# Patient Record
Sex: FEMALE | Race: ASIAN | NOT HISPANIC OR LATINO | Employment: UNEMPLOYED | URBAN - METROPOLITAN AREA
[De-identification: names, ages, dates, MRNs, and addresses within clinical notes are randomized per-mention and may not be internally consistent; named-entity substitution may affect disease eponyms.]

---

## 2017-01-11 ENCOUNTER — COMMUNICATION - HEALTHEAST (OUTPATIENT)
Dept: NURSING | Facility: CLINIC | Age: 57
End: 2017-01-11

## 2017-02-07 ENCOUNTER — OFFICE VISIT - HEALTHEAST (OUTPATIENT)
Dept: FAMILY MEDICINE | Facility: CLINIC | Age: 57
End: 2017-02-07

## 2017-02-07 DIAGNOSIS — R52 PAIN: ICD-10-CM

## 2017-02-07 DIAGNOSIS — E11.9 TYPE 2 DIABETES MELLITUS WITHOUT COMPLICATION, WITH LONG-TERM CURRENT USE OF INSULIN (H): ICD-10-CM

## 2017-02-07 DIAGNOSIS — Z79.4 TYPE 2 DIABETES MELLITUS WITHOUT COMPLICATION, WITH LONG-TERM CURRENT USE OF INSULIN (H): ICD-10-CM

## 2017-02-07 DIAGNOSIS — E55.9 VITAMIN D DEFICIENCY: ICD-10-CM

## 2017-02-07 LAB — HBA1C MFR BLD: 10.8 % (ref 3.5–6)

## 2017-02-07 ASSESSMENT — MIFFLIN-ST. JEOR: SCORE: 1190.05

## 2017-02-21 ENCOUNTER — OFFICE VISIT - HEALTHEAST (OUTPATIENT)
Dept: FAMILY MEDICINE | Facility: CLINIC | Age: 57
End: 2017-02-21

## 2017-02-21 DIAGNOSIS — R35.0 URINARY FREQUENCY: ICD-10-CM

## 2017-02-21 DIAGNOSIS — M54.50 ACUTE RIGHT-SIDED LOW BACK PAIN WITHOUT SCIATICA: ICD-10-CM

## 2017-02-21 DIAGNOSIS — R50.9 FEVER: ICD-10-CM

## 2017-02-21 DIAGNOSIS — J02.0 STREP PHARYNGITIS: ICD-10-CM

## 2017-02-21 DIAGNOSIS — J02.9 SORE THROAT: ICD-10-CM

## 2017-02-21 ASSESSMENT — MIFFLIN-ST. JEOR: SCORE: 1187.78

## 2017-02-27 ENCOUNTER — RECORDS - HEALTHEAST (OUTPATIENT)
Dept: ADMINISTRATIVE | Facility: OTHER | Age: 57
End: 2017-02-27

## 2017-03-02 ENCOUNTER — OFFICE VISIT - HEALTHEAST (OUTPATIENT)
Dept: NURSING | Facility: CLINIC | Age: 57
End: 2017-03-02

## 2017-03-02 DIAGNOSIS — E55.9 VITAMIN D DEFICIENCY: ICD-10-CM

## 2017-03-02 DIAGNOSIS — Z79.4 TYPE 2 DIABETES MELLITUS WITHOUT COMPLICATION, WITH LONG-TERM CURRENT USE OF INSULIN (H): ICD-10-CM

## 2017-03-02 DIAGNOSIS — E78.49 OTHER HYPERLIPIDEMIA: ICD-10-CM

## 2017-03-02 DIAGNOSIS — E11.9 TYPE 2 DIABETES MELLITUS WITHOUT COMPLICATION, WITH LONG-TERM CURRENT USE OF INSULIN (H): ICD-10-CM

## 2017-03-02 DIAGNOSIS — Z79.899 POLYPHARMACY: ICD-10-CM

## 2017-03-02 DIAGNOSIS — F41.1 ANXIETY STATE: ICD-10-CM

## 2017-03-02 DIAGNOSIS — F33.1 MAJOR DEPRESSIVE DISORDER, RECURRENT EPISODE, MODERATE (H): ICD-10-CM

## 2017-03-08 ENCOUNTER — COMMUNICATION - HEALTHEAST (OUTPATIENT)
Dept: NURSING | Facility: CLINIC | Age: 57
End: 2017-03-08

## 2017-03-16 ENCOUNTER — AMBULATORY - HEALTHEAST (OUTPATIENT)
Dept: FAMILY MEDICINE | Facility: CLINIC | Age: 57
End: 2017-03-16

## 2017-03-16 ENCOUNTER — OFFICE VISIT - HEALTHEAST (OUTPATIENT)
Dept: NURSING | Facility: CLINIC | Age: 57
End: 2017-03-16

## 2017-03-16 DIAGNOSIS — J02.0 STREP THROAT: ICD-10-CM

## 2017-03-16 DIAGNOSIS — J02.0 STREP PHARYNGITIS: ICD-10-CM

## 2017-03-16 DIAGNOSIS — I10 ESSENTIAL HYPERTENSION WITH GOAL BLOOD PRESSURE LESS THAN 140/90: ICD-10-CM

## 2017-03-16 DIAGNOSIS — E11.9 TYPE 2 DIABETES MELLITUS WITHOUT COMPLICATION, WITH LONG-TERM CURRENT USE OF INSULIN (H): ICD-10-CM

## 2017-03-16 DIAGNOSIS — Z79.4 TYPE 2 DIABETES MELLITUS WITHOUT COMPLICATION, WITH LONG-TERM CURRENT USE OF INSULIN (H): ICD-10-CM

## 2017-04-19 ENCOUNTER — AMBULATORY - HEALTHEAST (OUTPATIENT)
Dept: EDUCATION SERVICES | Facility: CLINIC | Age: 57
End: 2017-04-19

## 2017-04-20 ENCOUNTER — COMMUNICATION - HEALTHEAST (OUTPATIENT)
Dept: NURSING | Facility: CLINIC | Age: 57
End: 2017-04-20

## 2017-04-22 ENCOUNTER — COMMUNICATION - HEALTHEAST (OUTPATIENT)
Dept: FAMILY MEDICINE | Facility: CLINIC | Age: 57
End: 2017-04-22

## 2017-04-22 DIAGNOSIS — I10 UNSPECIFIED ESSENTIAL HYPERTENSION: ICD-10-CM

## 2017-04-22 DIAGNOSIS — E11.9 TYPE 2 DIABETES MELLITUS (H): ICD-10-CM

## 2017-04-22 DIAGNOSIS — E78.5 HYPERLIPIDEMIA: ICD-10-CM

## 2017-05-19 ENCOUNTER — OFFICE VISIT - HEALTHEAST (OUTPATIENT)
Dept: FAMILY MEDICINE | Facility: CLINIC | Age: 57
End: 2017-05-19

## 2017-05-19 DIAGNOSIS — I10 ESSENTIAL HYPERTENSION WITH GOAL BLOOD PRESSURE LESS THAN 140/90: ICD-10-CM

## 2017-05-19 DIAGNOSIS — E11.9 TYPE 2 DIABETES MELLITUS WITHOUT COMPLICATION, WITH LONG-TERM CURRENT USE OF INSULIN (H): ICD-10-CM

## 2017-05-19 DIAGNOSIS — Z79.4 TYPE 2 DIABETES MELLITUS WITHOUT COMPLICATION, WITH LONG-TERM CURRENT USE OF INSULIN (H): ICD-10-CM

## 2017-05-19 DIAGNOSIS — F33.1 MAJOR DEPRESSIVE DISORDER, RECURRENT EPISODE, MODERATE (H): ICD-10-CM

## 2017-05-19 LAB — HBA1C MFR BLD: 8.7 % (ref 3.5–6)

## 2017-05-19 ASSESSMENT — MIFFLIN-ST. JEOR: SCORE: 1202.52

## 2017-05-19 NOTE — ASSESSMENT & PLAN NOTE
Unable to take ACE Inhibitor due to angioedema from lisinopril  Excellent control on current regimen. No changes. Recheck in 1 year.

## 2017-06-01 ENCOUNTER — COMMUNICATION - HEALTHEAST (OUTPATIENT)
Dept: NURSING | Facility: CLINIC | Age: 57
End: 2017-06-01

## 2017-07-05 ENCOUNTER — AMBULATORY - HEALTHEAST (OUTPATIENT)
Dept: EDUCATION SERVICES | Facility: CLINIC | Age: 57
End: 2017-07-05

## 2017-07-13 ENCOUNTER — COMMUNICATION - HEALTHEAST (OUTPATIENT)
Dept: NURSING | Facility: CLINIC | Age: 57
End: 2017-07-13

## 2017-08-29 ENCOUNTER — COMMUNICATION - HEALTHEAST (OUTPATIENT)
Dept: NURSING | Facility: CLINIC | Age: 57
End: 2017-08-29

## 2017-09-02 ENCOUNTER — COMMUNICATION - HEALTHEAST (OUTPATIENT)
Dept: FAMILY MEDICINE | Facility: CLINIC | Age: 57
End: 2017-09-02

## 2017-09-02 DIAGNOSIS — K31.9 DISEASE OF STOMACH AND DUODENUM: ICD-10-CM

## 2017-09-02 DIAGNOSIS — F32.A DEPRESSION: ICD-10-CM

## 2017-09-20 ENCOUNTER — AMBULATORY - HEALTHEAST (OUTPATIENT)
Dept: EDUCATION SERVICES | Facility: CLINIC | Age: 57
End: 2017-09-20

## 2017-09-20 DIAGNOSIS — F32.A DEPRESSION: ICD-10-CM

## 2017-09-20 DIAGNOSIS — I10 UNSPECIFIED ESSENTIAL HYPERTENSION: ICD-10-CM

## 2017-09-20 LAB — HBA1C MFR BLD: 10.1 % (ref 3.5–6)

## 2017-10-03 ENCOUNTER — COMMUNICATION - HEALTHEAST (OUTPATIENT)
Dept: NURSING | Facility: CLINIC | Age: 57
End: 2017-10-03

## 2017-10-10 ENCOUNTER — OFFICE VISIT - HEALTHEAST (OUTPATIENT)
Dept: FAMILY MEDICINE | Facility: CLINIC | Age: 57
End: 2017-10-10

## 2017-10-10 DIAGNOSIS — E11.9 TYPE 2 DIABETES MELLITUS WITHOUT COMPLICATION, WITH LONG-TERM CURRENT USE OF INSULIN (H): ICD-10-CM

## 2017-10-10 DIAGNOSIS — E78.5 HYPERLIPIDEMIA: ICD-10-CM

## 2017-10-10 DIAGNOSIS — Z23 NEED FOR VACCINATION: ICD-10-CM

## 2017-10-10 DIAGNOSIS — E55.9 VITAMIN D DEFICIENCY: ICD-10-CM

## 2017-10-10 DIAGNOSIS — Z79.4 TYPE 2 DIABETES MELLITUS WITHOUT COMPLICATION, WITH LONG-TERM CURRENT USE OF INSULIN (H): ICD-10-CM

## 2017-10-10 DIAGNOSIS — E78.49 OTHER HYPERLIPIDEMIA: ICD-10-CM

## 2017-10-10 DIAGNOSIS — I10 ESSENTIAL HYPERTENSION: ICD-10-CM

## 2017-10-10 ASSESSMENT — MIFFLIN-ST. JEOR: SCORE: 1182.67

## 2017-10-10 NOTE — ASSESSMENT & PLAN NOTE
She is unable to take an ACE inhibitor due to angioedema from lisinopril.  She had been on propranolol, but has not taken it in the last week and her blood pressure is very good.  Will have her remain off of it for now, with recheck at upcoming appointment with our pharmacist.

## 2017-10-10 NOTE — ASSESSMENT & PLAN NOTE
Diabetes isuncontrolled.  I think that she is probably getting hypoglycemic and then overeating, resulting in many uncontrolled high blood sugars.  She does not want to give herself more injections, so splitting her long-acting and short-acting insulin is not practical and she has declined doing this in the past as well.  I think she might benefit from trying Trulicity to reduce her appetite and potentially decrease her insulin needs, thus keeping her blood sugars more even overall.  Will have her meet with our pharmacist in this regard.

## 2017-10-16 ENCOUNTER — OFFICE VISIT - HEALTHEAST (OUTPATIENT)
Dept: NURSING | Facility: CLINIC | Age: 57
End: 2017-10-16

## 2017-10-16 DIAGNOSIS — E78.5 DYSLIPIDEMIA: ICD-10-CM

## 2017-11-02 ENCOUNTER — OFFICE VISIT - HEALTHEAST (OUTPATIENT)
Dept: NURSING | Facility: CLINIC | Age: 57
End: 2017-11-02

## 2017-11-02 DIAGNOSIS — E11.9 TYPE 2 DIABETES MELLITUS WITHOUT COMPLICATION, WITH LONG-TERM CURRENT USE OF INSULIN (H): ICD-10-CM

## 2017-11-02 DIAGNOSIS — E78.5 DYSLIPIDEMIA: ICD-10-CM

## 2017-11-02 DIAGNOSIS — E66.09 CLASS 1 OBESITY DUE TO EXCESS CALORIES WITH SERIOUS COMORBIDITY AND BODY MASS INDEX (BMI) OF 31.0 TO 31.9 IN ADULT: ICD-10-CM

## 2017-11-02 DIAGNOSIS — I10 ESSENTIAL HYPERTENSION WITH GOAL BLOOD PRESSURE LESS THAN 140/90: ICD-10-CM

## 2017-11-02 DIAGNOSIS — Z79.4 TYPE 2 DIABETES MELLITUS WITHOUT COMPLICATION, WITH LONG-TERM CURRENT USE OF INSULIN (H): ICD-10-CM

## 2017-11-02 DIAGNOSIS — E66.811 CLASS 1 OBESITY DUE TO EXCESS CALORIES WITH SERIOUS COMORBIDITY AND BODY MASS INDEX (BMI) OF 31.0 TO 31.9 IN ADULT: ICD-10-CM

## 2017-11-02 DIAGNOSIS — M25.562 ACUTE PAIN OF LEFT KNEE: ICD-10-CM

## 2017-11-02 LAB
CHOLEST SERPL-MCNC: 150 MG/DL
FASTING STATUS PATIENT QL REPORTED: NO
HDLC SERPL-MCNC: 48 MG/DL
LDLC SERPL CALC-MCNC: 87 MG/DL
TRIGL SERPL-MCNC: 75 MG/DL

## 2017-11-15 ENCOUNTER — COMMUNICATION - HEALTHEAST (OUTPATIENT)
Dept: FAMILY MEDICINE | Facility: CLINIC | Age: 57
End: 2017-11-15

## 2017-12-20 ENCOUNTER — COMMUNICATION - HEALTHEAST (OUTPATIENT)
Dept: NURSING | Facility: CLINIC | Age: 57
End: 2017-12-20

## 2018-01-10 ENCOUNTER — OFFICE VISIT - HEALTHEAST (OUTPATIENT)
Dept: FAMILY MEDICINE | Facility: CLINIC | Age: 58
End: 2018-01-10

## 2018-01-10 DIAGNOSIS — I10 ESSENTIAL HYPERTENSION: ICD-10-CM

## 2018-01-10 DIAGNOSIS — Z79.4 TYPE 2 DIABETES MELLITUS WITHOUT COMPLICATION, WITH LONG-TERM CURRENT USE OF INSULIN (H): ICD-10-CM

## 2018-01-10 DIAGNOSIS — M25.562 LEFT KNEE PAIN: ICD-10-CM

## 2018-01-10 DIAGNOSIS — F33.1 MAJOR DEPRESSIVE DISORDER, RECURRENT EPISODE, MODERATE (H): ICD-10-CM

## 2018-01-10 DIAGNOSIS — E11.9 TYPE 2 DIABETES MELLITUS (H): ICD-10-CM

## 2018-01-10 DIAGNOSIS — E11.9 TYPE 2 DIABETES MELLITUS WITHOUT COMPLICATION, WITH LONG-TERM CURRENT USE OF INSULIN (H): ICD-10-CM

## 2018-01-10 DIAGNOSIS — E55.9 VITAMIN D DEFICIENCY: ICD-10-CM

## 2018-01-10 DIAGNOSIS — R10.9 RIGHT FLANK PAIN: ICD-10-CM

## 2018-01-10 LAB
ALBUMIN SERPL-MCNC: 3.6 G/DL (ref 3.5–5)
ALBUMIN UR-MCNC: NEGATIVE MG/DL
ALP SERPL-CCNC: 88 U/L (ref 45–120)
ALT SERPL W P-5'-P-CCNC: 31 U/L (ref 0–45)
ANION GAP SERPL CALCULATED.3IONS-SCNC: 13 MMOL/L (ref 5–18)
APPEARANCE UR: CLEAR
AST SERPL W P-5'-P-CCNC: 25 U/L (ref 0–40)
BILIRUB SERPL-MCNC: 0.7 MG/DL (ref 0–1)
BILIRUB UR QL STRIP: NEGATIVE
BUN SERPL-MCNC: 15 MG/DL (ref 8–22)
CALCIUM SERPL-MCNC: 9 MG/DL (ref 8.5–10.5)
CHLORIDE BLD-SCNC: 104 MMOL/L (ref 98–107)
CO2 SERPL-SCNC: 22 MMOL/L (ref 22–31)
COLOR UR AUTO: YELLOW
CREAT SERPL-MCNC: 0.83 MG/DL (ref 0.6–1.1)
CREAT UR-MCNC: 104.7 MG/DL
GFR SERPL CREATININE-BSD FRML MDRD: >60 ML/MIN/1.73M2
GLUCOSE BLD-MCNC: 204 MG/DL (ref 70–125)
GLUCOSE UR STRIP-MCNC: NEGATIVE MG/DL
HBA1C MFR BLD: 9.8 % (ref 3.5–6)
HGB UR QL STRIP: NEGATIVE
KETONES UR STRIP-MCNC: NEGATIVE MG/DL
LEUKOCYTE ESTERASE UR QL STRIP: NEGATIVE
MICROALBUMIN UR-MCNC: 0.88 MG/DL (ref 0–1.99)
MICROALBUMIN/CREAT UR: 8.4 MG/G
NITRATE UR QL: NEGATIVE
PH UR STRIP: 6 [PH] (ref 5–8)
POTASSIUM BLD-SCNC: 4.2 MMOL/L (ref 3.5–5)
PROT SERPL-MCNC: 7.4 G/DL (ref 6–8)
SODIUM SERPL-SCNC: 139 MMOL/L (ref 136–145)
SP GR UR STRIP: 1.01 (ref 1–1.03)
URATE SERPL-MCNC: 3.6 MG/DL (ref 2–7.5)
UROBILINOGEN UR STRIP-ACNC: NORMAL

## 2018-01-10 ASSESSMENT — MIFFLIN-ST. JEOR: SCORE: 1175.87

## 2018-01-10 NOTE — ASSESSMENT & PLAN NOTE
Poorly controlled on current regimen.  Will have her see our pharmacist for adjustements (discussed with PharmD Derrell Thorpe).

## 2018-01-10 NOTE — ASSESSMENT & PLAN NOTE
Borderline control on no meds.  Unable to take ACEInhibitor due to angioedema from lisinopril  Was on propranolol in the past, but we had stopped it because it hadn't seemed necessary (she had come to clinic 10/10/17 after not taking it for a week and blood pressure was good).  Continue to watch carefully.

## 2018-01-10 NOTE — ASSESSMENT & PLAN NOTE
I suspect she may have a ligamentous or meniscal injury given her relatively negative x-ray with history of swelling.  We discussed next steps, whichmight include an MRI, the patient declined at this time.

## 2018-01-11 LAB — 25(OH)D3 SERPL-MCNC: 42.9 NG/ML (ref 30–80)

## 2018-01-17 ENCOUNTER — COMMUNICATION - HEALTHEAST (OUTPATIENT)
Dept: FAMILY MEDICINE | Facility: CLINIC | Age: 58
End: 2018-01-17

## 2018-01-22 ENCOUNTER — OFFICE VISIT - HEALTHEAST (OUTPATIENT)
Dept: NURSING | Facility: CLINIC | Age: 58
End: 2018-01-22

## 2018-01-22 DIAGNOSIS — M25.569 ARTHRALGIA OF KNEE, UNSPECIFIED LATERALITY: ICD-10-CM

## 2018-02-01 ENCOUNTER — COMMUNICATION - HEALTHEAST (OUTPATIENT)
Dept: NURSING | Facility: CLINIC | Age: 58
End: 2018-02-01

## 2018-02-07 ENCOUNTER — AMBULATORY - HEALTHEAST (OUTPATIENT)
Dept: EDUCATION SERVICES | Facility: CLINIC | Age: 58
End: 2018-02-07

## 2018-02-18 ENCOUNTER — COMMUNICATION - HEALTHEAST (OUTPATIENT)
Dept: FAMILY MEDICINE | Facility: CLINIC | Age: 58
End: 2018-02-18

## 2018-02-18 DIAGNOSIS — R52 PAIN: ICD-10-CM

## 2018-02-18 DIAGNOSIS — E78.5 HYPERLIPIDEMIA: ICD-10-CM

## 2018-02-22 ENCOUNTER — OFFICE VISIT - HEALTHEAST (OUTPATIENT)
Dept: NURSING | Facility: CLINIC | Age: 58
End: 2018-02-22

## 2018-02-22 DIAGNOSIS — Z79.4 TYPE 2 DIABETES MELLITUS WITHOUT COMPLICATION, WITH LONG-TERM CURRENT USE OF INSULIN (H): ICD-10-CM

## 2018-02-22 DIAGNOSIS — E11.9 TYPE 2 DIABETES MELLITUS WITHOUT COMPLICATION, WITH LONG-TERM CURRENT USE OF INSULIN (H): ICD-10-CM

## 2018-03-12 ENCOUNTER — RECORDS - HEALTHEAST (OUTPATIENT)
Dept: ADMINISTRATIVE | Facility: OTHER | Age: 58
End: 2018-03-12

## 2018-03-14 ENCOUNTER — COMMUNICATION - HEALTHEAST (OUTPATIENT)
Dept: NURSING | Facility: CLINIC | Age: 58
End: 2018-03-14

## 2018-03-19 ENCOUNTER — AMBULATORY - HEALTHEAST (OUTPATIENT)
Dept: NURSING | Facility: CLINIC | Age: 58
End: 2018-03-19

## 2018-03-21 ENCOUNTER — OFFICE VISIT - HEALTHEAST (OUTPATIENT)
Dept: NURSING | Facility: CLINIC | Age: 58
End: 2018-03-21

## 2018-03-21 DIAGNOSIS — Z79.4 TYPE 2 DIABETES MELLITUS WITHOUT COMPLICATION, WITH LONG-TERM CURRENT USE OF INSULIN (H): ICD-10-CM

## 2018-03-21 DIAGNOSIS — I10 ESSENTIAL HYPERTENSION WITH GOAL BLOOD PRESSURE LESS THAN 140/90: ICD-10-CM

## 2018-03-21 DIAGNOSIS — E11.9 TYPE 2 DIABETES MELLITUS WITHOUT COMPLICATION, WITH LONG-TERM CURRENT USE OF INSULIN (H): ICD-10-CM

## 2018-03-21 DIAGNOSIS — G89.29 OTHER CHRONIC PAIN: ICD-10-CM

## 2018-04-11 ENCOUNTER — RECORDS - HEALTHEAST (OUTPATIENT)
Dept: ADMINISTRATIVE | Facility: OTHER | Age: 58
End: 2018-04-11

## 2018-04-13 ENCOUNTER — OFFICE VISIT - HEALTHEAST (OUTPATIENT)
Dept: FAMILY MEDICINE | Facility: CLINIC | Age: 58
End: 2018-04-13

## 2018-04-13 DIAGNOSIS — E78.49 OTHER HYPERLIPIDEMIA: ICD-10-CM

## 2018-04-13 DIAGNOSIS — Z00.00 ROUTINE GENERAL MEDICAL EXAMINATION AT A HEALTH CARE FACILITY: ICD-10-CM

## 2018-04-13 DIAGNOSIS — F33.1 MAJOR DEPRESSIVE DISORDER, RECURRENT EPISODE, MODERATE (H): ICD-10-CM

## 2018-04-13 DIAGNOSIS — R74.02 NONSPECIFIC ELEVATION OF LEVELS OF TRANSAMINASE OR LACTIC ACID DEHYDROGENASE (LDH): ICD-10-CM

## 2018-04-13 DIAGNOSIS — I10 ESSENTIAL HYPERTENSION: ICD-10-CM

## 2018-04-13 DIAGNOSIS — E11.9 TYPE 2 DIABETES MELLITUS WITHOUT COMPLICATION, WITH LONG-TERM CURRENT USE OF INSULIN (H): ICD-10-CM

## 2018-04-13 DIAGNOSIS — Z79.4 TYPE 2 DIABETES MELLITUS WITHOUT COMPLICATION, WITH LONG-TERM CURRENT USE OF INSULIN (H): ICD-10-CM

## 2018-04-13 DIAGNOSIS — R74.01 NONSPECIFIC ELEVATION OF LEVELS OF TRANSAMINASE OR LACTIC ACID DEHYDROGENASE (LDH): ICD-10-CM

## 2018-04-13 LAB
ALBUMIN SERPL-MCNC: 3.7 G/DL (ref 3.5–5)
ALP SERPL-CCNC: 89 U/L (ref 45–120)
ALT SERPL W P-5'-P-CCNC: 35 U/L (ref 0–45)
ANION GAP SERPL CALCULATED.3IONS-SCNC: 11 MMOL/L (ref 5–18)
AST SERPL W P-5'-P-CCNC: 25 U/L (ref 0–40)
BILIRUB DIRECT SERPL-MCNC: 0.2 MG/DL
BILIRUB SERPL-MCNC: 0.7 MG/DL (ref 0–1)
BUN SERPL-MCNC: 14 MG/DL (ref 8–22)
CALCIUM SERPL-MCNC: 9.4 MG/DL (ref 8.5–10.5)
CHLORIDE BLD-SCNC: 105 MMOL/L (ref 98–107)
CO2 SERPL-SCNC: 24 MMOL/L (ref 22–31)
CREAT SERPL-MCNC: 0.78 MG/DL (ref 0.6–1.1)
GFR SERPL CREATININE-BSD FRML MDRD: >60 ML/MIN/1.73M2
GLUCOSE BLD-MCNC: 200 MG/DL (ref 70–125)
HBA1C MFR BLD: 8.8 % (ref 3.5–6)
POTASSIUM BLD-SCNC: 4 MMOL/L (ref 3.5–5)
PROT SERPL-MCNC: 7.4 G/DL (ref 6–8)
SODIUM SERPL-SCNC: 140 MMOL/L (ref 136–145)

## 2018-04-13 ASSESSMENT — MIFFLIN-ST. JEOR: SCORE: 1197.42

## 2018-04-13 NOTE — ASSESSMENT & PLAN NOTE
Reports eye exam last month at a different clinic; will bring info to next appt.    Patient Instructions   Insulin:  27 Units every morning  35 Units every evening for normal meals, increase to 40 Units if bigger meal or having dessert.

## 2018-04-25 ENCOUNTER — COMMUNICATION - HEALTHEAST (OUTPATIENT)
Dept: NURSING | Facility: CLINIC | Age: 58
End: 2018-04-25

## 2018-05-09 ENCOUNTER — RECORDS - HEALTHEAST (OUTPATIENT)
Dept: MAMMOGRAPHY | Facility: CLINIC | Age: 58
End: 2018-05-09

## 2018-05-09 ENCOUNTER — OFFICE VISIT - HEALTHEAST (OUTPATIENT)
Dept: FAMILY MEDICINE | Facility: CLINIC | Age: 58
End: 2018-05-09

## 2018-05-09 DIAGNOSIS — Z12.31 ENCOUNTER FOR SCREENING MAMMOGRAM FOR MALIGNANT NEOPLASM OF BREAST: ICD-10-CM

## 2018-05-09 DIAGNOSIS — Z12.4 SCREENING FOR CERVICAL CANCER: ICD-10-CM

## 2018-05-09 DIAGNOSIS — R30.0 DYSURIA: ICD-10-CM

## 2018-05-09 DIAGNOSIS — M77.12 LATERAL EPICONDYLITIS, LEFT ELBOW: ICD-10-CM

## 2018-05-09 DIAGNOSIS — M79.622 LEFT AXILLARY PAIN: ICD-10-CM

## 2018-05-09 LAB
ALBUMIN UR-MCNC: NEGATIVE MG/DL
APPEARANCE UR: CLEAR
BACTERIA #/AREA URNS HPF: ABNORMAL HPF
BILIRUB UR QL STRIP: NEGATIVE
COLOR UR AUTO: YELLOW
GLUCOSE UR STRIP-MCNC: ABNORMAL MG/DL
HGB UR QL STRIP: NEGATIVE
KETONES UR STRIP-MCNC: NEGATIVE MG/DL
LEUKOCYTE ESTERASE UR QL STRIP: ABNORMAL
NITRATE UR QL: NEGATIVE
PH UR STRIP: 6 [PH] (ref 5–8)
RBC #/AREA URNS AUTO: ABNORMAL HPF
SP GR UR STRIP: <=1.005 (ref 1–1.03)
SQUAMOUS #/AREA URNS AUTO: ABNORMAL LPF
UROBILINOGEN UR STRIP-ACNC: ABNORMAL
WBC #/AREA URNS AUTO: ABNORMAL HPF

## 2018-05-09 ASSESSMENT — MIFFLIN-ST. JEOR: SCORE: 1189.48

## 2018-05-10 ENCOUNTER — COMMUNICATION - HEALTHEAST (OUTPATIENT)
Dept: FAMILY MEDICINE | Facility: CLINIC | Age: 58
End: 2018-05-10

## 2018-05-10 LAB
BACTERIA SPEC CULT: NO GROWTH
HPV SOURCE: NORMAL
HUMAN PAPILLOMA VIRUS 16 DNA: NEGATIVE
HUMAN PAPILLOMA VIRUS 18 DNA: NEGATIVE
HUMAN PAPILLOMA VIRUS FINAL DIAGNOSIS: NORMAL
HUMAN PAPILLOMA VIRUS OTHER HR: NEGATIVE
SPECIMEN DESCRIPTION: NORMAL

## 2018-05-16 ENCOUNTER — COMMUNICATION - HEALTHEAST (OUTPATIENT)
Dept: FAMILY MEDICINE | Facility: CLINIC | Age: 58
End: 2018-05-16

## 2018-05-29 ENCOUNTER — COMMUNICATION - HEALTHEAST (OUTPATIENT)
Dept: NURSING | Facility: CLINIC | Age: 58
End: 2018-05-29

## 2018-06-04 ENCOUNTER — COMMUNICATION - HEALTHEAST (OUTPATIENT)
Dept: NURSING | Facility: CLINIC | Age: 58
End: 2018-06-04

## 2018-06-05 ENCOUNTER — OFFICE VISIT - HEALTHEAST (OUTPATIENT)
Dept: FAMILY MEDICINE | Facility: CLINIC | Age: 58
End: 2018-06-05

## 2018-06-05 DIAGNOSIS — M54.50 ACUTE RIGHT-SIDED LOW BACK PAIN WITHOUT SCIATICA: ICD-10-CM

## 2018-06-05 DIAGNOSIS — R30.0 BURNING WITH URINATION: ICD-10-CM

## 2018-06-05 DIAGNOSIS — N30.00 ACUTE CYSTITIS WITHOUT HEMATURIA: ICD-10-CM

## 2018-06-05 LAB
ALBUMIN UR-MCNC: NEGATIVE MG/DL
APPEARANCE UR: ABNORMAL
BACTERIA #/AREA URNS HPF: ABNORMAL HPF
BILIRUB UR QL STRIP: NEGATIVE
COLOR UR AUTO: YELLOW
GLUCOSE UR STRIP-MCNC: ABNORMAL MG/DL
HGB UR QL STRIP: ABNORMAL
KETONES UR STRIP-MCNC: NEGATIVE MG/DL
LEUKOCYTE ESTERASE UR QL STRIP: ABNORMAL
MUCOUS THREADS #/AREA URNS LPF: ABNORMAL LPF
NITRATE UR QL: NEGATIVE
PH UR STRIP: 7 [PH] (ref 5–8)
RBC #/AREA URNS AUTO: ABNORMAL HPF
SP GR UR STRIP: 1.02 (ref 1–1.03)
SQUAMOUS #/AREA URNS AUTO: ABNORMAL LPF
TRANS CELLS #/AREA URNS HPF: ABNORMAL LPF
UROBILINOGEN UR STRIP-ACNC: ABNORMAL
WBC #/AREA URNS AUTO: ABNORMAL HPF
WBC CLUMPS #/AREA URNS HPF: PRESENT /[HPF]

## 2018-06-05 ASSESSMENT — MIFFLIN-ST. JEOR: SCORE: 1179.27

## 2018-06-06 ENCOUNTER — OFFICE VISIT - HEALTHEAST (OUTPATIENT)
Dept: PHARMACY | Facility: CLINIC | Age: 58
End: 2018-06-06

## 2018-06-06 DIAGNOSIS — R12 HEARTBURN: ICD-10-CM

## 2018-06-06 DIAGNOSIS — M25.562 CHRONIC PAIN OF LEFT KNEE: ICD-10-CM

## 2018-06-06 DIAGNOSIS — E11.9 TYPE 2 DIABETES MELLITUS WITHOUT COMPLICATION, WITH LONG-TERM CURRENT USE OF INSULIN (H): ICD-10-CM

## 2018-06-06 DIAGNOSIS — G89.29 CHRONIC PAIN OF LEFT KNEE: ICD-10-CM

## 2018-06-06 DIAGNOSIS — Z79.4 TYPE 2 DIABETES MELLITUS WITHOUT COMPLICATION, WITH LONG-TERM CURRENT USE OF INSULIN (H): ICD-10-CM

## 2018-06-06 DIAGNOSIS — E78.49 OTHER HYPERLIPIDEMIA: ICD-10-CM

## 2018-06-07 LAB — BACTERIA SPEC CULT: ABNORMAL

## 2018-06-27 ENCOUNTER — OFFICE VISIT - HEALTHEAST (OUTPATIENT)
Dept: FAMILY MEDICINE | Facility: CLINIC | Age: 58
End: 2018-06-27

## 2018-06-27 DIAGNOSIS — Z23 IMMUNIZATION DUE: ICD-10-CM

## 2018-06-27 DIAGNOSIS — R30.0 DYSURIA: ICD-10-CM

## 2018-06-27 DIAGNOSIS — M62.838 MUSCLE SPASM: ICD-10-CM

## 2018-06-27 LAB
ALBUMIN UR-MCNC: NEGATIVE MG/DL
APPEARANCE UR: CLEAR
BACTERIA #/AREA URNS HPF: ABNORMAL HPF
BILIRUB UR QL STRIP: NEGATIVE
COLOR UR AUTO: YELLOW
GLUCOSE UR STRIP-MCNC: ABNORMAL MG/DL
HGB UR QL STRIP: NEGATIVE
KETONES UR STRIP-MCNC: NEGATIVE MG/DL
LEUKOCYTE ESTERASE UR QL STRIP: ABNORMAL
NITRATE UR QL: NEGATIVE
PH UR STRIP: 6.5 [PH] (ref 5–8)
RBC #/AREA URNS AUTO: ABNORMAL HPF
SP GR UR STRIP: 1.01 (ref 1–1.03)
SQUAMOUS #/AREA URNS AUTO: ABNORMAL LPF
UROBILINOGEN UR STRIP-ACNC: ABNORMAL
WBC #/AREA URNS AUTO: ABNORMAL HPF

## 2018-06-27 ASSESSMENT — MIFFLIN-ST. JEOR: SCORE: 1172.47

## 2018-06-30 LAB
BACTERIA SPEC CULT: ABNORMAL
BACTERIA SPEC CULT: ABNORMAL

## 2018-07-02 ENCOUNTER — AMBULATORY - HEALTHEAST (OUTPATIENT)
Dept: FAMILY MEDICINE | Facility: CLINIC | Age: 58
End: 2018-07-02

## 2018-07-11 ENCOUNTER — OFFICE VISIT - HEALTHEAST (OUTPATIENT)
Dept: FAMILY MEDICINE | Facility: CLINIC | Age: 58
End: 2018-07-11

## 2018-07-11 ENCOUNTER — COMMUNICATION - HEALTHEAST (OUTPATIENT)
Dept: FAMILY MEDICINE | Facility: CLINIC | Age: 58
End: 2018-07-11

## 2018-07-11 DIAGNOSIS — K31.9 DISEASE OF STOMACH AND DUODENUM: ICD-10-CM

## 2018-07-11 DIAGNOSIS — N39.0 RECURRENT UTI (URINARY TRACT INFECTION): ICD-10-CM

## 2018-07-11 DIAGNOSIS — M54.50 LUMBAGO: ICD-10-CM

## 2018-07-11 ASSESSMENT — MIFFLIN-ST. JEOR: SCORE: 1174.17

## 2018-07-13 ENCOUNTER — HOSPITAL ENCOUNTER (OUTPATIENT)
Dept: CT IMAGING | Facility: HOSPITAL | Age: 58
Discharge: HOME | End: 2018-07-13
Attending: FAMILY MEDICINE

## 2018-07-13 DIAGNOSIS — N39.0 RECURRENT UTI (URINARY TRACT INFECTION): ICD-10-CM

## 2018-07-17 ENCOUNTER — COMMUNICATION - HEALTHEAST (OUTPATIENT)
Dept: NURSING | Facility: CLINIC | Age: 58
End: 2018-07-17

## 2018-07-25 ENCOUNTER — AMBULATORY - HEALTHEAST (OUTPATIENT)
Dept: LAB | Facility: CLINIC | Age: 58
End: 2018-07-25

## 2018-07-25 DIAGNOSIS — N39.0 RECURRENT UTI (URINARY TRACT INFECTION): ICD-10-CM

## 2018-07-25 LAB
ALBUMIN UR-MCNC: NEGATIVE MG/DL
APPEARANCE UR: CLEAR
BACTERIA #/AREA URNS HPF: ABNORMAL HPF
BILIRUB UR QL STRIP: NEGATIVE
COLOR UR AUTO: YELLOW
GLUCOSE UR STRIP-MCNC: NEGATIVE MG/DL
HGB UR QL STRIP: NEGATIVE
KETONES UR STRIP-MCNC: NEGATIVE MG/DL
LEUKOCYTE ESTERASE UR QL STRIP: ABNORMAL
NITRATE UR QL: NEGATIVE
PH UR STRIP: 6.5 [PH] (ref 5–8)
RBC #/AREA URNS AUTO: ABNORMAL HPF
SP GR UR STRIP: 1.01 (ref 1–1.03)
SQUAMOUS #/AREA URNS AUTO: ABNORMAL LPF
UROBILINOGEN UR STRIP-ACNC: ABNORMAL
WBC #/AREA URNS AUTO: ABNORMAL HPF

## 2018-07-26 LAB — BACTERIA SPEC CULT: NO GROWTH

## 2018-07-27 ENCOUNTER — AMBULATORY - HEALTHEAST (OUTPATIENT)
Dept: FAMILY MEDICINE | Facility: CLINIC | Age: 58
End: 2018-07-27

## 2018-07-27 DIAGNOSIS — R30.0 DYSURIA: ICD-10-CM

## 2018-08-13 ENCOUNTER — AMBULATORY - HEALTHEAST (OUTPATIENT)
Dept: LAB | Facility: CLINIC | Age: 58
End: 2018-08-13

## 2018-08-13 ENCOUNTER — COMMUNICATION - HEALTHEAST (OUTPATIENT)
Dept: ADMINISTRATIVE | Facility: CLINIC | Age: 58
End: 2018-08-13

## 2018-08-13 ENCOUNTER — COMMUNICATION - HEALTHEAST (OUTPATIENT)
Dept: PHARMACY | Facility: CLINIC | Age: 58
End: 2018-08-13

## 2018-08-13 ENCOUNTER — OFFICE VISIT - HEALTHEAST (OUTPATIENT)
Dept: PHARMACY | Facility: CLINIC | Age: 58
End: 2018-08-13

## 2018-08-13 DIAGNOSIS — M25.562 CHRONIC PAIN OF LEFT KNEE: ICD-10-CM

## 2018-08-13 DIAGNOSIS — E11.42 DIABETIC POLYNEUROPATHY ASSOCIATED WITH TYPE 2 DIABETES MELLITUS (H): ICD-10-CM

## 2018-08-13 DIAGNOSIS — E11.9 TYPE 2 DIABETES MELLITUS WITHOUT COMPLICATION, WITH LONG-TERM CURRENT USE OF INSULIN (H): ICD-10-CM

## 2018-08-13 DIAGNOSIS — Z79.4 TYPE 2 DIABETES MELLITUS WITHOUT COMPLICATION, WITH LONG-TERM CURRENT USE OF INSULIN (H): ICD-10-CM

## 2018-08-13 DIAGNOSIS — G89.29 CHRONIC PAIN OF LEFT KNEE: ICD-10-CM

## 2018-08-13 DIAGNOSIS — R30.0 DYSURIA: ICD-10-CM

## 2018-08-13 DIAGNOSIS — E78.49 OTHER HYPERLIPIDEMIA: ICD-10-CM

## 2018-08-13 LAB
ALBUMIN UR-MCNC: NEGATIVE MG/DL
APPEARANCE UR: CLEAR
BILIRUB UR QL STRIP: NEGATIVE
COLOR UR AUTO: YELLOW
GLUCOSE UR STRIP-MCNC: NEGATIVE MG/DL
HBA1C MFR BLD: 8.5 % (ref 3.5–6)
HGB UR QL STRIP: NEGATIVE
KETONES UR STRIP-MCNC: NEGATIVE MG/DL
LEUKOCYTE ESTERASE UR QL STRIP: NEGATIVE
NITRATE UR QL: NEGATIVE
PH UR STRIP: 6 [PH] (ref 5–8)
SP GR UR STRIP: 1.01 (ref 1–1.03)
UROBILINOGEN UR STRIP-ACNC: NORMAL

## 2018-08-28 ENCOUNTER — COMMUNICATION - HEALTHEAST (OUTPATIENT)
Dept: NURSING | Facility: CLINIC | Age: 58
End: 2018-08-28

## 2018-08-31 ENCOUNTER — RECORDS - HEALTHEAST (OUTPATIENT)
Dept: ADMINISTRATIVE | Facility: OTHER | Age: 58
End: 2018-08-31

## 2018-09-19 ENCOUNTER — OFFICE VISIT - HEALTHEAST (OUTPATIENT)
Dept: PHARMACY | Facility: CLINIC | Age: 58
End: 2018-09-19

## 2018-09-19 DIAGNOSIS — F32.A DEPRESSION, UNSPECIFIED DEPRESSION TYPE: ICD-10-CM

## 2018-09-19 DIAGNOSIS — E78.5 HYPERLIPIDEMIA, UNSPECIFIED HYPERLIPIDEMIA TYPE: ICD-10-CM

## 2018-09-19 DIAGNOSIS — E11.649 UNCONTROLLED TYPE 2 DIABETES MELLITUS WITH HYPOGLYCEMIA WITHOUT COMA, WITH LONG-TERM CURRENT USE OF INSULIN (H): ICD-10-CM

## 2018-09-19 DIAGNOSIS — Z79.4 UNCONTROLLED TYPE 2 DIABETES MELLITUS WITH HYPOGLYCEMIA WITHOUT COMA, WITH LONG-TERM CURRENT USE OF INSULIN (H): ICD-10-CM

## 2018-09-19 DIAGNOSIS — E55.9 VITAMIN D DEFICIENCY: ICD-10-CM

## 2018-09-19 DIAGNOSIS — E11.42 DIABETIC PERIPHERAL NEUROPATHY (H): ICD-10-CM

## 2018-10-04 ENCOUNTER — COMMUNICATION - HEALTHEAST (OUTPATIENT)
Dept: NURSING | Facility: CLINIC | Age: 58
End: 2018-10-04

## 2018-10-10 ENCOUNTER — AMBULATORY - HEALTHEAST (OUTPATIENT)
Dept: EDUCATION SERVICES | Facility: CLINIC | Age: 58
End: 2018-10-10

## 2018-10-10 DIAGNOSIS — Z79.4 TYPE 2 DIABETES MELLITUS WITHOUT COMPLICATION, WITH LONG-TERM CURRENT USE OF INSULIN (H): ICD-10-CM

## 2018-10-10 DIAGNOSIS — E11.9 TYPE 2 DIABETES MELLITUS WITHOUT COMPLICATION, WITH LONG-TERM CURRENT USE OF INSULIN (H): ICD-10-CM

## 2018-10-11 ENCOUNTER — AMBULATORY - HEALTHEAST (OUTPATIENT)
Dept: FAMILY MEDICINE | Facility: CLINIC | Age: 58
End: 2018-10-11

## 2018-10-11 DIAGNOSIS — Z79.4 TYPE 2 DIABETES MELLITUS WITHOUT COMPLICATION, WITH LONG-TERM CURRENT USE OF INSULIN (H): ICD-10-CM

## 2018-10-11 DIAGNOSIS — E11.9 TYPE 2 DIABETES MELLITUS WITHOUT COMPLICATION, WITH LONG-TERM CURRENT USE OF INSULIN (H): ICD-10-CM

## 2018-11-03 ENCOUNTER — RECORDS - HEALTHEAST (OUTPATIENT)
Dept: ADMINISTRATIVE | Facility: OTHER | Age: 58
End: 2018-11-03

## 2018-11-13 ENCOUNTER — COMMUNICATION - HEALTHEAST (OUTPATIENT)
Dept: NURSING | Facility: CLINIC | Age: 58
End: 2018-11-13

## 2018-11-14 ENCOUNTER — OFFICE VISIT - HEALTHEAST (OUTPATIENT)
Dept: EDUCATION SERVICES | Facility: CLINIC | Age: 58
End: 2018-11-14

## 2018-11-14 DIAGNOSIS — E11.9 TYPE 2 DIABETES MELLITUS WITHOUT COMPLICATION, WITH LONG-TERM CURRENT USE OF INSULIN (H): ICD-10-CM

## 2018-11-14 DIAGNOSIS — Z79.4 TYPE 2 DIABETES MELLITUS WITHOUT COMPLICATION, WITH LONG-TERM CURRENT USE OF INSULIN (H): ICD-10-CM

## 2018-11-14 LAB
CHOLEST SERPL-MCNC: 253 MG/DL
FASTING STATUS PATIENT QL REPORTED: NO
HBA1C MFR BLD: 9.4 % (ref 3.5–6)
HDLC SERPL-MCNC: 59 MG/DL
LDLC SERPL CALC-MCNC: 165 MG/DL
TRIGL SERPL-MCNC: 143 MG/DL

## 2018-11-15 ENCOUNTER — COMMUNICATION - HEALTHEAST (OUTPATIENT)
Dept: FAMILY MEDICINE | Facility: CLINIC | Age: 58
End: 2018-11-15

## 2019-01-02 ENCOUNTER — OFFICE VISIT - HEALTHEAST (OUTPATIENT)
Dept: EDUCATION SERVICES | Facility: CLINIC | Age: 59
End: 2019-01-02

## 2019-01-02 DIAGNOSIS — E11.9 TYPE 2 DIABETES MELLITUS (H): ICD-10-CM

## 2019-01-04 ENCOUNTER — OFFICE VISIT - HEALTHEAST (OUTPATIENT)
Dept: FAMILY MEDICINE | Facility: CLINIC | Age: 59
End: 2019-01-04

## 2019-01-04 DIAGNOSIS — R30.0 DYSURIA: ICD-10-CM

## 2019-01-04 LAB
ALBUMIN UR-MCNC: NEGATIVE MG/DL
APPEARANCE UR: CLEAR
BILIRUB UR QL STRIP: NEGATIVE
COLOR UR AUTO: YELLOW
GLUCOSE UR STRIP-MCNC: ABNORMAL MG/DL
HGB UR QL STRIP: NEGATIVE
KETONES UR STRIP-MCNC: NEGATIVE MG/DL
LEUKOCYTE ESTERASE UR QL STRIP: NEGATIVE
NITRATE UR QL: NEGATIVE
PH UR STRIP: 6 [PH] (ref 5–8)
SP GR UR STRIP: <=1.005 (ref 1–1.03)
UROBILINOGEN UR STRIP-ACNC: ABNORMAL

## 2019-01-04 ASSESSMENT — MIFFLIN-ST. JEOR: SCORE: 1190.05

## 2019-01-07 ENCOUNTER — COMMUNICATION - HEALTHEAST (OUTPATIENT)
Dept: NURSING | Facility: CLINIC | Age: 59
End: 2019-01-07

## 2019-01-10 ENCOUNTER — COMMUNICATION - HEALTHEAST (OUTPATIENT)
Dept: NURSING | Facility: CLINIC | Age: 59
End: 2019-01-10

## 2019-01-23 ENCOUNTER — COMMUNICATION - HEALTHEAST (OUTPATIENT)
Dept: FAMILY MEDICINE | Facility: CLINIC | Age: 59
End: 2019-01-23

## 2019-01-23 DIAGNOSIS — R52 PAIN: ICD-10-CM

## 2019-01-23 DIAGNOSIS — E11.9 TYPE 2 DIABETES MELLITUS (H): ICD-10-CM

## 2019-02-14 ENCOUNTER — COMMUNICATION - HEALTHEAST (OUTPATIENT)
Dept: NURSING | Facility: CLINIC | Age: 59
End: 2019-02-14

## 2019-02-15 ENCOUNTER — OFFICE VISIT - HEALTHEAST (OUTPATIENT)
Dept: FAMILY MEDICINE | Facility: CLINIC | Age: 59
End: 2019-02-15

## 2019-02-15 DIAGNOSIS — M77.12 LATERAL EPICONDYLITIS, LEFT ELBOW: ICD-10-CM

## 2019-02-15 DIAGNOSIS — I10 ESSENTIAL HYPERTENSION: ICD-10-CM

## 2019-02-15 DIAGNOSIS — Z79.4 TYPE 2 DIABETES MELLITUS WITHOUT COMPLICATION, WITH LONG-TERM CURRENT USE OF INSULIN (H): ICD-10-CM

## 2019-02-15 DIAGNOSIS — E11.9 TYPE 2 DIABETES MELLITUS WITHOUT COMPLICATION, WITH LONG-TERM CURRENT USE OF INSULIN (H): ICD-10-CM

## 2019-02-15 LAB
CREAT UR-MCNC: 47.1 MG/DL
HBA1C MFR BLD: 9.4 % (ref 3.5–6)
MICROALBUMIN UR-MCNC: 0.8 MG/DL (ref 0–1.99)
MICROALBUMIN/CREAT UR: 17 MG/G

## 2019-02-15 ASSESSMENT — MIFFLIN-ST. JEOR: SCORE: 1170.77

## 2019-02-15 NOTE — ASSESSMENT & PLAN NOTE
BP up a bit today.  Was on antihypertensive for awhile, but off recently.  Unable to take ACE Inhibitor due to angioedema from lisinopril.  Will start low-dose amlodipine.

## 2019-02-15 NOTE — ASSESSMENT & PLAN NOTE
Remains uncontrolled with A1C of 9.4 today.  Will have her see MTM pharmacist for insulin regimen adjustment.

## 2019-02-26 ENCOUNTER — OFFICE VISIT - HEALTHEAST (OUTPATIENT)
Dept: PHARMACY | Facility: CLINIC | Age: 59
End: 2019-02-26

## 2019-02-26 ENCOUNTER — COMMUNICATION - HEALTHEAST (OUTPATIENT)
Dept: ADMINISTRATIVE | Facility: CLINIC | Age: 59
End: 2019-02-26

## 2019-03-05 ENCOUNTER — COMMUNICATION - HEALTHEAST (OUTPATIENT)
Dept: PHARMACY | Facility: CLINIC | Age: 59
End: 2019-03-05

## 2019-03-19 ENCOUNTER — AMBULATORY - HEALTHEAST (OUTPATIENT)
Dept: CARE COORDINATION | Facility: CLINIC | Age: 59
End: 2019-03-19

## 2019-03-21 ENCOUNTER — COMMUNICATION - HEALTHEAST (OUTPATIENT)
Dept: CARE COORDINATION | Facility: CLINIC | Age: 59
End: 2019-03-21

## 2019-03-28 ENCOUNTER — COMMUNICATION - HEALTHEAST (OUTPATIENT)
Dept: NURSING | Facility: CLINIC | Age: 59
End: 2019-03-28

## 2019-04-03 ENCOUNTER — COMMUNICATION - HEALTHEAST (OUTPATIENT)
Dept: CARE COORDINATION | Facility: CLINIC | Age: 59
End: 2019-04-03

## 2019-04-09 ENCOUNTER — OFFICE VISIT - HEALTHEAST (OUTPATIENT)
Dept: FAMILY MEDICINE | Facility: CLINIC | Age: 59
End: 2019-04-09

## 2019-04-09 ENCOUNTER — AMBULATORY - HEALTHEAST (OUTPATIENT)
Dept: NURSING | Facility: CLINIC | Age: 59
End: 2019-04-09

## 2019-04-09 DIAGNOSIS — N39.3 FEMALE STRESS INCONTINENCE: ICD-10-CM

## 2019-04-09 DIAGNOSIS — M54.41 CHRONIC RIGHT-SIDED LOW BACK PAIN WITH RIGHT-SIDED SCIATICA: ICD-10-CM

## 2019-04-09 DIAGNOSIS — G89.29 CHRONIC RIGHT-SIDED LOW BACK PAIN WITH RIGHT-SIDED SCIATICA: ICD-10-CM

## 2019-04-09 DIAGNOSIS — R30.0 DYSURIA: ICD-10-CM

## 2019-04-09 LAB
ALBUMIN UR-MCNC: NEGATIVE MG/DL
ALBUMIN UR-MCNC: NEGATIVE MG/DL
APPEARANCE UR: CLEAR
APPEARANCE UR: CLEAR
BACTERIA #/AREA URNS HPF: ABNORMAL HPF
BACTERIA #/AREA URNS HPF: ABNORMAL HPF
BILIRUB UR QL STRIP: NEGATIVE
BILIRUB UR QL STRIP: NEGATIVE
COLOR UR AUTO: YELLOW
COLOR UR AUTO: YELLOW
GLUCOSE UR STRIP-MCNC: NEGATIVE MG/DL
GLUCOSE UR STRIP-MCNC: NEGATIVE MG/DL
HGB UR QL STRIP: NEGATIVE
HGB UR QL STRIP: NEGATIVE
KETONES UR STRIP-MCNC: NEGATIVE MG/DL
KETONES UR STRIP-MCNC: NEGATIVE MG/DL
LEUKOCYTE ESTERASE UR QL STRIP: ABNORMAL
LEUKOCYTE ESTERASE UR QL STRIP: ABNORMAL
NITRATE UR QL: NEGATIVE
NITRATE UR QL: NEGATIVE
PH UR STRIP: 6 [PH] (ref 5–8)
PH UR STRIP: 6 [PH] (ref 5–8)
RBC #/AREA URNS AUTO: ABNORMAL HPF
RBC #/AREA URNS AUTO: ABNORMAL HPF
SP GR UR STRIP: <=1.005 (ref 1–1.03)
SP GR UR STRIP: <=1.005 (ref 1–1.03)
SQUAMOUS #/AREA URNS AUTO: ABNORMAL LPF
SQUAMOUS #/AREA URNS AUTO: ABNORMAL LPF
UROBILINOGEN UR STRIP-ACNC: ABNORMAL
UROBILINOGEN UR STRIP-ACNC: ABNORMAL
WBC #/AREA URNS AUTO: ABNORMAL HPF
WBC #/AREA URNS AUTO: ABNORMAL HPF

## 2019-04-09 ASSESSMENT — MIFFLIN-ST. JEOR: SCORE: 1179.84

## 2019-04-10 LAB — BACTERIA SPEC CULT: NO GROWTH

## 2019-05-07 ENCOUNTER — COMMUNICATION - HEALTHEAST (OUTPATIENT)
Dept: FAMILY MEDICINE | Facility: CLINIC | Age: 59
End: 2019-05-07

## 2019-05-07 DIAGNOSIS — R52 PAIN: ICD-10-CM

## 2019-05-15 ENCOUNTER — COMMUNICATION - HEALTHEAST (OUTPATIENT)
Dept: NURSING | Facility: CLINIC | Age: 59
End: 2019-05-15

## 2019-05-17 ENCOUNTER — OFFICE VISIT - HEALTHEAST (OUTPATIENT)
Dept: FAMILY MEDICINE | Facility: CLINIC | Age: 59
End: 2019-05-17

## 2019-05-17 ENCOUNTER — OFFICE VISIT - HEALTHEAST (OUTPATIENT)
Dept: PHARMACY | Facility: CLINIC | Age: 59
End: 2019-05-17

## 2019-05-17 DIAGNOSIS — F32.A DEPRESSION, UNSPECIFIED DEPRESSION TYPE: ICD-10-CM

## 2019-05-17 DIAGNOSIS — R30.0 DYSURIA: ICD-10-CM

## 2019-05-17 DIAGNOSIS — E11.649 UNCONTROLLED TYPE 2 DIABETES MELLITUS WITH HYPOGLYCEMIA WITHOUT COMA, WITH LONG-TERM CURRENT USE OF INSULIN (H): ICD-10-CM

## 2019-05-17 DIAGNOSIS — M25.562 CHRONIC PAIN OF LEFT KNEE: ICD-10-CM

## 2019-05-17 DIAGNOSIS — Z79.4 TYPE 2 DIABETES MELLITUS WITHOUT COMPLICATION, WITH LONG-TERM CURRENT USE OF INSULIN (H): ICD-10-CM

## 2019-05-17 DIAGNOSIS — N30.00 ACUTE CYSTITIS WITHOUT HEMATURIA: ICD-10-CM

## 2019-05-17 DIAGNOSIS — G89.29 CHRONIC PAIN OF LEFT KNEE: ICD-10-CM

## 2019-05-17 DIAGNOSIS — E11.9 TYPE 2 DIABETES MELLITUS WITHOUT COMPLICATION, WITH LONG-TERM CURRENT USE OF INSULIN (H): ICD-10-CM

## 2019-05-17 DIAGNOSIS — R12 HEARTBURN: ICD-10-CM

## 2019-05-17 DIAGNOSIS — E55.9 VITAMIN D DEFICIENCY: ICD-10-CM

## 2019-05-17 DIAGNOSIS — E78.5 HYPERLIPIDEMIA, UNSPECIFIED HYPERLIPIDEMIA TYPE: ICD-10-CM

## 2019-05-17 DIAGNOSIS — N39.3 URINARY, INCONTINENCE, STRESS FEMALE: ICD-10-CM

## 2019-05-17 DIAGNOSIS — I10 ESSENTIAL HYPERTENSION: ICD-10-CM

## 2019-05-17 DIAGNOSIS — N39.0 RECURRENT UTI: ICD-10-CM

## 2019-05-17 DIAGNOSIS — Z71.84 COUNSELING ABOUT TRAVEL: ICD-10-CM

## 2019-05-17 DIAGNOSIS — Z79.4 UNCONTROLLED TYPE 2 DIABETES MELLITUS WITH HYPOGLYCEMIA WITHOUT COMA, WITH LONG-TERM CURRENT USE OF INSULIN (H): ICD-10-CM

## 2019-05-17 DIAGNOSIS — K31.9 DISEASE OF STOMACH AND DUODENUM: ICD-10-CM

## 2019-05-17 DIAGNOSIS — E11.9 TYPE 2 DIABETES MELLITUS (H): ICD-10-CM

## 2019-05-17 LAB
ALBUMIN SERPL-MCNC: 3.8 G/DL (ref 3.5–5)
ALBUMIN UR-MCNC: NEGATIVE MG/DL
ALP SERPL-CCNC: 68 U/L (ref 45–120)
ALT SERPL W P-5'-P-CCNC: 23 U/L (ref 0–45)
ANION GAP SERPL CALCULATED.3IONS-SCNC: 12 MMOL/L (ref 5–18)
APPEARANCE UR: CLEAR
AST SERPL W P-5'-P-CCNC: 24 U/L (ref 0–40)
BILIRUB SERPL-MCNC: 0.5 MG/DL (ref 0–1)
BILIRUB UR QL STRIP: NEGATIVE
BUN SERPL-MCNC: 12 MG/DL (ref 8–22)
CALCIUM SERPL-MCNC: 9.9 MG/DL (ref 8.5–10.5)
CHLORIDE BLD-SCNC: 104 MMOL/L (ref 98–107)
CO2 SERPL-SCNC: 27 MMOL/L (ref 22–31)
COLOR UR AUTO: YELLOW
CREAT SERPL-MCNC: 0.86 MG/DL (ref 0.6–1.1)
GFR SERPL CREATININE-BSD FRML MDRD: >60 ML/MIN/1.73M2
GLUCOSE BLD-MCNC: 108 MG/DL (ref 70–125)
GLUCOSE UR STRIP-MCNC: ABNORMAL MG/DL
HBA1C MFR BLD: 9 % (ref 3.5–6)
HGB UR QL STRIP: NEGATIVE
KETONES UR STRIP-MCNC: NEGATIVE MG/DL
LEUKOCYTE ESTERASE UR QL STRIP: NEGATIVE
NITRATE UR QL: NEGATIVE
PH UR STRIP: 5.5 [PH] (ref 5–8)
POTASSIUM BLD-SCNC: 4.5 MMOL/L (ref 3.5–5)
PROT SERPL-MCNC: 7.2 G/DL (ref 6–8)
SODIUM SERPL-SCNC: 143 MMOL/L (ref 136–145)
SP GR UR STRIP: 1.01 (ref 1–1.03)
UROBILINOGEN UR STRIP-ACNC: ABNORMAL

## 2019-05-17 RX ORDER — LANCETS 30 GAUGE
EACH MISCELLANEOUS
Qty: 100 EACH | Refills: 11 | Status: SHIPPED | OUTPATIENT
Start: 2019-05-17 | End: 2023-09-12

## 2019-05-17 ASSESSMENT — MIFFLIN-ST. JEOR: SCORE: 1178.71

## 2019-05-17 NOTE — ASSESSMENT & PLAN NOTE
Unable to take ACE Inhibitor due to angioedema from lisinopril    BP borderline today, but had run out of amlodopine; will refill.

## 2019-05-17 NOTE — ASSESSMENT & PLAN NOTE
Poorly controlled.  Most glucometer readings low, but clearly blood sugar must be high frequently for A1C 9.     Has refused morethan 2 insulin shots per day in the past, which is why she's on 50/50 insulin instead of basal plus mealtime.     Will have her meet with MTM for potential adjustments.

## 2019-05-19 ENCOUNTER — RECORDS - HEALTHEAST (OUTPATIENT)
Dept: FAMILY MEDICINE | Facility: CLINIC | Age: 59
End: 2019-05-19

## 2019-05-19 ENCOUNTER — COMMUNICATION - HEALTHEAST (OUTPATIENT)
Dept: FAMILY MEDICINE | Facility: CLINIC | Age: 59
End: 2019-05-19

## 2019-05-19 LAB — BACTERIA SPEC CULT: ABNORMAL

## 2019-05-29 ENCOUNTER — RECORDS - HEALTHEAST (OUTPATIENT)
Dept: HEALTH INFORMATION MANAGEMENT | Facility: CLINIC | Age: 59
End: 2019-05-29

## 2019-05-31 ENCOUNTER — OFFICE VISIT - HEALTHEAST (OUTPATIENT)
Dept: PHARMACY | Facility: CLINIC | Age: 59
End: 2019-05-31

## 2019-05-31 ENCOUNTER — AMBULATORY - HEALTHEAST (OUTPATIENT)
Dept: FAMILY MEDICINE | Facility: CLINIC | Age: 59
End: 2019-05-31

## 2019-05-31 DIAGNOSIS — I10 ESSENTIAL HYPERTENSION: ICD-10-CM

## 2019-05-31 DIAGNOSIS — E78.5 HYPERLIPIDEMIA, UNSPECIFIED HYPERLIPIDEMIA TYPE: ICD-10-CM

## 2019-05-31 DIAGNOSIS — N30.00 ACUTE CYSTITIS WITHOUT HEMATURIA: ICD-10-CM

## 2019-05-31 DIAGNOSIS — G89.29 OTHER CHRONIC PAIN: ICD-10-CM

## 2019-05-31 DIAGNOSIS — E11.9 TYPE 2 DIABETES MELLITUS WITHOUT COMPLICATION, WITH LONG-TERM CURRENT USE OF INSULIN (H): ICD-10-CM

## 2019-05-31 DIAGNOSIS — Z79.4 TYPE 2 DIABETES MELLITUS WITHOUT COMPLICATION, WITH LONG-TERM CURRENT USE OF INSULIN (H): ICD-10-CM

## 2019-05-31 DIAGNOSIS — R12 HEARTBURN: ICD-10-CM

## 2019-05-31 LAB
ALBUMIN UR-MCNC: NEGATIVE MG/DL
APPEARANCE UR: CLEAR
BILIRUB UR QL STRIP: NEGATIVE
COLOR UR AUTO: YELLOW
GLUCOSE UR STRIP-MCNC: NEGATIVE MG/DL
HGB UR QL STRIP: NEGATIVE
KETONES UR STRIP-MCNC: NEGATIVE MG/DL
LEUKOCYTE ESTERASE UR QL STRIP: NEGATIVE
NITRATE UR QL: NEGATIVE
PH UR STRIP: 6 [PH] (ref 5–8)
SP GR UR STRIP: 1.01 (ref 1–1.03)
UROBILINOGEN UR STRIP-ACNC: NORMAL

## 2019-06-01 LAB — BACTERIA SPEC CULT: NO GROWTH

## 2019-06-03 ENCOUNTER — AMBULATORY - HEALTHEAST (OUTPATIENT)
Dept: NURSING | Facility: CLINIC | Age: 59
End: 2019-06-03

## 2019-06-05 ENCOUNTER — COMMUNICATION - HEALTHEAST (OUTPATIENT)
Dept: FAMILY MEDICINE | Facility: CLINIC | Age: 59
End: 2019-06-05

## 2019-07-02 ENCOUNTER — COMMUNICATION - HEALTHEAST (OUTPATIENT)
Dept: NURSING | Facility: CLINIC | Age: 59
End: 2019-07-02

## 2019-07-09 ENCOUNTER — OFFICE VISIT - HEALTHEAST (OUTPATIENT)
Dept: PHARMACY | Facility: CLINIC | Age: 59
End: 2019-07-09

## 2019-07-09 ENCOUNTER — OFFICE VISIT - HEALTHEAST (OUTPATIENT)
Dept: FAMILY MEDICINE | Facility: CLINIC | Age: 59
End: 2019-07-09

## 2019-07-09 DIAGNOSIS — E78.5 HYPERLIPIDEMIA, UNSPECIFIED HYPERLIPIDEMIA TYPE: ICD-10-CM

## 2019-07-09 DIAGNOSIS — F33.1 MAJOR DEPRESSIVE DISORDER, RECURRENT EPISODE, MODERATE (H): ICD-10-CM

## 2019-07-09 DIAGNOSIS — I10 ESSENTIAL HYPERTENSION: ICD-10-CM

## 2019-07-09 DIAGNOSIS — E11.9 TYPE 2 DIABETES MELLITUS WITHOUT COMPLICATION, WITH LONG-TERM CURRENT USE OF INSULIN (H): ICD-10-CM

## 2019-07-09 DIAGNOSIS — Z79.4 TYPE 2 DIABETES MELLITUS WITHOUT COMPLICATION, WITH LONG-TERM CURRENT USE OF INSULIN (H): ICD-10-CM

## 2019-07-09 DIAGNOSIS — R35.0 FREQUENT URINATION: ICD-10-CM

## 2019-07-09 DIAGNOSIS — R30.0 DYSURIA: ICD-10-CM

## 2019-07-09 DIAGNOSIS — R12 HEARTBURN: ICD-10-CM

## 2019-07-09 LAB
ALBUMIN UR-MCNC: NEGATIVE MG/DL
APPEARANCE UR: CLEAR
BACTERIA #/AREA URNS HPF: ABNORMAL HPF
BILIRUB UR QL STRIP: NEGATIVE
COLOR UR AUTO: YELLOW
GLUCOSE UR STRIP-MCNC: NEGATIVE MG/DL
HGB UR QL STRIP: ABNORMAL
KETONES UR STRIP-MCNC: NEGATIVE MG/DL
LEUKOCYTE ESTERASE UR QL STRIP: ABNORMAL
NITRATE UR QL: NEGATIVE
PH UR STRIP: 5.5 [PH] (ref 5–8)
RBC #/AREA URNS AUTO: ABNORMAL HPF
SP GR UR STRIP: 1.02 (ref 1–1.03)
SQUAMOUS #/AREA URNS AUTO: ABNORMAL LPF
UROBILINOGEN UR STRIP-ACNC: ABNORMAL
WBC #/AREA URNS AUTO: ABNORMAL HPF

## 2019-07-09 ASSESSMENT — MIFFLIN-ST. JEOR: SCORE: 1177.4

## 2019-07-10 ENCOUNTER — COMMUNICATION - HEALTHEAST (OUTPATIENT)
Dept: NURSING | Facility: CLINIC | Age: 59
End: 2019-07-10

## 2019-07-10 LAB — BACTERIA SPEC CULT: NORMAL

## 2019-08-02 ENCOUNTER — OFFICE VISIT - HEALTHEAST (OUTPATIENT)
Dept: FAMILY MEDICINE | Facility: CLINIC | Age: 59
End: 2019-08-02

## 2019-08-02 DIAGNOSIS — R52 PAIN: ICD-10-CM

## 2019-08-02 DIAGNOSIS — M54.16 RIGHT LUMBAR RADICULITIS: ICD-10-CM

## 2019-08-02 DIAGNOSIS — E11.9 TYPE 2 DIABETES MELLITUS WITHOUT COMPLICATION, WITH LONG-TERM CURRENT USE OF INSULIN (H): ICD-10-CM

## 2019-08-02 DIAGNOSIS — Z79.4 TYPE 2 DIABETES MELLITUS WITHOUT COMPLICATION, WITH LONG-TERM CURRENT USE OF INSULIN (H): ICD-10-CM

## 2019-08-02 DIAGNOSIS — Z11.4 SCREENING FOR HIV (HUMAN IMMUNODEFICIENCY VIRUS): ICD-10-CM

## 2019-08-02 LAB
HBA1C MFR BLD: 8.4 % (ref 3.5–6)
HIV 1+2 AB+HIV1 P24 AG SERPL QL IA: NEGATIVE

## 2019-08-02 ASSESSMENT — MIFFLIN-ST. JEOR: SCORE: 1172.17

## 2019-08-29 ENCOUNTER — COMMUNICATION - HEALTHEAST (OUTPATIENT)
Dept: NURSING | Facility: CLINIC | Age: 59
End: 2019-08-29

## 2019-08-29 ENCOUNTER — OFFICE VISIT - HEALTHEAST (OUTPATIENT)
Dept: PHARMACY | Facility: CLINIC | Age: 59
End: 2019-08-29

## 2019-08-29 DIAGNOSIS — I10 ESSENTIAL HYPERTENSION: ICD-10-CM

## 2019-08-29 DIAGNOSIS — Z79.4 TYPE 2 DIABETES MELLITUS WITHOUT COMPLICATION, WITH LONG-TERM CURRENT USE OF INSULIN (H): ICD-10-CM

## 2019-08-29 DIAGNOSIS — F32.A DEPRESSION, UNSPECIFIED DEPRESSION TYPE: ICD-10-CM

## 2019-08-29 DIAGNOSIS — G89.29 OTHER CHRONIC PAIN: ICD-10-CM

## 2019-08-29 DIAGNOSIS — E78.5 HYPERLIPIDEMIA, UNSPECIFIED HYPERLIPIDEMIA TYPE: ICD-10-CM

## 2019-08-29 DIAGNOSIS — E11.9 TYPE 2 DIABETES MELLITUS WITHOUT COMPLICATION, WITH LONG-TERM CURRENT USE OF INSULIN (H): ICD-10-CM

## 2019-08-29 DIAGNOSIS — R12 HEARTBURN: ICD-10-CM

## 2019-08-30 ENCOUNTER — AMBULATORY - HEALTHEAST (OUTPATIENT)
Dept: NURSING | Facility: CLINIC | Age: 59
End: 2019-08-30

## 2019-09-27 ENCOUNTER — OFFICE VISIT - HEALTHEAST (OUTPATIENT)
Dept: PHARMACY | Facility: CLINIC | Age: 59
End: 2019-09-27

## 2019-09-27 DIAGNOSIS — E78.5 HYPERLIPIDEMIA, UNSPECIFIED HYPERLIPIDEMIA TYPE: ICD-10-CM

## 2019-09-27 DIAGNOSIS — E11.9 TYPE 2 DIABETES MELLITUS WITHOUT COMPLICATION, WITH LONG-TERM CURRENT USE OF INSULIN (H): ICD-10-CM

## 2019-09-27 DIAGNOSIS — Z79.4 TYPE 2 DIABETES MELLITUS WITHOUT COMPLICATION, WITH LONG-TERM CURRENT USE OF INSULIN (H): ICD-10-CM

## 2019-09-27 DIAGNOSIS — I10 ESSENTIAL HYPERTENSION: ICD-10-CM

## 2019-10-01 ENCOUNTER — COMMUNICATION - HEALTHEAST (OUTPATIENT)
Dept: NURSING | Facility: CLINIC | Age: 59
End: 2019-10-01

## 2019-10-02 ENCOUNTER — COMMUNICATION - HEALTHEAST (OUTPATIENT)
Dept: NURSING | Facility: CLINIC | Age: 59
End: 2019-10-02

## 2019-10-04 ENCOUNTER — OFFICE VISIT - HEALTHEAST (OUTPATIENT)
Dept: PHARMACY | Facility: CLINIC | Age: 59
End: 2019-10-04

## 2019-10-04 DIAGNOSIS — E11.9 TYPE 2 DIABETES MELLITUS WITHOUT COMPLICATION, WITH LONG-TERM CURRENT USE OF INSULIN (H): ICD-10-CM

## 2019-10-04 DIAGNOSIS — Z79.4 TYPE 2 DIABETES MELLITUS WITHOUT COMPLICATION, WITH LONG-TERM CURRENT USE OF INSULIN (H): ICD-10-CM

## 2019-11-01 ENCOUNTER — AMBULATORY - HEALTHEAST (OUTPATIENT)
Dept: LAB | Facility: CLINIC | Age: 59
End: 2019-11-01

## 2019-11-01 ENCOUNTER — OFFICE VISIT - HEALTHEAST (OUTPATIENT)
Dept: PHARMACY | Facility: CLINIC | Age: 59
End: 2019-11-01

## 2019-11-01 DIAGNOSIS — Z79.4 TYPE 2 DIABETES MELLITUS WITHOUT COMPLICATION, WITH LONG-TERM CURRENT USE OF INSULIN (H): ICD-10-CM

## 2019-11-01 DIAGNOSIS — E11.9 TYPE 2 DIABETES MELLITUS WITHOUT COMPLICATION, WITH LONG-TERM CURRENT USE OF INSULIN (H): ICD-10-CM

## 2019-11-01 DIAGNOSIS — E78.5 HYPERLIPIDEMIA, UNSPECIFIED HYPERLIPIDEMIA TYPE: ICD-10-CM

## 2019-11-01 LAB — HBA1C MFR BLD: 8.2 % (ref 3.5–6)

## 2019-11-05 ENCOUNTER — COMMUNICATION - HEALTHEAST (OUTPATIENT)
Dept: NURSING | Facility: CLINIC | Age: 59
End: 2019-11-05

## 2019-11-08 ENCOUNTER — OFFICE VISIT - HEALTHEAST (OUTPATIENT)
Dept: FAMILY MEDICINE | Facility: CLINIC | Age: 59
End: 2019-11-08

## 2019-11-08 DIAGNOSIS — R50.9 FEVER: ICD-10-CM

## 2019-11-08 DIAGNOSIS — R10.9 ABDOMINAL PAIN: ICD-10-CM

## 2019-11-08 DIAGNOSIS — R10.31 RLQ ABDOMINAL PAIN: ICD-10-CM

## 2019-11-08 DIAGNOSIS — E11.9 TYPE 2 DIABETES MELLITUS WITHOUT COMPLICATION, WITH LONG-TERM CURRENT USE OF INSULIN (H): ICD-10-CM

## 2019-11-08 DIAGNOSIS — M54.16 RIGHT LUMBAR RADICULITIS: ICD-10-CM

## 2019-11-08 DIAGNOSIS — Z23 IMMUNIZATION DUE: ICD-10-CM

## 2019-11-08 DIAGNOSIS — I10 ESSENTIAL HYPERTENSION: ICD-10-CM

## 2019-11-08 DIAGNOSIS — Z79.4 TYPE 2 DIABETES MELLITUS WITHOUT COMPLICATION, WITH LONG-TERM CURRENT USE OF INSULIN (H): ICD-10-CM

## 2019-11-08 LAB
ALBUMIN UR-MCNC: NEGATIVE MG/DL
APPEARANCE UR: CLEAR
BILIRUB UR QL STRIP: NEGATIVE
COLOR UR AUTO: YELLOW
FLUAV AG SPEC QL IA: NORMAL
FLUBV AG SPEC QL IA: NORMAL
GLUCOSE UR STRIP-MCNC: NEGATIVE MG/DL
HGB UR QL STRIP: NEGATIVE
KETONES UR STRIP-MCNC: NEGATIVE MG/DL
LEUKOCYTE ESTERASE UR QL STRIP: NEGATIVE
NITRATE UR QL: NEGATIVE
PH UR STRIP: 7 [PH] (ref 5–8)
SP GR UR STRIP: <=1.005 (ref 1–1.03)
UROBILINOGEN UR STRIP-ACNC: NORMAL

## 2019-11-08 ASSESSMENT — MIFFLIN-ST. JEOR: SCORE: 1168.77

## 2019-11-08 NOTE — ASSESSMENT & PLAN NOTE
Uncontrolled, with A1C last week still elevated, but improved from spring.  Hemoglobin A1c   Date Value Ref Range Status   11/01/2019 8.2 (H) 3.5 - 6.0 % Final   08/02/2019 8.4 (H) 3.5 - 6.0 % Final   05/17/2019 9.0 (H) 3.5 - 6.0 % Final   02/15/2019 9.4 (H) 3.5 - 6.0 % Final         Just had meds changed last week (increased metformin) and doesn't want to change insulin.  Will need more time to see effects.

## 2019-11-08 NOTE — ASSESSMENT & PLAN NOTE
Elevated today and last MTM visit. Borderline prior to that.    Unable to take ACE Inhibitor due to angioedema from lisinopril.    Will increase amlodipine from 2.5 to 5mg today, then can have BP checked at next MTM visit 12/6/19.

## 2019-11-08 NOTE — ASSESSMENT & PLAN NOTE
MRI of the lumbar spine on 07/26/2013 revealed a right paracentral diskprotrusion and annular tear at L4-5 traversing the right L5 nerve root and resulting in moderate spinal canal and mild bilateral neural foraminal  narrowing. There is also a left (asymptomatic side) midline foraminal disk herniation L5-S1.    Had epidural steroid injection 9/13/13.    Now having some return of pain, but it's not enough that she would consider injection at this time.  Will keep in mind for the future.

## 2019-11-11 ENCOUNTER — COMMUNICATION - HEALTHEAST (OUTPATIENT)
Dept: NURSING | Facility: CLINIC | Age: 59
End: 2019-11-11

## 2019-11-26 ENCOUNTER — AMBULATORY - HEALTHEAST (OUTPATIENT)
Dept: FAMILY MEDICINE | Facility: CLINIC | Age: 59
End: 2019-11-26

## 2019-11-26 DIAGNOSIS — Z79.4 TYPE 2 DIABETES MELLITUS WITHOUT COMPLICATION, WITH LONG-TERM CURRENT USE OF INSULIN (H): ICD-10-CM

## 2019-11-26 DIAGNOSIS — E11.9 TYPE 2 DIABETES MELLITUS WITHOUT COMPLICATION, WITH LONG-TERM CURRENT USE OF INSULIN (H): ICD-10-CM

## 2019-12-06 ENCOUNTER — OFFICE VISIT - HEALTHEAST (OUTPATIENT)
Dept: FAMILY MEDICINE | Facility: CLINIC | Age: 59
End: 2019-12-06

## 2019-12-06 ENCOUNTER — OFFICE VISIT - HEALTHEAST (OUTPATIENT)
Dept: PHARMACY | Facility: CLINIC | Age: 59
End: 2019-12-06

## 2019-12-06 DIAGNOSIS — K31.9 DISEASE OF STOMACH AND DUODENUM: ICD-10-CM

## 2019-12-06 DIAGNOSIS — Z79.4 TYPE 2 DIABETES MELLITUS WITHOUT COMPLICATION, WITH LONG-TERM CURRENT USE OF INSULIN (H): ICD-10-CM

## 2019-12-06 DIAGNOSIS — F32.A DEPRESSION, UNSPECIFIED DEPRESSION TYPE: ICD-10-CM

## 2019-12-06 DIAGNOSIS — R30.0 DYSURIA: ICD-10-CM

## 2019-12-06 DIAGNOSIS — E78.49 OTHER HYPERLIPIDEMIA: ICD-10-CM

## 2019-12-06 DIAGNOSIS — N30.00 ACUTE CYSTITIS WITHOUT HEMATURIA: ICD-10-CM

## 2019-12-06 DIAGNOSIS — E11.9 TYPE 2 DIABETES MELLITUS WITHOUT COMPLICATION, WITH LONG-TERM CURRENT USE OF INSULIN (H): ICD-10-CM

## 2019-12-06 DIAGNOSIS — I10 ESSENTIAL HYPERTENSION: ICD-10-CM

## 2019-12-06 ASSESSMENT — MIFFLIN-ST. JEOR: SCORE: 1168.77

## 2019-12-08 LAB — BACTERIA SPEC CULT: NORMAL

## 2019-12-27 ENCOUNTER — OFFICE VISIT - HEALTHEAST (OUTPATIENT)
Dept: PHARMACY | Facility: CLINIC | Age: 59
End: 2019-12-27

## 2019-12-27 DIAGNOSIS — E78.49 OTHER HYPERLIPIDEMIA: ICD-10-CM

## 2019-12-27 DIAGNOSIS — E11.9 TYPE 2 DIABETES MELLITUS WITHOUT COMPLICATION, WITH LONG-TERM CURRENT USE OF INSULIN (H): ICD-10-CM

## 2019-12-27 DIAGNOSIS — K31.9 DISEASE OF STOMACH AND DUODENUM: ICD-10-CM

## 2019-12-27 DIAGNOSIS — Z79.4 TYPE 2 DIABETES MELLITUS WITHOUT COMPLICATION, WITH LONG-TERM CURRENT USE OF INSULIN (H): ICD-10-CM

## 2019-12-27 DIAGNOSIS — I10 ESSENTIAL HYPERTENSION: ICD-10-CM

## 2020-01-03 ENCOUNTER — COMMUNICATION - HEALTHEAST (OUTPATIENT)
Dept: NURSING | Facility: CLINIC | Age: 60
End: 2020-01-03

## 2020-01-17 ENCOUNTER — OFFICE VISIT - HEALTHEAST (OUTPATIENT)
Dept: FAMILY MEDICINE | Facility: CLINIC | Age: 60
End: 2020-01-17

## 2020-01-17 DIAGNOSIS — N10 PYELONEPHRITIS, ACUTE: ICD-10-CM

## 2020-01-17 DIAGNOSIS — R30.0 DYSURIA: ICD-10-CM

## 2020-01-17 LAB
ALBUMIN UR-MCNC: NEGATIVE MG/DL
APPEARANCE UR: ABNORMAL
BACTERIA #/AREA URNS HPF: ABNORMAL HPF
BILIRUB UR QL STRIP: NEGATIVE
COLOR UR AUTO: YELLOW
GLUCOSE UR STRIP-MCNC: ABNORMAL MG/DL
HGB UR QL STRIP: ABNORMAL
KETONES UR STRIP-MCNC: NEGATIVE MG/DL
LEUKOCYTE ESTERASE UR QL STRIP: ABNORMAL
NITRATE UR QL: NEGATIVE
PH UR STRIP: 6 [PH] (ref 5–8)
RBC #/AREA URNS AUTO: ABNORMAL HPF
SP GR UR STRIP: 1.01 (ref 1–1.03)
SQUAMOUS #/AREA URNS AUTO: ABNORMAL LPF
TRANS CELLS #/AREA URNS HPF: ABNORMAL LPF
UROBILINOGEN UR STRIP-ACNC: ABNORMAL
WBC #/AREA URNS AUTO: ABNORMAL HPF

## 2020-01-17 ASSESSMENT — MIFFLIN-ST. JEOR: SCORE: 1163.1

## 2020-01-18 LAB — BACTERIA SPEC CULT: NORMAL

## 2020-01-29 ENCOUNTER — COMMUNICATION - HEALTHEAST (OUTPATIENT)
Dept: NURSING | Facility: CLINIC | Age: 60
End: 2020-01-29

## 2020-01-31 ENCOUNTER — AMBULATORY - HEALTHEAST (OUTPATIENT)
Dept: LAB | Facility: CLINIC | Age: 60
End: 2020-01-31

## 2020-01-31 ENCOUNTER — OFFICE VISIT - HEALTHEAST (OUTPATIENT)
Dept: PHARMACY | Facility: CLINIC | Age: 60
End: 2020-01-31

## 2020-01-31 DIAGNOSIS — I10 ESSENTIAL HYPERTENSION: ICD-10-CM

## 2020-01-31 DIAGNOSIS — K31.9 DISEASE OF STOMACH AND DUODENUM: ICD-10-CM

## 2020-01-31 DIAGNOSIS — E11.9 TYPE 2 DIABETES MELLITUS WITHOUT COMPLICATION, WITH LONG-TERM CURRENT USE OF INSULIN (H): ICD-10-CM

## 2020-01-31 DIAGNOSIS — E78.49 OTHER HYPERLIPIDEMIA: ICD-10-CM

## 2020-01-31 DIAGNOSIS — Z79.4 TYPE 2 DIABETES MELLITUS WITHOUT COMPLICATION, WITH LONG-TERM CURRENT USE OF INSULIN (H): ICD-10-CM

## 2020-01-31 DIAGNOSIS — R39.9 UTI SYMPTOMS: ICD-10-CM

## 2020-01-31 LAB
CHOLEST SERPL-MCNC: 220 MG/DL
FASTING STATUS PATIENT QL REPORTED: YES
HBA1C MFR BLD: 9.4 % (ref 3.5–6)
HDLC SERPL-MCNC: 47 MG/DL
LDLC SERPL CALC-MCNC: 153 MG/DL
TRIGL SERPL-MCNC: 99 MG/DL

## 2020-02-15 ENCOUNTER — COMMUNICATION - HEALTHEAST (OUTPATIENT)
Dept: FAMILY MEDICINE | Facility: CLINIC | Age: 60
End: 2020-02-15

## 2020-02-15 DIAGNOSIS — E78.5 HYPERLIPIDEMIA, UNSPECIFIED HYPERLIPIDEMIA TYPE: ICD-10-CM

## 2020-02-25 ENCOUNTER — RECORDS - HEALTHEAST (OUTPATIENT)
Dept: GENERAL RADIOLOGY | Facility: CLINIC | Age: 60
End: 2020-02-25

## 2020-02-25 ENCOUNTER — OFFICE VISIT - HEALTHEAST (OUTPATIENT)
Dept: FAMILY MEDICINE | Facility: CLINIC | Age: 60
End: 2020-02-25

## 2020-02-25 DIAGNOSIS — I10 ESSENTIAL HYPERTENSION: ICD-10-CM

## 2020-02-25 DIAGNOSIS — E78.5 HYPERLIPIDEMIA, UNSPECIFIED HYPERLIPIDEMIA TYPE: ICD-10-CM

## 2020-02-25 DIAGNOSIS — R07.81 PLEURODYNIA: ICD-10-CM

## 2020-02-25 DIAGNOSIS — K31.9 DISEASE OF STOMACH AND DUODENUM: ICD-10-CM

## 2020-02-25 DIAGNOSIS — Z12.31 VISIT FOR SCREENING MAMMOGRAM: ICD-10-CM

## 2020-02-25 DIAGNOSIS — R30.0 DYSURIA: ICD-10-CM

## 2020-02-25 DIAGNOSIS — Z79.4 TYPE 2 DIABETES MELLITUS WITH HYPERGLYCEMIA, WITH LONG-TERM CURRENT USE OF INSULIN (H): ICD-10-CM

## 2020-02-25 DIAGNOSIS — R07.81 RIB PAIN ON RIGHT SIDE: ICD-10-CM

## 2020-02-25 DIAGNOSIS — E11.65 TYPE 2 DIABETES MELLITUS WITH HYPERGLYCEMIA, WITH LONG-TERM CURRENT USE OF INSULIN (H): ICD-10-CM

## 2020-02-25 LAB
ALBUMIN UR-MCNC: NEGATIVE MG/DL
APPEARANCE UR: CLEAR
BILIRUB UR QL STRIP: NEGATIVE
COLOR UR AUTO: YELLOW
CREAT UR-MCNC: 51.5 MG/DL
GLUCOSE UR STRIP-MCNC: ABNORMAL MG/DL
HGB UR QL STRIP: NEGATIVE
KETONES UR STRIP-MCNC: NEGATIVE MG/DL
LEUKOCYTE ESTERASE UR QL STRIP: NEGATIVE
MICROALBUMIN UR-MCNC: 2.55 MG/DL (ref 0–1.99)
MICROALBUMIN/CREAT UR: 49.5 MG/G
NITRATE UR QL: NEGATIVE
PH UR STRIP: 5.5 [PH] (ref 5–8)
SP GR UR STRIP: 1.01 (ref 1–1.03)
UROBILINOGEN UR STRIP-ACNC: ABNORMAL

## 2020-02-25 ASSESSMENT — PATIENT HEALTH QUESTIONNAIRE - PHQ9: SUM OF ALL RESPONSES TO PHQ QUESTIONS 1-9: 3

## 2020-02-25 ASSESSMENT — MIFFLIN-ST. JEOR: SCORE: 1159.7

## 2020-02-25 NOTE — ASSESSMENT & PLAN NOTE
Hx reactive gastropathy by EGD 2012.  Had been taking ranitidine most recently, but it's no longer available.  Will try famotidine.

## 2020-02-25 NOTE — ASSESSMENT & PLAN NOTE
Inadequate control on current regimen. Would prefer to have her less than 130 systolic given risk factors.    BP Readings from Last 3 Encounters:   02/25/20 150/80   01/31/20 134/82   01/17/20 134/80      Unable to take ACE Inhibitor due to angioedema from lisinopril    Will increase amlodipine from 5 to 10mg daily.

## 2020-02-26 LAB — BACTERIA SPEC CULT: NO GROWTH

## 2020-03-03 ENCOUNTER — COMMUNICATION - HEALTHEAST (OUTPATIENT)
Dept: NURSING | Facility: CLINIC | Age: 60
End: 2020-03-03

## 2020-03-10 ENCOUNTER — COMMUNICATION - HEALTHEAST (OUTPATIENT)
Dept: NURSING | Facility: CLINIC | Age: 60
End: 2020-03-10

## 2020-03-10 ENCOUNTER — COMMUNICATION - HEALTHEAST (OUTPATIENT)
Dept: ADMINISTRATIVE | Facility: CLINIC | Age: 60
End: 2020-03-10

## 2020-03-11 ENCOUNTER — COMMUNICATION - HEALTHEAST (OUTPATIENT)
Dept: CARE COORDINATION | Facility: CLINIC | Age: 60
End: 2020-03-11

## 2020-03-16 ENCOUNTER — COMMUNICATION - HEALTHEAST (OUTPATIENT)
Dept: NURSING | Facility: CLINIC | Age: 60
End: 2020-03-16

## 2020-03-18 ENCOUNTER — AMBULATORY - HEALTHEAST (OUTPATIENT)
Dept: NURSING | Facility: CLINIC | Age: 60
End: 2020-03-18

## 2020-04-16 ENCOUNTER — COMMUNICATION - HEALTHEAST (OUTPATIENT)
Dept: CARE COORDINATION | Facility: CLINIC | Age: 60
End: 2020-04-16

## 2020-04-16 ENCOUNTER — COMMUNICATION - HEALTHEAST (OUTPATIENT)
Dept: NURSING | Facility: CLINIC | Age: 60
End: 2020-04-16

## 2020-04-21 ENCOUNTER — COMMUNICATION - HEALTHEAST (OUTPATIENT)
Dept: NURSING | Facility: CLINIC | Age: 60
End: 2020-04-21

## 2020-05-07 ENCOUNTER — COMMUNICATION - HEALTHEAST (OUTPATIENT)
Dept: CARE COORDINATION | Facility: CLINIC | Age: 60
End: 2020-05-07

## 2020-05-21 ENCOUNTER — COMMUNICATION - HEALTHEAST (OUTPATIENT)
Dept: CARE COORDINATION | Facility: CLINIC | Age: 60
End: 2020-05-21

## 2020-05-21 ENCOUNTER — COMMUNICATION - HEALTHEAST (OUTPATIENT)
Dept: NURSING | Facility: CLINIC | Age: 60
End: 2020-05-21

## 2020-06-05 ENCOUNTER — COMMUNICATION - HEALTHEAST (OUTPATIENT)
Dept: CARE COORDINATION | Facility: CLINIC | Age: 60
End: 2020-06-05

## 2020-06-16 ENCOUNTER — COMMUNICATION - HEALTHEAST (OUTPATIENT)
Dept: CARE COORDINATION | Facility: CLINIC | Age: 60
End: 2020-06-16

## 2020-06-16 ENCOUNTER — AMBULATORY - HEALTHEAST (OUTPATIENT)
Dept: FAMILY MEDICINE | Facility: CLINIC | Age: 60
End: 2020-06-16

## 2020-06-16 DIAGNOSIS — F32.A DEPRESSION, UNSPECIFIED DEPRESSION TYPE: ICD-10-CM

## 2020-06-16 DIAGNOSIS — K31.9 DISEASE OF STOMACH AND DUODENUM: ICD-10-CM

## 2020-06-16 DIAGNOSIS — E11.65 TYPE 2 DIABETES MELLITUS WITH HYPERGLYCEMIA, WITH LONG-TERM CURRENT USE OF INSULIN (H): ICD-10-CM

## 2020-06-16 DIAGNOSIS — R12 HEARTBURN: ICD-10-CM

## 2020-06-16 DIAGNOSIS — E78.5 HYPERLIPIDEMIA, UNSPECIFIED HYPERLIPIDEMIA TYPE: ICD-10-CM

## 2020-06-16 DIAGNOSIS — R52 PAIN: ICD-10-CM

## 2020-06-16 DIAGNOSIS — Z79.4 TYPE 2 DIABETES MELLITUS WITHOUT COMPLICATION, WITH LONG-TERM CURRENT USE OF INSULIN (H): ICD-10-CM

## 2020-06-16 DIAGNOSIS — Z79.4 UNCONTROLLED TYPE 2 DIABETES MELLITUS WITH HYPOGLYCEMIA WITHOUT COMA, WITH LONG-TERM CURRENT USE OF INSULIN (H): ICD-10-CM

## 2020-06-16 DIAGNOSIS — E11.9 TYPE 2 DIABETES MELLITUS (H): ICD-10-CM

## 2020-06-16 DIAGNOSIS — E11.65 TYPE 2 DIABETES MELLITUS WITH HYPERGLYCEMIA (H): ICD-10-CM

## 2020-06-16 DIAGNOSIS — E11.649 UNCONTROLLED TYPE 2 DIABETES MELLITUS WITH HYPOGLYCEMIA WITHOUT COMA, WITH LONG-TERM CURRENT USE OF INSULIN (H): ICD-10-CM

## 2020-06-16 DIAGNOSIS — Z79.4 TYPE 2 DIABETES MELLITUS WITH HYPERGLYCEMIA, WITH LONG-TERM CURRENT USE OF INSULIN (H): ICD-10-CM

## 2020-06-16 DIAGNOSIS — E11.9 TYPE 2 DIABETES MELLITUS WITHOUT COMPLICATION, WITH LONG-TERM CURRENT USE OF INSULIN (H): ICD-10-CM

## 2020-06-16 DIAGNOSIS — E55.9 VITAMIN D DEFICIENCY: ICD-10-CM

## 2020-06-16 DIAGNOSIS — I10 ESSENTIAL HYPERTENSION: ICD-10-CM

## 2020-06-16 RX ORDER — CALCIUM CARBONATE 500 MG/1
1 TABLET, CHEWABLE ORAL DAILY
Qty: 100 TABLET | Refills: 1 | Status: SHIPPED | OUTPATIENT
Start: 2020-06-16 | End: 2021-08-20

## 2020-06-16 RX ORDER — GLUCOSAMINE HCL/CHONDROITIN SU 500-400 MG
1 CAPSULE ORAL 4 TIMES DAILY
Qty: 100 STRIP | Refills: 11 | Status: SHIPPED | OUTPATIENT
Start: 2020-06-16 | End: 2023-09-12

## 2020-06-16 RX ORDER — GLUCOSAMINE HCL/CHONDROITIN SU 500-400 MG
CAPSULE ORAL
Qty: 300 STRIP | Refills: 4 | Status: SHIPPED | OUTPATIENT
Start: 2020-06-16 | End: 2023-09-12

## 2020-07-02 ENCOUNTER — COMMUNICATION - HEALTHEAST (OUTPATIENT)
Dept: NURSING | Facility: CLINIC | Age: 60
End: 2020-07-02

## 2020-07-09 ENCOUNTER — COMMUNICATION - HEALTHEAST (OUTPATIENT)
Dept: NURSING | Facility: CLINIC | Age: 60
End: 2020-07-09

## 2020-07-16 ENCOUNTER — COMMUNICATION - HEALTHEAST (OUTPATIENT)
Dept: NURSING | Facility: CLINIC | Age: 60
End: 2020-07-16

## 2020-07-30 ENCOUNTER — RECORDS - HEALTHEAST (OUTPATIENT)
Dept: ADMINISTRATIVE | Facility: OTHER | Age: 60
End: 2020-07-30

## 2020-08-05 ENCOUNTER — OFFICE VISIT - HEALTHEAST (OUTPATIENT)
Dept: FAMILY MEDICINE | Facility: CLINIC | Age: 60
End: 2020-08-05

## 2020-08-05 ENCOUNTER — RECORDS - HEALTHEAST (OUTPATIENT)
Dept: GENERAL RADIOLOGY | Facility: CLINIC | Age: 60
End: 2020-08-05

## 2020-08-05 DIAGNOSIS — Z79.4 TYPE 2 DIABETES MELLITUS WITH HYPERGLYCEMIA, WITH LONG-TERM CURRENT USE OF INSULIN (H): ICD-10-CM

## 2020-08-05 DIAGNOSIS — I10 ESSENTIAL HYPERTENSION: ICD-10-CM

## 2020-08-05 DIAGNOSIS — M79.671 PAIN OF RIGHT HEEL: ICD-10-CM

## 2020-08-05 DIAGNOSIS — M79.671 PAIN IN RIGHT FOOT: ICD-10-CM

## 2020-08-05 DIAGNOSIS — E11.65 TYPE 2 DIABETES MELLITUS WITH HYPERGLYCEMIA, WITH LONG-TERM CURRENT USE OF INSULIN (H): ICD-10-CM

## 2020-08-05 DIAGNOSIS — F33.1 MAJOR DEPRESSIVE DISORDER, RECURRENT EPISODE, MODERATE (H): ICD-10-CM

## 2020-08-05 DIAGNOSIS — R35.0 URINARY FREQUENCY: ICD-10-CM

## 2020-08-05 LAB
ALBUMIN UR-MCNC: ABNORMAL MG/DL
ANION GAP SERPL CALCULATED.3IONS-SCNC: 11 MMOL/L (ref 5–18)
APPEARANCE UR: CLEAR
BACTERIA #/AREA URNS HPF: ABNORMAL HPF
BILIRUB UR QL STRIP: NEGATIVE
BUN SERPL-MCNC: 15 MG/DL (ref 8–22)
CALCIUM SERPL-MCNC: 8.8 MG/DL (ref 8.5–10.5)
CHLORIDE BLD-SCNC: 99 MMOL/L (ref 98–107)
CO2 SERPL-SCNC: 26 MMOL/L (ref 22–31)
COLOR UR AUTO: YELLOW
CREAT SERPL-MCNC: 1.29 MG/DL (ref 0.6–1.1)
GFR SERPL CREATININE-BSD FRML MDRD: 42 ML/MIN/1.73M2
GLUCOSE BLD-MCNC: 370 MG/DL (ref 70–125)
GLUCOSE UR STRIP-MCNC: ABNORMAL MG/DL
HBA1C MFR BLD: 8 %
HGB UR QL STRIP: NEGATIVE
HYALINE CASTS #/AREA URNS LPF: ABNORMAL LPF
KETONES UR STRIP-MCNC: ABNORMAL MG/DL
LEUKOCYTE ESTERASE UR QL STRIP: NEGATIVE
MUCOUS THREADS #/AREA URNS LPF: ABNORMAL LPF
NITRATE UR QL: NEGATIVE
PH UR STRIP: 5.5 [PH] (ref 5–8)
POTASSIUM BLD-SCNC: 4.5 MMOL/L (ref 3.5–5)
RBC #/AREA URNS AUTO: ABNORMAL HPF
SODIUM SERPL-SCNC: 136 MMOL/L (ref 136–145)
SP GR UR STRIP: 1.02 (ref 1–1.03)
SQUAMOUS #/AREA URNS AUTO: ABNORMAL LPF
UROBILINOGEN UR STRIP-ACNC: ABNORMAL
WBC #/AREA URNS AUTO: ABNORMAL HPF

## 2020-08-05 ASSESSMENT — PATIENT HEALTH QUESTIONNAIRE - PHQ9: SUM OF ALL RESPONSES TO PHQ QUESTIONS 1-9: 8

## 2020-08-05 ASSESSMENT — MIFFLIN-ST. JEOR: SCORE: 1117.74

## 2020-08-07 LAB
BACTERIA SPEC CULT: ABNORMAL
BACTERIA SPEC CULT: ABNORMAL

## 2020-08-17 ENCOUNTER — COMMUNICATION - HEALTHEAST (OUTPATIENT)
Dept: FAMILY MEDICINE | Facility: CLINIC | Age: 60
End: 2020-08-17

## 2020-08-17 DIAGNOSIS — N30.00 ACUTE CYSTITIS WITHOUT HEMATURIA: ICD-10-CM

## 2020-08-25 ENCOUNTER — AMBULATORY - HEALTHEAST (OUTPATIENT)
Dept: PHARMACY | Facility: CLINIC | Age: 60
End: 2020-08-25

## 2020-08-25 DIAGNOSIS — K31.9 DISEASE OF STOMACH AND DUODENUM: ICD-10-CM

## 2020-08-25 DIAGNOSIS — I10 ESSENTIAL HYPERTENSION: ICD-10-CM

## 2020-08-25 DIAGNOSIS — Z79.4 TYPE 2 DIABETES MELLITUS WITHOUT COMPLICATION, WITH LONG-TERM CURRENT USE OF INSULIN (H): ICD-10-CM

## 2020-08-25 DIAGNOSIS — R39.9 UTI SYMPTOMS: ICD-10-CM

## 2020-08-25 DIAGNOSIS — E11.9 TYPE 2 DIABETES MELLITUS WITHOUT COMPLICATION, WITH LONG-TERM CURRENT USE OF INSULIN (H): ICD-10-CM

## 2020-08-25 PROCEDURE — 99607 MTMS BY PHARM ADDL 15 MIN: CPT | Performed by: PHARMACIST

## 2020-08-25 PROCEDURE — 99606 MTMS BY PHARM EST 15 MIN: CPT | Performed by: PHARMACIST

## 2020-08-25 RX ORDER — METFORMIN HCL 500 MG
1000 TABLET, EXTENDED RELEASE 24 HR ORAL
Status: SHIPPED | COMMUNITY
Start: 2020-08-25 | End: 2021-08-20

## 2020-08-26 ENCOUNTER — COMMUNICATION - HEALTHEAST (OUTPATIENT)
Dept: NURSING | Facility: CLINIC | Age: 60
End: 2020-08-26

## 2020-08-26 ENCOUNTER — COMMUNICATION - HEALTHEAST (OUTPATIENT)
Dept: CARE COORDINATION | Facility: CLINIC | Age: 60
End: 2020-08-26

## 2020-09-25 ENCOUNTER — AMBULATORY - HEALTHEAST (OUTPATIENT)
Dept: PHARMACY | Facility: CLINIC | Age: 60
End: 2020-09-25

## 2020-11-19 ENCOUNTER — AMBULATORY - HEALTHEAST (OUTPATIENT)
Dept: PHARMACY | Facility: CLINIC | Age: 60
End: 2020-11-19

## 2020-11-19 DIAGNOSIS — K31.9 DISEASE OF STOMACH AND DUODENUM: ICD-10-CM

## 2020-11-19 DIAGNOSIS — Z79.4 TYPE 2 DIABETES MELLITUS WITHOUT COMPLICATION, WITH LONG-TERM CURRENT USE OF INSULIN (H): ICD-10-CM

## 2020-11-19 DIAGNOSIS — I10 ESSENTIAL HYPERTENSION: ICD-10-CM

## 2020-11-19 DIAGNOSIS — E11.9 TYPE 2 DIABETES MELLITUS WITHOUT COMPLICATION, WITH LONG-TERM CURRENT USE OF INSULIN (H): ICD-10-CM

## 2020-11-19 DIAGNOSIS — E78.49 OTHER HYPERLIPIDEMIA: ICD-10-CM

## 2020-11-19 PROCEDURE — 99607 MTMS BY PHARM ADDL 15 MIN: CPT | Performed by: PHARMACIST

## 2020-11-19 PROCEDURE — 99606 MTMS BY PHARM EST 15 MIN: CPT | Performed by: PHARMACIST

## 2020-11-20 ENCOUNTER — AMBULATORY - HEALTHEAST (OUTPATIENT)
Dept: LAB | Facility: CLINIC | Age: 60
End: 2020-11-20

## 2020-11-20 ENCOUNTER — AMBULATORY - HEALTHEAST (OUTPATIENT)
Dept: NURSING | Facility: CLINIC | Age: 60
End: 2020-11-20

## 2020-11-20 DIAGNOSIS — E11.9 TYPE 2 DIABETES MELLITUS WITHOUT COMPLICATION, WITH LONG-TERM CURRENT USE OF INSULIN (H): ICD-10-CM

## 2020-11-20 DIAGNOSIS — E78.49 OTHER HYPERLIPIDEMIA: ICD-10-CM

## 2020-11-20 DIAGNOSIS — Z23 NEED FOR IMMUNIZATION AGAINST INFLUENZA: ICD-10-CM

## 2020-11-20 DIAGNOSIS — Z79.4 TYPE 2 DIABETES MELLITUS WITHOUT COMPLICATION, WITH LONG-TERM CURRENT USE OF INSULIN (H): ICD-10-CM

## 2020-11-20 LAB
ANION GAP SERPL CALCULATED.3IONS-SCNC: 7 MMOL/L (ref 5–18)
BUN SERPL-MCNC: 11 MG/DL (ref 8–22)
CALCIUM SERPL-MCNC: 9.8 MG/DL (ref 8.5–10.5)
CHLORIDE BLD-SCNC: 103 MMOL/L (ref 98–107)
CHOLEST SERPL-MCNC: 192 MG/DL
CO2 SERPL-SCNC: 31 MMOL/L (ref 22–31)
CREAT SERPL-MCNC: 0.81 MG/DL (ref 0.6–1.1)
CREAT UR-MCNC: 73.7 MG/DL
FASTING STATUS PATIENT QL REPORTED: YES
GFR SERPL CREATININE-BSD FRML MDRD: >60 ML/MIN/1.73M2
GLUCOSE BLD-MCNC: 180 MG/DL (ref 70–125)
HBA1C MFR BLD: 9.5 %
HDLC SERPL-MCNC: 62 MG/DL
LDLC SERPL CALC-MCNC: 120 MG/DL
MICROALBUMIN UR-MCNC: 2.54 MG/DL (ref 0–1.99)
MICROALBUMIN/CREAT UR: 34.5 MG/G
POTASSIUM BLD-SCNC: 4.8 MMOL/L (ref 3.5–5)
SODIUM SERPL-SCNC: 141 MMOL/L (ref 136–145)
TRIGL SERPL-MCNC: 52 MG/DL

## 2020-11-24 ENCOUNTER — AMBULATORY - HEALTHEAST (OUTPATIENT)
Dept: PHARMACY | Facility: CLINIC | Age: 60
End: 2020-11-24

## 2020-11-24 DIAGNOSIS — E78.49 OTHER HYPERLIPIDEMIA: ICD-10-CM

## 2020-11-24 DIAGNOSIS — E11.9 TYPE 2 DIABETES MELLITUS WITHOUT COMPLICATION, WITH LONG-TERM CURRENT USE OF INSULIN (H): ICD-10-CM

## 2020-11-24 DIAGNOSIS — K31.9 DISEASE OF STOMACH AND DUODENUM: ICD-10-CM

## 2020-11-24 DIAGNOSIS — Z79.4 TYPE 2 DIABETES MELLITUS WITHOUT COMPLICATION, WITH LONG-TERM CURRENT USE OF INSULIN (H): ICD-10-CM

## 2020-11-24 DIAGNOSIS — I10 ESSENTIAL HYPERTENSION: ICD-10-CM

## 2020-11-24 PROCEDURE — 99606 MTMS BY PHARM EST 15 MIN: CPT | Performed by: PHARMACIST

## 2020-11-24 PROCEDURE — 99607 MTMS BY PHARM ADDL 15 MIN: CPT | Performed by: PHARMACIST

## 2020-12-09 ENCOUNTER — AMBULATORY - HEALTHEAST (OUTPATIENT)
Dept: PHARMACY | Facility: CLINIC | Age: 60
End: 2020-12-09

## 2021-01-13 ENCOUNTER — COMMUNICATION - HEALTHEAST (OUTPATIENT)
Dept: FAMILY MEDICINE | Facility: CLINIC | Age: 61
End: 2021-01-13

## 2021-01-13 DIAGNOSIS — R52 PAIN: ICD-10-CM

## 2021-01-13 RX ORDER — ACETAMINOPHEN 500 MG
TABLET ORAL
Qty: 100 TABLET | Refills: 5 | Status: SHIPPED | OUTPATIENT
Start: 2021-01-13 | End: 2021-11-15

## 2021-01-15 ENCOUNTER — OFFICE VISIT - HEALTHEAST (OUTPATIENT)
Dept: FAMILY MEDICINE | Facility: CLINIC | Age: 61
End: 2021-01-15

## 2021-01-15 DIAGNOSIS — H60.541 DERMATITIS OF RIGHT EAR CANAL: ICD-10-CM

## 2021-01-15 DIAGNOSIS — Z00.00 ROUTINE GENERAL MEDICAL EXAMINATION AT A HEALTH CARE FACILITY: ICD-10-CM

## 2021-01-15 DIAGNOSIS — E11.65 TYPE 2 DIABETES MELLITUS WITH HYPERGLYCEMIA, WITH LONG-TERM CURRENT USE OF INSULIN (H): ICD-10-CM

## 2021-01-15 DIAGNOSIS — I10 ESSENTIAL HYPERTENSION: ICD-10-CM

## 2021-01-15 DIAGNOSIS — R80.9 PROTEINURIA DUE TO TYPE 2 DIABETES MELLITUS (H): ICD-10-CM

## 2021-01-15 DIAGNOSIS — Z79.4 TYPE 2 DIABETES MELLITUS WITH HYPERGLYCEMIA, WITH LONG-TERM CURRENT USE OF INSULIN (H): ICD-10-CM

## 2021-01-15 DIAGNOSIS — E55.9 VITAMIN D DEFICIENCY: ICD-10-CM

## 2021-01-15 DIAGNOSIS — E11.29 PROTEINURIA DUE TO TYPE 2 DIABETES MELLITUS (H): ICD-10-CM

## 2021-01-15 ASSESSMENT — MIFFLIN-ST. JEOR: SCORE: 1123.41

## 2021-01-15 ASSESSMENT — PATIENT HEALTH QUESTIONNAIRE - PHQ9: SUM OF ALL RESPONSES TO PHQ QUESTIONS 1-9: 8

## 2021-02-26 ENCOUNTER — OFFICE VISIT - HEALTHEAST (OUTPATIENT)
Dept: FAMILY MEDICINE | Facility: CLINIC | Age: 61
End: 2021-02-26

## 2021-02-26 ENCOUNTER — RECORDS - HEALTHEAST (OUTPATIENT)
Dept: MAMMOGRAPHY | Facility: CLINIC | Age: 61
End: 2021-02-26

## 2021-02-26 DIAGNOSIS — Z12.31 ENCOUNTER FOR SCREENING MAMMOGRAM FOR MALIGNANT NEOPLASM OF BREAST: ICD-10-CM

## 2021-02-26 DIAGNOSIS — Z79.4 TYPE 2 DIABETES MELLITUS WITH HYPERGLYCEMIA, WITH LONG-TERM CURRENT USE OF INSULIN (H): ICD-10-CM

## 2021-02-26 DIAGNOSIS — I10 ESSENTIAL HYPERTENSION: ICD-10-CM

## 2021-02-26 DIAGNOSIS — Z12.31 VISIT FOR SCREENING MAMMOGRAM: ICD-10-CM

## 2021-02-26 DIAGNOSIS — E11.65 TYPE 2 DIABETES MELLITUS WITH HYPERGLYCEMIA, WITH LONG-TERM CURRENT USE OF INSULIN (H): ICD-10-CM

## 2021-02-26 LAB
ANION GAP SERPL CALCULATED.3IONS-SCNC: 11 MMOL/L (ref 5–18)
BUN SERPL-MCNC: 20 MG/DL (ref 8–22)
CALCIUM SERPL-MCNC: 9 MG/DL (ref 8.5–10.5)
CHLORIDE BLD-SCNC: 102 MMOL/L (ref 98–107)
CO2 SERPL-SCNC: 24 MMOL/L (ref 22–31)
CREAT SERPL-MCNC: 1.17 MG/DL (ref 0.6–1.1)
GFR SERPL CREATININE-BSD FRML MDRD: 47 ML/MIN/1.73M2
GLUCOSE BLD-MCNC: 373 MG/DL (ref 70–125)
HBA1C MFR BLD: 10 %
POTASSIUM BLD-SCNC: 4.7 MMOL/L (ref 3.5–5)
SODIUM SERPL-SCNC: 137 MMOL/L (ref 136–145)

## 2021-02-26 ASSESSMENT — MIFFLIN-ST. JEOR: SCORE: 1118.87

## 2021-03-15 ENCOUNTER — COMMUNICATION - HEALTHEAST (OUTPATIENT)
Dept: FAMILY MEDICINE | Facility: CLINIC | Age: 61
End: 2021-03-15

## 2021-04-02 ENCOUNTER — OFFICE VISIT - HEALTHEAST (OUTPATIENT)
Dept: PHARMACY | Facility: CLINIC | Age: 61
End: 2021-04-02

## 2021-04-02 ENCOUNTER — COMMUNICATION - HEALTHEAST (OUTPATIENT)
Dept: FAMILY MEDICINE | Facility: CLINIC | Age: 61
End: 2021-04-02

## 2021-04-02 DIAGNOSIS — Z79.4 TYPE 2 DIABETES MELLITUS WITHOUT COMPLICATION, WITH LONG-TERM CURRENT USE OF INSULIN (H): ICD-10-CM

## 2021-04-02 DIAGNOSIS — Z23 HIGH PRIORITY FOR 2019-NCOV VACCINE: ICD-10-CM

## 2021-04-02 DIAGNOSIS — F33.1 MAJOR DEPRESSIVE DISORDER, RECURRENT EPISODE, MODERATE (H): ICD-10-CM

## 2021-04-02 DIAGNOSIS — E78.49 OTHER HYPERLIPIDEMIA: ICD-10-CM

## 2021-04-02 DIAGNOSIS — E55.9 VITAMIN D DEFICIENCY: ICD-10-CM

## 2021-04-02 DIAGNOSIS — E11.9 TYPE 2 DIABETES MELLITUS WITHOUT COMPLICATION, WITH LONG-TERM CURRENT USE OF INSULIN (H): ICD-10-CM

## 2021-04-02 DIAGNOSIS — I10 ESSENTIAL HYPERTENSION: ICD-10-CM

## 2021-04-02 DIAGNOSIS — K31.9 DISEASE OF STOMACH AND DUODENUM: ICD-10-CM

## 2021-04-02 PROCEDURE — 99605 MTMS BY PHARM NP 15 MIN: CPT | Performed by: PHARMACIST

## 2021-04-02 PROCEDURE — 99607 MTMS BY PHARM ADDL 15 MIN: CPT | Performed by: PHARMACIST

## 2021-04-10 ENCOUNTER — AMBULATORY - HEALTHEAST (OUTPATIENT)
Dept: NURSING | Facility: CLINIC | Age: 61
End: 2021-04-10

## 2021-05-04 ENCOUNTER — AMBULATORY - HEALTHEAST (OUTPATIENT)
Dept: PHARMACY | Facility: CLINIC | Age: 61
End: 2021-05-04

## 2021-05-04 DIAGNOSIS — E55.9 VITAMIN D DEFICIENCY: ICD-10-CM

## 2021-05-04 DIAGNOSIS — I10 ESSENTIAL HYPERTENSION: ICD-10-CM

## 2021-05-04 DIAGNOSIS — E78.49 OTHER HYPERLIPIDEMIA: ICD-10-CM

## 2021-05-04 DIAGNOSIS — Z79.4 TYPE 2 DIABETES MELLITUS WITHOUT COMPLICATION, WITH LONG-TERM CURRENT USE OF INSULIN (H): ICD-10-CM

## 2021-05-04 DIAGNOSIS — E11.9 TYPE 2 DIABETES MELLITUS WITHOUT COMPLICATION, WITH LONG-TERM CURRENT USE OF INSULIN (H): ICD-10-CM

## 2021-05-04 DIAGNOSIS — F33.1 MAJOR DEPRESSIVE DISORDER, RECURRENT EPISODE, MODERATE (H): ICD-10-CM

## 2021-05-04 DIAGNOSIS — K31.9 DISEASE OF STOMACH AND DUODENUM: ICD-10-CM

## 2021-05-04 PROCEDURE — 99606 MTMS BY PHARM EST 15 MIN: CPT | Performed by: PHARMACIST

## 2021-05-04 PROCEDURE — 99607 MTMS BY PHARM ADDL 15 MIN: CPT | Performed by: PHARMACIST

## 2021-05-26 VITALS
SYSTOLIC BLOOD PRESSURE: 140 MMHG | HEART RATE: 72 BPM | DIASTOLIC BLOOD PRESSURE: 80 MMHG | TEMPERATURE: 97.4 F | OXYGEN SATURATION: 96 %

## 2021-05-27 ASSESSMENT — PATIENT HEALTH QUESTIONNAIRE - PHQ9
SUM OF ALL RESPONSES TO PHQ QUESTIONS 1-9: 8
SUM OF ALL RESPONSES TO PHQ QUESTIONS 1-9: 3
SUM OF ALL RESPONSES TO PHQ QUESTIONS 1-9: 8

## 2021-05-27 NOTE — PROGRESS NOTES
Programs Applying For: Renewal Tracking   County:  Case #:  [facility] Worker:   Deja #:   PMI #:   Tracking: Feb 2020 for March 2020  Date Applied:     Outreach:   4/3/2019: [facility] checked MNITS, patient has been renewed with Medical Assistance.[facility] called patient to let her know her insurance is still active. [facility] will track patient's health insurance while in [facility]  This subscriber has eligibility for MA: Medical Assistance.  Elig Type DX: Disabled  Eligibility Begin Date: 03/01/2016  Eligibility End Date: --/--/----  This subscriber is eligible for the following service types: Medical Care , Chiropractic , Dental Care , Hospital , Hospital - Inpatient , Hospital - Outpatient , Emergency Services , Pharmacy , Professional (Physician) Visit - Office , Vision (Optometry) , Mental Health , Urgent Care     CLOSED ENCOUNTER:  3/21/2019: [facility] called patient to discuss referral, G educated patient that [facility] takes 30-45 days to process documents. [facility] will make outreach call to patient and [facility] in 2 weeks.     Health Insurance Information:         Referral:   Hi,     I would like to refer this patient to assist with renewal for health insurance. Patient did submit her renewal form and the [facility] sent her a letter that pt have to submit back statement which she did send her bank statement about two weeks ago. Patient called requested for help to follow up with [facility], please contact patient at your convenience and assist with renewal.     Thanks,   -Greg

## 2021-05-27 NOTE — PROGRESS NOTES
Patient is 58 yr old female unaccompanied came to see [facility] RN today for yearly RN re-assessment.     Patient has pertinent medical dx of carotid artery stenosis, major depression, Insomnia, DM II, and chronic pain.    Chart reviewed and noted that patient needs initial appt with MTM. Will assist patient to schedule to see MTM for DM. Schedule to be seen by MTM on 5/17/19.     States she was scheduled to see diabetic educator at [facility] on 4/1/19 but she had to cancel. States she prefers to see a diabetic educator here instead of other clinics. Will assist patient to schedule appt with diabetic educator once new diabetic educator started here.     MedRec and med reviewed completed today.   1) Tylenol - as needed  2) Amlodipine 2.5mg daily - not taking  3) Atorvastatin 80mg - HS   4) Vit D 50,000IU weekly -   5) Escitalopram 10mg daily  6) Humalog 50-50 - 25 units AM and 35 units in the evening   7) Pantoprazole 40mg daily - AM  8) Phenazopyridine 100mg -- not taking - doesn't want it refill  9) Bactrim started today for possible UTI      Lives with spouse, 21 yr old daughter, 30 yr old son with his wife and 3 grand kids.     Spouse works full time.     Denies financial issue or food insecurity.     States she would like to work on lowering her A1c <8    RN Recommendations and Referrals  Dental exam: CG to assist as needed.  Eye exam: CG to assist as needed.    Action Plan    RN Will  Schedule MEV (Medication Evaluation Visit)  Will add the patient to [facility] RN tracking list  Be available to the patient as nursing needs arise    Care Guide Will  n/a    Goals    Clinic Care Coordination RN Assessed Needs  Patient Centered Assessment Method-No data recorded  Level 2:  A score of 25-36 indicates that the patient has a moderate initial need for RN or SW intervention at the discretion of the .  The RN will add this patient to her panel and follow closely in partnership with the care guide until stable.  She  will reach out to the care guide for support in care coordination needs and graduate the patient to standard care guide outreach when appropriate.      PCAM (Patient Centered Assessment Method)          Emergency Plan  Emergency Plan Recommendation:    When to Use the Emergency Department (ED)  An emergency means you could die if you don t get care quickly. Or you could be hurt permanently (disabled). Read below to know when to use -- and when not to use -- an emergency department (also called ED).    Dangers to your life  Here are examples of emergencies. These need immediate care:  A hard time breathing  Severe chest pain  Choking  Severe bleeding  Suddenly not able to move or speak  Blacking out (fainting)`  Poisoning    Dangers of permanent injuries  Here are other emergencies. These also need immediate care:  Deep cuts or severe burns  Broken bones, or sudden severe pain and swelling in a joint    When it s an emergency  If you have an emergency, follow these steps:    1. Go to the nearest emergency department  If you can, go to the hospital ED closest to you right away.  If you cannot get there right away, or if it is not safe to take yourself, call 911 or your police emergency number.  2. Call your primary care doctor  Tell your doctor about the emergency. Call within 24 hours of going to the ED.  If you cannot call, have someone call for you.  Go to your doctor (not the ED) for any follow-up care.    When it s not an emergency  If a problem is not an emergency, follow these steps:    1. Call your primary care doctor  If you don t know the name of your doctor, call your health plan.  If you cannot call, have someone call for you.  2. Follow instructions  Your doctor will tell you what you should do.  You may be told to see your doctor right away. You may be told to go to the ED. Or you may be told to go to an urgent care center.  Follow your doctor s advice.  Long-Term Complications of Diabetes can cause  health problems over time. These are called complications. They are more likely to happen if your blood sugar is often too high. Over time, high blood sugar can damage blood vessels in your body. It is important to keep your blood sugar in your target range. This can help prevent or delay complications from diabetes.  Possible complications  Complications of diabetes include:    Eye problems, including damage to the blood vessels in the eyes (retinopathy), pressure in the eye (glaucoma), and clouding of the eye s lens (a cataract). Eye problems can eventually lead to irreversible blindness.     Tooth and gum problems (periodontal disease), causing loss of teeth and bone    Blood vessel (vascular) disease leading to circulation problems, heart attack or stroke, or a need for amputation of a limb     Problems with sexual function leading to erectile dysfunction in men and sexual discomfort in women     Kidney disease (nephropathy) can eventually lead to kidney failure, which may require dialysis or kidney transplant     Nerve problems (neuropathy), causing pain or loss of feeling in your feet and other parts of your body, potentially leading to an amputation of a limb     High blood pressure (hypertension), putting strain on your heart and blood vessels    Serious infections, possibly leading to loss of toes, feet, or limbs  How to avoid complications  The serious consequences of these complications may be avoidable for most people with diabetes by managing your blood glucose, blood pressure, and cholesterol levels. This can help you feel better and stay healthy. You can manage diabetes by tracking your blood sugar. You can also eat healthy and exercise to avoid gaining weight. And you should take medicine if directed by your healthcare provider.  Call your health care provider if:    You vomit or have diarrhea for more than 6 hours.    Your blood glucose level is higher than usual or over 250 mg/dL after you have  taken extra insulin (if recommended in your sick-day plan).    You take oral medicine for diabetes, and your blood sugar is higher than usual or over 250 mg/dL, before a meal and stays that high for more than 24 hours.    Your blood glucose is lower than usual or less than 70 mg/dL    You have moderate to large amounts of ketones in your blood or urine.    You aren t better after 2 days.

## 2021-05-27 NOTE — TELEPHONE ENCOUNTER
[facility] web site is not updated yet however I was able to arrange transportation for patient's upcoming with diabetes educator at [facility] on 04/01/2019 at 11AM with Jazmin. [facility] ID# is [id number].

## 2021-05-27 NOTE — TELEPHONE ENCOUNTER
Insurance is now active, will call pt to try and schedule appt with Heather Chavez for Medication Management Initial Appt.

## 2021-05-27 NOTE — TELEPHONE ENCOUNTER
Francy ALBRECHT, says insurance is active, but is through [employer], per [employer], insurance is not active.

## 2021-05-27 NOTE — PROGRESS NOTES
"ASSESMENT AND PLAN:  1. Chronic right-sided low back pain with right-sided sciatica  -   Seeing Spine clinic and getting injections.  Reports helping somewhat. Denies new or worsening sxs.  -    Continue f/u with Spine clinic.     2. Dysuria  -  Intermittent dysuria x several months.  Pt with hx of recurrent UTI.  -  Advised Pt to drink more fluids/water.   -  Denies vaginal discharge/itching. Declines  exam.   -  UTC: POSITIVE; Pt notified,  Rx sent.    Orders:   - Urinalysis-UC if Indicated   - sulfamethoxazole-trimethoprim (BACTRIM DS) 800-160 mg per tablet; Take 1 tablet by mouth 2 (two) times a day for 3 days.  Dispense: 6 tablet; Refill: 0   - Urinalysis-UC if Indicated   - Culture, Urine    3. Female stress incontinence  -  Reports leaking with cough, getting up, or any pressure/stress.  -  Pt reports using about 2pads/da.   Orders:   -  Written script given to Pt for Incontinence pads/diapers. #100 R3. Use pad/diaper PRN.    Pt is present with professional ,  Mich Huitron.   Reviewed Medical/Social history and Medications.  See new changes above.   Discussed indications for emergent medical attention and routine F/u.  Patient/Parent/Guardian engaged in decision making process and verbalized understanding of the options discussed and agreed with the final treatment plan.     SUBJECTIVE:  Ramona Wilder is a 58 y.o. female who presents  for evaluation of her Dysuria.     Pt reports intermittent dysuria x several months.  Pain is \"burning\" and mild.  Mostly occurs during and after urinating. Also reports having mild vaginal itching after urinating. Denies vaginal discharge/ and declines  exam.   Has hx of recurrent UTI; reports dysuria feels slightly better today.  Denies fever/chills, n/v, abdominal pain, diarrhea/constipation, hematochezia, hematuria.     Pt also complains of chronic lower back pain R>L with radiation to right leg. She is seeing Spine clinic and getting injections done.  She reports feeling " somewhat better and denies new or worsening pain or sxs.        ROS:  Comprehensive Review of Systems Negative except stated in HPI.     No past medical history on file.  Patient Active Problem List   Diagnosis     Carotid Artery Stenosis     Moderate Recurrent Major Depression     Anxiety     Insomnia     Hypertension     Vitamin D deficiency     Gastropathy     Type 2 diabetes mellitus without complication, with long-term current use of insulin (H)     Hyperlipidemia     Headache     Chronic Pain     Back Strain     Dermatitis     Metabolic Tests Nonspecific Elevation Of Transaminase Levels     Pain in finger of right hand     Left knee pain     Current Outpatient Medications   Medication Sig Dispense Refill     acetaminophen (TYLENOL) 500 MG tablet TAKE 1 TABLET BY MOUTH EVERY 6 HOURS AS NEEDED FOR PAIN 100 tablet 2     amLODIPine (NORVASC) 2.5 MG tablet Take 1 tablet (2.5 mg total) by mouth daily. 30 tablet 11     atorvastatin (LIPITOR) 80 MG tablet Take 1 tablet (80 mg total) by mouth at bedtime. 30 tablet 11     blood glucose test (GLUCOSE BLOOD) strips Use to check blood sugar three times daily.  One touch verio test strips. 300 strip 3     ergocalciferol (ERGOCALCIFEROL) 50,000 unit capsule Take 1 capsule (50,000 Units total) by mouth once a week. 12 capsule 3     escitalopram oxalate (LEXAPRO) 10 MG tablet Take 1 tablet (10 mg total) by mouth daily. 90 tablet 3     insulin lispro protamin-lispro 100 unit/mL (50-50) Susp Inject 28 units in the morning and 35 units in the evenings with food. 1/2 dose if not eating full meal. (Patient taking differently: Inject 30 units in the morning and 37 units in the evenings with food. 1/2 dose if not eating full meal.      ) 15 mL 11     lancets (ONETOUCH DELICA LANCETS) 33 gauge Misc Use to check blood sugar 3 per day. 100 each 11     ONETOUCH VERIO strips TEST 4 TIMES DAILY. 100 strip 11     pantoprazole (PROTONIX) 40 MG tablet Take 1 tablet (40 mg total) by mouth  "daily. 90 tablet 3     phenazopyridine (PYRIDIUM) 100 MG tablet Take 1 tablet (100 mg total) by mouth 3 (three) times a day as needed for pain. 20 tablet 0     TRUEPLUS PEN NEEDLE 32 gauge x 5/32\" Ndle USE TO INJECT INSULIN FOUR TIMES DAILY OR AS DIRECTED 400 each 1     sulfamethoxazole-trimethoprim (BACTRIM DS) 800-160 mg per tablet Take 1 tablet by mouth 2 (two) times a day for 3 days. 6 tablet 0     No current facility-administered medications for this visit.      Social History     Tobacco Use   Smoking Status Never Smoker   Smokeless Tobacco Current User     Types: Chew   Tobacco Comment     smokes outside, pt chews betel nut     OBJECTIVE: /66   Pulse 64   Temp 97.9  F (36.6  C) (Oral)   Resp 20   Ht 4' 11\" (1.499 m)   Wt 155 lb 4 oz (70.4 kg)   LMP 05/09/2014   BMI 31.36 kg/m     No results found for this or any previous visit (from the past 24 hour(s)).    PHYSICAL:  General Alert, awake, not in acute distress.   CV Normal S1 & S2. No murmurs.   RESP Non-labored, RRR, CTAB. No wheezes or crackles.    BACK No CVA tenderness.    ABDOMEN Soft,non-tender. No rigidity, guarding.  Normal bowel sounds.     Decline.       Britton Britt PA-C         "

## 2021-05-28 NOTE — TELEPHONE ENCOUNTER
RN cannot approve Refill Request    RN can NOT refill this medication med is not covered by policy/route to provider. Last office visit: 2/15/2019 Coreen Kendall MD Last Physical: 4/13/2018 Last MTM visit: Visit date not found Last visit same specialty: 4/9/2019 Britton Britt PA-C.  Next visit within 3 mo: Visit date not found  Next physical within 3 mo: Visit date not found      Cirilo Masters, [facility] [place] Connection Triage/Med Refill 5/7/2019    Requested Prescriptions   Pending Prescriptions Disp Refills     acetaminophen (TYLENOL) 500 MG tablet [Pharmacy Med Name: ACETAMINOPHEN 500 MG TABLET] 100 tablet 0     Sig: TAKE 1 TABLET BY MOUTH EVERY 6 HOURS AS NEEDED FOR PAIN       There is no refill protocol information for this order

## 2021-05-28 NOTE — PROGRESS NOTES
Reminded patient of upcoming appointment with PCP, pharmacist and [facility] RN in the clinic.          Next outreach: 07/02/2019.

## 2021-05-28 NOTE — TELEPHONE ENCOUNTER
Patient's urine culture came back positive over the weekend.  I tried calling her on Sunday via Telelanguage, but there was no answer.  I left a message that she has a bladder infection and I sent a prescription to her pharmacy.    Please call patient on Monday and make sure that she got the message that her urine culture grew out bacteria and she needs to start an antibiotic.  I sent a prescription on Sunday to [facility] (amoxicillin-clavulanate two times a day x10 days).  When she was here on Friday and I had sent a different prescription for her to take with her to [facility] (nitrofurantoin).  She should not take that prescription yet, but save it to bring with her in case she needs it while she's in [facility].

## 2021-05-28 NOTE — TELEPHONE ENCOUNTER
Both pt and adult child's number were called.  Number rang but stopped.  Will call again.  Thanks.

## 2021-05-28 NOTE — PROGRESS NOTES
MTM Initial Encounter  Assessment & Plan                                                     1. Type 2 diabetes mellitus without complication, with long-term current use of insulin (H)  Needs improvement. A1c remains consistently above goal of less than 7%. Patient currently only taking one time fasting BG - which recently reports very controlled BG and sometimes hypoglycemia, though this is not reflected in the recent A1c.  Patient is resistant to check BG more than once daily due to pain of finger pokes, would be helpful to use CGM to obtain more information. Patient agreeable to have prescriptions sent today, will provide education on system at follow up visit. Patient not currently taking metformin, kidney function is stable. After intitiating CGM and hypoglycemia resolves, will consider additional metformin therapy. Informed patient that urinary pain and frequency will likely resolve with controlled blood sugars.   - flash glucose scanning reader (FREESTYLE AILYN 14 DAY READER) Misc; Use 1 Units As Directed every 8 (eight) hours.  Dispense: 1 each; Refill: 0  - flash glucose sensor (FREESTYLE AILYN 14 DAY SENSOR) Kit; Use 1 Units As Directed every 14 (fourteen) days.  Dispense: 6 kit; Refill: 3    Future consideration: Metformin therapy    2. Hyperlipidemia, unspecified hyperlipidemia type  Stable. Patient currently taking and tolerating high intensity, atorvastatin 80 mg daily. Recommend continued use.     3. Hypertension  Needs improvement. Patient has not been taking amlodipine for the last couple months due to miscommunication. Per discussion with PCP, new prescriptions sent today for patient to restart.   - Patient to taking amlodipine daily    4. Heartburn  Needs improvement. Patient taking pantoprazole 40 mg daily after dinner. Pantoprazole most effective when dosed on an empty stomach prior to a meal. Recommended switching medication dosing to 30 minutes prior to morning meal, patient verbalizes  understanding.   - Patient to take pantoprazole 30 minutes prior to a meal on an empty stomach daily    5. Depression  Stable. Currently well managed taking escitalopram daily.     6. Vitamin D deficiency  Stable. Taking ergocalciferol 50,000 units weekly. Last vitamin D within normal limits. Could consider maintenance dosing in the future.     7. Chronic pain of left knee  Stable. Taking acetaminophen as needed for pain.     Follow Up  Return in about 2 weeks (around 2019) for Medication Review.    Subjective & Objective                                                     Ramona Wilder is a 59 y.o. female coming in for an initial visit for Medication Therapy Management. She was referred to me from Coreen Kendall MD.  Patient presents with .     Chief Complaint: DM MTM visit. Urinary frequency.    Medication Adherence/Access: Self management and help from kids. She takes medications from the vials, does not have a pillbox. Takes medications twice daily. Patient brings with medications today.     Diabetes:  Pt currently taking insulin Humalog Mix 50/50 insulin 25 AM and 30-35 units (35 units if above 200). PCP verified that there is no urinary infection but she has issues with frequent urination and burning. Urinating about 15 times daily. Burning even when sitting still and not urinating. Dosing insulin 1 minute prior to eating.  patient is not currently experiencing side effects.   SMB-2 times daily    Ranges (based on glucometer readings) :   Date AM fasting Date AM fasting    54 5/10 69   5/16 85 5/9 169   5/15 98 5/8 88   5/14 83 5/7 46   5/13 77 5/6 89   5/12 59 5/5 105   5/11 210 5/4 61   5/1 - 292 AM  Patient is experiencing hypoglycemia. see BG above.  feeling low twice weekly. Brings quick acting sugar with her all the time just in case.   Recent symptoms of high blood sugar? Frequent urination  Eye exam: up to date   Foot exam: up to date   ACEi/ARB:  None - Allergy to ACE  Aspirin:  Not taking due to unknown   Diet/Exercise: She has changed from white rice to brown rice and cut down on rice. She is aware that if she eats sweet foods her BG elevates. Eats 2 meals daily, 9 am and 6 pm - dinner time is bigger than morning. Denies snacking throughout the day - sometimes 1/2 banana, tad, or orange for snack. Apple and cranberry juice only when sugars are low.    Lab Results   Component Value Date    HGBA1C 9.0 (H) 05/17/2019    HGBA1C 9.4 (H) 02/15/2019    HGBA1C 9.4 (H) 11/14/2018     Lab Results   Component Value Date    MICROALBUR 0.80 02/15/2019    LDLCALC 165 (H) 11/14/2018    CREATININE 0.86 05/17/2019     Hyperlipidemia: Atorvastatin 80 mg daily. Denies myalgias.   Lab Results   Component Value Date    CHOL 253 (H) 11/14/2018    CHOL 150 11/02/2017    CHOL 146 07/15/2016     Lab Results   Component Value Date    HDL 59 11/14/2018    HDL 48 (L) 11/02/2017    HDL 41 (L) 07/15/2016     Lab Results   Component Value Date    LDLCALC 165 (H) 11/14/2018    LDLCALC 87 11/02/2017    LDLCALC 85 07/15/2016     Lab Results   Component Value Date    TRIG 143 11/14/2018    TRIG 75 11/02/2017    TRIG 98 07/15/2016     Hypertension: Not taking the Amlodipine 2.5 mg daily. Has not been taking amlodipine for about 2 months, was not aware there would be refills. Denied edema while taking amlodipine.   BP Readings from Last 3 Encounters:   05/17/19 130/82   05/17/19 130/82   04/09/19 132/66     GERD: Pantoprazole 40 mg daily at bedtime after eating dinner. Symptoms are not completely gone, this is just helping. Admits that spicy foods cause her to have heartburn. She is trying to avoid spicy foods, if she does eat them she tries to only eat a little. Patient states that she has been taking this medication after food because she thinks it'll cause more pain if she takes it on an empty stomach.     Depression: Escitalopram 20 mg daily.   PHQ-9 Total Score: 8 (1/4/2019  9:00 AM)    Vitamin D deficiency:  Ergocalciferol 50,000 units weekly on Thursdays.   Vitamin D, Total (25-Hydroxy)   Date Value Ref Range Status   01/10/2018 42.9 30.0 - 80.0 ng/mL Final     Arthritis: Acetaminophen 1,000 mg 1-2 times daily as needed.     PMH: reviewed in EPIC   Allergies/ADRs: reviewed in EPIC   Alcohol: reviewed in EPIC   Tobacco:   Social History     Tobacco Use   Smoking Status Never Smoker   Smokeless Tobacco Current User     Types: Chew   Tobacco Comment     smokes outside, pt chews betel nut     Today's Vitals:   Vitals:    05/17/19 1618   BP: 130/82   Pulse: 72   Weight: 155 lb (70.3 kg)     ----------------  The patient was given a summary of these recommendations as an after visit summary    I spent 60 minutes with this patient today.   All changes were made via collaborative practice agreement with Coreen Kendall MD. A copy of the visit note was provided to the patient's provider.     Heather Chavez, PharmD  Medication Therapy Management Pharmacist  [facility]       Current Outpatient Medications   Medication Sig Dispense Refill     acetaminophen (TYLENOL) 500 MG tablet TAKE 1 TABLET BY MOUTH EVERY 6 HOURS AS NEEDED FOR PAIN 100 tablet 5     amLODIPine (NORVASC) 2.5 MG tablet Take 1 tablet (2.5 mg total) by mouth daily. 90 tablet 4     atorvastatin (LIPITOR) 80 MG tablet Take 1 tablet (80 mg total) by mouth at bedtime. 30 tablet 11     atovaquone-proguanil (MALARONE) 250-100 mg Tab Take 1 tablet daily, starting 2 days before leaving [place] and continuing until 7 days after returning to [place]. 40 tablet 0     azithromycin (ZITHROMAX) 500 MG tablet Take 2 tablets for severe travel diarrhea (more than 4 unformed stools daily, fever, or blood, pus, or mucus in the stool) 2 tablet 0     blood glucose test (GLUCOSE BLOOD) strips Use to check blood sugar three times daily.  One touch verio test strips. 300 strip 3     ergocalciferol (ERGOCALCIFEROL) 50,000 unit capsule Take 1 capsule (50,000 Units  "total) by mouth once a week. 12 capsule 3     escitalopram oxalate (LEXAPRO) 10 MG tablet Take 1 tablet (10 mg total) by mouth daily. 90 tablet 3     flash glucose scanning reader (FREESTYLE AILYN 14 DAY READER) Misc Use 1 Units As Directed every 8 (eight) hours. 1 each 0     flash glucose sensor (FREESTYLE AILYN 14 DAY SENSOR) Kit Use 1 Units As Directed every 14 (fourteen) days. 6 kit 3     insulin lispro protamin-lispro 100 unit/mL (50-50) Susp Inject 28 units in the morning and 35 units in the evenings with food. 1/2 dose if not eating full meal. (Patient taking differently: Inject 30 units in the morning and 37 units in the evenings with food. 1/2 dose if not eating full meal.      ) 15 mL 11     lancets (ONETOUCH DELICA LANCETS) 33 gauge Misc Use to check blood sugar 3 per day. 100 each 11     nitrofurantoin, macrocrystal-monohydrate, (MACROBID) 100 MG capsule Take 1 capsule (100 mg total) by mouth 2 (two) times a day for 5 days. For bladder infection. 10 capsule 0     ONETOUCH VERIO strips TEST 4 TIMES DAILY. 100 strip 11     pantoprazole (PROTONIX) 40 MG tablet Take 1 tablet (40 mg total) by mouth daily. 90 tablet 3     pen needle, diabetic (TRUEPLUS PEN NEEDLE) 32 gauge x 5/32\" Ndle USE TO INJECT INSULIN FOUR TIMES DAILY OR AS DIRECTED 400 each 1     phenazopyridine (PYRIDIUM) 100 MG tablet Take 1 tablet (100 mg total) by mouth 3 (three) times a day as needed for pain. 20 tablet 0     No current facility-administered medications for this visit.              "

## 2021-05-28 NOTE — PROGRESS NOTES
"ASSESSMENT/PLAN:    Problem List Items Addressed This Visit        ENT/CARD/PULM/ENDO Problems    Hypertension     Unable to take ACE Inhibitor due to angioedema from lisinopril    BP borderline today, but had run out of amlodopine; will refill.         Relevant Medications    amLODIPine (NORVASC) 2.5 MG tablet    Type 2 diabetes mellitus without complication, with long-term current use of insulin (H) - Primary     Poorly controlled.  Most glucometer readings low, but clearly blood sugar must be high frequently for A1C 9.     Has refused more than 2 insulin shots per day in the past, which is why she's on 50/50 insulin instead of basal plus mealtime.     Will have her meet with MTM for potential adjustments.         Relevant Medications    pen needle, diabetic (TRUEPLUS PEN NEEDLE) 32 gauge x 5/32\" Ndle    Other Relevant Orders    Glycosylated Hemoglobin A1c (Completed)    Comprehensive Metabolic Panel (Completed)       Other    Vitamin D deficiency    Relevant Medications    ergocalciferol (ERGOCALCIFEROL) 50,000 unit capsule    Gastropathy    Relevant Medications    pantoprazole (PROTONIX) 40 MG tablet    Urinary, incontinence, stress female    Relevant Orders    Incontinence supplies      Other Visit Diagnoses     Counseling about travel        Relevant Medications    atovaquone-proguanil (MALARONE) 250-100 mg Tab    azithromycin (ZITHROMAX) 500 MG tablet    Other Relevant Orders    TYPHOID, INACTIVE (Completed)    Dysuria        Recurrent dysuria and frequency, maybe be due to glucosuria.  Usually doesn't have infection with this, but checking culture today.    Relevant Medications    phenazopyridine (PYRIDIUM) 100 MG tablet    Other Relevant Orders    Urinalysis-UC if Indicated (Completed)    Type 2 diabetes mellitus (H)        Relevant Medications    pen needle, diabetic (TRUEPLUS PEN NEEDLE) 32 gauge x 5/32\" Ndle    Other Relevant Orders    Glycosylated Hemoglobin A1c (Completed)    Comprehensive Metabolic " Panel (Completed)    Uncontrolled type 2 diabetes mellitus with hypoglycemia without coma, with long-term current use of insulin        Relevant Medications    lancets (ONETOUCH DELICA LANCETS) 33 gauge Misc    Depression, unspecified depression type        Relevant Medications    escitalopram oxalate (LEXAPRO) 10 MG tablet    Recurrent UTI        Did give rx for antibiotics to take with on her trip in case of UTI.    Relevant Medications    atovaquone-proguanil (MALARONE) 250-100 mg Tab    azithromycin (ZITHROMAX) 500 MG tablet    nitrofurantoin, macrocrystal-monohydrate, (MACROBID) 100 MG capsule    Other Relevant Orders    Culture, Urine          Return in about 3 months (around 8/17/2019) for Diabetes follow-up.     SUBJECTIVE:  Ramona Wilder is a 59 y.o. female here for diabetes follow-up, dysuria, and travel prepeartion.    Travel: Going to [facility] 6/5-7/5/19 for her son's wedding.  [place] [facility].    Diabetes:  Taking insulin 25 morning, evening 30-35.  Glucometer shows blood sugars .  Patient notes blood sugars too high when she eats, but low if she doesn't.    Has burning in hands and feet, long time.    Depression:  Out of Lexapro for awhile.  Would like to rest it.  Helps her sleep better, cry less.    Urinary incontinence:  Requests adult diapers for urinary incontinence, worse when she has dysuria and frequency. Worse with cough or sneeze. Would like to run for exercise, but can't because of leaking urine.  Has hx of anal sphincter injury years ago with childbirth, with repair in 2011.      Patient Active Problem List   Diagnosis     Carotid Artery Stenosis     Moderate Recurrent Major Depression     Anxiety     Insomnia     Hypertension     Vitamin D deficiency     Gastropathy     Type 2 diabetes mellitus without complication, with long-term current use of insulin (H)     Hyperlipidemia     Headache     Chronic Pain     Back Strain     Dermatitis      Metabolic Tests Nonspecific Elevation Of Transaminase Levels     Pain in finger of right hand     Left knee pain     Urinary, incontinence, stress female     Current Outpatient Medications on File Prior to Visit   Medication Sig Dispense Refill     acetaminophen (TYLENOL) 500 MG tablet TAKE 1 TABLET BY MOUTH EVERY 6 HOURS AS NEEDED FOR PAIN 100 tablet 5     atorvastatin (LIPITOR) 80 MG tablet Take 1 tablet (80 mg total) by mouth at bedtime. 30 tablet 11     blood glucose test (GLUCOSE BLOOD) strips Use to check blood sugar three times daily.  One touch verio test strips. 300 strip 3     insulin lispro protamin-lispro 100 unit/mL (50-50) Susp Inject 28 units in the morning and 35 units in the evenings with food. 1/2 dose if not eating full meal. (Patient taking differently: Inject 30 units in the morning and 37 units in the evenings with food. 1/2 dose if not eating full meal.      ) 15 mL 11     ONETOUCH VERIO strips TEST 4 TIMES DAILY. 100 strip 11     No current facility-administered medications on file prior to visit.         Social History     Tobacco Use   Smoking Status Never Smoker   Smokeless Tobacco Current User     Types: Chew   Tobacco Comment     smokes outside, pt chews betel nut       DIABETIC MEASURES:  Hemoglobin A1c   Date Value Ref Range Status   05/17/2019 9.0 (H) 3.5 - 6.0 % Final   02/15/2019 9.4 (H) 3.5 - 6.0 % Final   11/14/2018 9.4 (H) 3.5 - 6.0 % Final        Lipids:   Lab Results   Component Value Date    HDL 59 11/14/2018    HDL 48 (L) 11/02/2017    HDL 41 (L) 07/15/2016    Lab Results   Component Value Date    LDLCALC 165 (H) 11/14/2018    LDLCALC 87 11/02/2017    LDLCALC 85 07/15/2016    Lab Results   Component Value Date    TRIG 143 11/14/2018    TRIG 75 11/02/2017    TRIG 98 07/15/2016        Microalbumin  Lab Results   Component Value Date    MICROALBUR 0.80 02/15/2019        Last eye exam: Nov 2018      Recent blood pressures:   BP Readings from Last 3 Encounters:  "  05/17/19 130/82   05/17/19 130/82   04/09/19 132/66        Recent weight:   Wt Readings from Last 3 Encounters:   05/17/19 155 lb (70.3 kg)   05/17/19 155 lb (70.3 kg)   04/09/19 155 lb 4 oz (70.4 kg)         OBJECTIVE:  :  /82   Pulse 72   Temp 97.5  F (36.4  C) (Oral)   Resp 20   Ht 4' 11\" (1.499 m)   Wt 155 lb (70.3 kg)   LMP 05/09/2014   BMI 31.31 kg/m      Gen:  A&A, NAD  Neck:  supple, no sig LAD or thyromegally  CV:  HRRR, no M/R/G  Resp:  CTAB  Abd:  Soft, mild suprapubic tenderness.  Extremities: No edema.  No sores or significant callus.  Monofilament testing shows intact sensation.     Lab results:    Results for orders placed or performed in visit on 05/17/19   Glycosylated Hemoglobin A1c   Result Value Ref Range    Hemoglobin A1c 9.0 (H) 3.5 - 6.0 %   Comprehensive Metabolic Panel   Result Value Ref Range    Sodium 143 136 - 145 mmol/L    Potassium 4.5 3.5 - 5.0 mmol/L    Chloride 104 98 - 107 mmol/L    CO2 27 22 - 31 mmol/L    Anion Gap, Calculation 12 5 - 18 mmol/L    Glucose 108 70 - 125 mg/dL    BUN 12 8 - 22 mg/dL    Creatinine 0.86 0.60 - 1.10 mg/dL    GFR MDRD Af Amer >60 >60 mL/min/1.73m2    GFR MDRD Non Af Amer >60 >60 mL/min/1.73m2    Bilirubin, Total 0.5 0.0 - 1.0 mg/dL    Calcium 9.9 8.5 - 10.5 mg/dL    Protein, Total 7.2 6.0 - 8.0 g/dL    Albumin 3.8 3.5 - 5.0 g/dL    Alkaline Phosphatase 68 45 - 120 U/L    AST 24 0 - 40 U/L    ALT 23 0 - 45 U/L   Urinalysis-UC if Indicated   Result Value Ref Range    Color, UA Yellow Colorless, Yellow, Straw, Light Yellow    Clarity, UA Clear Clear    Glucose,  mg/dL (!) Negative    Bilirubin, UA Negative Negative    Ketones, UA Negative Negative    Specific Gravity, UA 1.010 1.005 - 1.030    Blood, UA Negative Negative    pH, UA 5.5 5.0 - 8.0    Protein, UA Negative Negative mg/dL    Urobilinogen, UA 0.2 E.U./dL 0.2 E.U./dL, 1.0 E.U./dL    Nitrite, UA Negative Negative    Leukocytes, UA Negative Negative             "

## 2021-05-29 NOTE — PROGRESS NOTES
Clinic Care Coordination Medication Education FOLLOW UP Visit    Patient presents for:  Medication education, Compliance monitoring and Medication reconciliation    Language: Dee    Communication: Literate in other languages    Accompanied by: unaccompanied    Patient Living Situation:  Dependent with family    Primary Care Provider:  Coreen Kendall MD    Barriers:  Language, Cultural, Poor insight into disease process and Non-compliance of medications    Medication List (see cited below): Patient presents with all ordered medications    Current Outpatient Medications   Medication Sig     acetaminophen (TYLENOL) 500 MG tablet TAKE 1 TABLET BY MOUTH EVERY 6 HOURS AS NEEDED FOR PAIN     amLODIPine (NORVASC) 2.5 MG tablet Take 1 tablet (2.5 mg total) by mouth daily.     amoxicillin-clavulanate (AUGMENTIN) 875-125 mg per tablet Take 1 tablet by mouth 2 (two) times a day for 10 days.     atorvastatin (LIPITOR) 80 MG tablet Take 1 tablet (80 mg total) by mouth at bedtime.     atovaquone-proguanil (MALARONE) 250-100 mg Tab Take 1 tablet daily, starting 2 days before leaving [place] and continuing until 7 days after returning to [place].     azithromycin (ZITHROMAX) 500 MG tablet Take 2 tablets for severe travel diarrhea (more than 4 unformed stools daily, fever, or blood, pus, or mucus in the stool)     blood glucose test (GLUCOSE BLOOD) strips Use to check blood sugar three times daily.  One touch verio test strips.     ergocalciferol (ERGOCALCIFEROL) 50,000 unit capsule Take 1 capsule (50,000 Units total) by mouth once a week.     escitalopram oxalate (LEXAPRO) 10 MG tablet Take 1 tablet (10 mg total) by mouth daily.     flash glucose scanning reader (FREESTYLE AILYN 14 DAY READER) Misc Use 1 Units As Directed every 8 (eight) hours.     flash glucose sensor (FREESTYLE AILYN 14 DAY SENSOR) Kit Use 1 Units As Directed every 14 (fourteen) days.     insulin lispro protamin-lispro 100 unit/mL (50-50) Susp Inject 20 units in  "the morning and 30 units in the evenings with food. 1/2 dose if not eating full meal.     lancets (ONETOUCH DELICA LANCETS) 33 gauge Misc Use to check blood sugar 3 per day.     ONETOUCH VERIO strips TEST 4 TIMES DAILY.     pantoprazole (PROTONIX) 40 MG tablet Take 1 tablet (40 mg total) by mouth daily.     pen needle, diabetic (TRUEPLUS PEN NEEDLE) 32 gauge x 5/32\" Ndle USE TO INJECT INSULIN FOUR TIMES DAILY OR AS DIRECTED     phenazopyridine (PYRIDIUM) 100 MG tablet Take 1 tablet (100 mg total) by mouth 3 (three) times a day as needed for pain.       Equipment:     Pill Box was       Compliance: 80%    Future Appointments   Date Time Provider Department Center   6/3/2019 10:00 AM N CCC RN [facility]   7/9/2019  8:00 AM Heather Chavez PharmD N [facility]   8/30/2019 11:00 AM RLN CCC RN RLN [facility]       Action Plan  RN Will:  F/u appt with CCC RN on 8/30/19     Care Guide Will:  Care Guide Delegation    Nursing Notes:     Patient takes her medications on her own. Able to verbalize how to take her meds correctly but sometime she tends to forget to take it.    Would like to work on lowering A1c <8. Sees MTM regularly. Last saw MTM on 5/17/19 and next f/u appt on 6/8/19.     Main concern frequent urination and burning sensation with urination. States it goes on about 2 yrs now. Had many UA/UC done. Last UA/UC done on 5/31/19 with no growth but PCP started patient on ABX.     Patient will be out the country as of 6/5/19 and be back on 7/5/19. Scheduled patient to see PCP on 8/2/19 f/u on frequent urination. Patient states symptoms would be better for a few days after ABX but then will return soon after.         1) Lexapram 10mg daily    2) Amlodipine 2.5mg daily    3) Augmentin 875-125mg  Two times a day x 10 days.    4) Vit D 50, 000 IU weekly    5) Tylenol - as needed    6) Atorvastatin 80mg HS    7) Pantoprazole 40mg   "

## 2021-05-29 NOTE — TELEPHONE ENCOUNTER
CMT left message x 3. Please review message thread below and advise the patient as indicated. Please schedule if necessary or indicated in message thread.

## 2021-05-29 NOTE — TELEPHONE ENCOUNTER
CMT left message for Ramona Wilder at her home phone. Attempted to call son's phone but his phone is not currently active. DOMINGO will call again.

## 2021-05-29 NOTE — TELEPHONE ENCOUNTER
Patient notified per MD note below. The patient verbalizes understanding of provider/CSS instructions for follow-up and continued care per provider message.

## 2021-05-29 NOTE — TELEPHONE ENCOUNTER
CMT attempted to reach the patient x 2. CMT unable to leave voicemail, unable to reach the patient. Please review message thread below and advise the patient as indicated. Please schedule if necessary or indicated in message thread.

## 2021-05-29 NOTE — TELEPHONE ENCOUNTER
Called and did not leave any messages, please relay PCP's message below when call back. Thanks.       ----- Message from Coreen Kendall MD sent at 6/3/2019  2:04 PM CDT -----  Please call pt: Her repeat urine culture on from Friday ended up coming back negative; she does NOT have a bladder infection. She should stop the new antibiotic that I prescribed on Friday.

## 2021-05-29 NOTE — PROGRESS NOTES
MTM Follow Up Encounter  Assessment & Plan                                                     1. Acute cystitis without hematuria  Needs improvement.  Despite 5-day course of Macrobid antibiotic, patient still endorsing symptoms.  Per discussion with PCP, patient to complete additional urinalysis today and prescription will be sent for Augmentin.  Verified with pharmacy that Augmentin is available and ready for pickup.  - Patient to  and take 5-day course of Augmentin  - Urinalysis-UC if Indicated  - Culture, Urine    2. Type 2 diabetes mellitus without complication, with long-term current use of insulin (H)  Needs improvement.  Patient's current insulin dosing continues to cause multiple hypoglycemic events, though A1c remains consistently above goal of <7%.  Likely hypoglycemic events likely due to patient diet adjustments recently.  Congratulated diet adjustments and recommended a 15% decrease in insulin today.  Patient has still not received the CGM prescribed at previous visit, will check on this today.  Additionally, patient would benefit from metformin therapy- will initiate when patient returns from [facility] and blood sugars stabilized.  - insulin lispro protamin-lispro 100 unit/mL (50-50) Susp; Inject 20 units in the morning and 30 units in the evenings with food. 1/2 dose if not eating full meal.  Dispense: 15 mL; Refill: 11    3. Hyperlipidemia  Stable. Patient currently taking and tolerating high intensity, atorvastatin 80 mg daily.  Recommend continued use.     4. Hypertension  Needs further assessment.  Patient recently restarted amlodipine 2.5 mg daily.  Blood pressure today elevated to goal of less than 140/90, will recheck blood pressure at follow-up visit.    5. Heartburn  Stable.  Patient currently taking pantoprazole daily and working well.  Patient also avoiding trigger foods.    6. Chronic Pain  Needs further assessment.  Patient continues to express pain in her feet and lower back,  despite acetaminophen use.  Will discuss in further detail at follow-up visit.    Follow Up  Return in about 6 weeks (around 2019) for Medication Review.    Subjective & Objective                                                       Ramona Wilder is a 59 y.o. female coming in for a follow up visit for Medication Therapy Management. She was referred to me from Coreen Kendall, JSatient presents with .     Chief Complaint: DM MTM visit. Continued urinary frequency.    Medication Adherence/Access: Self management and help from kids. She takes medications from the vials, does not have a pillbox. Takes medications twice daily. Patient brings with medications today.     Vacation Override: Patient requesting all medications be filled prior to her 1 month vacation starting .      Acute cystitis without hematuria: Patient is still suffering from symptoms of urinary infection; urinary frequency and painful urination. Completed macrobid prescription as prescribed, and infection has not subsided.  Patient states that she has also completed her phenazopyridine prescription.    Diabetes:  Pt currently taking insulin Humalog Mix 50/50 insulin 25 AM and 30-35 units (35 units if above 200). Patient presents with paperwork today stating that starting 19, her Humalog 50/50 kwikpen will not be covered. Covered alternatives include humalog 50/50 mix vials, Novolog 70/30 mix (unsure if pens or vials per paperwork).  Dosing insulin 1 minute prior to eating.  patient is not currently experiencing side effects.   SMB-2 times daily    Ranges (based on glucometer readings) :   Date FBG/2 hours PP Dinner/2 hours PP     30  Ate rice last night (less than normal) 35 units last night    75 78/     119 114     113 103     130      63 178     80     Patient is experiencing hypoglycemia, especially last night. see BG above.  Brings quick acting sugar with her all the time just in case.    Recent symptoms of high blood sugar? Frequent urination  Aspirin: Not taking due to allergy  Diet/Exercise: She has changed from white rice to brown rice and cut down on rice. She is aware that if she eats sweet foods her BG elevates. Patient states that she recently cut fruits out of her diet 1-2 weeks ago to help decrease her blood sugars.    Eats 2 meals daily, 9 am and 6 pm - dinner time is bigger than morning. Denies snacking throughout the day - sometimes 1/2 banana, tad, or orange for snack. Apple and cranberry juice only when sugars are low.    Lab Results   Component Value Date    HGBA1C 9.0 (H) 05/17/2019    HGBA1C 9.4 (H) 02/15/2019    HGBA1C 9.4 (H) 11/14/2018     Lab Results   Component Value Date    MICROALBUR 0.80 02/15/2019    LDLCALC 165 (H) 11/14/2018    CREATININE 0.86 05/17/2019     Hyperlipidemia: Atorvastatin 80 mg daily. Denies myalgias.   Lab Results   Component Value Date    LDLCALC 165 (H) 11/14/2018     Hypertension: Taking the Amlodipine 2.5 mg daily, recently restarted. Denies light headedness or dizziness or ankle swelling.   BP Readings from Last 3 Encounters:   05/31/19 144/82   05/17/19 130/82   05/17/19 130/82     GERD: Pantoprazole 40 mg daily before eating. Her stomach is better than before, still avoiding spicy foods. Admits that spicy foods cause her to have heartburn. She is trying to avoid spicy foods, if she does eat them she tries to only eat a little.     Arthritis: Acetaminophen 1,000 mg 1-2 times daily as needed. Continues to take,  States that her pain is still problematic especially in her feet in the early mornings and in her lower back.     PMH: reviewed in EPIC   Allergies/ADRs: reviewed in EPIC   Alcohol: reviewed in EPIC   Tobacco:   Social History     Tobacco Use   Smoking Status Never Smoker   Smokeless Tobacco Current User     Types: Chew   Tobacco Comment     smokes outside, pt chews betel nut     Today's Vitals:   Vitals:    05/31/19 1539 05/31/19  1540   BP: 130/78 144/82   Pulse: 76    SpO2: 97%    Weight: 158 lb 8 oz (71.9 kg)      ----------------    The patient was given a summary of these recommendations as an after visit summary    I spent 60 minutes with this patient today;   All changes were made via collaborative practice agreement with Coreen Kendall MD. A copy of the visit note was provided to the patient's provider.     Heather Chavez, PharmD  Medication Therapy Management Pharmacist  [facility]       Current Outpatient Medications   Medication Sig Dispense Refill     acetaminophen (TYLENOL) 500 MG tablet TAKE 1 TABLET BY MOUTH EVERY 6 HOURS AS NEEDED FOR PAIN 100 tablet 5     amLODIPine (NORVASC) 2.5 MG tablet Take 1 tablet (2.5 mg total) by mouth daily. 90 tablet 4     amoxicillin-clavulanate (AUGMENTIN) 875-125 mg per tablet Take 1 tablet by mouth 2 (two) times a day for 10 days. 20 tablet 0     atorvastatin (LIPITOR) 80 MG tablet Take 1 tablet (80 mg total) by mouth at bedtime. 30 tablet 11     atovaquone-proguanil (MALARONE) 250-100 mg Tab Take 1 tablet daily, starting 2 days before leaving [place] and continuing until 7 days after returning to [place]. 40 tablet 0     azithromycin (ZITHROMAX) 500 MG tablet Take 2 tablets for severe travel diarrhea (more than 4 unformed stools daily, fever, or blood, pus, or mucus in the stool) 2 tablet 0     blood glucose test (GLUCOSE BLOOD) strips Use to check blood sugar three times daily.  One touch verio test strips. 300 strip 3     ergocalciferol (ERGOCALCIFEROL) 50,000 unit capsule Take 1 capsule (50,000 Units total) by mouth once a week. 12 capsule 3     escitalopram oxalate (LEXAPRO) 10 MG tablet Take 1 tablet (10 mg total) by mouth daily. 90 tablet 3     flash glucose scanning reader (FREESTYLE AILYN 14 DAY READER) Misc Use 1 Units As Directed every 8 (eight) hours. 1 each 0     flash glucose sensor (FREESTYLE AILYN 14 DAY SENSOR) Kit Use 1 Units As Directed every 14 (fourteen)  "days. 6 kit 3     insulin lispro protamin-lispro 100 unit/mL (50-50) Susp Inject 20 units in the morning and 30 units in the evenings with food. 1/2 dose if not eating full meal. 15 mL 11     lancets (ONETOUCH DELICA LANCETS) 33 gauge Misc Use to check blood sugar 3 per day. 100 each 11     ONETOUCH VERIO strips TEST 4 TIMES DAILY. 100 strip 11     pantoprazole (PROTONIX) 40 MG tablet Take 1 tablet (40 mg total) by mouth daily. 90 tablet 3     pen needle, diabetic (TRUEPLUS PEN NEEDLE) 32 gauge x 5/32\" Ndle USE TO INJECT INSULIN FOUR TIMES DAILY OR AS DIRECTED 400 each 1     phenazopyridine (PYRIDIUM) 100 MG tablet Take 1 tablet (100 mg total) by mouth 3 (three) times a day as needed for pain. 20 tablet 0     No current facility-administered medications for this visit.                            "

## 2021-05-30 VITALS — BODY MASS INDEX: 31.75 KG/M2 | HEIGHT: 59 IN | WEIGHT: 157.5 LBS

## 2021-05-30 VITALS — BODY MASS INDEX: 31.65 KG/M2 | HEIGHT: 59 IN | WEIGHT: 157 LBS

## 2021-05-30 VITALS — WEIGHT: 162 LBS | HEIGHT: 59 IN | BODY MASS INDEX: 32.66 KG/M2

## 2021-05-30 VITALS — WEIGHT: 160 LBS | BODY MASS INDEX: 32.32 KG/M2

## 2021-05-30 NOTE — PATIENT INSTRUCTIONS - HE
START Novolog 70/30 insulin   STOP Humulin 50/50 insulin    START ranitidine 150 mg two times a day as needed  STOP pantoprazole 40 mg daily    START rosuvastatin 20 mg daily  STOP atorvastatin 80 mg daily

## 2021-05-30 NOTE — PROGRESS NOTES
"HPI - 60 yo female (who is a patient of Dr. Kendall and who is new to me) here with dysuira.     Dysuria - She reports persistent burning with urination and recurrent UTIs. She reports a constant burning and frequent urination.   No fever but feels warm      Per chart review:   Seen 6/3/19 for UTI but her Ucx was negative and there is a phone message to stop the antibiotics.   DM that is poorly controlled with last A1c was 9.0 on 5/17/19 on insulin 70/30  CR wnl 5/17/19 and microalbumin wnl 2/15/19      H/o PTSD/MDD  PHQ9= 1 today  On lexapro      Current Outpatient Medications   Medication Sig     acetaminophen (TYLENOL) 500 MG tablet TAKE 1 TABLET BY MOUTH EVERY 6 HOURS AS NEEDED FOR PAIN     amLODIPine (NORVASC) 2.5 MG tablet Take 1 tablet (2.5 mg total) by mouth daily.     atovaquone-proguanil (MALARONE) 250-100 mg Tab Take 1 tablet daily, starting 2 days before leaving [place] and continuing until 7 days after returning to [place].     blood glucose test (GLUCOSE BLOOD) strips Use to check blood sugar three times daily.  One touch verio test strips.     ergocalciferol (ERGOCALCIFEROL) 50,000 unit capsule Take 1 capsule (50,000 Units total) by mouth once a week.     escitalopram oxalate (LEXAPRO) 10 MG tablet Take 1 tablet (10 mg total) by mouth daily.     flash glucose scanning reader (FREESTYLE AILYN 14 DAY READER) Misc Use 1 Units As Directed every 8 (eight) hours.     flash glucose sensor (FREESTYLE AILYN 14 DAY SENSOR) Kit Use 1 Units As Directed every 14 (fourteen) days.     insulin aspart protamine-insulin aspart (NOVOLOG MIX 70-30FLEXPEN U-100) 100 unit/mL (70-30) pen Inject 20 units under the skin before morning mean and 30 units under the skin before evening meal     lancets (ONETOUCH DELICA LANCETS) 33 gauge Misc Use to check blood sugar 3 per day.     ONETOUCH VERIO strips TEST 4 TIMES DAILY.     pen needle, diabetic (TRUEPLUS PEN NEEDLE) 32 gauge x 5/32\" Ndle USE TO INJECT INSULIN FOUR TIMES DAILY OR AS " "DIRECTED     phenazopyridine (PYRIDIUM) 100 MG tablet Take 1 tablet (100 mg total) by mouth 3 (three) times a day as needed for pain.     ranitidine (ZANTAC) 150 MG tablet Take 1 tablet (150 mg total) by mouth 2 (two) times a day as needed for heartburn.     rosuvastatin (CRESTOR) 20 MG tablet Take 1 tablet (20 mg total) by mouth at bedtime.     Vitals:    07/09/19 0946   BP: 130/84   Pulse: 70   Resp: 12   Temp: 97.5  F (36.4  C)   TempSrc: Oral   SpO2: 98%   Weight: 155 lb 14.4 oz (70.7 kg)   Height: 4' 10.66\" (1.49 m)     PHYSICAL EXAM   General Appearance: Awake and alert, in no acute distress  HEENT: neck is supple  CV: regular rate  Resp: No respiratory distress. Breathing comfortably  Musculoskeletal: moving limbs comfortably with not deficits or deformities  Skin: no rashes noted    Recent Results (from the past 1008 hour(s))   Urinalysis-UC if Indicated    Collection Time: 05/31/19  3:54 PM   Result Value Ref Range    Color, UA Yellow Colorless, Yellow, Straw, Light Yellow    Clarity, UA Clear Clear    Glucose, UA Negative Negative    Bilirubin, UA Negative Negative    Ketones, UA Negative Negative    Specific Gravity, UA 1.010 1.005 - 1.030    Blood, UA Negative Negative    pH, UA 6.0 5.0 - 8.0    Protein, UA Negative Negative mg/dL    Urobilinogen, UA 0.2 E.U./dL 0.2 E.U./dL, 1.0 E.U./dL    Nitrite, UA Negative Negative    Leukocytes, UA Negative Negative   Culture, Urine    Collection Time: 05/31/19  4:01 PM   Result Value Ref Range    Culture No Growth    Urinalysis-UC if Indicated    Collection Time: 07/09/19 10:00 AM   Result Value Ref Range    Color, UA Yellow Colorless, Yellow, Straw, Light Yellow    Clarity, UA Clear Clear    Glucose, UA Negative Negative    Bilirubin, UA Negative Negative    Ketones, UA Negative Negative    Specific Gravity, UA 1.020 1.005 - 1.030    Blood, UA Trace (!) Negative    pH, UA 5.5 5.0 - 8.0    Protein, UA Negative Negative mg/dL    Urobilinogen, UA 0.2 E.U./dL 0.2 " E.U./dL, 1.0 E.U./dL    Nitrite, UA Negative Negative    Leukocytes, UA Trace (!) Negative       A/P  Dysuria   UA is borderline   Ucx pending  Refill pyridium for sx management while awaiting Ucx    Frequent urination -   Discussed that poorly controlled DM with elevated blood sugars will cause an increase in thirst and urination  Pt concerns about kidney function and chart reviewed with patient and CR, microalbumin and abdo/pelvic CT have all been normal.     DM T2 - poorly controlled  On insulin 70/30  She met with MTM today for med teaching    Spent 25 min face to face with patient with more the 100% spent in counseling, reviewing chart with patient and coordination of care and discussing problems listed above.

## 2021-05-30 NOTE — PROGRESS NOTES
CC CHW reminded patient of upcoming appointments and transportation arranged. [facility] RN will update medical goal and patient will follow up with [facility] RN 8 weeks as scheduled.    CC CHW will outreach again on 08/29/2019.

## 2021-05-30 NOTE — PATIENT INSTRUCTIONS - HE
Dysuria /burning with urination  UA is borderline   Ucx pending  Refill pyridium for sx management while awaiting Ucx    Frequent urination -   Discussed that poorly controlled DM with elevated blood sugars will cause an increase in thirst and urination  Pt concerns about kidney function and chart reviewed with patient and CR, microalbumin and abdo/pelvic CT have all been normal.     DM T2 - poorly controlled  On insulin 70/30  She met with MTM today for med teaching

## 2021-05-31 ENCOUNTER — RECORDS - HEALTHEAST (OUTPATIENT)
Dept: ADMINISTRATIVE | Facility: CLINIC | Age: 61
End: 2021-05-31

## 2021-05-31 VITALS — BODY MASS INDEX: 31.47 KG/M2 | WEIGHT: 154.5 LBS

## 2021-05-31 VITALS — BODY MASS INDEX: 32.87 KG/M2 | WEIGHT: 160 LBS

## 2021-05-31 VITALS — HEIGHT: 59 IN | BODY MASS INDEX: 31.6 KG/M2 | WEIGHT: 156.75 LBS

## 2021-05-31 VITALS — BODY MASS INDEX: 32.05 KG/M2 | WEIGHT: 156 LBS

## 2021-05-31 VITALS — WEIGHT: 155.25 LBS | BODY MASS INDEX: 31.3 KG/M2 | HEIGHT: 59 IN

## 2021-05-31 NOTE — PROGRESS NOTES
CHW met with patient on today follow up appointment with MTM. Patient to follow up with [facility] RN for medication education tomorrow, 08/30/2019.        Next outreach: 10/02/2019.

## 2021-05-31 NOTE — PROGRESS NOTES
ASSESSMENT/PLAN:    1. Type 2 diabetes mellitus without complication, with long-term current use of insulin (H)  Patient's A1c has decreased a bit since switching from 50/50 to 70/30 insulin.  Will continue on current regimen, follow-up with pharmacist in 1 month as already scheduled.  - blood glucose test (ONETOUCH VERIO) strips; Use 1 each As Directed 4 (four) times a day. Dispense brand per patient's insurance at pharmacy discretion.  Dispense: 100 strip; Refill: 11  - Glycosylated Hemoglobin A1c    2. Right lumbar radiculitis  I think the patient's pain in her right leg is explained by radiculopathy.  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  She had an epidural steroid injection on 9/13/2013 which gave her good relief.  However, it was a difficult experience and she does not wish to injection at this time.  Therefore, I did not elect to update imaging today to reevaluate.  She did not tolerate gabapentin in the past (stomach upset) and does not wish to pursue physical therapy at this time.  She will keep these options in mind for the future.  For now, okay to take Tylenol as needed and return promptly with any worsening symptoms.    3. Screening for HIV (human immunodeficiency virus)  - HIV Antigen/Antibody Screening [place]    4. Pain  - acetaminophen (TYLENOL) 500 MG tablet; Take 1 tablet (500 mg total) by mouth every 6 (six) hours as needed for pain.  Dispense: 100 tablet; Refill: 5     Return in about 3 months (around 11/2/2019) for Diabetes follow-up.     SUBJECTIVE:  Ramona Wiledr is a 59 y.o. female here for follow-up of diabetes.    Of note, she traveled to [facility] for her son's wedding 6/5/2019-7/5/2019.  The trip was good.  She did have some bilateral lateral knee pain right more than left while she was there.  It sounds like she took her medicines while she was there.    She  has had knee pain for a long time and it just worsens sometimes.  She notes that she get a shot in her back one time in the past and that helped.  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  She had an epidural steroid injection.  Patient reports to me now that the injection helped a lot but it made her blood pressure go really high and she had to stay there a long time.  The procedure note does indicate some blurry vision that resolved spontaneously.  But the pain she is getting now seems like sometimes it starts in her low back her right buttock and then radiates down the lateral part of her leg to her knee and sometimes her heel.  Sound like she gets some muscle spasms at times.  Takes Tylenol which may or may not help.      Diabetes: Had to swtich insulin from 50/50 to 70/30 due to insurance reasons, and since that time blood sugars seem a bit better.  Reports no hypoglycemia.  Review of her glucometer shows recent readings between 69 and 223.      ROS:  Remainder review of systems is negative unless previously stated    The following portions of the patient's history were reviewed and updated as appropriate: allergies, current medications and problem list  Current Outpatient Medications on File Prior to Visit   Medication Sig     amLODIPine (NORVASC) 2.5 MG tablet Take 1 tablet (2.5 mg total) by mouth daily.     blood glucose test (GLUCOSE BLOOD) strips Use to check blood sugar three times daily.  One touch verio test strips.     ergocalciferol (ERGOCALCIFEROL) 50,000 unit capsule Take 1 capsule (50,000 Units total) by mouth once a week.     escitalopram oxalate (LEXAPRO) 10 MG tablet Take 1 tablet (10 mg total) by mouth daily.     flash glucose scanning reader (Vigix AILYN 14 DAY READER) Misc Use 1 Units As Directed every 8 (eight) hours.      "flash glucose sensor (FREESTYLE AILYN 14 DAY SENSOR) Kit Use 1 Units As Directed every 14 (fourteen) days.     insulin aspart protamine-insulin aspart (NOVOLOG MIX 70-30FLEXPEN U-100) 100 unit/mL (70-30) pen Inject 20 units under the skin before morning mean and 30 units under the skin before evening meal     lancets (ONETOUCH DELICA LANCETS) 33 gauge Misc Use to check blood sugar 3 per day.     pen needle, diabetic (TRUEPLUS PEN NEEDLE) 32 gauge x 5/32\" Ndle USE TO INJECT INSULIN FOUR TIMES DAILY OR AS DIRECTED     ranitidine (ZANTAC) 150 MG tablet Take 1 tablet (150 mg total) by mouth 2 (two) times a day as needed for heartburn.     rosuvastatin (CRESTOR) 20 MG tablet Take 1 tablet (20 mg total) by mouth at bedtime.     [DISCONTINUED] acetaminophen (TYLENOL) 500 MG tablet TAKE 1 TABLET BY MOUTH EVERY 6 HOURS AS NEEDED FOR PAIN     [DISCONTINUED] ONETOUCH VERIO strips TEST 4 TIMES DAILY.     phenazopyridine (PYRIDIUM) 100 MG tablet Take 1 tablet (100 mg total) by mouth 3 (three) times a day as needed for pain.     No current facility-administered medications on file prior to visit.         Social History     Tobacco Use   Smoking Status Never Smoker   Smokeless Tobacco Current User     Types: Chew   Tobacco Comment     smokes outside, pt chews betel nut       OBJECTIVE:  :  /74   Pulse 81   Temp 97.9  F (36.6  C) (Oral)   Resp 24   Ht 4' 10.66\" (1.49 m)   Wt 154 lb 12 oz (70.2 kg)   LMP 05/09/2014   SpO2 97%   BMI 31.62 kg/m      Gen:  A&A, NAD  CV:  HRRR, no M/R/G  Resp:  CTAB  Ext:  W&D, no edema.  No joint laxity in the knees.  No pain to palpation over the joint lines.  Skin: Without rash or jaundice      Lab results:    Office Visit on 08/02/2019   Component Date Value Ref Range Status     Hemoglobin A1c 08/02/2019 8.4* 3.5 - 6.0 % Final            "

## 2021-05-31 NOTE — PROGRESS NOTES
MTM Follow Up Encounter  Assessment & Plan                                                     1. Type 2 diabetes mellitus without complication, with long-term current use of insulin (H)  Improving.  A1c reduced from previous, though not yet at goal of less than 7%.  Per discussion with PCP after last visit, appropriate to retrial metformin therapy, discussed with patient today who refuses.  Patient states that she does not want to take any more medications than what she is currently taking.  Alternatively, patient states that she really wants to work on her diet and exercise to help control blood sugars before changing medications.  Pharmacist agreed to current plan, recommend continuing current medications.  - Patient to continue current medications and work on diet and exercise prior to next visit for blood sugar management  - Per [facility], [employer] has been approved and ready for delivery since June, they have not been able to reach the patient, tried calling both phone numbers on profile today, also unable to reach - will help with set up at next visit.     Future consideration: If blood sugars not managed at next visit, recommend initiating metformin; help with [employer] delivery and education    2. Hyperlipidemia  Stable.  Patient currently taking and tolerating rosuvastatin 20 mg daily, recommend continuation.  Last lipid Cascade checked in November of last year, recommend annual fasting reassessment in November.    Future assessment: Annual fasting lipid cascade in November 2019    3. Hypertension  Stable.  Blood pressure goal of less than 140/90.  Recommend continuation of current amlodipine therapy.  Patient would benefit from increased medication adherence, pharmacist provided pillbox today.  - Pharmacist provided once daily pillbox and instructions for use for increased medication adherence    4. Heartburn  Needs improvement.  Despite current use of ranitidine 150 mg twice daily, patient  still experiencing symptoms, about 1-2 times per week-mostly related to certain triggers such as spicy foods.  Patient agreeable to try Tums on as-needed basis.    - calcium, as carbonate, (TUMS) 200 mg calcium (500 mg) chewable tablet; Chew 1 tablet (200 mg total) daily.  Dispense: 100 tablet; Refill: 1    5. Chronic Pain  Stable.  Recommend continued follow-up with PCP for assessment.    6. Depression,  Stable.  Recommend continued use of current therapy.    Follow Up  Return in about 29 days (around 2019) for Medication Review.    Subjective & Objective                                                       Ramona Wilder is a 59 y.o. female coming in for a follow up visit for Medication Therapy Management. She was referred to me from Coreen Kendall MD. Patient presents with .     Chief Complaint: Follow up visit from [facility] on 19    Medication Adherence/Access: Patient looking to reduce the number of medications she takes daily.  Self management and help from kids. She takes medications from the vials, does not have a pillbox. Takes medications twice daily. Patient brings with medications today.     Diabetes:  Novolog 70/30 mix insulin - AM 20 and PM 30. Dosing insulin before 2 meals daily. Able to tell when she is feeling hypoglycemic, about 1-2 times in the last month.  Patient states that she is Wilner taking enough medications, and does not want to take any other medications.  SMB-2 times daily    Ranges (based on glucometer readings) :   Date FBG     196     134     139     110     74 Able to tell it was low    202     114    Patient is experiencing hypoglycemia. Only felt low once, knows how to correct.   Brings quick acting sugar with her all the time just in case.    Aspirin: Not taking due to allergy  Lab Results   Component Value Date    HGBA1C 8.4 (H) 2019    HGBA1C 9.0 (H) 2019    HGBA1C 9.4 (H) 02/15/2019     Lab Results   Component Value Date     MICROALBUR 0.80 02/15/2019    LDLCALC 165 (H) 11/14/2018    CREATININE 0.86 05/17/2019     Hyperlipidemia: Rosuvastatin 20 mg daily before bed. Denies all over muscle aches, just leg pain which has been going on for a long time.   Lab Results   Component Value Date    LDLCALC 165 (H) 11/14/2018     Hypertension: Amlodipine 2.5 mg daily.  Denies light headedness or dizziness or ankle swelling.    BP Readings from Last 3 Encounters:   08/29/19 130/74   08/02/19 120/74   07/09/19 130/84     GERD: Taking ranitidine 150 mg two times a day, been taking scheduled. Still noticing heartburn symptoms depending on the food. If eating spicy foods, she has heartburn. Sometimes she craves spicy foods so she eats it. About 1-2 times per week. States that she has not used Tums before.      Arthritis: Acetaminophen 1,000 mg 1-2 times daily as needed. Continues to take, states that her pain is still problematic especially in her feet in the early mornings and in her lower back. Discussing with Dr. Kendall options for leg pain.     Mood: Escitalopram 10 mg daily. Sometimes she says her mood is happy and sometimes not happy. Sometimes she wants to be alone, not with people and quiet. Sleep has been good, appetite has been good. Denies thoughts of harming herself or others.   PHQ-9 Total Score: 1 (7/9/2019  9:00 AM)    PMH: reviewed in EPIC   Allergies/ADRs: reviewed in EPIC   Alcohol: reviewed in EPIC   Tobacco:   Social History     Tobacco Use   Smoking Status Never Smoker   Smokeless Tobacco Current User     Types: Chew   Tobacco Comment     smokes outside, pt chews betel nut     Today's Vitals:   Vitals:    08/29/19 0948 08/29/19 0950   BP: 134/80 130/74   Pulse:  70   SpO2:  97%   Weight:  154 lb (69.9 kg)     ----------------    The patient was given a summary of these recommendations as an after visit summary    I spent 30 minutes with this patient today;   All changes were made via collaborative practice agreement  "with Coreen Kendall MD. A copy of the visit note was provided to the patient's provider.     Heather Chavez, PharmD  Medication Therapy Management Pharmacist  [facility]       Current Outpatient Medications   Medication Sig Dispense Refill     acetaminophen (TYLENOL) 500 MG tablet Take 1 tablet (500 mg total) by mouth every 6 (six) hours as needed for pain. 100 tablet 5     amLODIPine (NORVASC) 2.5 MG tablet Take 1 tablet (2.5 mg total) by mouth daily. 90 tablet 4     blood glucose test (GLUCOSE BLOOD) strips Use to check blood sugar three times daily.  One touch verio test strips. 300 strip 3     blood glucose test (ONETOUCH VERIO) strips Use 1 each As Directed 4 (four) times a day. Dispense brand per patient's insurance at pharmacy discretion. 100 strip 11     calcium, as carbonate, (TUMS) 200 mg calcium (500 mg) chewable tablet Chew 1 tablet (200 mg total) daily. 100 tablet 1     ergocalciferol (ERGOCALCIFEROL) 50,000 unit capsule Take 1 capsule (50,000 Units total) by mouth once a week. 12 capsule 3     escitalopram oxalate (LEXAPRO) 10 MG tablet Take 1 tablet (10 mg total) by mouth daily. 90 tablet 3     flash glucose scanning reader (FREESTYLE AILYN 14 DAY READER) Misc Use 1 Units As Directed every 8 (eight) hours. 1 each 0     flash glucose sensor (FREESTYLE AILYN 14 DAY SENSOR) Kit Use 1 Units As Directed every 14 (fourteen) days. 6 kit 3     insulin aspart protamine-insulin aspart (NOVOLOG MIX 70-30FLEXPEN U-100) 100 unit/mL (70-30) pen Inject 20 units under the skin before morning mean and 30 units under the skin before evening meal 5 adj dose pen PRN     lancets (ONETOUCH DELICA LANCETS) 33 gauge Misc Use to check blood sugar 3 per day. 100 each 11     pen needle, diabetic (TRUEPLUS PEN NEEDLE) 32 gauge x 5/32\" Ndle USE TO INJECT INSULIN FOUR TIMES DAILY OR AS DIRECTED 400 each 1     ranitidine (ZANTAC) 150 MG tablet Take 1 tablet (150 mg total) by mouth 2 (two) times a day as " needed for heartburn. 60 tablet 3     rosuvastatin (CRESTOR) 20 MG tablet Take 1 tablet (20 mg total) by mouth at bedtime. 30 tablet 6     No current facility-administered medications for this visit.

## 2021-05-31 NOTE — PROGRESS NOTES
Clinic Care Coordination Contact    Clinic Care Coordination Medication Education FOLLOW UP Visit     Patient presents for:  Medication education, Compliance monitoring and Medication reconciliation     Language: Dee     Communication: Literate in other languages     Accompanied by: unaccompanied     Patient Living Situation:  Dependent with family     Primary Care Provider:  Coreen Kendall MD     Barriers:  Language, Cultural, Poor insight into disease process and Non-compliance of medications     Medication List (see cited below): Patient presents with all ordered medications          Current Outpatient Medications   Medication Sig     acetaminophen (TYLENOL) 500 MG tablet TAKE 1 TABLET BY MOUTH EVERY 6 HOURS AS NEEDED FOR PAIN     amLODIPine (NORVASC) 2.5 MG tablet Take 1 tablet (2.5 mg total) by mouth daily.     amoxicillin-clavulanate (AUGMENTIN) 875-125 mg per tablet Take 1 tablet by mouth 2 (two) times a day for 10 days.     atorvastatin (LIPITOR) 80 MG tablet Take 1 tablet (80 mg total) by mouth at bedtime.     atovaquone-proguanil (MALARONE) 250-100 mg Tab Take 1 tablet daily, starting 2 days before leaving [place] and continuing until 7 days after returning to [place].     azithromycin (ZITHROMAX) 500 MG tablet Take 2 tablets for severe travel diarrhea (more than 4 unformed stools daily, fever, or blood, pus, or mucus in the stool)     blood glucose test (GLUCOSE BLOOD) strips Use to check blood sugar three times daily.  One touch verio test strips.     ergocalciferol (ERGOCALCIFEROL) 50,000 unit capsule Take 1 capsule (50,000 Units total) by mouth once a week.     escitalopram oxalate (LEXAPRO) 10 MG tablet Take 1 tablet (10 mg total) by mouth daily.     flash glucose scanning reader (FREESTYLE AILYN 14 DAY READER) Misc Use 1 Units As Directed every 8 (eight) hours.     flash glucose sensor (FREESTYLE AILYN 14 DAY SENSOR) Kit Use 1 Units As Directed every 14 (fourteen) days.     insulin lispro  "protamin-lispro 100 unit/mL (50-50) Susp Inject 20 units in the morning and 30 units in the evenings with food. 1/2 dose if not eating full meal.     lancets (ONETOUCH DELICA LANCETS) 33 gauge Misc Use to check blood sugar 3 per day.     ONETOUCH VERIO strips TEST 4 TIMES DAILY.     pantoprazole (PROTONIX) 40 MG tablet Take 1 tablet (40 mg total) by mouth daily.     pen needle, diabetic (TRUEPLUS PEN NEEDLE) 32 gauge x 5/32\" Ndle USE TO INJECT INSULIN FOUR TIMES DAILY OR AS DIRECTED     phenazopyridine (PYRIDIUM) 100 MG tablet Take 1 tablet (100 mg total) by mouth 3 (three) times a day as needed for pain.         Equipment:      Pill Box was         Compliance: 80%            Future Appointments   Date Time Provider Department Center   6/3/2019 10:00 AM N [facility] RN RLN [facility]   7/9/2019  8:00 AM Heather Chavez PharmD N [facility]   8/30/2019 11:00 AM N [facility] RN N [facility]         Action Plan  RN Will:  F/u appt with [facility] RN on 8/30/19      Care Guide Will:  Care Guide Delegation     Nursing Notes:      Patient takes her medications on her own. Able to verbalize how to take her meds correctly but sometime she tends to forget to take it.      1) Lexapram 10mg daily     2) Amlodipine 2.5mg daily     3) Vit D 50, 000 IU weekly     4) Tylenol - as needed     5) Atorvastatin 80mg HS     6) Pantoprazole 40mg     Reports she wants to work on lowering A1c <7.     Seeing MTM regularly - next f/u on 9/28/19    F/u appt with PCP on 11/8/19.     Planning to step down to maintenance once A1c <7.     States she's due to renew her healthcare very soon. Will bring in paperwork when she receives it in the mail to get assist from [facility].     Next [facility] RN f/u appt on 10/1/19.     "

## 2021-06-01 ENCOUNTER — RECORDS - HEALTHEAST (OUTPATIENT)
Dept: ADMINISTRATIVE | Facility: CLINIC | Age: 61
End: 2021-06-01

## 2021-06-01 ENCOUNTER — COMMUNICATION - HEALTHEAST (OUTPATIENT)
Dept: FAMILY MEDICINE | Facility: CLINIC | Age: 61
End: 2021-06-01

## 2021-06-01 VITALS — BODY MASS INDEX: 31.15 KG/M2 | WEIGHT: 154.5 LBS | HEIGHT: 59 IN

## 2021-06-01 VITALS — HEIGHT: 59 IN | WEIGHT: 156 LBS | BODY MASS INDEX: 31.45 KG/M2

## 2021-06-01 VITALS — BODY MASS INDEX: 30.8 KG/M2 | WEIGHT: 152.5 LBS

## 2021-06-01 VITALS — BODY MASS INDEX: 31.04 KG/M2 | HEIGHT: 59 IN | WEIGHT: 154 LBS

## 2021-06-01 VITALS — BODY MASS INDEX: 30.3 KG/M2 | WEIGHT: 150 LBS

## 2021-06-01 VITALS — HEIGHT: 59 IN | WEIGHT: 158.25 LBS | BODY MASS INDEX: 31.9 KG/M2

## 2021-06-01 VITALS — BODY MASS INDEX: 31.61 KG/M2 | WEIGHT: 155.19 LBS

## 2021-06-01 VITALS — WEIGHT: 161.31 LBS | BODY MASS INDEX: 32.86 KG/M2

## 2021-06-01 VITALS — BODY MASS INDEX: 32.25 KG/M2 | HEIGHT: 59 IN | WEIGHT: 160 LBS

## 2021-06-01 VITALS — WEIGHT: 153 LBS | BODY MASS INDEX: 30.9 KG/M2

## 2021-06-01 DIAGNOSIS — K31.9 DISEASE OF STOMACH AND DUODENUM: ICD-10-CM

## 2021-06-01 NOTE — PROGRESS NOTES
[facility] Follow Up Encounter  Assessment & Plan                                                     1. Type 2 diabetes mellitus without complication, with long-term current use of insulin (H)  Needs improvement. Patients A1c remains uncontrolled to goal of less than 7%, though she has recently be experiencing hypoglycemia likely due to dietary changes. Congratulated patients dietary changes.  After much discussion, patient agreeable to try metformin at low dose and decrease insulin due to recently low blood sugars.  Additionally, patient would benefit from a CGM, hopefully this device will be delivered to her prior to our visit next week.  -Insulin decreased due to hypoglycemia- NovoLog 70/30 mix 20 units twice daily  -Patient to start metformin ER at low dose-1 tablet once daily    2. Hyperlipidemia  Stable.  Patient currently taking and tolerating rosuvastatin 20 mg daily, recommend continuation.  Last lipid Cascade checked in November of last year, recommend annual fasting reassessment in November.    Future assessment: Annual fasting lipid cascade in November 2019    3. Hypertension  Stable.  Blood pressure goal of less than 140/90.  Recommend continuation of current amlodipine therapy.     Follow Up  Return in about 1 week (around 10/4/2019) for Medication Review.    Subjective & Objective                                                       Ramona Wilder is a 59 y.o. female coming in for a follow up visit for Medication Therapy Management. She was referred to me from Coreen Kendall MD. Patient presents with .     Chief Complaint: Follow up visit from [facility] on 7/9/19    Medication Adherence/Access: Using her pillbox now, this is helping with remembering to take meds every day.  Self management and help from kids.     Diabetes:  Novolog 70/30 mix insulin - AM 20 and PM 30. Dosing insulin before 2 meals daily. Admits that she misses her insulin sometimes once weekly.  Able to tell when she is feeling  hypoglycemic, once in the last month. Patient states that she does not want to start any more medications for diabetes as she is already taking a lot of medications. States that she has previously tried diabetes medications that caused her to have stomach upset - she thinks that taking too many medications is not good for her.   Per last visit, [facility] states that the Freestyle Rosalinda is covered, though they have not been able to get ahold of her to send it to her - patient admits that she still wants to try this system.   SMB-2 times daily    Ranges (based on glucometer readings) :   Date FBG    106    80    212    109    85    152    90   Patient is experiencing hypoglycemia. Woke up with BG 43 on , only event in last month. Knows how to correct.   Brings quick acting sugar with her all the time just in case.    Aspirin: Not taking due to allergy  Diet: 2 equal sized meals a day. Has started to cut down rice and eat more vegetable. States that she does eat sticky rice sometimes and then her sugars go up to 200's.   Lab Results   Component Value Date    HGBA1C 8.4 (H) 2019    HGBA1C 9.0 (H) 2019    HGBA1C 9.4 (H) 02/15/2019     Lab Results   Component Value Date    MICROALBUR 0.80 02/15/2019    LDLCALC 165 (H) 2018    CREATININE 0.86 2019     Hyperlipidemia: Rosuvastatin 20 mg daily before bed. Denies all over muscle aches, just leg pain which has been going on for a long time.   Lab Results   Component Value Date    LDLCALC 165 (H) 2018     Hypertension: Amlodipine 2.5 mg daily.  Denies light headedness or dizziness or ankle swelling.    BP Readings from Last 3 Encounters:   19 132/76   19 130/74   19 120/74     PMH: reviewed in EPIC   Allergies/ADRs: reviewed in EPIC   Alcohol: reviewed in EPIC   Tobacco:   Social History     Tobacco Use   Smoking Status Never Smoker   Smokeless Tobacco Current User     Types: Chew   Tobacco Comment      smokes outside, pt chews betel nut     Today's Vitals:   Vitals:    09/27/19 1323 09/27/19 1324   BP: 132/78 132/76   Pulse:  70   SpO2:  98%   Weight:  154 lb 1.6 oz (69.9 kg)     ----------------  The patient was given a summary of these recommendations as an after visit summary    I spent 30 minutes with this patient today;   All changes were made via collaborative practice agreement with Coreen Kendall MD. A copy of the visit note was provided to the patient's provider.     Heather Cahvez, PharmD  Medication Therapy Management Pharmacist  [facility]       Current Outpatient Medications   Medication Sig Dispense Refill     acetaminophen (TYLENOL) 500 MG tablet Take 1 tablet (500 mg total) by mouth every 6 (six) hours as needed for pain. 100 tablet 5     amLODIPine (NORVASC) 2.5 MG tablet Take 1 tablet (2.5 mg total) by mouth daily. 90 tablet 4     blood glucose test (GLUCOSE BLOOD) strips Use to check blood sugar three times daily.  One touch verio test strips. 300 strip 3     blood glucose test (ONETOUCH VERIO) strips Use 1 each As Directed 4 (four) times a day. Dispense brand per patient's insurance at pharmacy discretion. 100 strip 11     calcium, as carbonate, (TUMS) 200 mg calcium (500 mg) chewable tablet Chew 1 tablet (200 mg total) daily. 100 tablet 1     ergocalciferol (ERGOCALCIFEROL) 50,000 unit capsule Take 1 capsule (50,000 Units total) by mouth once a week. 12 capsule 3     escitalopram oxalate (LEXAPRO) 10 MG tablet Take 1 tablet (10 mg total) by mouth daily. 90 tablet 3     flash glucose scanning reader (FREESTYLE AILYN 14 DAY READER) Misc Use 1 Units As Directed every 8 (eight) hours. 1 each 0     flash glucose sensor (FREESTYLE AILYN 14 DAY SENSOR) Kit Use 1 Units As Directed every 14 (fourteen) days. 6 kit 3     insulin aspart protamine-insulin aspart (NOVOLOG MIX 70-30FLEXPEN U-100) 100 unit/mL (70-30) pen Inject 20 Units under the skin 2 (two) times a day before  "meals. 5 adj dose pen PRN     lancets (ONETOUCH DELICA LANCETS) 33 gauge Misc Use to check blood sugar 3 per day. 100 each 11     metFORMIN (GLUCOPHAGE-XR) 500 MG 24 hr tablet Take 1 tablet (500 mg total) by mouth daily with breakfast. 30 tablet 3     pen needle, diabetic (TRUEPLUS PEN NEEDLE) 32 gauge x 5/32\" Ndle USE TO INJECT INSULIN FOUR TIMES DAILY OR AS DIRECTED 400 each 1     ranitidine (ZANTAC) 150 MG tablet Take 1 tablet (150 mg total) by mouth 2 (two) times a day as needed for heartburn. 60 tablet 3     rosuvastatin (CRESTOR) 20 MG tablet Take 1 tablet (20 mg total) by mouth at bedtime. 30 tablet 6     No current facility-administered medications for this visit.                      "

## 2021-06-01 NOTE — PROGRESS NOTES
Situation(s):  [facility] outreach and follow up with patient.    Background:  Patient who has chronic and complex health problems and diabetes A1C uncontrolled. Patient has been enrolled with [facility] and continues to work with [facility] RN, pharmacist and as well as with PCP to help reduce diabetes A1C and other health problems.    Assessment:  Patient's health insurance is active, able to get all medications refill when needed and has no new health concern.    Recommendation(s):  Patient was reminded for upcoming appointments and to bring all medications for providers to review them. Patient was advised to call the clinic [facility] with questions or concerns regarding coordination assistance and if additional resources needed.         Next outreach: 11/05/2019.

## 2021-06-02 ENCOUNTER — RECORDS - HEALTHEAST (OUTPATIENT)
Dept: ADMINISTRATIVE | Facility: CLINIC | Age: 61
End: 2021-06-02

## 2021-06-02 VITALS — HEIGHT: 59 IN | BODY MASS INDEX: 31.25 KG/M2 | WEIGHT: 155 LBS

## 2021-06-02 VITALS — BODY MASS INDEX: 31.1 KG/M2 | WEIGHT: 154 LBS

## 2021-06-02 VITALS — HEIGHT: 59 IN | BODY MASS INDEX: 31.75 KG/M2 | WEIGHT: 157.5 LBS

## 2021-06-02 VITALS — WEIGHT: 156 LBS | BODY MASS INDEX: 31.51 KG/M2

## 2021-06-02 VITALS — WEIGHT: 158.5 LBS | BODY MASS INDEX: 32.01 KG/M2

## 2021-06-02 VITALS — BODY MASS INDEX: 31.31 KG/M2 | WEIGHT: 155 LBS

## 2021-06-02 VITALS — BODY MASS INDEX: 30.89 KG/M2 | HEIGHT: 59 IN | WEIGHT: 153.25 LBS

## 2021-06-02 VITALS — WEIGHT: 155.25 LBS | HEIGHT: 59 IN | BODY MASS INDEX: 31.3 KG/M2

## 2021-06-02 NOTE — PROGRESS NOTES
[facility] Follow Up Encounter  Assessment & Plan                                                     1. Type 2 diabetes mellitus without complication, with long-term current use of insulin (H)  Needs improvement.  Patient's A1c remains uncontrolled to goal of less than 7%.  Patient agreeable to continue to monitor diet and exercise, and agreeable to increase metformin today but refuses to change insulins at this time.  Ideally would like to switch patient to basal bolus insulin due to vastly fluctuating BG, though patient refuses.  Patient speaks and valleys of her blood sugars are not ideal.  For now, will increase metformin dose today with patient's approval.  Unfortunately, CGM was not any effective method of monitoring for the patient, therefore recommended increased monitoring to twice daily, patient agreeable.  - Patient to monitor blood sugars twice daily and monitor portion sizes  - Glycosylated Hemoglobin A1C; Today  - metFORMIN (GLUCOPHAGE-XR) 500 MG 24 hr tablet; Take 1 tablet (500 mg total) by mouth 2 (two) times a day with meals.  Dispense: 60 tablet; Refill: 3    2. Hyperlipidemia  Stable.  Patient currently taking and tolerating rosuvastatin 20 mg daily, recommend continuation.  Last lipid Cascade checked in November of last year, recommend annual fasting reassessment in November.    Future assessment: Annual fasting lipid cascade in November 2019    Follow Up  Return in about 5 weeks (around 12/6/2019) for Medication Review.    Subjective & Objective                                                       Ramona Wilder is a 59 y.o. female coming in for a follow up visit for Medication Therapy Management. She was referred to me from Coreen Kendall MD. Patient presents with .     Chief Complaint: Follow up visit from [facility] on 7/9/19    Medication Adherence/Access: Using her pillbox now, this is helping with remembering to take meds every day.  Self management and help from kids.     Diabetes:   Metformin  mg once daily and Novolog 70/30 mix insulin - AM 20 and PM 20.  Patient states that even though her insulin dosing changed, her blood sugars are completely depending on what she eats.  Patient admits that she has not been doing well with monitoring carbs, she sometimes eats things even though she knows that her blood sugars will be elevated afterwards.  Dosing insulin before 2 meals daily. Patient states that she does not want to use the freestyle digna anymore, she cannot figure out how to work it. Nothing changed in her diet. Still noticing hypoglycemia with decreased insulin dosing. Patient refuses to change insulin to basal/bolus due to pain with current 2 injections daily, she does not want to be injecting more frequently.  When she was using her freestyle digna, it was notable that her blood sugars would sometimes dip into hypoglycemia and then have a very quick peaks in the 300s and above.  SMB-2 times daily    Ranges (based on glucometer readings) :   Date FBG    81   10/31 86   10/30 89   10/29 68   10/28 130   10/27 106   10/25 98   Patient is still experiencing some hypoglycemia. Knows how to correct.   Brings quick acting sugar with her all the time just in case.    Aspirin: Not taking due to allergy  Diet: 2 equal sized meals a day. Has started to cut down rice and eat more vegetable. States that she does eat sticky rice sometimes and then her sugars go up to 200's.   Lab Results   Component Value Date    HGBA1C 8.2 (H) 2019    HGBA1C 8.4 (H) 2019    HGBA1C 9.0 (H) 2019     Lab Results   Component Value Date    MICROALBUR 0.80 02/15/2019    LDLCALC 165 (H) 2018    CREATININE 0.86 2019     Hyperlipidemia: Rosuvastatin 20 mg daily before bed. Denies all over muscle aches, just leg pain which has been going on for a long time.   Lab Results   Component Value Date    LDLCALC 165 (H) 2018     Hypertension: Amlodipine 2.5 mg daily.  Denies light  headedness or dizziness or ankle swelling.    BP Readings from Last 3 Encounters:   11/01/19 160/70   09/27/19 132/76   08/29/19 130/74     PMH: reviewed in EPIC   Allergies/ADRs: reviewed in EPIC   Alcohol: reviewed in EPIC   Tobacco:   Social History     Tobacco Use   Smoking Status Never Smoker   Smokeless Tobacco Current User     Types: Chew   Tobacco Comment     smokes outside, pt chews betel nut     Today's Vitals:   Vitals:    11/01/19 1033 11/01/19 1034   BP: 160/84 160/70   Pulse:  75   SpO2:  100%   Weight:  155 lb (70.3 kg)     ----------------  The patient was given a summary of these recommendations as an after visit summary    I spent 30 minutes with this patient today;   All changes were made via collaborative practice agreement with Coreen Kendall MD. A copy of the visit note was provided to the patient's provider.     Heather Chavez, PharmD  Medication Therapy Management Pharmacist  [facility]       Current Outpatient Medications   Medication Sig Dispense Refill     acetaminophen (TYLENOL) 500 MG tablet Take 1 tablet (500 mg total) by mouth every 6 (six) hours as needed for pain. 100 tablet 5     amLODIPine (NORVASC) 2.5 MG tablet Take 1 tablet (2.5 mg total) by mouth daily. 90 tablet 4     blood glucose test (GLUCOSE BLOOD) strips Use to check blood sugar three times daily.  One touch verio test strips. 300 strip 3     blood glucose test (ONETOUCH VERIO) strips Use 1 each As Directed 4 (four) times a day. Dispense brand per patient's insurance at pharmacy discretion. 100 strip 11     calcium, as carbonate, (TUMS) 200 mg calcium (500 mg) chewable tablet Chew 1 tablet (200 mg total) daily. 100 tablet 1     ergocalciferol (ERGOCALCIFEROL) 50,000 unit capsule Take 1 capsule (50,000 Units total) by mouth once a week. 12 capsule 3     escitalopram oxalate (LEXAPRO) 10 MG tablet Take 1 tablet (10 mg total) by mouth daily. 90 tablet 3     insulin aspart protamine-insulin  "aspart (NOVOLOG MIX 70-30FLEXPEN U-100) 100 unit/mL (70-30) pen Inject 20 Units under the skin 2 (two) times a day before meals. 5 adj dose pen PRN     lancets (ONETOUCH DELICA LANCETS) 33 gauge Misc Use to check blood sugar 3 per day. 100 each 11     metFORMIN (GLUCOPHAGE-XR) 500 MG 24 hr tablet Take 1 tablet (500 mg total) by mouth 2 (two) times a day with meals. 60 tablet 3     pen needle, diabetic (TRUEPLUS PEN NEEDLE) 32 gauge x 5/32\" Ndle USE TO INJECT INSULIN FOUR TIMES DAILY OR AS DIRECTED 400 each 1     ranitidine (ZANTAC) 150 MG tablet Take 1 tablet (150 mg total) by mouth 2 (two) times a day as needed for heartburn. 60 tablet 3     rosuvastatin (CRESTOR) 20 MG tablet Take 1 tablet (20 mg total) by mouth at bedtime. 30 tablet 6     No current facility-administered medications for this visit.                    "

## 2021-06-02 NOTE — PROGRESS NOTES
MTM Consult Encounter    Ramona Wilder is a 59 y.o. female referred for a clinical pharmacist consult from Dr. Kendall for CGM education. Patient presents with professional Dee  today.    Discussion: Rosalinda teaching today. Patient arrived with Stromedixyle Rosalinda today for the first time and was looking for instruction on appropriate use. Pharmacist walked through instructions for use and placement of first sensor on her left arm.   Lab Results   Component Value Date    HGBA1C 8.4 (H) 08/02/2019    HGBA1C 9.0 (H) 05/17/2019    HGBA1C 9.4 (H) 02/15/2019     Lab Results   Component Value Date    MICROALBUR 0.80 02/15/2019    LDLCALC 165 (H) 11/14/2018    CREATININE 0.86 05/17/2019     Plan:  1. Use Freestyle Rosalinda to check blood sugars continuously    Follow up:   Return in about 4 weeks (around 11/1/2019) for Medication Review.      Heather Chavez, PharmD  Medication Therapy Management Pharmacist  [facility]    Current Outpatient Medications   Medication Sig Dispense Refill     acetaminophen (TYLENOL) 500 MG tablet Take 1 tablet (500 mg total) by mouth every 6 (six) hours as needed for pain. 100 tablet 5     amLODIPine (NORVASC) 2.5 MG tablet Take 1 tablet (2.5 mg total) by mouth daily. 90 tablet 4     blood glucose test (GLUCOSE BLOOD) strips Use to check blood sugar three times daily.  One touch verio test strips. 300 strip 3     blood glucose test (ONETOUCH VERIO) strips Use 1 each As Directed 4 (four) times a day. Dispense brand per patient's insurance at pharmacy discretion. 100 strip 11     calcium, as carbonate, (TUMS) 200 mg calcium (500 mg) chewable tablet Chew 1 tablet (200 mg total) daily. 100 tablet 1     ergocalciferol (ERGOCALCIFEROL) 50,000 unit capsule Take 1 capsule (50,000 Units total) by mouth once a week. 12 capsule 3     escitalopram oxalate (LEXAPRO) 10 MG tablet Take 1 tablet (10 mg total) by mouth daily. 90 tablet 3     flash glucose scanning reader (FREESTYLE ROSALINDA 14  "DAY READER) Misc Use 1 Units As Directed every 8 (eight) hours. 1 each 0     flash glucose sensor (FREESTYLE AILYN 14 DAY SENSOR) Kit Use 1 Units As Directed every 14 (fourteen) days. 6 kit 3     insulin aspart protamine-insulin aspart (NOVOLOG MIX 70-30FLEXPEN U-100) 100 unit/mL (70-30) pen Inject 20 Units under the skin 2 (two) times a day before meals. 5 adj dose pen PRN     lancets (ONETOUCH DELICA LANCETS) 33 gauge Misc Use to check blood sugar 3 per day. 100 each 11     metFORMIN (GLUCOPHAGE-XR) 500 MG 24 hr tablet Take 1 tablet (500 mg total) by mouth daily with breakfast. 30 tablet 3     pen needle, diabetic (TRUEPLUS PEN NEEDLE) 32 gauge x 5/32\" Ndle USE TO INJECT INSULIN FOUR TIMES DAILY OR AS DIRECTED 400 each 1     ranitidine (ZANTAC) 150 MG tablet Take 1 tablet (150 mg total) by mouth 2 (two) times a day as needed for heartburn. 60 tablet 3     rosuvastatin (CRESTOR) 20 MG tablet Take 1 tablet (20 mg total) by mouth at bedtime. 30 tablet 6     No current facility-administered medications for this visit.                         "

## 2021-06-03 VITALS — WEIGHT: 155.9 LBS | BODY MASS INDEX: 31.43 KG/M2 | HEIGHT: 59 IN

## 2021-06-03 VITALS
SYSTOLIC BLOOD PRESSURE: 160 MMHG | HEIGHT: 59 IN | TEMPERATURE: 97.4 F | HEART RATE: 75 BPM | RESPIRATION RATE: 16 BRPM | BODY MASS INDEX: 31.04 KG/M2 | DIASTOLIC BLOOD PRESSURE: 80 MMHG | WEIGHT: 154 LBS | OXYGEN SATURATION: 97 %

## 2021-06-03 VITALS
BODY MASS INDEX: 31.04 KG/M2 | DIASTOLIC BLOOD PRESSURE: 82 MMHG | SYSTOLIC BLOOD PRESSURE: 152 MMHG | OXYGEN SATURATION: 96 % | HEIGHT: 59 IN | TEMPERATURE: 97.4 F | HEART RATE: 72 BPM | RESPIRATION RATE: 16 BRPM | WEIGHT: 154 LBS

## 2021-06-03 VITALS
OXYGEN SATURATION: 100 % | HEART RATE: 75 BPM | WEIGHT: 155 LBS | DIASTOLIC BLOOD PRESSURE: 70 MMHG | SYSTOLIC BLOOD PRESSURE: 160 MMHG | BODY MASS INDEX: 31.67 KG/M2

## 2021-06-03 VITALS
WEIGHT: 153.6 LBS | HEART RATE: 71 BPM | BODY MASS INDEX: 31.38 KG/M2 | DIASTOLIC BLOOD PRESSURE: 82 MMHG | SYSTOLIC BLOOD PRESSURE: 144 MMHG | OXYGEN SATURATION: 99 %

## 2021-06-03 VITALS — BODY MASS INDEX: 31.47 KG/M2 | WEIGHT: 154 LBS

## 2021-06-03 VITALS
SYSTOLIC BLOOD PRESSURE: 132 MMHG | DIASTOLIC BLOOD PRESSURE: 76 MMHG | HEART RATE: 70 BPM | OXYGEN SATURATION: 98 % | BODY MASS INDEX: 31.49 KG/M2 | WEIGHT: 154.1 LBS

## 2021-06-03 VITALS — WEIGHT: 156 LBS | BODY MASS INDEX: 31.51 KG/M2

## 2021-06-03 VITALS — WEIGHT: 154.75 LBS | BODY MASS INDEX: 31.2 KG/M2 | HEIGHT: 59 IN

## 2021-06-03 VITALS — BODY MASS INDEX: 31.55 KG/M2 | WEIGHT: 154.4 LBS

## 2021-06-03 NOTE — PATIENT INSTRUCTIONS - HE
Results for orders placed or performed in visit on 11/08/19   Influenza A/B Rapid Test- Nasal Swab   Result Value Ref Range    Influenza  A, Rapid Antigen No Influenza A antigen detected No Influenza A antigen detected    Influenza B, Rapid Antigen No Influenza B antigen detected No Influenza B antigen detected   Urinalysis-UC if Indicated   Result Value Ref Range    Color, UA Yellow Colorless, Yellow, Straw, Light Yellow    Clarity, UA Clear Clear    Glucose, UA Negative Negative    Bilirubin, UA Negative Negative    Ketones, UA Negative Negative    Specific Gravity, UA <=1.005 1.005 - 1.030    Blood, UA Negative Negative    pH, UA 7.0 5.0 - 8.0    Protein, UA Negative Negative mg/dL    Urobilinogen, UA 0.2 E.U./dL 0.2 E.U./dL, 1.0 E.U./dL    Nitrite, UA Negative Negative    Leukocytes, UA Negative Negative

## 2021-06-03 NOTE — PROGRESS NOTES
ASSESSMENT/PLAN:    Problem List Items Addressed This Visit        ENT/CARD/PULM/ENDO Problems    Hypertension - Primary     Elevated today and last MTM visit. Borderline prior to that.    Unable to take ACE Inhibitor due to angioedema from lisinopril.    Will increase amlodipine from 2.5 to 5mg today, then can have BP checked at next MTM visit 12/6/19.           Relevant Medications    amLODIPine (NORVASC) 5 MG tablet    Type 2 diabetes mellitus without complication, with long-term current use of insulin (H)     Uncontrolled, with A1C last week still elevated, but improved from spring.  Hemoglobin A1c   Date Value Ref Range Status   11/01/2019 8.2 (H) 3.5 - 6.0 % Final   08/02/2019 8.4 (H) 3.5 - 6.0 % Final   05/17/2019 9.0 (H) 3.5 - 6.0 % Final   02/15/2019 9.4 (H) 3.5 - 6.0 % Final         Just had meds changed last week (increased metformin) and doesn't want to change insulin.  Will need more time to see effects.            Other    Right lumbar radiculitis     MRI of the lumbar spine on 07/26/2013 revealed a right paracentral disk protrusion and annular tear at L4-5 traversing the right L5 nerve root and resulting in moderate spinal canal and mild bilateral neural foraminal  narrowing. There is also a left (asymptomatic side) midline foraminal disk herniation L5-S1.    Had epidural steroid injection 9/13/13.    Now having some return of pain, but it's not enough that she would consider injection at this time.  Will keep in mind for the future.           Other Visit Diagnoses     Fever        Relevant Orders    Influenza A/B Rapid Test- Nasal Swab (Completed)    RLQ abdominal pain        Labs unrevealing, sx mild.  Will watch for improvement.    Abdominal pain        Has history of dysuria with sometimes UTI, but urinalysis totally normal today.    Relevant Orders    Urinalysis-UC if Indicated (Completed)    Immunization due        Relevant Orders    Influenza, Seasonal Quad, PF =/> 6months (Completed)             Return in about 3 months (around 2/8/2020) for Diabetes follow-up.     SUBJECTIVE:  Ramona Wilder is a 59 y.o. female here scheduled for diabetes f/u, but has other concerns.    Diabetes:  Has been working with [facility] pharmacist.  Most recently had metformin increased last week.  Doesn't want to change insulin.  Having ups and down.    Chills and body aches x3 days - never had before.  Frequent urination, but no dysuria. Has hx of recurrent dysuria - some with UTI and some not.  Right back hurts; more when needs to urinate.  Worse at night.  Taking tylenol for sx, 3-4 times per day.  Helps temporarily - maybe for 1 hour.    No runny nose, sore throat.  Little cough.  Headache. No nausea.    Right back pain radiates to right lateral leg.  Sometimes weak?  Had injections before that helped a lot.      ROS:  Remainder review of systems is negative unless previously stated    The following portions of the patient's history were reviewed and updated as appropriate: allergies, current medications and problem list  Current Outpatient Medications on File Prior to Visit   Medication Sig     acetaminophen (TYLENOL) 500 MG tablet Take 1 tablet (500 mg total) by mouth every 6 (six) hours as needed for pain.     amLODIPine (NORVASC) 2.5 MG tablet Take 1 tablet (2.5 mg total) by mouth daily.     blood glucose test (GLUCOSE BLOOD) strips Use to check blood sugar three times daily.  One touch verio test strips.     blood glucose test (ONETOUCH VERIO) strips Use 1 each As Directed 4 (four) times a day. Dispense brand per patient's insurance at pharmacy discretion.     calcium, as carbonate, (TUMS) 200 mg calcium (500 mg) chewable tablet Chew 1 tablet (200 mg total) daily.     ergocalciferol (ERGOCALCIFEROL) 50,000 unit capsule Take 1 capsule (50,000 Units total) by mouth once a week.     escitalopram oxalate (LEXAPRO) 10 MG tablet Take 1 tablet (10 mg total) by mouth daily.     insulin aspart protamine-insulin aspart (NOVOLOG MIX  "70-30FLEXPEN U-100) 100 unit/mL (70-30) pen Inject 20 Units under the skin 2 (two) times a day before meals.     lancets (ONETOUCH DELICA LANCETS) 33 gauge Misc Use to check blood sugar 3 per day.     metFORMIN (GLUCOPHAGE-XR) 500 MG 24 hr tablet Take 1 tablet (500 mg total) by mouth 2 (two) times a day with meals.     pen needle, diabetic (TRUEPLUS PEN NEEDLE) 32 gauge x 5/32\" Ndle USE TO INJECT INSULIN FOUR TIMES DAILY OR AS DIRECTED     ranitidine (ZANTAC) 150 MG tablet Take 1 tablet (150 mg total) by mouth 2 (two) times a day as needed for heartburn.     rosuvastatin (CRESTOR) 20 MG tablet Take 1 tablet (20 mg total) by mouth at bedtime.     No current facility-administered medications on file prior to visit.         Social History     Tobacco Use   Smoking Status Never Smoker   Smokeless Tobacco Current User     Types: Chew   Tobacco Comment     smokes outside, pt chews betel nut       OBJECTIVE:  :  /80   Pulse 75   Temp 97.4  F (36.3  C) (Oral)   Resp 16   Ht 4' 10.66\" (1.49 m)   Wt 154 lb (69.9 kg)   LMP 05/09/2014   SpO2 97%   BMI 31.47 kg/m      BP Readings from Last 5 Encounters:   11/08/19 160/80   11/01/19 160/70   09/27/19 132/76   08/29/19 130/74   08/02/19 120/74      Wt Readings from Last 5 Encounters:   11/08/19 154 lb (69.9 kg)   11/01/19 155 lb (70.3 kg)   10/04/19 154 lb 6.4 oz (70 kg)   09/27/19 154 lb 1.6 oz (69.9 kg)   08/29/19 154 lb (69.9 kg)        Gen:  A&A, NAD  HEENT: Bilateral ear canals clear, pearly gray tympanic membranes.  Oropharynx pink moist and benign.  Neck:  supple, no sig LAD or thyromegally  CV:  HRRR, no M/R/G  Resp:  CTAB  Abd:  S&NT, no mass  Ext:  W&D, no edema  Neuro:  DTR's 1+. Normal toe and heel walking.    Lab results:    Office Visit on 11/08/2019   Component Date Value Ref Range Status     Influenza  A, Rapid Antigen 11/08/2019 No Influenza A antigen detected  No Influenza A antigen detected Final     Influenza B, Rapid Antigen 11/08/2019 No " Influenza B antigen detected  No Influenza B antigen detected Final     Color, UA 11/08/2019 Yellow  Colorless, Yellow, Straw, Light Yellow Final     Clarity, UA 11/08/2019 Clear  Clear Final     Glucose, UA 11/08/2019 Negative  Negative Final     Bilirubin, UA 11/08/2019 Negative  Negative Final     Ketones, UA 11/08/2019 Negative  Negative Final     Specific Gravity, UA 11/08/2019 <=1.005  1.005 - 1.030 Final     Blood, UA 11/08/2019 Negative  Negative Final     pH, UA 11/08/2019 7.0  5.0 - 8.0 Final     Protein, UA 11/08/2019 Negative  Negative mg/dL Final     Urobilinogen, UA 11/08/2019 0.2 E.U./dL  0.2 E.U./dL, 1.0 E.U./dL Final     Nitrite, UA 11/08/2019 Negative  Negative Final     Leukocytes, UA 11/08/2019 Negative  Negative Final

## 2021-06-03 NOTE — PROGRESS NOTES
Clinic Care Coordination Contact    Follow Up Progress Note      Assessment:  F/u on A1c result and DM II mgmt/teaching  Unable to reach patient via phone today  Chart review completed today.  Last seen by PCP on 11/8/19 - chart still open  Last A1c - 8.2 on 11/1/19 which slightly improved from 8.4 on 8/2/19   Patient is working closely with MTM for DM II mgmt/teaching.   F/u appt with MTM on 12/6/19        Goals addressed this encounter:      Goals        Patient Stated      Iwould like to lower my A1c <8 in the next 6 months.  (pt-stated)      Action steps to achieve this goal  1.  I will speak with [facility] RN at outreach calls  2. I will take all my medications as prescribed  3. I will check BG 2 times per day + PRN for s/s of hypo/hyperglycemia  4. I will attend my appt with MTM and PCP for DM II mgmt as scheduled  5. Need to f/u with PCP in Feb, 2020 ( 3 month f/u)  6. Patient will attend her appt with MTM on 12/6/19 @ 10am.     Date goal set:  11/11/19                    Plan:   1) [facility] RN will continue to do monthly outreach call - scheduled on 1/3/19

## 2021-06-03 NOTE — PROGRESS NOTES
Situation(s):  [facility] monthly outreach and follow up with patient.     Background:  Patient who has chronic and complex health problems and diabetes A1C uncontrolled is enrolled with [facility] for coordination assistance and to get connected with community resources.      Assessment:  Patient's health insurance is active, able to get all medications refill when needed and has no new health concern. Patient continues to work with [facility] RN, pharmacist and as well as with PCP to help reduce diabetes A1C and other health problems.     Recommendation(s):  Patient was reminded for upcoming appointments and to bring all medications for providers to review them. Patient was advised to call the clinic [facility] with questions or concerns regarding coordination assistance and if additional resources needed.            Next outreach: 12/18/2019.

## 2021-06-04 ENCOUNTER — OFFICE VISIT - HEALTHEAST (OUTPATIENT)
Dept: FAMILY MEDICINE | Facility: CLINIC | Age: 61
End: 2021-06-04

## 2021-06-04 VITALS
OXYGEN SATURATION: 98 % | HEART RATE: 72 BPM | SYSTOLIC BLOOD PRESSURE: 134 MMHG | WEIGHT: 153.9 LBS | BODY MASS INDEX: 31.45 KG/M2 | DIASTOLIC BLOOD PRESSURE: 82 MMHG

## 2021-06-04 VITALS
TEMPERATURE: 98.6 F | BODY MASS INDEX: 28.78 KG/M2 | RESPIRATION RATE: 20 BRPM | WEIGHT: 142.75 LBS | HEART RATE: 67 BPM | OXYGEN SATURATION: 98 % | HEIGHT: 59 IN | SYSTOLIC BLOOD PRESSURE: 100 MMHG | DIASTOLIC BLOOD PRESSURE: 62 MMHG

## 2021-06-04 VITALS
BODY MASS INDEX: 30.8 KG/M2 | DIASTOLIC BLOOD PRESSURE: 80 MMHG | RESPIRATION RATE: 16 BRPM | HEIGHT: 59 IN | HEART RATE: 82 BPM | SYSTOLIC BLOOD PRESSURE: 134 MMHG | OXYGEN SATURATION: 96 % | WEIGHT: 152.75 LBS | TEMPERATURE: 97.4 F

## 2021-06-04 VITALS
BODY MASS INDEX: 30.64 KG/M2 | HEART RATE: 84 BPM | TEMPERATURE: 97.6 F | SYSTOLIC BLOOD PRESSURE: 150 MMHG | WEIGHT: 152 LBS | HEIGHT: 59 IN | RESPIRATION RATE: 20 BRPM | OXYGEN SATURATION: 98 % | DIASTOLIC BLOOD PRESSURE: 80 MMHG

## 2021-06-04 DIAGNOSIS — G89.29 CHRONIC RIGHT-SIDED LOW BACK PAIN WITHOUT SCIATICA: ICD-10-CM

## 2021-06-04 DIAGNOSIS — I10 ESSENTIAL HYPERTENSION: ICD-10-CM

## 2021-06-04 DIAGNOSIS — M54.50 CHRONIC RIGHT-SIDED LOW BACK PAIN WITHOUT SCIATICA: ICD-10-CM

## 2021-06-04 DIAGNOSIS — E11.65 TYPE 2 DIABETES MELLITUS WITH HYPERGLYCEMIA, WITH LONG-TERM CURRENT USE OF INSULIN (H): ICD-10-CM

## 2021-06-04 DIAGNOSIS — R80.9 PROTEINURIA DUE TO TYPE 2 DIABETES MELLITUS (H): ICD-10-CM

## 2021-06-04 DIAGNOSIS — F32.A DEPRESSION, UNSPECIFIED DEPRESSION TYPE: ICD-10-CM

## 2021-06-04 DIAGNOSIS — Z79.4 TYPE 2 DIABETES MELLITUS WITH HYPERGLYCEMIA, WITH LONG-TERM CURRENT USE OF INSULIN (H): ICD-10-CM

## 2021-06-04 DIAGNOSIS — E55.9 VITAMIN D DEFICIENCY: ICD-10-CM

## 2021-06-04 DIAGNOSIS — E11.29 PROTEINURIA DUE TO TYPE 2 DIABETES MELLITUS (H): ICD-10-CM

## 2021-06-04 DIAGNOSIS — E78.5 HYPERLIPIDEMIA, UNSPECIFIED HYPERLIPIDEMIA TYPE: ICD-10-CM

## 2021-06-04 LAB
ANION GAP SERPL CALCULATED.3IONS-SCNC: 9 MMOL/L (ref 5–18)
BUN SERPL-MCNC: 15 MG/DL (ref 8–22)
CALCIUM SERPL-MCNC: 8.9 MG/DL (ref 8.5–10.5)
CHLORIDE BLD-SCNC: 106 MMOL/L (ref 98–107)
CO2 SERPL-SCNC: 27 MMOL/L (ref 22–31)
CREAT SERPL-MCNC: 0.94 MG/DL (ref 0.6–1.1)
GFR SERPL CREATININE-BSD FRML MDRD: >60 ML/MIN/1.73M2
GLUCOSE BLD-MCNC: 235 MG/DL (ref 70–125)
HBA1C MFR BLD: 8.7 %
POTASSIUM BLD-SCNC: 4.6 MMOL/L (ref 3.5–5)
SODIUM SERPL-SCNC: 142 MMOL/L (ref 136–145)

## 2021-06-04 RX ORDER — BLOOD PRESSURE TEST KIT
KIT MISCELLANEOUS
Qty: 1 EACH | Refills: 0 | Status: SHIPPED | OUTPATIENT
Start: 2021-06-04 | End: 2023-10-24

## 2021-06-04 RX ORDER — ERGOCALCIFEROL 1.25 MG/1
1 CAPSULE ORAL WEEKLY
Qty: 12 CAPSULE | Refills: 3 | Status: SHIPPED | OUTPATIENT
Start: 2021-06-04 | End: 2021-12-17

## 2021-06-04 RX ORDER — INSULIN ASPART 100 [IU]/ML
INJECTION, SUSPENSION SUBCUTANEOUS
Qty: 10 ML | Refills: 4 | Status: SHIPPED | OUTPATIENT
Start: 2021-06-04 | End: 2021-08-20

## 2021-06-04 RX ORDER — ROSUVASTATIN CALCIUM 40 MG/1
40 TABLET, COATED ORAL AT BEDTIME
Qty: 90 TABLET | Refills: 4 | Status: SHIPPED | OUTPATIENT
Start: 2021-06-04 | End: 2022-07-03

## 2021-06-04 RX ORDER — HYDROCHLOROTHIAZIDE 25 MG/1
25 TABLET ORAL DAILY
Qty: 90 TABLET | Refills: 3 | Status: SHIPPED | OUTPATIENT
Start: 2021-06-04 | End: 2022-07-03

## 2021-06-04 RX ORDER — ESCITALOPRAM OXALATE 10 MG/1
10 TABLET ORAL DAILY
Qty: 90 TABLET | Refills: 4 | Status: SHIPPED | OUTPATIENT
Start: 2021-06-04 | End: 2023-10-24

## 2021-06-04 RX ORDER — LOSARTAN POTASSIUM 100 MG/1
100 TABLET ORAL DAILY
Qty: 90 TABLET | Refills: 3 | Status: SHIPPED | OUTPATIENT
Start: 2021-06-04 | End: 2021-10-22

## 2021-06-04 ASSESSMENT — MIFFLIN-ST. JEOR: SCORE: 1132.48

## 2021-06-04 NOTE — PATIENT INSTRUCTIONS - HE
Sleep Hygiene Suggestions:    ?Sleep as long as necessary to feel rested (usually seven to eight hours for adults) and then get out of bed  ?Maintain a regular sleep schedule, particularly a regular wake-up time in the morning  ?Try not to force sleep  ?Avoid caffeinated beverages after lunch  ?Avoid alcohol near bedtime (eg, late afternoon and evening)  ?Avoid smoking or other nicotine intake, particularly during the evening  ?Adjust the bedroom environment as needed to decrease stimuli (eg, reduce ambient light, turn off the television or radio)  ?Avoid prolonged use of light-emitting screens (laptops, tablets, smartphones, Busapooks) before bedtime   ?Resolve concerns or worries before bedtime  ?Exercise regularly for at least 20 minutes, preferably more than four to five hours prior to bedtime    ?Avoid daytime naps, especially if they are longer than 20 to 30 minutes or occur late in the day

## 2021-06-04 NOTE — PROGRESS NOTES
Clinic Care Coordination Contact    Follow Up Progress Note      Assessment: f/u on DM II mgmt/teaching, compliance with f/u appts with MTM and PCP    Chart review completed today.     1) DM II - A1c not at goal of <8. A1c - 8.2 on 11/1/19 - slightly improved from 8.4 on 8/2/19  Meds:   Novolog 20 units AM and 15 units LIA - confirmed with patient today  Metformin 500mg two times a day - states was taking it daily but since was told by MTM on 12/27/19 then she takes it two times a day now.   States she's taking her meds and insulin as directed.   Talked about diabetic diet - low carb and to avoid sweets which she loves and 30 mins daily exercise.   Last seen by MTM on 12/27/19     Goals addressed this encounter:   Goals        Patient Stated      Iwould like to lower my A1c <8 in the next 6 months.  (pt-stated)      Action steps to achieve this goal  1.  I will speak with [facility] RN at outreach calls  2. I will take all my medications as prescribed  3. I will check BG 2 times per day + PRN for s/s of hypo/hyperglycemia  4. I will attend my appt with MTM and PCP for DM II mgmt as scheduled  5. Need to f/u with PCP in Feb, 2020 ( 3 month f/u)  6. Patient will attend her appt with MTM on 12/6/19 @ 10am.     Goal discussed: 1/3/2020  Date goal set:  11/11/19                 Plan:   1) [facility] RN monthly chart review   2) Patient will attend her appt with MTM on 1/31/2020  3) Need appt with PCP 3 months f/u -  notify - add note on MTM appt for front dest to scheduel f/u appt with PCP

## 2021-06-04 NOTE — PROGRESS NOTES
MTM Follow Up Encounter  Assessment & Plan                                                     1. Type 2 diabetes mellitus without complication, with long-term current use of insulin (H)  Needs improvement.  A1c not at goal of less than 7%.  Largely discussed diet and exercise today, specifically limiting sweets to a small amount very seldomly.  After discussion, patient agreeable to increase to the prescribed dose of metformin x7 days and decrease back to 500 mg daily if 1000 mg is not tolerated.  Additionally, with continued hypoglycemia in the morning off and on, recommended decreasing evening insulin, patient agreeable.  Once stable on max tolerated metformin dose, recommend decreasing insulin and initiate GLP-1 agonist.  Starting famotidine will likely prevent reflux and patient will hopefully be able to tolerate metformin better.  - Patient to reduce sweets and carbohydrate intake as dicussed  - INCREASE dose - Patient to take metformin  mg twice daily  - DECREASE dose - insulin aspart protamine-insulin aspart (NOVOLOG MIX 70-30FLEXPEN U-100) 100 unit/mL (70-30) pen; Inject 20 units under the skin in the morning and 15 units in the evening.  Dispense: 5 adj dose pen; Refill: PRN    Future discussion: Switch from insulin to GLP-1 agonist    2. Gastropathy  Needs improvement.  Patient noting continued symptoms with current Tums use.  Previously took ranitidine which was recently discontinued.  Recommend starting famotidine 1-2 times daily as needed.  - START - famotidine (FOR PEPCID) 10 MG tablet; Take 1 tablet (10 mg total) by mouth 2 (two) times a day as needed for heartburn.  Dispense: 60 tablet; Refill: 3    3. Other hyperlipidemia  Stable.  Patient currently taking and tolerating rosuvastatin 20 mg daily, recommend continuation.  Last lipid Cascade checked in November of last year, recommend annual fasting reassessment at next visit.  Patient to receive all lab draws at once for next visit.  Future  assessment: Annual fasting lipid cascade at next visit    4. Hypertension  Needs further assessment.  Blood pressure not at goal of less than 140/90.  Patient continues to take amlodipine 5 mg daily and blood pressure trending in the right direction.  Although, patient has not taken her blood pressure medication yet today and she is noting back pain, recommend follow-up at next visit.  If elevated blood pressure continues, recommend either increasing amlodipine to 10 mg daily or starting ARB therapy such as losartan.    Follow Up  Return in about 5 weeks (around 1/31/2020) for Medication Review.    Subjective & Objective                                                       Ramona Wilder is a 59 y.o. female coming in for a follow up visit for Medication Therapy Management. She was referred to me from Coreen Kendall MD. Patient presents with Dee .     Chief Complaint: Follow up visit from [facility] on 12/6/19    Medication Adherence/Access: Using her pillbox now, this is helping with remembering to take meds every day.  Self management, no longer gets help from her kids like previously.     UTI: Took and finished the macrobid, states that this is getting much better. Denies urinary pain.      Diabetes:  Metformin  mg once daily (prescribed two times a day) and Novolog 70/30 mix insulin - AM 20 and PM 20 directly before meals.  Only taking metformin once daily because otherwise she notes dizziness and headache. States that her blood sugar goes up when eating sweet things. States that her morning meal is the biggest meal of the day. Patient states that she sometimes eats cookies and bananas in the evening, when she doesn't eat these, she has low BG in the morning. States that she only indulges with sweets sometimes, not always.   Patient still experiencing some hypoglycemic events, but she is aware of how to correct them.  Additionally, patient does not want to be checking her blood sugars more than once a  day.  Patient has not made any dietary adjustments as discussed.  Patient not noting any medication side effects, no D/N/V.   SMB-2 times daily    Ranges (based on glucometer readings) : Has not been able to check BG for the last 2 days due to incorrect test strips dispensed.  Did not know it was wrong until getting home. Patient has the Accu-chek meter at home but does not know how to use it. States that her son checks his BG.   (CGM has previously been ineffective for patient)  Date FBG Dinner    62     116     103     170     113     93     123 310   Aspirin: Not taking due to allergy  Diet: 2 meals a day. Previously states that the meals are equal in size, now states that morning meal is larger.  ACEi/ARB: Reported allergy to lisinopril  Lab Results   Component Value Date    HGBA1C 8.2 (H) 2019    HGBA1C 8.4 (H) 2019    HGBA1C 9.0 (H) 2019     Lab Results   Component Value Date    MICROALBUR 0.80 02/15/2019    LDLCALC 165 (H) 2018    CREATININE 0.86 2019     GERD: Taking Tums daily but continues to endorse symptoms of reflux at times.  Was told to stop ranitidine due to recall for some time.    Hyperlipidemia: Rosuvastatin 20 mg daily before bed. Denies all over muscle aches, just leg pain which has been going on for a long time.   Lab Results   Component Value Date    LDLCALC 165 (H) 2018     Hypertension: Amlodipine 5 mg daily.  States that she has not yet taken her medications today and she has back pain that is bothering her today.  Denies edema.    BP Readings from Last 3 Encounters:   19 144/82   19 152/82   19 140/80       PMH: reviewed in EPIC   Allergies/ADRs: reviewed in EPIC   Alcohol: None  Tobacco:   Social History     Tobacco Use   Smoking Status Never Smoker   Smokeless Tobacco Current User     Types: Chew   Tobacco Comment     smokes outside, pt chews betel nut     Today's Vitals:   Vitals:     12/27/19 1035 12/27/19 1036   BP: 150/88 144/82   Pulse:  71   SpO2:  99%   Weight:  153 lb 9.6 oz (69.7 kg)     ----------------  The patient was given a summary of these recommendations as an after visit summary    I spent 60 minutes with this patient today;   All changes were made via collaborative practice agreement with Coreen Kendall MD. A copy of the visit note was provided to the patient's provider.     Heather Matthews), PharmD  Medication Therapy Management Pharmacist  [facility]       Current Outpatient Medications   Medication Sig Dispense Refill     acetaminophen (TYLENOL) 500 MG tablet Take 1 tablet (500 mg total) by mouth every 6 (six) hours as needed for pain. 100 tablet 5     amLODIPine (NORVASC) 5 MG tablet Take 1 tablet (5 mg total) by mouth daily. 30 tablet 11     blood glucose test (GLUCOSE BLOOD) strips Use to check blood sugar three times daily.  One touch verio test strips. 300 strip 3     blood glucose test (ONETOUCH VERIO) strips Use 1 each As Directed 4 (four) times a day. Dispense brand per patient's insurance at pharmacy discretion. 100 strip 11     calcium, as carbonate, (TUMS) 200 mg calcium (500 mg) chewable tablet Chew 1 tablet (200 mg total) daily. 100 tablet 1     ergocalciferol (ERGOCALCIFEROL) 50,000 unit capsule Take 1 capsule (50,000 Units total) by mouth once a week. 12 capsule 3     escitalopram oxalate (LEXAPRO) 10 MG tablet Take 1 tablet (10 mg total) by mouth daily. 90 tablet 3     insulin aspart protamine-insulin aspart (NOVOLOG MIX 70-30FLEXPEN U-100) 100 unit/mL (70-30) pen Inject 20 units under the skin in the morning and 15 units in the evening. 5 adj dose pen PRN     lancets (ONETOUCH DELICA LANCETS) 33 gauge Misc Use to check blood sugar 3 per day. 100 each 11     metFORMIN (GLUCOPHAGE-XR) 500 MG 24 hr tablet Take 1 tablet (500 mg total) by mouth 2 (two) times a day with meals. 60 tablet 3     pen needle, diabetic (TRUEPLUS PEN  "NEEDLE) 32 gauge x 5/32\" Ndle USE TO INJECT INSULIN FOUR TIMES DAILY OR AS DIRECTED 400 each 1     rosuvastatin (CRESTOR) 20 MG tablet Take 1 tablet (20 mg total) by mouth at bedtime. 30 tablet 6     No current facility-administered medications for this visit.                "

## 2021-06-04 NOTE — PROGRESS NOTES
"Ramona Wilder is a 59 y.o. female here for burning with urination    ASSESSMENT/PLAN:   Ramona was seen today for urinary tract infection.    Diagnoses and all orders for this visit:    Dysuria  Acute cystitis without hematuria  Will treat based on symptoms and leuk esterase in UA, culture sent. Last UTI was 5/2019, treated with cephalexin. Urine culture from that shows sensitivity to nitrofurantoin. No signs of pyelonephritis.   -     Urinalysis-UC if Indicated  -     nitrofurantoin, macrocrystal-monohydrate, (MACROBID) 100 MG capsule; Take 1 capsule (100 mg total) by mouth 2 (two) times a day for 5 days.  -     Culture, Urine        Return if symptoms worsen or fail to improve.       ======================================================    SUBJECTIVE  Ramona Wilder is a 59 y.o. female here for UTI symptoms    Frequent urination, small amounts. Trevino when she pees. Symptoms started 2-3 days ago. No fevers. Feeling achy all over. No sick contacts. Did have her flu shot this year. No measured fevers. Pelvic pain when urinating. No nausea/vomiting, diarrhea. No flank pain.       ROS  Complete 10 point review of systems negative except as noted above in HPI    Reviewed Past Medical History, Medications, Family History and Social History in Epic and up to date with no new changes.    OBJECTIVE  /82   Pulse 72   Temp 97.4  F (36.3  C) (Oral)   Resp 16   Ht 4' 10.66\" (1.49 m)   Wt 154 lb (69.9 kg)   LMP 05/09/2014   SpO2 96%   BMI 31.47 kg/m       General: Cooperative, pleasant, in no acute distress  CV: RRR, normal S1/S2, no murmur, rubs, gallops  Resp: No respiratory distress. Clear to auscultation bilaterally. No wheezes, rales, rhonchi  Abd: Tender in pelvic region with deep palpation. nondistended, bowel sounds present  Ext: radial/pedal pulses +2 bilaterally  Neuro: CN II-XII intact  Skin: warm, well perfused. No rashes    LABS & IMAGES   Results for orders placed or performed in visit on 12/06/19   Urinalysis-UC if " Indicated   Result Value Ref Range    Color, UA Yellow Colorless, Yellow, Straw, Light Yellow    Clarity, UA Clear Clear    Glucose, UA Negative Negative    Bilirubin, UA Negative Negative    Ketones, UA Negative Negative    Specific Gravity, UA 1.010 1.005 - 1.030    Blood, UA Negative Negative    pH, UA 6.5 5.0 - 8.0    Protein, UA Negative Negative mg/dL    Urobilinogen, UA 0.2 E.U./dL 0.2 E.U./dL, 1.0 E.U./dL    Nitrite, UA Negative Negative    Leukocytes, UA Small (!) Negative    Bacteria, UA Few (!) None Seen hpf    RBC, UA 0-2 None Seen, 0-2 hpf    WBC, UA 0-5 None Seen, 0-5 hpf    Squam Epithel, UA 5-10 (!) None Seen, 0-5 lpf         ======================================================    Visit was completed along with in person Dee     Options for treatment and follow-up care were reviewed with the patient. Ramona Meño and/or guardian was engaged and actively involved in the decision making process. Ramona Mosquerah and/or guardian verbalized understanding of the options discussed and was satisfied with the final plan.      Frida Michelle MD

## 2021-06-04 NOTE — PROGRESS NOTES
MTM Follow Up Encounter  Assessment & Plan                                                     1. Type 2 diabetes mellitus without complication, with long-term current use of insulin (H)  Needs improvement.  Patient's blood sugars continue to fluctuate, and A1c remains uncontrolled to goal of less than 7%.  Patient has not yet made the adjustment to taking metformin 1000 mg daily, reiterated this change today.  Discussed adjustment of diet today to low carbohydrate diet and continued exercise.  In the future, would like to discuss the option of switching to GLP-1 agonist plus max dose metformin for benefit of weight loss plus glucose control.  - INCREASE dose - Patient to take metformin  mg twice daily    Future discussion: Switch from insulin to GLP-1 agonist    2. Other hyperlipidemia  Stable.  Patient currently taking and tolerating rosuvastatin 20 mg daily, recommend continuation.  Last lipid Cascade checked in November of last year, recommend annual fasting reassessment in November.    Future assessment: Annual fasting lipid cascade at next visit    3. Hypertension  Needs further assessment.  Blood pressure not yet at goal of less than 140/90, though improved from last visit.  Amlodipine dose recently increased, patient not noting any side effects.  If BP continues to be elevated at next visit, would recommend addition of ARB for kidney protection and blood pressure control such as losartan.     Future consideration: ARB    4. Gastropathy  Stable.  Patient currently using Tums as needed, recently discontinued ranitidine use due to recall-removed from medication list today.  Recommend continuation of current therapy.    5. Depression/Insomnia  Patient currently taking escitalopram 10 mg daily.  Patient currently well managed on therapy.  Discussed insomnia today and sleep hygiene techniques in great length.  Patient agreeable to try these techniques that were discussed.  - Pharmacist discussed sleep  hygiene methods and ways to help with insomnia    Follow Up  Return in about 3 weeks (around 2019) for Medication Review.    Subjective & Objective                                                       Ramona Wilder is a 59 y.o. female coming in for a follow up visit for Medication Therapy Management. She was referred to me from Coreen Kendall MD. Patient presents with Dee .     Chief Complaint: Follow up visit from [facility] on 19    Medication Adherence/Access: Using her pillbox now, this is helping with remembering to take meds every day.  Self management, no longer gets help from her kids like previously.     UTI: Patient states that she feels like she has to urinate but not a lot comes out each time. Patient has recurrent infections. Admits to leg and back pain with this. Been like this for 3-4 days. Bactrim DS, macrobid, and augmintin rx's in the last year. States that symptoms are similar to last time. Burning upon urination. Admits to chills.     Diabetes:  Metformin  mg once daily (prescribed two times a day) and Novolog 70/30 mix insulin - AM 20 and PM 20 before meals.  Patient's blood sugars have been fluctuating vastly.  Patient still experiencing some hypoglycemic events, but she is aware of how to correct them.  Additionally, patient does not want to be checking her blood sugars more than once a day.  Patient has not made any dietary adjustments as discussed.  Patient not noting any medication side effects, no D/N/V.   SMB-2 times daily    Ranges (based on glucometer readings) : (CGM has previously been ineffective for patient)  Date FBG Dinner Date FASTING BG    140   155    118   118   124 55   86   12/3 87   121   12 70   185    220 75  88    114 202  95   Aspirin: Not taking due to allergy  Diet: 2 equal sized meals a day.   ACEi/ARB: Reported allergy to lisinopril  Lab Results   Component Value Date    HGBA1C 8.2 (H)  11/01/2019    HGBA1C 8.4 (H) 08/02/2019    HGBA1C 9.0 (H) 05/17/2019     Lab Results   Component Value Date    MICROALBUR 0.80 02/15/2019    LDLCALC 165 (H) 11/14/2018    CREATININE 0.86 05/17/2019     Hyperlipidemia: Rosuvastatin 20 mg daily before bed. Denies all over muscle aches, just leg pain which has been going on for a long time.   Lab Results   Component Value Date    LDLCALC 165 (H) 11/14/2018     Hypertension: Amlodipine 5 mg daily.  Admits to some dizziness. Denies edema.  Has not missed any BP doses.   BP Readings from Last 3 Encounters:   12/06/19 140/80   11/08/19 160/80   11/01/19 160/70     GERD: Taking Tums as needed.  Admits to burning lately, especially with spicy foods. She has not been eating spicy foods. Was told to stop ranitidine due to recall.     Mood: Escitalopram 10 mg daily. Mood sometimes is happy, sometimes not good.  Patient states that she thinks so much at night and is not able to fall asleep. Largely discussed sleep hygiene techniques today and various methods to help with sleep wake cycle.     PMH: reviewed in EPIC   Allergies/ADRs: reviewed in EPIC   Alcohol: reviewed in EPIC   Tobacco:   Social History     Tobacco Use   Smoking Status Never Smoker   Smokeless Tobacco Current User     Types: Chew   Tobacco Comment     smokes outside, pt chews betel nut     Today's Vitals:   Vitals:    12/06/19 1017 12/06/19 1020   BP: 144/82 140/80   Pulse: 72    Temp: 97.4  F (36.3  C)    SpO2: 96%      ----------------  The patient declined an after visit summary    I spent 60 minutes with this patient today;   All changes were made via collaborative practice agreement with Coreen Kendall MD. A copy of the visit note was provided to the patient's provider.     Heather Chavez PharmD  Medication Therapy Management Pharmacist  [facility]       Current Outpatient Medications   Medication Sig Dispense Refill     acetaminophen (TYLENOL) 500 MG tablet Take 1 tablet  "(500 mg total) by mouth every 6 (six) hours as needed for pain. 100 tablet 5     amLODIPine (NORVASC) 5 MG tablet Take 1 tablet (5 mg total) by mouth daily. 30 tablet 11     blood glucose test (GLUCOSE BLOOD) strips Use to check blood sugar three times daily.  One touch verio test strips. 300 strip 3     blood glucose test (ONETOUCH VERIO) strips Use 1 each As Directed 4 (four) times a day. Dispense brand per patient's insurance at pharmacy discretion. 100 strip 11     calcium, as carbonate, (TUMS) 200 mg calcium (500 mg) chewable tablet Chew 1 tablet (200 mg total) daily. 100 tablet 1     ergocalciferol (ERGOCALCIFEROL) 50,000 unit capsule Take 1 capsule (50,000 Units total) by mouth once a week. 12 capsule 3     escitalopram oxalate (LEXAPRO) 10 MG tablet Take 1 tablet (10 mg total) by mouth daily. 90 tablet 3     insulin aspart protamine-insulin aspart (NOVOLOG MIX 70-30FLEXPEN U-100) 100 unit/mL (70-30) pen Inject 20 Units under the skin 2 (two) times a day before meals. 5 adj dose pen PRN     lancets (ONETOUCH DELICA LANCETS) 33 gauge Misc Use to check blood sugar 3 per day. 100 each 11     metFORMIN (GLUCOPHAGE-XR) 500 MG 24 hr tablet Take 1 tablet (500 mg total) by mouth 2 (two) times a day with meals. 60 tablet 3     nitrofurantoin, macrocrystal-monohydrate, (MACROBID) 100 MG capsule Take 1 capsule (100 mg total) by mouth 2 (two) times a day for 5 days. 10 capsule 0     pen needle, diabetic (TRUEPLUS PEN NEEDLE) 32 gauge x 5/32\" Ndle USE TO INJECT INSULIN FOUR TIMES DAILY OR AS DIRECTED 400 each 1     rosuvastatin (CRESTOR) 20 MG tablet Take 1 tablet (20 mg total) by mouth at bedtime. 30 tablet 6     No current facility-administered medications for this visit.                  "

## 2021-06-05 VITALS
OXYGEN SATURATION: 98 % | SYSTOLIC BLOOD PRESSURE: 142 MMHG | BODY MASS INDEX: 30.04 KG/M2 | DIASTOLIC BLOOD PRESSURE: 92 MMHG | WEIGHT: 147 LBS | HEART RATE: 83 BPM

## 2021-06-05 VITALS
BODY MASS INDEX: 29.03 KG/M2 | HEART RATE: 73 BPM | TEMPERATURE: 98.1 F | WEIGHT: 144 LBS | DIASTOLIC BLOOD PRESSURE: 68 MMHG | OXYGEN SATURATION: 97 % | HEIGHT: 59 IN | RESPIRATION RATE: 20 BRPM | SYSTOLIC BLOOD PRESSURE: 120 MMHG

## 2021-06-05 VITALS
HEART RATE: 85 BPM | HEIGHT: 59 IN | BODY MASS INDEX: 28.83 KG/M2 | WEIGHT: 143 LBS | TEMPERATURE: 98.5 F | DIASTOLIC BLOOD PRESSURE: 60 MMHG | SYSTOLIC BLOOD PRESSURE: 120 MMHG | OXYGEN SATURATION: 97 % | RESPIRATION RATE: 20 BRPM

## 2021-06-05 LAB — 25(OH)D3 SERPL-MCNC: 34.7 NG/ML (ref 30–80)

## 2021-06-05 NOTE — PROGRESS NOTES
"Ramona Wilder is a 59 y.o. female here for possible UTI    ASSESSMENT/PLAN:   Ramona was seen today for urinary retention and back pain.    Diagnoses and all orders for this visit:    Dysuria  Pyelonephritis, acute  Patient has recurrent UTI complaints but urinalysis of been bland and urine cultures have been overall normal since over 6 months ago.  She feels like the symptoms come and go every month.  Today urinalysis is consistent with an infection, physical exam consistent with possible pyelonephritis.  Patient is afebrile, vital signs stable.  Will treat with 10-day course of cefdinir based on last urine cultures from 6 months ago.  Anticipatory guidance given on when to go to the ER versus come back to clinic.  -     Urinalysis-cloudy, positive for glucose, trace blood, moderate leuk esterase and bacteria.  10-25 WBC on micro.  -     Culture, Urine, pending  -     cefdinir (OMNICEF) 300 MG capsule; Take 1 capsule (300 mg total) by mouth 2 (two) times a day for 10 days.      Return in about 1 month (around 2/17/2020) for Next scheduled follow up.   Consider repeat UA after infection is cleared.    ======================================================    SUBJECTIVE  Ramona Wilder is a 59 y.o. female here for hospital UTI.    Patient has had intermittent symptoms almost every month with concern for UTI.  UA results have been bland, urine cultures negative since 6 months ago.  For the past 2 days she has been having difficulty urinating, low pelvic pain, starting to have some back pain.  Denies any fevers, nausea, vomiting.  Still able to hydrate but she has been drinking less because she has had difficulty urinating.    ROS  Complete 10 point review of systems negative except as noted above in HPI    Reviewed Past Medical History, Medications, Family History and Social History in Epic and up to date with no new changes.    OBJECTIVE  /80   Pulse 82   Temp 97.4  F (36.3  C) (Oral)   Resp 16   Ht 4' 10.66\" (1.49 m)   " Wt 152 lb 12 oz (69.3 kg)   LMP 05/09/2014   SpO2 96%   BMI 31.21 kg/m       General: Cooperative, pleasant, in no acute distress  HEENT: PERRL, EOMI.  TM normal bilaterally.  Pharynx normal without erythema.  Tonsils normal without hypertrophy or exudates.  Neck: no lymphadenopathy, no masses  CV: RRR, normal S1/S2, no murmur, rubs, gallops  Resp: No respiratory distress. Clear to auscultation bilaterally. No wheezes, rales, rhonchi  Abd: Nontender, nondistended, bowel sounds present  Ext: radial/pedal pulses +2 bilaterally  MSK: Normal muscle tone  Neuro: CN II-XII intact  Skin: warm, well perfused. No   Psych: No suicidal or homicidal ideations, no self-harm.  Normal affect.    LABS & IMAGES   Results for orders placed or performed in visit on 01/17/20   Urinalysis-UC if Indicated   Result Value Ref Range    Color, UA Yellow Colorless, Yellow, Straw, Light Yellow    Clarity, UA Cloudy (!) Clear    Glucose,  mg/dL (!) Negative    Bilirubin, UA Negative Negative    Ketones, UA Negative Negative    Specific Gravity, UA 1.015 1.005 - 1.030    Blood, UA Trace (!) Negative    pH, UA 6.0 5.0 - 8.0    Protein, UA Negative Negative mg/dL    Urobilinogen, UA 0.2 E.U./dL 0.2 E.U./dL, 1.0 E.U./dL    Nitrite, UA Negative Negative    Leukocytes, UA Moderate (!) Negative    Bacteria, UA Moderate (!) None Seen hpf    RBC, UA 5-10 (!) None Seen, 0-2 hpf    WBC, UA 10-25 (!) None Seen, 0-5 hpf    Squam Epithel, UA 5-10 (!) None Seen, 0-5 lpf    Trans Epithel, UA 0-5 (!) None Seen lpf         ======================================================    Visit was completed along with in person Dee     Options for treatment and follow-up care were reviewed with the patient. Ramona Meño and/or guardian was engaged and actively involved in the decision making process. Ramona Mosquerah and/or guardian verbalized understanding of the options discussed and was satisfied with the final plan.      Frida Michelle MD

## 2021-06-05 NOTE — PROGRESS NOTES
Situation(s):  [facility] monthly outreach and follow up with patient.     Background:  Patient who has chronic and complex health problems and diabetes A1C uncontrolled is enrolled with [facility] for coordination assistance and to get connected with community resources.      Assessment:  Patient continues to follow up with primary doctor as scheduled and [facility] RN to continue chart review monthly. [facility] RN will send any deligation to [facility] to follow for coordination. Patient is able to get all medications refill when needed and health insurance is active with no ending date. Patient continues receiving PCA services and SSI/RSDI monthly and patient is a [place] citizen.     Recommendation(s):  Patient was reminded for upcoming appointments in the clinic and to bring all medications for providers to review them. Patient was advised to call the clinic [facility] with questions or concerns regarding coordination assistance and if additional resources needed.            Next outreach: 03/03/2020.

## 2021-06-05 NOTE — PROGRESS NOTES
MTM Follow Up Encounter  Assessment & Plan                                                     1. UTI symptoms  Needs further assessment.  Patient endorsing symptoms of painful urination, urination frequency, and associated lower back pain.  Recommend patient visit with PCP regarding symptoms, patient's PCP has opening at 2:20 today, patient refuses to return today and would prefer follow-up with PCP at upcoming visit.    2. Type 2 diabetes mellitus without complication, with long-term current use of insulin (H)  Needs improvement.  Patient's A1c has increased today since last visit, and is not at goal of less than 7%.  Patient has not been tolerating metformin 1000 mg daily though was tolerating 500 mg a day, will reduce back to this dose.  Patient accidentally switched insulin dosing from recommended 20 units in the morning 15 units at night, but has been using 15 units in the morning and 20 at night, therefore patient's symptoms of hypoglycemia in the morning have continued. Ideally, would like to transition from insulin to GLP-1 agonist in the future, though patient is refusing any medication changes today, therefore reiterated appropriate insulin dosing to help control sugars and prevent hypoglycemia.  Plan  -Patient to use NovoLog 70/30 mix insulin 20 units in the morning and 15 units in the evening before meals  - Glycosylated Hemoglobin A1C; Today    Future discussion: Switch from insulin to GLP-1 agonist    3. Other hyperlipidemia  Needs further assessment.  Patient currently taking and tolerating rosuvastatin 20 mg daily, recommend continuation.  Last lipid Cascade checked in November of 2018, will check annual fasting lipids today.  We will call the patient with lab results.  - Lipid Cascade; Future    4. Hypertension  Stable.  Patient's blood pressure at goal of less than 140/90 today, recommend continuation of current amlodipine daily.  If blood pressure not controlled in the future, would recommend  initiating losartan in addition to amlodipine.    5. Gastropathy  Stable.  Recommend continuation of current Tums as needed for symptoms, if symptoms persist and worsen, would recommend nonpharmacologic treatments such as elevating the head of the bed and avoiding trigger foods.    Follow Up  Return in about 9 weeks (around 4/3/2020) for Medication Review.    Subjective & Objective                                                       Ramona Wilder is a 59 y.o. female coming in for a follow up visit for Medication Therapy Management. She was referred to me from Coreen Kendall MD. Patient presents with Dee .     Chief Complaint: Follow up visit from [facility] on 19    Medication Adherence/Access: Using her pillbox now, this is helping with remembering to take meds every day.  Self management, no longer gets help from her kids like previously.     UTI: Took and finished the macrobid. This has been a continuous problem for her.  Endorsing urinary pain and frequent urination.  The antibiotic was helping and then it returned. Just finished 10 day course of cefdinir and patients symptoms returned 3 days after. Admitting to lower back pain as well. This is very frustrating to her.   Patient appropriately took the cefdinir.     Diabetes:  Metformin  mg two times a day and Novolog 70/30 mix insulin - AM 15 and PM 20 directly before meals (prescribed 20 units AM and 15 units PM).  Patient states that she is having burning in her chest (potentially due to reflux, and unrelated to metformin) and abdominal pain when increasing metformin dose, it was better when only taking it once daily.   States that she has not been eating sweet things.   Patient still experiencing some hypoglycemic events, but she is aware of how to correct them.  Additionally, patient does not want to be checking her blood sugars more than once a day.  Patient has not made any dietary adjustments as discussed.  SMB-2 times daily     Ranges (based on glucometer readings) :  Date FBG Date FBG    1/31 88 1/24 80    1/30 142 1/23 78    1/29 88 1/22 117    1/28 117 1/21 124    1/27 136 1/20 151    1/26 177 1/19 142    1/25 87 1/18 191    Aspirin: Not taking due to reported allergy  Diet: 2 meals a day. Previously states that the meals are equal in size, now states that morning meal is larger.  ACEi/ARB: Reported allergy to lisinopril  Lab Results   Component Value Date    HGBA1C 9.4 (H) 01/31/2020    HGBA1C 8.2 (H) 11/01/2019    HGBA1C 8.4 (H) 08/02/2019     Lab Results   Component Value Date    MICROALBUR 0.80 02/15/2019    LDLCALC 165 (H) 11/14/2018    CREATININE 0.86 05/17/2019     GERD: Taking Tums daily but continues to endorse symptoms of reflux at times.  Was told to stop ranitidine due to recall for some time. Tried sending famotidine at last visit, unsure if she was able to receive this, this has also been on shortage with ranitidine recall.     Hyperlipidemia: Rosuvastatin 20 mg daily before bed. Denies all over muscle aches, just leg pain which has been going on for a long time.   Lab Results   Component Value Date    LDLCALC 165 (H) 11/14/2018     Hypertension: Amlodipine 5 mg daily.  States that she has not yet taken her medications today and she has back pain that is bothering her today.  Denies edema.    BP Readings from Last 3 Encounters:   01/31/20 134/82   01/17/20 134/80   12/27/19 144/82       PMH: reviewed in EPIC   Allergies/ADRs: reviewed in EPIC   Alcohol: None  Tobacco:   Social History     Tobacco Use   Smoking Status Never Smoker   Smokeless Tobacco Current User     Types: Chew   Tobacco Comment     smokes outside, pt chews betel nut     Today's Vitals:   Vitals:    01/31/20 1028 01/31/20 1029   BP: 144/82 134/82   Pulse:  72   SpO2:  98%   Weight:  153 lb 14.4 oz (69.8 kg)     ----------------  The patient declined an after visit summary    I spent 45 minutes with this patient today;   All changes were made via  "collaborative practice agreement with Coreen Kendall MD. A copy of the visit note was provided to the patient's provider.     Heather Morales (Scott), PharmD  Medication Therapy Management Pharmacist  [facility]       Current Outpatient Medications   Medication Sig Dispense Refill     acetaminophen (TYLENOL) 500 MG tablet Take 1 tablet (500 mg total) by mouth every 6 (six) hours as needed for pain. 100 tablet 5     amLODIPine (NORVASC) 5 MG tablet Take 1 tablet (5 mg total) by mouth daily. 30 tablet 11     blood glucose test (GLUCOSE BLOOD) strips Use to check blood sugar three times daily.  One touch verio test strips. 300 strip 3     blood glucose test (ONETOUCH VERIO) strips Use 1 each As Directed 4 (four) times a day. Dispense brand per patient's insurance at pharmacy discretion. 100 strip 11     calcium, as carbonate, (TUMS) 200 mg calcium (500 mg) chewable tablet Chew 1 tablet (200 mg total) daily. 100 tablet 1     ergocalciferol (ERGOCALCIFEROL) 50,000 unit capsule Take 1 capsule (50,000 Units total) by mouth once a week. 12 capsule 3     escitalopram oxalate (LEXAPRO) 10 MG tablet Take 1 tablet (10 mg total) by mouth daily. 90 tablet 3     famotidine (FOR PEPCID) 10 MG tablet Take 1 tablet (10 mg total) by mouth 2 (two) times a day as needed for heartburn. 60 tablet 3     insulin aspart protamine-insulin aspart (NOVOLOG MIX 70-30FLEXPEN U-100) 100 unit/mL (70-30) pen Inject 20 units under the skin in the morning and 15 units in the evening. 5 adj dose pen PRN     lancets (ONETOUCH DELICA LANCETS) 33 gauge Misc Use to check blood sugar 3 per day. 100 each 11     metFORMIN (GLUCOPHAGE-XR) 500 MG 24 hr tablet Take 1 tablet (500 mg total) by mouth 2 (two) times a day with meals. 60 tablet 3     pen needle, diabetic (TRUEPLUS PEN NEEDLE) 32 gauge x 5/32\" Ndle USE TO INJECT INSULIN FOUR TIMES DAILY OR AS DIRECTED 400 each 1     rosuvastatin (CRESTOR) 20 MG tablet Take 1 tablet (20 mg " total) by mouth at bedtime. 30 tablet 6     No current facility-administered medications for this visit.

## 2021-06-06 NOTE — PROGRESS NOTES
Clinic Care Coordination Contact    Follow Up Progress Note        Goals addressed this encounter:   Goals Addressed                 This Visit's Progress      Other (pt-stated)        Goal Statement: I would like to reinstate my health insurance in the next 60 days.  Date Goal set: 03/10/2020.  Barriers: Language barrier, lack of understanding of chronic illness and diabetes A1C uncontrolled.  Strengths: Taking medications daily.  Date to Achieve By: 05/07/2020.  Patient expressed understanding of goal: Yes.    Action steps to achieve this goal:  1. I will answer my phone when [facility] FRW calls.  2. I will gather all required documents such as back statement and income verifications.  3. I will contact the clinic CHW if there are additional questions.              Intervention/Education provided during outreach: Patient's health insurance has ended on 02/29/2020 and CHW sent referral to [facility] FRW to assist with reinstating health insurance. Patient is also scheduled to see [facility] RN on 03/18/2020 for patient to have resources available for affordable medications before health insurance reinstated. Patient was reminded to call CHW with questions or concerns regarding coordination assistance and if additional resources needed.        Plan:   Patient to come see [facility] RN for MEV on 03/18/2020 and patient will meet with [facility] FRW to assist with health insurance.    Care Coordinator will follow up in one month.

## 2021-06-06 NOTE — PROGRESS NOTES
Clinic Care Coordination Contact  [facility]/Voicemail       Clinical Data: Care Coordinator Outreach  Outreach attempted x 1.  Left message on patient's voicemail with call back information and requested return call.  Plan: Care Coordinator will try to reach patient again in two weeks.

## 2021-06-06 NOTE — PROGRESS NOTES
ASSESSMENT/PLAN:    Problem List Items Addressed This Visit        Edg Concept Cardiac Problems    Hyperlipidemia     Hasn't had much of decrease in LDL on rosuvastatin 20mg; will increase to 40mg daily.         Relevant Medications    rosuvastatin (CRESTOR) 40 MG tablet    Hypertension     Inadequate control on current regimen. Would prefer to have her less than 130 systolic given risk factors.    BP Readings from Last 3 Encounters:   02/25/20 150/80   01/31/20 134/82   01/17/20 134/80      Unable to take ACE Inhibitor due to angioedema from lisinopril    Will increase amlodipine from 5 to 10mg daily.         Relevant Medications    amLODIPine (NORVASC) 10 MG tablet    Type 2 diabetes mellitus with hyperglycemia, with long-term current use of insulin (H) - Primary     A1C last month markedly elevated at 9.4.   Continue med adjustments with MTM Pharmacist.         Relevant Medications    metFORMIN (GLUCOPHAGE-XR) 500 MG 24 hr tablet    Other Relevant Orders    Microalbumin, Random Urine (Completed)       Other    Gastropathy     Hx reactive gastropathy by EGD 2012.  Had been taking ranitidine most recently, but it's no longer available.  Will try famotidine.         Relevant Medications    famotidine (FOR PEPCID) 10 MG tablet      Other Visit Diagnoses     Visit for screening mammogram        Relevant Orders    Mammo Screening Bilateral    Dysuria        Recurrent dysuria, often without infeciton. UA basically normal today, so didn't start abx.  Urine culture ultimately return negative.    Relevant Orders    Urinalysis-UC if Indicated (Completed)    Culture, Urine (Completed)    Rib pain on right side        Contusion due to fall. Ruled out fracture with xray today. Ok prn pain meds, discussed could take a few weeks to return to normal.    Relevant Orders    XR Ribs Right (Completed)          Return in about 3 months (around 5/25/2020) for Diabetes follow-up.     SUBJECTIVE:  Ramona Wilder is a 59 y.o. female here for  diabetes follow-up.  She also complains of a fall.    Chest about 3 weeks ago.  Patient slipped and fell against a railing and hit her right chest.  It hurt right away and has continued to hurt.  Is maybe a little bit better.  Is worse to breathe and to move in certain directions.  Not taking any medicine for it.  She is worried something could be broken.    She has diabetes which is recently been uncontrolled.  She brings in her glucometer and the readings are all in the 100s.  She checks infrequently, however.  Denies polydipsia or polyuria.  Does endorse dysuria.    Review of Systems:  Reminder of complete review systems is negative.     Patient Active Problem List   Diagnosis     Carotid Artery Stenosis     Moderate Recurrent Major Depression     Anxiety     Insomnia     Hypertension     Vitamin D deficiency     Gastropathy     Type 2 diabetes mellitus with hyperglycemia, with long-term current use of insulin (H)     Hyperlipidemia     Headache     Chronic Pain     Back Strain     Dermatitis     Metabolic Tests Nonspecific Elevation Of Transaminase Levels     Pain in finger of right hand     Left knee pain     Urinary, incontinence, stress female     Right lumbar radiculitis     Medication Sig     acetaminophen (TYLENOL) 500 MG tablet Take 1 tablet (500 mg total) by mouth every 6 (six) hours as needed for pain.     amLODIPine (NORVASC) 5 MG tablet Take 1 tablet (5 mg total) by mouth daily.     blood glucose test (GLUCOSE BLOOD) strips Use to check blood sugar three times daily.  One touch verio test strips.     blood glucose test (ONETOUCH VERIO) strips Use 1 each As Directed 4 (four) times a day. Dispense brand per patient's insurance at pharmacy discretion.     calcium, as carbonate, (TUMS) 200 mg calcium (500 mg) chewable tablet Chew 1 tablet (200 mg total) daily.     ergocalciferol (ERGOCALCIFEROL) 50,000 unit capsule Take 1 capsule (50,000 Units total) by mouth once a week.     escitalopram oxalate  "(LEXAPRO) 10 MG tablet Take 1 tablet (10 mg total) by mouth daily.     famotidine (FOR PEPCID) 10 MG tablet Take 1 tablet (10 mg total) by mouth 2 (two) times a day as needed for heartburn.     insulin aspart protamine-insulin aspart (NOVOLOG MIX 70-30FLEXPEN U-100) 100 unit/mL (70-30) pen Inject 20 units under the skin in the morning and 15 units in the evening.     lancets (ONETOUCH DELICA LANCETS) 33 gauge Misc Use to check blood sugar 3 per day.     metFORMIN (GLUCOPHAGE-XR) 500 MG 24 hr tablet Take 1 tablet (500 mg total) by mouth 2 (two) times a day with meals.     pen needle, diabetic (TRUEPLUS PEN NEEDLE) 32 gauge x 5/32\" Ndle USE TO INJECT INSULIN FOUR TIMES DAILY OR AS DIRECTED     rosuvastatin (CRESTOR) 20 MG tablet Take 1 tablet (20 mg total) by mouth at bedtime.     No current facility-administered medications on file prior to visit.         Social History     Tobacco Use   Smoking Status Never Smoker   Smokeless Tobacco Current User     Types: Chew   Tobacco Comment     smokes outside, pt chews betel nut       DIABETIC MEASURES:  Hemoglobin A1c   Date Value Ref Range Status   01/31/2020 9.4 (H) 3.5 - 6.0 % Final   11/01/2019 8.2 (H) 3.5 - 6.0 % Final   08/02/2019 8.4 (H) 3.5 - 6.0 % Final        Lipids:   Lab Results   Component Value Date    HDL 47 (L) 01/31/2020    HDL 59 11/14/2018    HDL 48 (L) 11/02/2017    Lab Results   Component Value Date    LDLCALC 153 (H) 01/31/2020    LDLCALC 165 (H) 11/14/2018    LDLCALC 87 11/02/2017    Lab Results   Component Value Date    TRIG 99 01/31/2020    TRIG 143 11/14/2018    TRIG 75 11/02/2017        Microalbumin  Lab Results   Component Value Date    MICROALBUR 2.55 (H) 02/25/2020        Last eye exam: 11/3/2018      Recent blood pressures:   BP Readings from Last 3 Encounters:   02/25/20 150/80   01/31/20 134/82   01/17/20 134/80        Recent weight:   Wt Readings from Last 3 Encounters:   02/25/20 152 lb (68.9 kg)   01/31/20 153 lb 14.4 oz (69.8 kg) " "  01/17/20 152 lb 12 oz (69.3 kg)         OBJECTIVE:  :  /80   Pulse 84   Temp 97.6  F (36.4  C) (Oral)   Resp 20   Ht 4' 10.66\" (1.49 m)   Wt 152 lb (68.9 kg)   LMP 05/09/2014   SpO2 98%   BMI 31.06 kg/m      Gen:  A&A, NAD  Neck:  supple, no sig LAD or thyromegally  Chest: There is tenderness over the right lateral and anterior ribs.  CV:  HRRR, no M/R/G  Resp:  CTAB  Abdomen: Soft with  mild suprapubic tenderness  Ext:  W&D, no edema  Skin: No bruising over the chest    Lab results:    Results for orders placed or performed in visit on 02/25/20   Culture, Urine   Result Value Ref Range    Culture No Growth    Microalbumin, Random Urine   Result Value Ref Range    Microalbumin, Random Urine 2.55 (H) 0.00 - 1.99 mg/dL    Creatinine, Urine 51.5 mg/dL    Microalbumin/Creatinine Ratio Random Urine 49.5 (H) <=19.9 mg/g   Urinalysis-UC if Indicated   Result Value Ref Range    Color, UA Yellow Colorless, Yellow, Straw, Light Yellow    Clarity, UA Clear Clear    Glucose,  mg/dL (!) Negative    Bilirubin, UA Negative Negative    Ketones, UA Negative Negative    Specific Gravity, UA 1.015 1.005 - 1.030    Blood, UA Negative Negative    pH, UA 5.5 5.0 - 8.0    Protein, UA Negative Negative mg/dL    Urobilinogen, UA 0.2 E.U./dL 0.2 E.U./dL, 1.0 E.U./dL    Nitrite, UA Negative Negative    Leukocytes, UA Negative Negative             "

## 2021-06-06 NOTE — PROGRESS NOTES
Programs Applying For: Health Insurance renewal   County: [place]  Case #:  County Worker:   PMI #: [id number]  Tracking:   Date Applied:     Outreach:  3/25/2020: FRW checked [place], no active health insurance. FRW called patient and left VM asking if patient completed renewal. FRW will make outreach in 2 weeks.  3/16/2020: FRW called patient on referral. Patient is going to have family member bring her to [facility] to complete renewal. FRW will make outreach to patient in 2 weeks to make sure insurance is active.  3/11/2020: Outreach attempted x 1. Left message on voicemail with call back information and requested return call.  Plan: FRW will call again within one week.       Health Insurance Information:     Major Programs   This subscriber has eligibility for MA: Medical Assistance.   Elig Type 16: 1619B   Eligibility Begin Date: 07/01/2019   Eligibility End Date: 02/29/2020   This subscriber is eligible for the following service types: Medical Care , Chiropractic , Dental Care , Hospital , Hospital - Inpatient , Hospital - Outpatient , Emergency Services , Pharmacy , Professional (Physician) Visit - Office , Vision (Optometry) , Mental Health , Urgent Care   Prepaid Health Plan   This subscriber receives MA37 - Special Needs BasicCare (SNBC) delivered through [facility]. The phone numbers are: [phone] (metro) or [phone] (toll free).   SNBC does not cover personal care assistant (PCA) services, private duty nursing (PDN), or home and community based waiver services. SNBC recipients can obtain these services through fee-for-service [facility].   If the subscriber has Medicare, Medicare services must be submitted to original Medicare FFS or the selected Medicare Part D plan prior to submitting to the health plan.      Referral:     I'd like to refer this [facility] enrolled patient to assist with health insurance which has ended on 02/29/2020, please contact patient at your convenience and assist with the  request.

## 2021-06-06 NOTE — PROGRESS NOTES
Programs Applying For: Health Insurance renewal   County: [place]  Case #:  [facility] Worker:   PMI #: [id number]  Tracking:   Date Applied:     Outreach:   3/11/2020: Outreach attempted x 1. Left message on voicemail with call back information and requested return call.  Plan: FRW will call again within one week.       Health Insurance Information:     Major Programs   This subscriber has eligibility for MA: Medical Assistance.   Elig Type 16: 1619B   Eligibility Begin Date: 07/01/2019   Eligibility End Date: 02/29/2020   This subscriber is eligible for the following service types: Medical Care , Chiropractic , Dental Care , Hospital , Hospital - Inpatient , Hospital - Outpatient , Emergency Services , Pharmacy , Professional (Physician) Visit - Office , Vision (Optometry) , Mental Health , Urgent Care   Prepaid Health Plan   This subscriber receives MA37 - Special Needs BasicCare (SNBC) delivered through [facility]. The phone numbers are: [phone] (metro) or [phone] (toll free).   SNBC does not cover personal care assistant (PCA) services, private duty nursing (PDN), or home and community based waiver services. SNBC recipients can obtain these services through fee-for-service [facility].   If the subscriber has Medicare, Medicare services must be submitted to original Medicare FFS or the selected Medicare Part D plan prior to submitting to the health plan.      Referral:     I'd like to refer this [facility] enrolled patient to assist with health insurance which has ended on 02/29/2020, please contact patient at your convenience and assist with the request.

## 2021-06-06 NOTE — PROGRESS NOTES
Clinic Care Coordination Contact    Follow Up Progress Note      Assessment:   Patient came to see [facility] RN today accompanied by her daughter for low cost prescription resources.     Writer gave a print out of [employer] low cost prescription program and current med list. Educated patient how to fill her meds while lapse in insurance.   Patient states she still has 2 insulin pens left and 5-10 each medications. States she's only using as needed at this time.   Daughter states she will assist her mom to fill meds at [facility].     Active with FRW - patient states she already applied for health insurance but submitted missing documents yesterday. States the county office is closed due to coronavirus and she needs to mail out the paperwork.   Per chart review, FRW will reach out in 2 weeks as of 3/16/2020.     Daughter speaks good English and able to assist with paperwork and transportation.     Denies other needs.     A1c not at goal of <8 yet. Expect to be elevated with next A1c check due to lack of medications and insulin.     Patient states she's doing well but worried about her health insurance.        Plan:   1) [facility] RN will f/u in 1 month

## 2021-06-07 NOTE — PROGRESS NOTES
Programs Applying For: Health Insurance renewal   County: [place]  Case #:  [facility] Worker:   PMI #: [id number]  Tracking:   Date Applied:     Outreach:  4/22/2020: FRW called daughters #'s the RN gave FRW. FRW was unable to leave VM for daughters and both VM were not set up. FRW will make another outreach in 1 week.   4/16/2020: FRW called [facility] and spoke with Cuca. According to [facility] notes, patient was not added on renewal. Has patient moved? Patient will need to call [facility] Mnsure and verify to be added to family case. FRW called patient and daughter and left VM. FRW will make outreach in 1 week.  CLOSED ENCOUNTER:  3/25/2020: FRW checked [place], no active health insurance. FRW called patient and left VM asking if patient completed renewal. FRW will make outreach in 2 weeks.  3/16/2020: FRW called patient on referral. Patient is going to have family member bring her to [facility] to complete renewal. FRW will make outreach to patient in 2 weeks to make sure insurance is active.  3/11/2020: Outreach attempted x 1. Left message on voicemail with call back information and requested return call.  Plan: FRW will call again within one week.       Health Insurance Information:     Major Programs   This subscriber has eligibility for MA: Medical Assistance.   Elig Type 16: 1619B   Eligibility Begin Date: 07/01/2019   Eligibility End Date: 02/29/2020   This subscriber is eligible for the following service types: Medical Care , Chiropractic , Dental Care , Hospital , Hospital - Inpatient , Hospital - Outpatient , Emergency Services , Pharmacy , Professional (Physician) Visit - Office , Vision (Optometry) , Mental Health , Urgent Care   Prepaid Health Plan   This subscriber receives MA37 - Special Needs BasicCare (SNBC) delivered through [facility]. The phone numbers are: [phone] (metro) or [phone] (toll free).   SNBC does not cover personal care assistant (PCA) services, private duty nursing  (PDN), or home and community based waiver services. SNBC recipients can obtain these services through [facility].   If the subscriber has Medicare, Medicare services must be submitted to original Medicare FFS or the selected Medicare Part D plan prior to submitting to the health plan.      Referral:     I'd like to refer this [facility] enrolled patient to assist with health insurance which has ended on 02/29/2020, please contact patient at your convenience and assist with the request.

## 2021-06-07 NOTE — PROGRESS NOTES
Clinic Care Coordination Contact    Follow Up Progress Note      Assessment: f/u medication refill, insurance status  Chart review completed today.  Spoke with patient via phone after had to call to 4 different phone numbers.   FRW wasn't able to reach patient via phone this morning.   Patient state she can't pay her phone bill at this time.     Requested to call her daughter at [phone] or [phone] to reach her.     States she's completely out of her oral medications and afraid to go to [facility] to refill her meds there due to covid-19.   States she still has 2 insulin pen left and she would only use it if her BG reading greater than 300.     States BG reading ranging in 100s-200s, mostly <200s.   Educated patient on low card foods and daily exercise to keep her BG manageable while she's out meds and only using insulin as needed.     Writer reviewed FRARUNA note this morning with her and to expect call back from FRW in 1 week.     Encouraged patient and daughter who speaks good English to call [facility] to be added to family case. Patient states she did all of that in the beginning and she did submit requested documents.     Goals addressed this encounter:   Goals        Patient Stated      Iwould like to lower my A1c <8 in the next 6 months.  (pt-stated)      Action steps to achieve this goal  1.  I will speak with CCC RN at outreach calls  2. I will take all my medications as prescribed  3. I will check BG 2 times per day + PRN for s/s of hypo/hyperglycemia  4. I will attend my appt with MTM and PCP for DM II mgmt as scheduled  5. Need to f/u with PCP in Feb, 2020 ( 3 month f/u)  6. Patient will attend her appt with MTM on 12/6/19 @ 10am.     Goal discussed: 1/3/2020; 3/18/2020; 4/16/2020  Date goal set:  11/11/19        Other (pt-stated)      Goal Statement: I would like to reinstate my health insurance in the next 60 days.  Date Goal set: 03/10/2020.  Barriers: Language barrier, lack of understanding of chronic  illness and diabetes A1C uncontrolled.  Strengths: Taking medications daily.  Date to Achieve By: 05/07/2020.  Patient expressed understanding of goal: Yes.    Action steps to achieve this goal:  1. I will answer my phone when CCC FRW calls.  2. I will gather all required documents such as back statement and income verifications.  3. I will contact the clinic CHW if there are additional questions.                     Plan:

## 2021-06-08 NOTE — PROGRESS NOTES
Programs Applying For: Health Insurance renewal   County: [place]  Case #:  [facility] Worker:   PMI #: [id number]  Tracking:   Date Applied:     Outreach:  5/7/2020: FRW called daughters's #'s and left VM. FRW will make outreach in 1 week. FRW sent RN/CHW staff message that FRW is having a hard time getting a hold of family.   4/22/2020: FRW called daughters #'s the RN gave FRW. FRW was unable to leave VM for daughters and both VM were not set up. FRW will make another outreach in 1 week.   4/16/2020: FRW called [facility] and spoke with Cuca. According to [facility] notes, patient was not added on renewal. Has patient moved? Patient will need to call [facility] Mnsure and verify to be added to family case. FRW called patient and daughter and left VM. FRW will make outreach in 1 week.  CLOSED ENCOUNTER:  3/25/2020: FRW checked [place], no active health insurance. FRW called patient and left VM asking if patient completed renewal. FRW will make outreach in 2 weeks.  3/16/2020: FRW called patient on referral. Patient is going to have family member bring her to [facility] to complete renewal. FRW will make outreach to patient in 2 weeks to make sure insurance is active.  3/11/2020: Outreach attempted x 1. Left message on voicemail with call back information and requested return call.  Plan: FRW will call again within one week.       Health Insurance Information:     Major Programs   This subscriber has eligibility for MA: Medical Assistance.   Elig Type 16: 1619B   Eligibility Begin Date: 07/01/2019   Eligibility End Date: 02/29/2020   This subscriber is eligible for the following service types: Medical Care , Chiropractic , Dental Care , Hospital , Hospital - Inpatient , Hospital - Outpatient , Emergency Services , Pharmacy , Professional (Physician) Visit - Office , Vision (Optometry) , Mental Health , Urgent Care   Prepaid Health Plan   This subscriber receives MA37 - Special Needs BasicCare (SNBC) delivered through  [facility]. The phone numbers are: [phone] (metro) or [phone] (toll free).   [facility] does not cover personal care assistant (PCA) services, private duty nursing (PDN), or home and community based waiver services. [facility] recipients can obtain these services through [facility].   If the subscriber has Medicare, Medicare services must be submitted to original Medicare FFS or the selected Medicare Part D plan prior to submitting to the health plan.      Referral:     I'd like to refer this [facility] enrolled patient to assist with health insurance which has ended on 02/29/2020, please contact patient at your convenience and assist with the request.

## 2021-06-08 NOTE — PROGRESS NOTES
Unable to reach patient.   -Patient reported that there is no issue refilling medications at pharmacy and health insurance is active.  -Patient stated that there is no form or paper work that she needs help with at this time.  -Care Guide helped rescheduled pharmD appointment.   -Care Guide will outreach patient again in 6 weeks and informed to call sooner if needed.

## 2021-06-08 NOTE — PROGRESS NOTES
Clinic Care Coordination Contact    Follow Up Progress Note      Assessment: f/u post active insurance, assist with med refill  Chart review completed today.   CCC RN received a message from FRW stating that patient's insurance is active.     Unable to reach patient via phone. Someone who speaks English answer the phone - not Dee and daughter didn't answer the phone    Writer called [facility] and requested all meds to be refilled. CHW to f/u on status    New scripts needed:     1) Tylenol - need new script  2) Test strips   3) Tums - need new script if PCP still wants her to take it  4) Vit D 50, 000IU - need new script  5) Lexapro - need refill   6) Famotidine  7) novolog insuliin - Pharmacist states [place] might not cover brand but request to call back the next day to find out on status - CHW - PLEASE MAKE SURE IT'S COVER BY INSURANCE AND PEN NEEDLES INCLUDED   8) Metformin - need new refill  9) Crestor     Message sent to PCP regarding new scripts needed.   Goals addressed this encounter:     Goals addressed this encounter:   Goals Addressed                 This Visit's Progress       Patient Stated      Iwould like to lower my A1c <8 in the next 6 months.  (pt-stated)   On track     Action steps to achieve this goal  1.  I will speak with CCC RN at outreach calls  2. I will take all my medications as prescribed  3. I will check BG 2 times per day + PRN for s/s of hypo/hyperglycemia  4. I will attend my appt with MTM and PCP for DM II mgmt as scheduled  5. Need to f/u with PCP in Feb, 2020 ( 3 month f/u)  6. Patient will attend her appt with MTM on 12/6/19 @ 10am.     Goal discussed: 1/3/2020; 3/18/2020; 4/16/2020  Date goal set:  11/11/19        COMPLETED: Other (pt-stated)   100%     Goal Statement: I would like to reinstate my health insurance in the next 60 days.  Date Goal set: 03/10/2020.  Barriers: Language barrier, lack of understanding of chronic illness and diabetes A1C uncontrolled.  Strengths:  Taking medications daily.  Date to Achieve By: 05/07/2020.  Patient expressed understanding of goal: Yes.    Action steps to achieve this goal:    1. I will answer my phone today at 1:00 PM when FRW calls to assist me with health insurance.  2. I will send in all required documents to [facility] to reactive my health insurance.  3. I will contact the clinic CHW for coordination assistance when needed.  4. I will call CCC RN for medications concern.      Goal update: 04/21/2020; 05/21/2020,            Plan:   1) CHW to call patient to ask them to  meds. CCC RN unable to reach patient via phone today. Med should be ready by 6/18/2020  2) CCC RN will f/u on 7/9/2020  3) message sent to scheduling pool today to make appt with PCP to f/u on DM II.

## 2021-06-08 NOTE — PROGRESS NOTES
Programs Applying For: Health Insurance renewal   County: [place]  Case #:  [facility] Worker:   PMI #: [id number]  Tracking:   Date Applied:     Outreach:  6/5/2020: FRW called [employer]. [employer] stated the county worker said moms insurance was active but when he called the insurance company they said it will be active July 1st. Jeniffer Babcocko is calling county worker back today to get clarification. FRW will move outreach to 2 weeks.   5/21/2020: CHW reached out to FRW asking if FRW can call [employer] again today. FRW called [employer]. Son indicated he is working with the [facility]. FRW gave the [facility] mnsure # to son. Son is going to call them next week in the morning. FRW will make outreach to son in 2 weeks on status.   5/21/2020: FRW called patient and [employer] and left VM. FRW will make last attempt next week.   5/7/2020: FRW called daughters's #'s and left VM. FRW will make outreach in 1 week. FRW sent RN/CHW staff message that FRW is having a hard time getting a hold of family.   4/22/2020: FRW called daughters #'s the RN gave FRW. FRW was unable to leave VM for daughters and both VM were not set up. FRW will make another outreach in 1 week.   4/16/2020: FRW called [facility] and spoke with Cuca. According to [facility] notes, patient was not added on renewal. Has patient moved? Patient will need to call [facility] Mnsure and verify to be added to family case. FRW called patient and daughter and left VM. FRW will make outreach in 1 week.  CLOSED ENCOUNTER:  3/25/2020: FRW checked [place], no active health insurance. FRW called patient and left VM asking if patient completed renewal. FRW will make outreach in 2 weeks.  3/16/2020: FRW called patient on referral. Patient is going to have family member bring her to [facility] to complete renewal. FRW will make outreach to patient in 2 weeks to make sure insurance is active.  3/11/2020: Outreach attempted x 1. Left message on voicemail with call back  information and requested return call.  Plan: FRW will call again within one week.       Health Insurance Information:     Major Programs   This subscriber has eligibility for MA: Medical Assistance.   Elig Type 16: 1619B   Eligibility Begin Date: 07/01/2019   Eligibility End Date: 02/29/2020   This subscriber is eligible for the following service types: Medical Care , Chiropractic , Dental Care , Hospital , Hospital - Inpatient , Hospital - Outpatient , Emergency Services , Pharmacy , Professional (Physician) Visit - Office , Vision (Optometry) , Mental Health , Urgent Care   Prepaid Health Plan   This subscriber receives MA37 - Special Needs BasicCare (SNBC) delivered through [facility]. The phone numbers are: [phone] (metro) or [phone] (toll free).   SNBC does not cover personal care assistant (PCA) services, private duty nursing (PDN), or home and community based waiver services. SNBC recipients can obtain these services through [facility].   If the subscriber has Medicare, Medicare services must be submitted to original Medicare FFS or the selected Medicare Part D plan prior to submitting to the health plan.      Referral:     I'd like to refer this [facility] enrolled patient to assist with health insurance which has ended on 02/29/2020, please contact patient at your convenience and assist with the request.

## 2021-06-08 NOTE — PROGRESS NOTES
Programs Applying For: Health Insurance renewal   [place]: [place] #:  [facility] Worker:   PMI #: [id number]  Tracking:   Date Applied:     Outreach:  5/21/2020: CHW reached out to FRW asking if FRW can call [employer] again today. FRW called [employer]. Son indicated he is working with the [facility]. FRW gave the [facility] mnsure # to son. Son is going to call them next week in the morning. FRW will make outreach to son in 2 weeks on status.   5/21/2020: FRW called patient and [employer] and left VM. FRW will make last attempt next week.   5/7/2020: FRW called daughters's #'s and left VM. FRW will make outreach in 1 week. FRW sent RN/CHW staff message that FRW is having a hard time getting a hold of family.   4/22/2020: FRW called daughters #'s the RN gave FRW. FRW was unable to leave VM for daughters and both VM were not set up. FRW will make another outreach in 1 week.   4/16/2020: FRW called [facility] and spoke with Cuca. According to [facility] notes, patient was not added on renewal. Has patient moved? Patient will need to call [facility] Mnsure and verify to be added to family case. FRW called patient and daughter and left VM. FRW will make outreach in 1 week.  CLOSED ENCOUNTER:  3/25/2020: FRW checked [place], no active health insurance. FRW called patient and left VM asking if patient completed renewal. FRW will make outreach in 2 weeks.  3/16/2020: FRW called patient on referral. Patient is going to have family member bring her to [facility] to complete renewal. FRW will make outreach to patient in 2 weeks to make sure insurance is active.  3/11/2020: Outreach attempted x 1. Left message on voicemail with call back information and requested return call.  Plan: FRW will call again within one week.       Health Insurance Information:     Major Programs   This subscriber has eligibility for MA: Medical Assistance.   Elig Type 16: 1619B   Eligibility Begin Date: 07/01/2019   Eligibility End Date:  02/29/2020   This subscriber is eligible for the following service types: Medical Care , Chiropractic , Dental Care , Hospital , Hospital - Inpatient , Hospital - Outpatient , Emergency Services , Pharmacy , Professional (Physician) Visit - Office , Vision (Optometry) , Mental Health , Urgent Care   Prepaid Health Plan   This subscriber receives MA37 - Special Needs BasicCare (SNBC) delivered through [facility]. The phone numbers are: [phone] (metro) or [phone] (toll free).   SNBC does not cover personal care assistant (PCA) services, private duty nursing (PDN), or home and community based waiver services. SNBC recipients can obtain these services through [facility]   If the subscriber has Medicare, Medicare services must be submitted to original Medicare FFS or the selected Medicare Part D plan prior to submitting to the health plan.      Referral:     I'd like to refer this [facility] enrolled patient to assist with health insurance which has ended on 02/29/2020, please contact patient at your convenience and assist with the request.

## 2021-06-08 NOTE — PROGRESS NOTES
CHW called to remind patient to pick medications on 6/19/2020 however unable to reach and CHW will call patient again the next day.

## 2021-06-08 NOTE — PROGRESS NOTES
"ASSESSMENT/PLAN:    Diagnoses and all orders for this visit:    Type 2 diabetes mellitus without complication, with long-term current use of insulin  -     Hemoglobin A1c  -     Comprehensive Metabolic Panel  -     Microalbumin, Random Urine  -     Ambulatory referral to Ophthalmology  -     Ambulatory referral to Diabetes Education (Existing Diagnosis)  Advised weight loss and incease in activity  Didn't change meds yet today; would prefer she see diabetes ed first to get a better picture of what needs to be improved.    Vitamin D deficiency  -     Vitamin D, Total (25-Hydroxy)    Pain  -     acetaminophen (TYLENOL) 500 MG tablet; Take 1 tablet (500 mg total) by mouth every 6 (six) hours as needed for pain.  Dispense: 50 tablet; Refill: 11    *I think a big part of her health problems are related to depression. The patient denies depression, but expressed that she wants to be alone.  I discussed [facility].  Not very interested, but I asked her to think about it.  Will ask Care Guide to call and offer.        SUBJECTIVE:  Ramona Wilder is a 56 y.o. female here for diabetes follow-up.    C/o foot burning    She is bothered that blood sugars are up and down.  Confirms taking the following since October:  25 units for small-medium meals  35 units for large meals    Feels shaking sometimes when blood sugar 70-80  Most sugars 160-230, max 344, min 84  Shakiness about 2x/week  High sugar diet with noddles, sticky rice.  Not avoiding any foods because she's \"going to die someday anyway\".    Back stiff if sits along time.  Chest/stomach burnings sometimes.  Headaches - takes Tylenol  Experiences chills and hot.    Prefers to be home alone.          Patient Active Problem List   Diagnosis     Carotid Artery Stenosis     Moderate Recurrent Major Depression     Anxiety     Insomnia     Hypertension     Vitamin D deficiency     Gastropathy     Type 2 diabetes mellitus without complication, with long-term current use " "of insulin     Hyperlipidemia     Headache     Chronic Pain     Back Strain     Dermatitis     Metabolic Tests Nonspecific Elevation Of Transaminase Levels     Pain in finger of right hand     Current Outpatient Prescriptions on File Prior to Visit   Medication Sig Dispense Refill     aluminum-magnesium hydroxide-simethicone (ANTACID ANTI-GAS) 200-200-20 mg/5 mL Susp Take 15 mL by mouth 4 (four) times a day as needed. 1560 mL 11     atorvastatin (LIPITOR) 80 MG tablet TAKE 1 TABLET DAILY AT BEDTIME. 30 tablet 11     blood-glucose meter Misc Use As Directed.       CONTOUR TEST STRIPS strips USE TO TEST BLOOD SUGAR TWICE DAILY OR AS DIRECTED 200 strip 1     ergocalciferol (ERGOCALCIFEROL) 50,000 unit capsule Take 1 capsule (50,000 Units total) by mouth once a week. 12 capsule 3     escitalopram oxalate (LEXAPRO) 10 MG tablet TAKE 1 TABLET DAILY 30 tablet 11     lancets (MICROLET LANCET) Misc Use to check glucose twice daily or as directed. 50 each prn     NOVOLOG MIX 70-30 FLEXPEN 100 unit/mL (70-30) pen Inject 28 Units under the skin 2 (two) times a day before meals. 15 mL 11     pantoprazole (PROTONIX) 40 MG tablet TAKE ONE TABLET BY MOUTH DAILY 30 tablet 11     pen needle, diabetic (BD ULTRA-FINE CITLALLI PEN NEEDLES) 32 gauge x 5/32\" Ndle Use to inject insulin four times daily or as directed. 100 each 11     propranolol (INDERAL LA) 60 mg 24 hr capsule TAKE 1 CAPSULE BY MOUTH AT BEDTIME. 30 capsule 11     ammonium lactate (LAC-HYDRIN) 12 % lotion APPLY AND RUB IN A THIN FILM TO AFFECTED AREA TWICE DAILY(MORNING ANDEVENING) 225 g 11     lidocaine (XYLOCAINE) 5 % ointment Apply to painful joints and muscles 4 times daily as needed. 50 g 11     triamcinolone (KENALOG) 0.5 % ointment Apply to affected area twice daily. 45 g 1     No current facility-administered medications on file prior to visit.         DIABETIC MEASURES:  HEMOGLOBIN A1C   Date Value Ref Range Status   02/07/2017 10.8 (H) 3.5 - 6.0 % Final   10/21/2016 " "9.4 (H) 3.5 - 6.0 % Final   07/15/2016 9.1 (H) 3.5 - 6.0 % Final        Lipids:   Lab Results   Component Value Date    HDL 41 (L) 07/15/2016    HDL 51 06/03/2015    HDL 46 03/03/2015    Lab Results   Component Value Date    LDLCALC 85 07/15/2016    LDLCALC 105 06/03/2015    LDLCALC 163 (H) 03/03/2015    Lab Results   Component Value Date    TRIG 98 07/15/2016    TRIG 65 06/03/2015    TRIG 179 (H) 03/03/2015        Microalbumin  Lab Results   Component Value Date    MICROALBUR <0.50 02/07/2017        Last eye exam: Jan 2016      Recent blood pressures:   BP Readings from Last 3 Encounters:   02/07/17 122/80   10/21/16 118/70   07/15/16 126/62        Recent weight:   Wt Readings from Last 3 Encounters:   02/07/17 157 lb 8 oz (71.4 kg)   10/21/16 148 lb (67.1 kg)   07/15/16 154 lb (69.9 kg)         OBJECTIVE:  :    Visit Vitals     /80 (Patient Site: Right Arm, Patient Position: Sitting, Cuff Size: Adult Regular)     Pulse 78     Temp 98.3  F (36.8  C) (Oral)     Resp 28     Ht 4' 11\" (1.499 m)     Wt 157 lb 8 oz (71.4 kg)     BMI 31.81 kg/m2       Gen:  A&A, NAD  Neck:  supple, no sig LAD or thyromegally  CV:  HRRR, no M/R/G  Resp:  CTAB  Ext:  W&D, no edema    diabeteic foot exam done - slightly decreased sensation to monofilament testing    Lab results:    Results for orders placed or performed in visit on 02/07/17   Hemoglobin A1c   Result Value Ref Range    Hemoglobin A1c 10.8 (H) 3.5 - 6.0 %   Comprehensive Metabolic Panel   Result Value Ref Range    Sodium 140 136 - 145 mmol/L    Potassium 4.6 3.5 - 5.0 mmol/L    Chloride 104 98 - 107 mmol/L    CO2 23 22 - 31 mmol/L    Anion Gap, Calculation 13 5 - 18 mmol/L    Glucose 176 (H) 70 - 125 mg/dL    BUN 14 8 - 22 mg/dL    Creatinine 0.84 0.60 - 1.10 mg/dL    GFR MDRD Af Amer >60 >60 mL/min/1.73m2    GFR MDRD Non Af Amer >60 >60 mL/min/1.73m2    Bilirubin, Total 0.8 0.0 - 1.0 mg/dL    Calcium 9.3 8.5 - 10.5 mg/dL    Protein, Total 7.3 6.0 - 8.0 g/dL    Albumin " 4.0 3.5 - 5.0 g/dL    Alkaline Phosphatase 100 45 - 120 U/L    AST 45 (H) 0 - 40 U/L    ALT 71 (H) 0 - 45 U/L   Microalbumin, Random Urine   Result Value Ref Range    Microalbumin, Random Urine <0.50 0.00 - 1.99 mg/dL    Creatinine, Urine 33.5 mg/dL    Microalbumin/Creatinine Ratio Random Urine  <=19.9 mg/g   Vitamin D, Total (25-Hydroxy)   Result Value Ref Range    Vitamin D, Total (25-Hydroxy) 19.9 (L) 30.0 - 80.0 ng/mL

## 2021-06-08 NOTE — PROGRESS NOTES
Clinic Care Coordination Contact    Community Health Worker Follow Up    Goals:   Goals Addressed                 This Visit's Progress      Other (pt-stated)        Goal Statement: I would like to reinstate my health insurance in the next 60 days.  Date Goal set: 03/10/2020.  Barriers: Language barrier, lack of understanding of chronic illness and diabetes A1C uncontrolled.  Strengths: Taking medications daily.  Date to Achieve By: 05/07/2020.  Patient expressed understanding of goal: Yes.    Action steps to achieve this goal:    1. I will answer my phone today at 1:00 PM when FRW calls to assist me with health insurance.  2. I will send in all required documents to [facility] to reactive my health insurance.  3. I will contact the clinic CHW for coordination assistance when needed.  4. I will call CCC RN for medications concern.      Goal update: 04/21/2020; 05/21/2020,          Intervention/Education provided during outreach: CHW reminded patient and daughter to answer phone when FRW calls today at 1:00 PM for assistance to reinstate health insurance. CHW reminded patient and daughter to call for coordination assistance and if additional resources needed.      CHW Next Follow-up: In one month.

## 2021-06-09 NOTE — PROGRESS NOTES
Clinic Care Coordination Contact  Patient has completed all goals with Clinic Care Coordination.  Please review the chart and confirm if maintenance  is approved.    [facility] RN reviewed chart and approved for [facility] Maintenance:   This Maintenance Wellness Plan provides private information in regard to the work I have done with my Care Team at my Primary Care Clinic.  This document provides insight on the goals I have worked hard to achieve.  My Care Team congratulates me on my journey to become well.  With the assistance of the Clinic Care Coordination Team and my Primary Care Provider, I have succeeded in improving areas of my health that I identified as barriers to becoming well.  I will continue to seek wellness and use the skills I have obtained to further my journey.  My Community Health Worker will follow up with me in 2 months.  In the meantime, if I should have any questions or concerns I will contact my Community Health Worker.

## 2021-06-09 NOTE — PROGRESS NOTES
Clinic Care Coordination Contact  [facility]/Voicemail       Clinical Data: Care Coordinator Outreach  Outreach attempted x 1. CHW called and unable to reach and unable to leave voice message to request for return call.   Plan: Care Coordinator will try to reach patient again in two weeks.

## 2021-06-09 NOTE — PROGRESS NOTES
-Care Guide helped coordinated appointment for patient to be seen with a doctor who reported cough, little wheezing and fever.  -patient has no form or paper work that she needs help with at this time and health insurance is active.  -Care Guide will outreach patient again in 6 weeks and informed to call sooner if needed.

## 2021-06-09 NOTE — PROGRESS NOTES
Seeing MTM today, was recently treated for strep throat with a course of Augmentin and sx's have not resolved.  Rx for cephalexin sent to the pharmacy.  Derrell explained to her how to take the medication.

## 2021-06-09 NOTE — PROGRESS NOTES
CHW was able to speak with patient's daughter zeferino Root and informed to pick medications a pharmacy at convenience time.

## 2021-06-09 NOTE — PROGRESS NOTES
MTM Encounter    Assessment/Plan  Diabetes: Uncontrolled goal of less than 8%.  For the past few weeks Ramona has been self adjusting her insulin.  We discussed the advantages and risks of doing this and I feel that her self-management has been reasonable so far.  We discussed hypoglycemia today to help minimize the risk of her continuing to self adjust.  I expect that she will get better as she continues to get more experience doing so.  Her blood sugars have been fairly well controlled in the past few weeks.  -Continue current therapy    GERD: We discussed nonpharmacological methods of reducing acid reflux, including avoiding spicy, acidic, and fatty foods.  We also discussed avoiding whereafter constrictive clothing, losing weight, and elevating the head of the bed.Ramona declined the addition of as needed ranitidine.  -Continue current therapy    Hypertension: Controlled to goal of less than 140/90.  Borderline hypertensive, should continue to monitor.  -Continue current therapy    Strep Pharyngitis: Discussed history of present illness with Dr. Mauro who felt it appropriate to prescribe a second round of antibiotics.  -Keflex 500 mg twice daily ×10 days    Follow Up  with PCP in 2 weeks    Subjective/Objective  Ramona Wilder is a 56 y.o. female here for a medication therapy management (MTM) appointment; her chief concern today is diabetes follow-up.    Diabetes: Ramona is taking Ramona is taking Novolog 70/30 25-30 units in the morning before breakfast and 35-40 units in the evening with dinner.  She has previously reported afternoon hypoglycemia so I have asked her to check her blood sugar in the afternoon.  She has been self adjusting her insulin also.  She thinks this may have led to some of the lows also.  Last A1c: 10.8% on 2/7/2017  Glucose readings: Ramona checks her glucose 1-2 time(s) per day.  AM fasting range: 100-140. PM fasting range: 120-220  She has had two episodes of hypoglycemia early in the morning; she notes  that on these mornings she had not eaten much the night before (53,64)   Last microalbumin/creatinine: wnl Feb '17  On aspirin: No ASCVD risk of 5.6%  On statin: high intensity    Strep Pharyngitis: Strep detected on rapid strep from three weeks ago.  Ramona completed 10 days of Augmentin and was feeling a bit better, but is now not feeling very well again.  She notes that she feels very hot and tired.    Chronic cough: She has had this cough for a long time (2-3 months).  She feels tired and not full of breath.  She describes heavy breathing also.  Her cough is worse at night.  She has tried cough syrup, but this increased her blood sugar so she stopped taking it.      ----------------    Ramona's medication list was reviewed with them, discussing reason for use, directions for use, and potential side effects of each medication as needed. Indication, safety, efficacy, and convenience was assessed for all medications addressed above.  No environmental factors were noted currently affecting patient.  This care plan was communicated via EMR with her primary care provider, Coreen Kendall MD, who is the authorizing prescriber for this visit.  Direct supervision was available by either the patient's PCP or other available provider.  Time and complexity billing metrics are included in the docflowsheet linked to this visit.  Time spent: 30 minutes      Omega Thorpe, PharmD  [facility] Pharmacist  [facility]  [phone]

## 2021-06-09 NOTE — PROGRESS NOTES
Subjective:   Ramona Wilder presents today with multiple concerns.  She's had 3 weeks of coughing.  The cough is slightly productive.  She's had a little bit of wheezing.  She's had fevers at home.  She complains of a significant sore throat with this.  The sore throat seems worse in the morning but does last all day long.  She's had some mild headaches associated with this.  Her appetite has definitely been decreased recently.  She also is here because of problems with urinary frequency and some lower mid abdominal pain.  She worries about bladder infections.  She's had bladder infections in the past.  She's had a lot of back pain.  The back pain is mainly over the right side of the back.  It is low in the back.  She wonders whether this could be from a bladder infection.      Objective:  HEENT: Conjunctivae are clear.  Both TMs are gray.  Sinuses are nontender.  Nasal mucosa is inflamed but good air flow noted bilaterally.  The pharynx has erythema.  No exudate noted.  Uvula is midline.  Neck: Neck reveals some mild tender anterior lymph nodes in the submandibular area.  No posterior adenopathy present.  No thyromegaly noted.  Lungs: Bilateral expiratory wheezes heard with forced expiration.  Airflow is equal and fair.  Patient is in no obvious respiratory distress during my exam.  She did have a significant cough throughout the exam.  Cardiac: There is a regular rate and rhythm present.  No murmur heard.  Abdomen: Mild suprapubic tenderness noted today.  No rebound, guarding or rigidity present.  Back: No CVA tenderness noted.  The right sacroiliac area was exquisitely tender.  There is tenderness noted in the right lumbar musculature as well.  No rash was present.  Straight leg raising was negative.      Assessment:  1.  Strep pharyngitis  2.  Upper respiratory infection most likely viral  3.  Low back pain consistent with sacroiliac strain  4.  Abdominal pain.  No evidence for UTI.  5.  Urinary frequency.  Possibly  secondary to diabetes      Plan:  The patient will be started on Augmentin 875 mg twice daily.  This should help her bronchial infection as well as her strep infection.  She will take extra liquids.  She will make sure she is getting plenty of rest and good nutrition.  She will follow-up here as needed.  She continues Tylenol for pain control.

## 2021-06-09 NOTE — PROGRESS NOTES
Clinic Care Coordination Contact    Follow Up Progress Note      Assessment: f/u on DM II mgmt and med compliance.   Chart review completed today.   Unable to reach patient via phone x3.   Son Jeniffer Root called writer back stating that his mom doesn't have access to the phone during the day and only after 3pm when patient's  back home from work -  phone - [phone].    This is 2nd attempt unreachable with patient directly. SonJeniffer doesn't live with patient.   Unable to address goal.   Patient was unreachable by clinic staff also to schedule f/u appt with PCP. Explained to son the important of patient having access to a phone so her care team members can reach her directly because she can speak for herself. Son verbalized understanding.     Son states to call him to make f/u appt with PCP so he can tell his mom. Message sent to [facility] scheduling to assist.     [facility] RN unable to connect with patient directly to work on goal of lowering A1c <8.   Per son, they will work with PCP to address this.   No further assist needed from CCC RN.     Goals addressed this encounter:   Goals Addressed    None              Plan:   Okay to step down to maintenance.

## 2021-06-09 NOTE — PROGRESS NOTES
MTM Encounter    Assessment/Plan  Diabetes: Uncontrolled.  It sounds like there is a lot of variability in her blood sugar readings, but she does not bring her meter with her today.  Her estimated morning glucose readings are very high, but then she later states that she is sometimes in the 80s in the morning.  Since she is subjectively experiencing hypoglycemia several times per week, we will not increase her insulin today.  I have asked that she return with glucose readings in two weeks and that we can make adjustments based off her glucose readings between now and then.  - Check glucose twice daily  - Continue current therapy     GERD: Well controlled with daily PPI use.  As she recalls daily symptoms prior to starting, we will not try to taper off.  - Continue current therapy     Depression: Well controlled with escitalopram.  - Continue current therapy     Anxiety: Well controlled with escitalopram and propranolol.  - Continue current therapy     Hypovitaminosis D: Vitamin D level still subtherapeutic, but improving.  Likely to further improve this summer.  We may need to consider daily supplementation as well.  - Continue current therapy     Dyslipidemia: Well controlled.  On appropriate statin therapy for diabetes.  - Continue current therapy     Follow Up  Two weeks.    Subjective/Objective  Ramona Wilder is a 56 y.o. female here for a medication therapy management (MTM) appointment; her chief concern today is six month medication review.     Strep pharyngitis: She was recently was given a prescription for Augmentin and has 7 tablets remaining.  She is feeling better, but she is still coughing.    Diabetes: Ramona is taking Novolog 70/30 25 units in the morning before breakfast and 35 units in the evening with dinner.  She is seeing diabetes education next month.  She is frustrated because her blood sugar is too high if she eats too much and is too low if she does not eat enough.  Ramona typically eats two meals per  day.  She is eating aroiund 8-9AM and again at 4-5 pm.  She uses sugar free sweetner in her coffee.  She will sometimes eat a piece of fruit during the day (apple/banana).  Last A1c: 10.8% on 2/7/17  Glucose readings: Ramona checks her glucose once time(s) per day.  AM fasting range: 150-250  Last microalbumin/creatinine: wnl Feb '17  On aspirin: No  On statin: high intensity  Current symptoms: mild hypoglycemia 2-3 times per week (80s, 90s)  Current complications: none   Lifestyle interventions:encouragement to exercise    GERD: Taking pantoprazole daily.  No symptoms with PPI use.  Without this medication, she has reflux symptoms daily.    Depression: Taking escitalopram 10mg daily.  Working well for her.  She cried very easily before starting on this medication.  This is better with escitalopram.    Anxiety: Taking escitalopram 10mg daily in addition to propranolol 60mg QHS.  No dizziness, lightheadedness from propranolol.    Hypovitaminosis D: Ramona has been taking ergocalciferol for about three months.  Her most recent vitamin D level was three weeks ago, 19.9.  She has no side effects to this medication.    Dyslipidemia: Taking atorvastatin 80mg daily.  Last LDL six months ago was 85.    ----------------    Ramona's medication list was reviewed with them, discussing reason for use, directions for use, and potential side effects of each medication as needed. Indication, safety, efficacy, and convenience was assessed for all medications addressed above.  No environmental factors were noted currently affecting patient.  This care plan was communicated via EMR with her primary care provider, Coreen Kendall MD, who is the authorizing prescriber for this visit.  Direct supervision was available by either the patient's PCP or other available provider.  Time and complexity billing metrics are included in the docflowsheet linked to this visit.  Time spent: 45 minutes      Omega Thorpe PharmD  [facility]  Pharmacist  [facility]  [phone]

## 2021-06-10 NOTE — PATIENT INSTRUCTIONS - HE
Try taking metformin only once per day.  If after 2 weeks you are feeling good, then try increasing back up to twice per day.  Let me know if you are unable to take even once per day.    :  Hemoglobin A1c   Date Value Ref Range Status   08/05/2020 8.0 (H) <=5.6 % Final     Comment:     Normal <5.7% Prediabete 5.7-6.4% Diabletes 6.5% or higher - adopted from ADA consensus guidelines   01/31/2020 9.4 (H) 3.5 - 6.0 % Final   11/01/2019 8.2 (H) 3.5 - 6.0 % Final        Urinalysis-UC if Indicated   Result Value Ref Range    Color, UA Yellow Colorless, Yellow, Straw, Light Yellow    Clarity, UA Clear Clear    Glucose,  mg/dL (!) Negative    Bilirubin, UA Negative Negative    Ketones, UA Trace (!) Negative    Specific Gravity, UA 1.020 1.005 - 1.030    Blood, UA Negative Negative    pH, UA 5.5 5.0 - 8.0    Protein, UA 30 mg/dL (!) Negative mg/dL    Urobilinogen, UA 1.0 E.U./dL 0.2 E.U./dL, 1.0 E.U./dL    Nitrite, UA Negative Negative    Leukocytes, UA Negative Negative    Bacteria, UA None Seen None Seen hpf    RBC, UA 0-2 None Seen, 0-2 hpf    WBC, UA 0-5 None Seen, 0-5 hpf    Squam Epithel, UA 0-5 None Seen, 0-5 lpf    Mucus, UA Moderate (!) None Seen lpf    Hyaline Casts, UA 0-5 0-5, None Seen lpf          Goal range for blood sugars.    Fasting (before breakfast):   Before meals:   2 hours after meals: 150-160 or less  Bedtime: 100-140

## 2021-06-10 NOTE — PROGRESS NOTES
MTM Follow Up Encounter  Assessment & Plan                                                       1. UTI symptoms  Needs improvement, patient inappropriately taking her cephalexin prescription and therefore increasing risk of antibiotic resistance.  Discussed with patient the importance of taking the cephalexin 500 mg 4 times daily as prescribed as well as completing the full course of antibiotic, even if symptoms are improving.  Patient agrees with plan.  Plan  - Patient to take cephalexin 500 mg 4 times daily as prescribed and complete full course    2. Type 2 diabetes mellitus without complication, with long-term current use of insulin (H)  Needs improvement, last A1c has improved but is still not meeting goal of less than 7%.  Patient continues to take metformin, despite provider's most recent note stating it was being discontinued due to side effects.  Discussed that switching her acid reflux medication may help her tolerate the metformin, therefore patient to take metformin once daily x1 week and then increase to twice daily if tolerated.  Plan  - Patient to take metformin once daily x1 week, then increase to twice daily if tolerated, chances of tolerating her higher as patient will restart PPI    3. Gastropathy  Needs improvement.  Despite use of famotidine daily as prescribed and Tums as needed, patient states that her acid reflux is worsening and requesting return to chronic PPI.  Of note, would have recommended dose increase of famotidine though patient's kidney function does not allow for a dose increase, therefore will restart PPI today.  Plan  - Discontinue famotidine  - Initiate omeprazole 20 mg daily, Rx sent to pharmacy today    4. Hypertension  Stable.  Last BP at goal of less than 140/90, recommend continuation of current therapy.    Follow Up  Return in about 1 month (around 9/25/2020) for Medication Review.    Subjective & Objective                                                       Ramona Wilder is  a 60 y.o. female called for a follow up visit for Medication Therapy Management. She was referred to me from Coreen Kendall MD. Professional telephonic Dee  utilized today.    Patient consented to a telehealth visit: Yes  Chief Complaint: Follow up visit from [facility] on 1/31/20  Medication Adherence/Access: Using her pillbox now, this is helping with remembering to take meds every day.  Self management, no longer gets help from her kids like previously.     UTI: Patient states that she was not able to  the cephalexin prescription until a couple days ago because she was sick and not feeling well.  Since then, she has been taking cephalexin 500 mg twice daily (currently prescribed as 4 times daily).  Patient states that she is aware to complete the full antibiotic prescription.  Patient states that her symptoms have slightly improved since taking the cephalexin.    Diabetes:  Metformin  mg daily (patient states that this medication has previously caused her stomach upset, though states that she continues to take it once a day because the doctor requested her to do so.  With food and Novolog 70/30 mix insulin - AM 20 and PM 15 directly before meals.  Patient denies any medication side effects at this time.  SMBG: once daily    Ranges (per pt report) : 100, BG up to 500 when skipped insulin the other day, since has returned to taking insulin. Endorsing BG 60 3-4 days ago, no others to report.   Aspirin: Not taking due to reported allergy  Diet: 2 meals a day. Previously states that the meals are equal in size, now states that morning meal is larger.  ACEi/ARB: None, reported allergy to lisinopril  Statin: Rosuvastatin 40 mg daily before bed  Lab Results   Component Value Date    HGBA1C 8.0 (H) 08/05/2020    HGBA1C 9.4 (H) 01/31/2020    HGBA1C 8.2 (H) 11/01/2019     Lab Results   Component Value Date    MICROALBUR 2.55 (H) 02/25/2020    LDLCALC 153 (H) 01/31/2020    CREATININE 1.29 (H)  08/05/2020     GERD: Taking Tums daily prn, and famotidine 10 mg a day - states that this is not working for her. Thinks that the heartburn has been worsening since switching to this, she prefers the PPI that she has had before.      Hypertension: Amlodipine 10 mg daily in the afternoon.  Denies dizziness.   States that her right ankle is swollen and this makes it difficult for her to walk. States there was no accident, been swollen since June for unknown reason. Describes as aching pain. Spoke with Dr. Kendall, taking naproxen which feels better, likely not related to peripheral edema.   BP Readings from Last 3 Encounters:   08/05/20 100/62   02/25/20 150/80   01/31/20 134/82     PMH: reviewed in EPIC   Allergies/ADRs: reviewed in EPIC   Alcohol: None  Tobacco:   Social History     Tobacco Use   Smoking Status Never Smoker   Smokeless Tobacco Current User     Types: Chew   Tobacco Comment     smokes outside, pt chews betel nut     ----------------  The patient declined an after visit summary    I spent 37 minutes with this patient today;   All changes were made via collaborative practice agreement with Coreen Kendall MD. A copy of the visit note was provided to the patient's provider.     Heather Matthews), PharmD  Medication Therapy Management Pharmacist  [facility]    Telemedicine Visit Details    Type of service:  Telephone     Start Time: 3:02 PM  End Time (time video/phone call stopped): 3:39 PM    Originating Location (pt. Location): Home    Distant Location (provider location):  [facility] MEDICATION THERAPY MANAGEMENT [facility]     Mode of Communication:   Telephone       Current Outpatient Medications   Medication Sig Dispense Refill     metFORMIN (GLUCOPHAGE-XR) 500 MG 24 hr tablet Take 500 mg by mouth daily with supper.       acetaminophen (TYLENOL) 500 MG tablet Take 1 tablet (500 mg total) by mouth every 6 (six) hours as needed for pain. 100 tablet  "5     amLODIPine (NORVASC) 10 MG tablet Take 1 tablet (10 mg total) by mouth daily. 90 tablet 4     blood glucose test (GLUCOSE BLOOD) strips Use to check blood sugar three times daily. Ok whichever brand is covered by her insurance and compatible with her glucometer. 300 strip 4     blood glucose test (ONETOUCH VERIO TEST STRIPS) strips Use 1 each As Directed 4 (four) times a day. Dispense brand per patient's insurance at pharmacy discretion. 100 strip 11     calcium, as carbonate, (TUMS) 200 mg calcium (500 mg) chewable tablet Chew 1 tablet (200 mg total) daily. 100 tablet 1     ergocalciferol (ERGOCALCIFEROL) 1,250 mcg (50,000 unit) capsule Take 1 capsule (50,000 Units total) by mouth once a week. 12 capsule 3     escitalopram oxalate (LEXAPRO) 10 MG tablet Take 1 tablet (10 mg total) by mouth daily. 90 tablet 4     insulin aspart protamine-insulin aspart (NOVOLOG MIX 70-30FLEXPEN U-100) 100 unit/mL (70-30) pen Inject 20 Units before daytime meal and 15 Units before evening meal. 45 mL 4     lancets (ONETOUCH DELICA LANCETS) 33 gauge Misc Use to check blood sugar 3 per day. 100 each 11     naproxen (NAPROSYN) 375 MG tablet Take 1 tablet (375 mg total) by mouth 2 (two) times a day as needed. Must take with food. 20 tablet 0     omeprazole (PRILOSEC) 20 MG capsule Take 1 capsule (20 mg total) by mouth daily before breakfast. 30 capsule 5     pen needle, diabetic (TRUEPLUS PEN NEEDLE) 32 gauge x 5/32\" Ndle USE TO INJECT INSULIN twice TIMES DAILY OR AS DIRECTED 200 each 4     rosuvastatin (CRESTOR) 40 MG tablet Take 1 tablet (40 mg total) by mouth at bedtime. 90 tablet 4     No current facility-administered medications for this visit.            "

## 2021-06-10 NOTE — TELEPHONE ENCOUNTER
Please call pt and explain her urine did show some bacteria. Since she is having pain, we should treat this.     Will send Rx for keflex to pharmacy- take 1 pill 4 times per day for 1 week.     Leah Richardson MD

## 2021-06-10 NOTE — PROGRESS NOTES
-Patient stated that she's able to get all the medications refill at pharmacy.   -Patient has no form or paperwork that she needs help to complete and health insurance is active.   -Care Guide will outreach patient again 4 to 6 weeks and patient was informed to call sooner if needed.

## 2021-06-10 NOTE — TELEPHONE ENCOUNTER
The patient verbalizes understanding of provider/CSS instructions for follow-up and continued care per provider message.

## 2021-06-10 NOTE — PROGRESS NOTES
Assessment: Ramona is a pleasant female in for some help with her diabetes management.  She is currently on novolog 70/30 2x/daily.  Her last a1c has risen to 10.8.  She is on 25 units in the AM and 35-40 units in the PM.  She did not bring her meter in with her today so she was given a new one today and instructed how to use it.  Her biggest issue is that she never tests because it hurts.  I showed her how to adjust the lancing device so it doesn't hurt.  Today she said it did not hurt.  She has also been worried about injecting herself more times in a day.  I showed her how to avoid bruised skin sites and to choose healthier skin to inject at.  She did agree to switching to more modern insulin to help control her blood sugars better.  I showed her how to give herself 40 units of basaglar nightly and to give novolog - 5 units 1st meal, 3 units for snack, & 7 units for dinner.  Next appointment we will discuss a sliding scale and adjust her doses based on her blood sugars.  She was told to test 3 times a day - before breakfast, before her 2nd meal, & before bedtime.  Her blood sugar at this meeting was 351 so I had her inject 4 units of novolog at the meeting to bring it down.  She states she has 2 cups of rice her first meal and 2 1/2 cups her second meal.  She has 2 slices of bread or cereal for her snack in between with coffee.  Her goal is to reduce her a1c below 8.    Plan: I will call her in 1 week to see how her blood sugars are doing, adjust dosing if necessary, & schedule our next appointment.  She will start basaglar 40 units nightly tonight and not use 70/30 insulin anymore.  She will give 5/3/7 units of novolog at her meals and snacks.  She will start to test her blood sugar 3 times a day.  Would like to suggest oral medications to supplement her blood sugars at her next appointment.    Subjective and Objective:      Ramona Wilder is referred by Coreen Kendall for Diabetes Education.     Lab Results   Component  Value Date    HGBA1C 10.8 (H) 02/07/2017         Current diabetes medications:  novolog 70/30 2x/daily    Goals        Patient Stated      I would like to control my diabetes A1C under 8.0. (pt-stated)            Action steps to achieve this goal:    1. I will follow up with PharmD again next week on 03/16/2017.  2. I will continue on with medications changed which were made at previous visits with pharmD and PCP.  3. I will follow up with diabetic educator on 04/19/2017.  4. I will try to eat healthy diet to help control my blood sugar.      Goal Update: 03/08/2017.              Follow up:   Primary care visit  CDE (certified diabetic educator)      Education:     Monitoring   Meter (per above goals): Assessed and Discussed  Monitoring: Assessed and Discussed  BG goals: Assessed, Discussed and Literature provided    Nutrition Management  Nutrition Management: Assessed and Discussed  Weight: Assessed and Discussed  Portions/Balance: Assessed and Discussed  Carb ID/Count: Not addressed  Label Reading: Not addressed  Heart Healthy Fats: Assessed and Discussed  Menu Planning: Assessed and Discussed  Dining Out: Assessed and Discussed  Physical Activity: Assessed and Discussed  Medications: Assessed and Discussed  Orals: Not addressed  Injected Medications: Assessed and Discussed   Storage/Exp:Assessed and Discussed   Site Rotation: Assessed and Discussed   Sites Assessed: yes    Diabetes Disease Process: Assessed and Discussed    Acute Complications: Prevent, Detect, Treat:  Hypoglycemia: Assessed and Discussed  Hyperglycemia: Assessed and Discussed  Sick Days: Not addressed  Driving: Not addressed    Chronic Complications  Foot Care:Assessed and Discussed  Skin Care: Not addressed  Eye: Assessed and Discussed  ABC: Assessed and Discussed  Teeth:Not addressed  Goal Setting and Problem Solving: Assessed and Discussed  Barriers: Assessed and Discussed  Psychosocial Adjustments: Assessed and Discussed      Time spent with  the patient: 60 minutes for diabetes education and counseling.   Previous Education: no  Visit Type:DSMT  Hours Remaining: DSMT 1 and MNT 2      Syed Gonzalez  4/19/2017

## 2021-06-10 NOTE — PROGRESS NOTES
ASSESSMENT/PLAN:    Problem List Items Addressed This Visit     Moderate Recurrent Major Depression     Stable off meds; no changes         Hypertension     Unable to take ACE Inhibitor due to angioedema from lisinopril  Excellent control on current regimen. No changes. Recheck in 1 year.            Type 2 diabetes mellitus without complication, with long-term current use of insulin - Primary     Diabetes control improving.  This is probably due to the change in her insulin.  However, patient absolutely does not want to change to multiple injections per day and wants to switch back to the NovoLog 70/30 mix.  I had thought that I had convinced her to continue on the basaglar plus NovoLog since it was working better, the patient declined.  We will ask her to follow up with diabetes education soon.  She commits to exercising more to control her diabetes better.  Follow-up with me in 3.5 months, at which time she will be due for another A1c.         Relevant Medications    insulin aspart protamine-insulin aspart (NOVOLOG MIX 70-30 FLEXPEN) 100 unit/mL (70-30) pen    Other Relevant Orders    Glycosylated Hemoglobin A1c (Completed)          25 minutes was spent with the patient, which greater than 50% in counseling via interp, specifically regarding benefits of adequate glucose control and advantage of using 2 different insulin pens.    SUBJECTIVE:  Ramona Wilder is a 57 y.o. female here for diabetes and cough follow-up    Diabetes: She saw the diabetes educator on 4/19/17 and her insulin was changed from NovoLog 70/30 to Basaglar (40 U qhs)  plus mealtime NovoLog (5 units 1st meal, 3 units for snack, & 7 units for dinner).  Plan was to call her in 1 week and adjust meds and schedule next appointment, but I don't see that this was done.    Blood sugars (per monitor, almost all fasting):    On new regimen of basaglar + novolog x1 month  Wants to switch back due to expensive and too many shots.  Lancets hurt.    Cough: She  "first complained of cough on 2/21/17, at which time she was found to have strep pharyngitis.  She was treated with Augmentin.  She was seen again on 3/16/17 and still had a cough and the provider was worried that she still had strep pharyngitis, so was treated with Keflex.  Much better now; occassional cough depending on weather.    She notes that she still is having some back pain.  She has had injections in the past and does not want them anymore.  Tylenol helps a little.  She is not interested in trying any new medications, returning to specialist, or undergoing physical therapy at this time.  She just wanted me to know.    Out of cholesterol medications for a few days.    PHQ-2 Total Score: 2 (2/21/2017  3:00 PM)      Patient Active Problem List   Diagnosis     Carotid Artery Stenosis     Moderate Recurrent Major Depression     Anxiety     Insomnia     Hypertension     Vitamin D deficiency     Gastropathy     Type 2 diabetes mellitus without complication, with long-term current use of insulin     Hyperlipidemia     Headache     Chronic Pain     Back Strain     Dermatitis     Metabolic Tests Nonspecific Elevation Of Transaminase Levels     Pain in finger of right hand     Current Outpatient Prescriptions on File Prior to Visit   Medication Sig Dispense Refill     acetaminophen (TYLENOL) 500 MG tablet Take 1 tablet (500 mg total) by mouth every 6 (six) hours as needed for pain. 50 tablet 11     BD ULTRA-FINE CITLALLI PEN NEEDLES 32 gauge x 5/32\" Ndle USE TO INJECT INSULIN FOUR TIMES DAILY OR AS DIRECTED 100 each 1     blood glucose test (GLUCOSE BLOOD) strips Use 1 each As Directed 4 (four) times a day. 150 each 4     CONTOUR TEST STRIPS strips USE TO TEST BLOOD SUGAR TWICE DAILY OR AS DIRECTED 200 strip 1     ergocalciferol (ERGOCALCIFEROL) 50,000 unit capsule Take 1 capsule (50,000 Units total) by mouth once a week. 12 capsule 3     escitalopram oxalate (LEXAPRO) 10 MG tablet TAKE 1 TABLET DAILY 30 tablet 11     " lancets (MICROLET LANCET) Misc Use to check glucose twice daily or as directed. 50 each prn     lancets (ONETOUCH DELICA LANCETS) 33 gauge Misc Use 1 per day. 100 each 4     pantoprazole (PROTONIX) 40 MG tablet TAKE ONE TABLET BY MOUTH DAILY 30 tablet 11     [DISCONTINUED] insulin aspart (NOVOLOG) 100 unit/mL injection pen Inject 3-7 Units under the skin 3 (three) times a day before meals. 15 mL 2     [DISCONTINUED] insulin glargine (LANTUS; BASAGLAR) 100 unit/mL (3 mL) pen Inject 40-60 Units under the skin at bedtime. 5 adj dose pen 0     [DISCONTINUED] NOVOLOG MIX 70-30 FLEXPEN 100 unit/mL (70-30) pen Inject 28 Units under the skin 2 (two) times a day before meals. 15 mL 11     atorvastatin (LIPITOR) 80 MG tablet TAKE 1 TABLET DAILY AT BEDTIME. 30 tablet 3     propranolol (INDERAL LA) 60 mg 24 hr capsule TAKE 1 CAPSULE BY MOUTH AT BEDTIME. 30 capsule 3     No current facility-administered medications on file prior to visit.         History   Smoking Status     Never Smoker   Smokeless Tobacco     Former User     Types: Chew       DIABETIC MEASURES:  Hemoglobin A1c   Date Value Ref Range Status   05/19/2017 8.7 (H) 3.5 - 6.0 % Final   02/07/2017 10.8 (H) 3.5 - 6.0 % Final   10/21/2016 9.4 (H) 3.5 - 6.0 % Final        Lipids:   Lab Results   Component Value Date    HDL 41 (L) 07/15/2016    HDL 51 06/03/2015    HDL 46 03/03/2015    Lab Results   Component Value Date    LDLCALC 85 07/15/2016    LDLCALC 105 06/03/2015    LDLCALC 163 (H) 03/03/2015    Lab Results   Component Value Date    TRIG 98 07/15/2016    TRIG 65 06/03/2015    TRIG 179 (H) 03/03/2015        Microalbumin  Lab Results   Component Value Date    MICROALBUR <0.50 02/07/2017        Last eye exam: 2/27/17      Recent blood pressures:   BP Readings from Last 3 Encounters:   05/19/17 108/82   02/21/17 138/88   02/07/17 122/80        Recent weight:   Wt Readings from Last 3 Encounters:   05/19/17 162 lb (73.5 kg)   04/19/17 160 lb (72.6 kg)   02/21/17 157 lb  "(71.2 kg)         OBJECTIVE:  :  /82 (Patient Site: Right Arm, Patient Position: Sitting, Cuff Size: Adult Regular)  Pulse 66  Temp 98.2  F (36.8  C) (Oral)   Resp 20  Ht 4' 10.5\" (1.486 m)  Wt 162 lb (73.5 kg)  BMI 33.28 kg/m2    Gen:  A&A, NAD  CV:  HRRR, no M/R/G  Resp:  CTAB  Ext:  W&D, no edema    Lab results:    Results for orders placed or performed in visit on 05/19/17   Glycosylated Hemoglobin A1c   Result Value Ref Range    Hemoglobin A1c 8.7 (H) 3.5 - 6.0 %             "

## 2021-06-10 NOTE — PROGRESS NOTES
Clinic Care Coordination Contact  Patient has completed all goals with Clinic Care Coordination.  Please review the chart and confirm if graduation is approved.    Patient has no new concern, able to get all medications refill when needed, has full supports from family member and continue to follow up with PCP and MTM as recommended.

## 2021-06-10 NOTE — PROGRESS NOTES
Clinic Care Coordination Contact    Assessment: Care Coordinator contacted patient for 2 month follow up.  Patient has continued to follow the plan of care and assessment is negative for any new needs or concerns.    Enrollment status: Graduated.      Plan: No further outreaches at this time.  Patient will continue to follow the plan of care.  If new needs arise a new Care Coordination referral may be placed.  FYI to PCP    Clinic Care Coordination Contact    Situation: Patient chart reviewed by care coordinator.    Background:   Greg Mistry CHW 17 minutes ago (4:27 PM)          Clinic Care Coordination Contact  Patient has completed all goals with Clinic Care Coordination.  Please review the chart and confirm if graduation is approved.     Patient has no new concern, able to get all medications refill when needed, has full supports from family member and continue to follow up with PCP and MTM as recommended.             Assessment:   Chart review completed today.   No ED visits or hospitalization the past 60 days.   A1c - 8.0 on 8/5/2020. She will continues to work with MTM and PCP to lower A1c and DM II mgmt.   Patient completed all goals. No new concerns reported.   Last seen by PCP on 8/5/2020 with no new concerns and had virtual visit with MTM on 8/25/2020.   Next f/u with MTM on 9/25/2020  F/u appt with PCP scheduled on 11/14/2020 for annual physical exam.       Plan/Recommendations:   Okay to graduate

## 2021-06-10 NOTE — TELEPHONE ENCOUNTER
Called home phone and no answer just dropped x 2.  Called cell and left VM to call back.  Ok to relay when calls back. Thanks.

## 2021-06-10 NOTE — PROGRESS NOTES
ASSESSMENT/PLAN:    1. Type 2 diabetes mellitus with hyperglycemia, with long-term current use of insulin (H)  Poorly controlled on current regimen with A1C 8.0, although improved from 9.4 in January.  Unable to tolerate metformin due to stomach issues; tried twice - will remove from med list.  Will try to get her back in with [facility] pharmacist.  - Glycosylated Hemoglobin A1c  - Basic Metabolic Panel    2. Hypertension  BP was high last visit in Feb; amlodopine was increased then. BP excellent now.  Continue.  Unable to take ACE Inhibitor due to angioedema from lisinopril  - Basic Metabolic Panel    3. Major depressive disorder, recurrent episode, moderate (H)  Stable on current regimen.    4. Urinary frequency  UA equivicol today; will send for culture.  - Urinalysis-UC if Indicated  - Culture, Urine    5. Pain of right heel  Suspect due to heel spurs, given xray findings today. Will try heel cups and naproxen.  - XR Calcaneus Right 2 or More Views; Future  - Ankle/Foot DME: Heel Cup; Right  - naproxen (NAPROSYN) 375 MG tablet; Take 1 tablet (375 mg total) by mouth 2 (two) times a day as needed. Must take with food.  Dispense: 20 tablet; Refill: 0     Return in 3 months (on 11/13/2020) for Wellness Visit/Healthcare Maintainance Visit, Diabetes follow-up.     SUBJECTIVE:  Ramona Wilder is a 60 y.o. female here for diabetes follow-up    Diabetes:  Metformin giving her stomach ache. Burning abd pain.  Stopped taking it about 1 month ago. Had been off due to insurance, then restarted. prob with stomach started  when restarted.  Novolog mix 70/30:  Inject 25 Units before daytime meal and 15 Units before evening meal.   Forgot glucometer.   States this morning fasting glucose 120.  Generally only checks in the morning.      LIttle headache    Foot pain:  Right heel/ankle pain x2 months. Couldn't really walk on it at first. No injury.  Just started to hurt around heel/ankle.  Wrapped it, took Tylenol which helped a little.     Heel  Worst at first  Had been standing/walking a lot fishing.    Urine problem: freuqnecy, urgency, burning.  No fever, no nausea. x1 week. Has had this problem before.    Has a cane    PHQ-2 Total Score: 1 (8/5/2020  3:23 PM)  PHQ-9 Total Score: 8 (8/5/2020  3:23 PM)   Little interest or pleasure in doing things: Not at all  Feeling down, depressed, or hopeless: Several days  Trouble falling or staying asleep, or sleeping too much: Nearly every day  Feeling tired or having little energy: Nearly every day  Poor appetite or overeating: Not at all  Feeling bad about yourself - or that you are a failure or have let yourself or your family down: Not at all  Trouble concentrating on things, such as reading the newspaper or watching television: Not at all  Moving or speaking so slowly that other people could have noticed. Or the opposite - being so fidgety or restless that you have been moving around a lot more than usual: Several days  Thoughts that you would be better off dead, or of hurting yourself in some way: Not at all  PHQ-9 Total Score: 8  If you checked off any problems, how difficult have these problems made it for you to do your work, take care of things at home, or get along with other people?: Somewhat difficult     Review of Systems:  Reminder of complete review systems is negative.     Patient Active Problem List   Diagnosis     Carotid Artery Stenosis     Moderate Recurrent Major Depression     Anxiety     Insomnia     Hypertension     Vitamin D deficiency     Gastropathy     Type 2 diabetes mellitus with hyperglycemia, with long-term current use of insulin (H)     Hyperlipidemia     Headache     Chronic Pain     Back Strain     Dermatitis     Metabolic Tests Nonspecific Elevation Of Transaminase Levels     Pain in finger of right hand     Left knee pain     Urinary, incontinence, stress female     Right lumbar radiculitis     Current Outpatient Medications on File Prior to Visit   Medication Sig  "Dispense Refill     acetaminophen (TYLENOL) 500 MG tablet Take 1 tablet (500 mg total) by mouth every 6 (six) hours as needed for pain. 100 tablet 5     amLODIPine (NORVASC) 10 MG tablet Take 1 tablet (10 mg total) by mouth daily. 90 tablet 4     blood glucose test (GLUCOSE BLOOD) strips Use to check blood sugar three times daily. Ok whichever brand is covered by her insurance and compatible with her glucometer. 300 strip 4     blood glucose test (ONETOUCH VERIO TEST STRIPS) strips Use 1 each As Directed 4 (four) times a day. Dispense brand per patient's insurance at pharmacy discretion. 100 strip 11     calcium, as carbonate, (TUMS) 200 mg calcium (500 mg) chewable tablet Chew 1 tablet (200 mg total) daily. 100 tablet 1     ergocalciferol (ERGOCALCIFEROL) 1,250 mcg (50,000 unit) capsule Take 1 capsule (50,000 Units total) by mouth once a week. 12 capsule 3     escitalopram oxalate (LEXAPRO) 10 MG tablet Take 1 tablet (10 mg total) by mouth daily. 90 tablet 4     famotidine (FOR PEPCID) 10 MG tablet Take 1 tablet (10 mg total) by mouth 2 (two) times a day as needed for heartburn. 60 tablet 4     insulin aspart protamine-insulin aspart (NOVOLOG MIX 70-30FLEXPEN U-100) 100 unit/mL (70-30) pen Inject 20 Units before daytime meal and 15 Units before evening meal. 45 mL 4     lancets (ONETOUCH DELICA LANCETS) 33 gauge Misc Use to check blood sugar 3 per day. 100 each 11     metFORMIN (GLUCOPHAGE-XR) 500 MG 24 hr tablet Take 1 tablet (500 mg total) by mouth 2 (two) times a day with meals. 180 tablet 4     pen needle, diabetic (TRUEPLUS PEN NEEDLE) 32 gauge x 5/32\" Ndle USE TO INJECT INSULIN twice TIMES DAILY OR AS DIRECTED 200 each 4     rosuvastatin (CRESTOR) 40 MG tablet Take 1 tablet (40 mg total) by mouth at bedtime. 90 tablet 4     No current facility-administered medications on file prior to visit.         Social History     Tobacco Use   Smoking Status Never Smoker   Smokeless Tobacco Current User     Types: Chew " "  Tobacco Comment     smokes outside, pt chews betel nut       DIABETIC MEASURES:  Hemoglobin A1c   Date Value Ref Range Status   08/05/2020 8.0 (H) <=5.6 % Final     Comment:     Normal <5.7% Prediabete 5.7-6.4% Diabletes 6.5% or higher - adopted from ADA consensus guidelines   01/31/2020 9.4 (H) 3.5 - 6.0 % Final   11/01/2019 8.2 (H) 3.5 - 6.0 % Final        Lipids:   Lab Results   Component Value Date    HDL 47 (L) 01/31/2020    HDL 59 11/14/2018    HDL 48 (L) 11/02/2017    Lab Results   Component Value Date    LDLCALC 153 (H) 01/31/2020    LDLCALC 165 (H) 11/14/2018    LDLCALC 87 11/02/2017    Lab Results   Component Value Date    TRIG 99 01/31/2020    TRIG 143 11/14/2018    TRIG 75 11/02/2017        Microalbumin  Lab Results   Component Value Date    MICROALBUR 2.55 (H) 02/25/2020        Last eye exam: ?Thinks within last year; we need records.      Recent blood pressures:   BP Readings from Last 3 Encounters:   08/05/20 100/62   02/25/20 150/80   01/31/20 134/82        Recent weight:   Wt Readings from Last 3 Encounters:   08/05/20 142 lb 12 oz (64.8 kg)   02/25/20 152 lb (68.9 kg)   01/31/20 153 lb 14.4 oz (69.8 kg)         OBJECTIVE:  :  /62   Pulse 67   Temp 98.6  F (37  C) (Oral)   Resp 20   Ht 4' 10.66\" (1.49 m)   Wt 142 lb 12 oz (64.8 kg)   LMP 05/09/2014   SpO2 98%   BMI 29.17 kg/m      Gen:  A&A, NAD  Neck:  supple, no sig LAD or thyromegally  CV:  HRRR, no M/R/G  Resp:  CTAB  Extremities: No edema.  No sores or significant callus.  Monofilament testing shows intact sensation.       Lab results:    Results for orders placed or performed in visit on 08/05/20   Culture, Urine    Specimen: Urine   Result Value Ref Range    Culture Mixture of urogenital organisms with     Culture (!)      10,000-50,000 col/ml Coagulase negative Staphylococcus   Glycosylated Hemoglobin A1c   Result Value Ref Range    Hemoglobin A1c 8.0 (H) <=5.6 %   Basic Metabolic Panel   Result Value Ref Range    " Sodium 136 136 - 145 mmol/L    Potassium 4.5 3.5 - 5.0 mmol/L    Chloride 99 98 - 107 mmol/L    CO2 26 22 - 31 mmol/L    Anion Gap, Calculation 11 5 - 18 mmol/L    Glucose 370 (H) 70 - 125 mg/dL    Calcium 8.8 8.5 - 10.5 mg/dL    BUN 15 8 - 22 mg/dL    Creatinine 1.29 (H) 0.60 - 1.10 mg/dL    GFR MDRD Af Amer 51 (L) >60 mL/min/1.73m2    GFR MDRD Non Af Amer 42 (L) >60 mL/min/1.73m2   Urinalysis-UC if Indicated   Result Value Ref Range    Color, UA Yellow Colorless, Yellow, Straw, Light Yellow    Clarity, UA Clear Clear    Glucose,  mg/dL (!) Negative    Bilirubin, UA Negative Negative    Ketones, UA Trace (!) Negative    Specific Gravity, UA 1.020 1.005 - 1.030    Blood, UA Negative Negative    pH, UA 5.5 5.0 - 8.0    Protein, UA 30 mg/dL (!) Negative mg/dL    Urobilinogen, UA 1.0 E.U./dL 0.2 E.U./dL, 1.0 E.U./dL    Nitrite, UA Negative Negative    Leukocytes, UA Negative Negative    Bacteria, UA None Seen None Seen hpf    RBC, UA 0-2 None Seen, 0-2 hpf    WBC, UA 0-5 None Seen, 0-5 hpf    Squam Epithel, UA 0-5 None Seen, 0-5 lpf    Mucus, UA Moderate (!) None Seen lpf    Hyaline Casts, UA 0-5 0-5, None Seen lpf       Xr Calcaneus Right 2 Or More Views    Result Date: 8/5/2020  EXAM: XR CALCANEUS RIGHT 2 OR MORE VIEWS LOCATION: [facility] DATE/TIME: 8/5/2020 4:11 PM INDICATION: Pain in right foot COMPARISON: None.     Moderate sized plantar and posterior calcaneal spurs. Calcaneus otherwise negative. No fractures.

## 2021-06-11 NOTE — PROGRESS NOTES
-Patient reported that there is no issue refilling medications at pharmacy and health insurance is active.  -Patient stated that there is no form or paperwork that she needs help with at this time.  -Care Guide will outreach patient again in 6 weeks and informed to call sooner if needed.

## 2021-06-11 NOTE — PROGRESS NOTES
Assessment: Ramona is a pleasant lady in for a follow up on her diabetes management.  Since I saw her last, she was not so excited to inject herself 2 extra times a day for a total of 4 (as many people may feel).  She went back to using her novomix 70/30 and has been taking 18 units before breakfast & 25 units before dinner.  She states she reduced the dose herself because she was having low blood sugars.  Reviewing her blood sugar data, she most commonly has lows in the mornin, 90, 58, 58, 58, 89, 189, 64, 213, 178, 64.  She seldom takes her blood sugar in the evening, but they are usually high: 213, 178, 201.  I asked why she doesn't test at this time & she stated it hurts.  I reviewed lowering the lancing device's depth to 2 or 3 to prevent it from hurting.  I recommended that she go up on her morning dose to lower her evening blood sugars to 22 units & to go down on her evening dose to prevent lows in the morning to 22 units.      Plan: She will take 22 units of novomix 70/30 before breakfast and before dinnertime.  I highly encouraged her to check her blood sugar before she injects (at least 2x/daily).  She knows to titrate her dose by 2 units if she is having lows or high blood sugars consistently.  She will follow up with her primary in 2 months and me in 3 months to review her labs and diabetes management.      Subjective and Objective:      Ramona Wilder is referred by Coreen Kendall for Diabetes Education.     Lab Results   Component Value Date    HGBA1C 8.7 (H) 2017         Current diabetes medications:  novomix 70/30     Goals        Patient Stated      I would like to control my diabetes A1C under 8.0. (pt-stated)            Action steps to achieve this goal:    1. I will follow up with diabetic educator again next month which is scheduled on 2017.  2 I will continue on current dose of insulin that recommended and test my blood sugar daily.  3. I will contact the clinic Care Guide if I need helps  with medications refill.      Goal Update: 06/01/2017.              Follow up:   Primary care visit  CDE (certified diabetic educator)      Education:     Monitoring   Meter (per above goals): Assessed and Discussed  Monitoring: Assessed and Discussed  BG goals: Assessed and Discussed    Nutrition Management  Nutrition Management: Assessed and Discussed  Weight: Assessed and Discussed  Portions/Balance: Assessed and Discussed  Carb ID/Count: Assessed and Discussed  Label Reading: Not addressed  Heart Healthy Fats: Not addressed  Menu Planning: Assessed and Discussed  Dining Out: Not addressed  Physical Activity: Assessed and Discussed  Medications: Assessed and Discussed  Orals: Assessed and Discussed  Injected Medications: Assessed and Discussed   Storage/Exp:Assessed and Discussed   Site Rotation: Assessed and Discussed   Sites Assessed: no    Diabetes Disease Process: Assessed and Discussed    Acute Complications: Prevent, Detect, Treat:  Hypoglycemia: Assessed and Discussed  Hyperglycemia: Assessed and Discussed  Sick Days: Not addressed  Driving: Not addressed    Chronic Complications  Foot Care:Assessed and Discussed  Skin Care: Not addressed  Eye: Assessed and Discussed  ABC: Assessed and Discussed  Teeth:Not addressed  Goal Setting and Problem Solving: Assessed and Discussed  Barriers: Assessed and Discussed  Psychosocial Adjustments: Assessed and Discussed      Time spent with the patient: 60 minutes for diabetes education and counseling.   Previous Education: yes  Visit Type:DSMT  Hours Remaining: DSMT 8 and MNT 3      Syed Gonzalez  7/5/2017

## 2021-06-11 NOTE — PROGRESS NOTES
-Care Guide arranged transportation for patient to [facility] follow up appointment.  -Patient reported that all medications are current and health insurance is active.  -Patient did not need help with form or paperwork today outreach.  -Care Guide will outreach patient again in 6 weeks and informed to call sooner if needed.

## 2021-06-13 NOTE — PROGRESS NOTES
Assessment: Ramona is in for a follow up on her diabetes management.  She has been checking her blood sugars 2x/daily, but unfortunately she did not bring her meter with her today.  She states her mornings range from  on average, but she had a 65 this morning and 200s when she eats more sugary foods.  She states this only happens once every 2 weeks.  She denies any lows in the evening and states her blood sugars range from 120-200.  She is taking 22 units of novolog mix 70/30 in the morning and before dinnertime around 6pm.  I wrote down the change we made today of 25 units in the AM and 20 units in the PM to reduce lows in the morning and highs in the evening.  This was translated for her on paper.  Today we ky a new a1c and her numbers don't seem to reflect the numbers she told me today.  Her a1c increased to 10.1 from 8.7.  She will follow up with her primary doctor in 3 weeks to review those blood sugars more closely.  We may have to start a GLP-1 like victoza or get her to start checking her blood sugars 3-4x/daily.  I would recommend starting her on a 96 hour continuous glucose sensor to view her blood sugar patterns more accurately.      Plan: Ramona will check her blood sugars 2x/daily and bring her glucometer with to her appointments.  She will take 25 units of novolog mix 70/30 in the morning and 20 units in the evening.  She will continue to portion size her rice & carbs at meals.  She has a follow up with Dr. Kendall in 3 weeks and had to reschedule her previous appointment with her.  Ramona is all out of her lexapro and protonix so an Rx refill was sent to Dr. Kendall for signature.      Subjective and Objective:      Ramona Wilder is referred by Dr. Kendall for Diabetes Education.     Lab Results   Component Value Date    HGBA1C 8.7 (H) 05/19/2017         Current diabetes medications:  novolog mix 70/30 BID    Goals        Patient Stated      I would like to control my diabetes A1C under 8.0. (pt-stated)             Action steps to achieve this goal:    1. I will follow up with diabetic educator again on 9/20/2017.  2  I will follow up with my primary doctor in October 2017 as rescheduled.  3. I will continue on current medications for diabetes.       Goal Update: 08/29/2017.           Other      Monitoring            9/20/17:  Ramona will check her blood sugar 2x/daily and bring her meter with her to her appointments.            Follow up:   Primary care visit  CDE (certified diabetic educator)      Education:     Monitoring   Meter (per above goals): Assessed and Literature provided  Monitoring: Assessed and Discussed  BG goals: Assessed and Discussed    Nutrition Management  Nutrition Management: Assessed and Discussed  Weight: Assessed and Discussed  Portions/Balance: Assessed and Discussed  Carb ID/Count: Assessed and Discussed  Label Reading: Not addressed  Heart Healthy Fats: Not addressed  Menu Planning: Not addressed  Dining Out: Not addressed  Physical Activity: Assessed and Discussed  Medications: Assessed and Discussed  Orals: Not addressed  Injected Medications: Assessed and Discussed   Storage/Exp:Assessed and Discussed   Site Rotation: Assessed and Discussed   Sites Assessed: yes    Diabetes Disease Process: Assessed and Discussed    Acute Complications: Prevent, Detect, Treat:  Hypoglycemia: Assessed and Discussed  Hyperglycemia: Assessed and Discussed  Sick Days: Not addressed  Driving: Not addressed    Chronic Complications  Foot Care:Assessed and Discussed  Skin Care: Not addressed  Eye: Assessed and Discussed  ABC: Assessed and Discussed  Teeth:Not addressed  Goal Setting and Problem Solving: Assessed and Discussed  Barriers: Assessed and Discussed  Psychosocial Adjustments: Assessed and Discussed      Time spent with the patient: 60 minutes for diabetes education and counseling.   Previous Education: yes  Visit Type:DSMT  Hours Remaining: DSMT 7 MNT 3      Syed Gonzalez  9/20/2017

## 2021-06-13 NOTE — PROGRESS NOTES
MTM Encounter    Assessment/Plan  Knee Pain: Discussed that this could be OA or possibly a meniscus tear.  Advise physical examination by PCP.  She does not feel like she needs to come in before her next appointment in January.    - rest, ice, and elevate knee  - come in before Jan if knee pain suddenly worsens or popping occurs  - APAP for pain as needed    Diabetes: Uncontrolled to goal of 7%, improving.  Suggested using a measuring cup for rice that is that same for every meal.  If she eats banana, drinks milk or has other sweets, she will take a few spoonfuls out of her bowl before eating.  We discussed that working on consistency as the key to getting her blood sugars under good control since her insulin dose does not change from day-to-day.  I congratulated her on the dietary changes and the vast improvement in her blood sugars.  Small reduction today as she feels low at glucoses <100 mg/dL.  -Decrease a.m. insulin dose to 23 units  -Keep p.m. insulin dose at 24 units    Hypertension: Controlled to goal of less than 140/90.  Borderline control today with first blood pressure check significantly above goal.  Given her strong desire to minimize medications, we will hold off resuming a antihypertensive today.  No change in anxiety with discontinuation of propranolol.  I encouraged her to keep up on her new low sugar diet which will likely result in weight loss.  If she is able lose 5-10 pounds her blood pressure will likely be under better control and eliminate need for antihypertensive.    Dyslipidemia: Well controlled at last check.  If values at goal today and she maintains her current diet, annual checks are likely not necessary unless adherence becomes a concern.  - lipid cascade    Follow Up  One month      Subjective/Objective  Ramona Wilder is a 57 y.o. female here for a medication therapy management (MTM) appointment; her chief concern today is hypertension, diabetes, and dyslipidemia.  She has complaints of  left knee pain which started about three weeks ago.    Knee pain: Just started 3 weeks ago, never had this before.  Also having some swelling and making a little bit of a popping sound when extending knee, but not when flexing.  Hurts to sit down on her knees, hurts worse in the evening.  No hx of knee injury that she recalls.    Diabetes: Ramona is taking Novolog 70/30 24 units twice daily.  Ramona has stopped eating sugar and sweet fruits like banana.  She had one day of hypoglycemia - she woke up with a bs of 58, ate breakfast and then took her insulin, and had a bs of 59 before dinner the same day.  She does not recall what she had for dinner the night before (probably brown rice w/ veggies and meat).  She feels bad when her blood sugar is <100 mg/dL (6 readings in the past two weeks)  Last A1c: 10.1% on 9/20/17  Glucose readings: Ramona checks her glucose two time(s) per day.  AM fasting range:   Pm fasting range:   Before bed:    Last microalbumin/creatinine: wnl Feb '17    Hypertension: Stopped propranolol four weeks ago.    ----------------    Ramona's medication list was reviewed with them, discussing reason for use, directions for use, and potential side effects of each medication as needed. Indication, safety, efficacy, and convenience was assessed for all medications addressed above.  No environmental factors were noted currently affecting patient.  This care plan was communicated via EMR with her primary care provider, Coreen Kendall MD, who is the authorizing prescriber for this visit.  Direct supervision was available by either the patient's PCP or other available provider.  Time and complexity billing metrics are included in the docflowsheet linked to this visit.  Time spent: 50 minutes      Omega Thorpe PharmD  [facility] Pharmacist  [facility]  [phone]

## 2021-06-13 NOTE — PROGRESS NOTES
MTM Follow Up Encounter  Assessment & Plan                                                       1. Type 2 diabetes mellitus without complication, with long-term current use of insulin (H)  A1c not meeting goal of less than 7%, patient's most recent A1c increased since last checked.  Although, patient continues to report well controlled self monitored blood sugars.  Recommended patient present in clinic for face-to-face visit regarding blood sugars and medication management, patient declines recommendation today.  Alternatively, recommended patient document self monitored blood sugars for MTM phone visit in 2 weeks, patient agreeable.  Also discussed avoiding sugar in her diet.  May recommend dose increase of insulin at next visit once more information from self monitored blood sugars is available.  Plan  - Recommended face to face visit for review of all medications; patient declined d/t COVID-19 concerns  - Recommended patient document self monitored BG for next MTM phone visit    2. Gastropathy  Patient not currently taking chronic PPI, omeprazole.  Recommend patient request refill from the pharmacy and restart chronic therapy for acid reflux, patient agreeable to plan.  Plan  - Patient to request refill of omeprazole 20 mg daily and restart    3. Hypertension  Last BP is meeting goal of less than 140/90.  Although, based on most recent microalbumin, indicates protein spilling in the urine.  Therefore recommended initiating low-dose ARB therapy today as patient has history of ACE inhibitor allergy.  Although, patient declined recommendation today because she does not want to come into clinic for follow-up creatinine and K+ in 2 to 4 weeks.  Unable to initiate ARB therapy today if patient refuses follow-up labs, will defer to PCP at her next in-person visit in January.  Plan  -  Recommended losartan 25 mg daily & recheck Creatinine and K+ after 2-4 weeks; Patient declined    4. Other hyperlipidemia  Patient  appropriately taking high intensity statin without medication side effects, recommend continuation.  Lipid cascade recently checked and stable.    Follow Up  Return in about 15 days (around 12/9/2020) for Medication Review.  PCP 1/15/21  Subjective & Objective                                                       Ramona Wilder is a 60 y.o. female called for a follow up visit for Medication Therapy Management. She was referred to me from Coreen Kendall MD. Professional telephonic Dee  utilized today.    Patient consented to a telehealth visit: Yes  Chief Complaint: Follow up visit from [facility] on 8/25/20  Medication Adherence/Access: Using her pillbox now, this is helping with remembering to take meds every day.  Self management, no longer gets help from her kids like previously. Does miss medication doses about once monthly.     Diabetes: Novolog 70/30 mix insulin - AM 25 and PM 15 directly before meals, Metformin  mg daily. States that she rarely misses doses of insulin, only 1-2 times a month. Endorsing pain in her hands and feet - tingling and aching sensation. Already discussed this with PCP.  Patient denies any medication side effects at this time.  SMBG: once daily    Ranges (per pt report) :  90 - not able to report any other sugars, not sure how to look up BG history.   Aspirin: Not taking due to reported allergy  Diet: 2 meals a day. Previously states that the meals are equal in size, now states that morning meal is larger.  ACEi/ARB: None, reported allergy to lisinopril  Statin: Rosuvastatin 40 mg daily before bed  Lab Results   Component Value Date    HGBA1C 9.5 (H) 11/20/2020    HGBA1C 8.0 (H) 08/05/2020    HGBA1C 9.4 (H) 01/31/2020     Lab Results   Component Value Date    MICROALBUR 2.54 (H) 11/20/2020    LDLCALC 120 11/20/2020    CREATININE 0.81 11/20/2020     GERD: Has been out of her omeprazole 20 mg daily, not been taking fr what appears to be many months. States that at times she  still has stomach burning, been like this for a long time. The pain comes and goes.     Hypertension: Amlodipine 10 mg daily in the afternoon.  Denies dizziness, only rarely. Denies any peripheral edema. Does not check her BP at home.   Patient has documented allergy to lisinopril, swelling.  Lab Results   Component Value Date    K 4.8 11/20/2020     Lab Results   Component Value Date    MICROALBUR 2.54 (H) 11/20/2020     BP Readings from Last 3 Encounters:   08/05/20 100/62   02/25/20 150/80   01/31/20 134/82     Hyperlipidemia: Taking rosuvastatin 40 mg daily.   Lab Results   Component Value Date    CHOL 192 11/20/2020    CHOL 220 (H) 01/31/2020    CHOL 253 (H) 11/14/2018     Lab Results   Component Value Date    HDL 62 11/20/2020    HDL 47 (L) 01/31/2020    HDL 59 11/14/2018     Lab Results   Component Value Date    LDLCALC 120 11/20/2020    LDLCALC 153 (H) 01/31/2020    LDLCALC 165 (H) 11/14/2018     Lab Results   Component Value Date    TRIG 52 11/20/2020    TRIG 99 01/31/2020    TRIG 143 11/14/2018     PMH: reviewed in EPIC   Allergies/ADRs: reviewed in EPIC   Alcohol: None  Tobacco:   Social History     Tobacco Use   Smoking Status Never Smoker   Smokeless Tobacco Current User     Types: Chew   Tobacco Comment     smokes outside, pt chews betel nut     ----------------  The patient declined an after visit summary    I spent 30 minutes with this patient today;   All changes were made via collaborative practice agreement with Coreen Kendall MD. A copy of the visit note was provided to the patient's provider.     Heather Matthews), PharmD  Medication Therapy Management Pharmacist  [facility]    Telemedicine Visit Details    Type of service:  Telephone     Start Time: 9:30 AM  End Time (time video/phone call stopped): 10:00 AM    Originating Location (pt. Location): Home    Distant Location (provider location):  [facility] MEDICATION THERAPY MANAGEMENT [facility]  "    Mode of Communication:   Telephone         Current Outpatient Medications   Medication Sig Dispense Refill     acetaminophen (TYLENOL) 500 MG tablet Take 1 tablet (500 mg total) by mouth every 6 (six) hours as needed for pain. 100 tablet 5     amLODIPine (NORVASC) 10 MG tablet Take 1 tablet (10 mg total) by mouth daily. 90 tablet 4     blood glucose test (GLUCOSE BLOOD) strips Use to check blood sugar three times daily. Ok whichever brand is covered by her insurance and compatible with her glucometer. 300 strip 4     blood glucose test (ONETOUCH VERIO TEST STRIPS) strips Use 1 each As Directed 4 (four) times a day. Dispense brand per patient's insurance at pharmacy discretion. 100 strip 11     calcium, as carbonate, (TUMS) 200 mg calcium (500 mg) chewable tablet Chew 1 tablet (200 mg total) daily. 100 tablet 1     ergocalciferol (ERGOCALCIFEROL) 1,250 mcg (50,000 unit) capsule Take 1 capsule (50,000 Units total) by mouth once a week. 12 capsule 3     escitalopram oxalate (LEXAPRO) 10 MG tablet Take 1 tablet (10 mg total) by mouth daily. 90 tablet 4     insulin aspart protamine-insulin aspart (NOVOLOG MIX 70-30FLEXPEN U-100) 100 unit/mL (70-30) pen Inject 20 Units before daytime meal and 15 Units before evening meal. 45 mL 4     lancets (ONETOUCH DELICA LANCETS) 33 gauge Misc Use to check blood sugar 3 per day. 100 each 11     metFORMIN (GLUCOPHAGE-XR) 500 MG 24 hr tablet Take 500 mg by mouth daily with supper.       naproxen (NAPROSYN) 375 MG tablet Take 1 tablet (375 mg total) by mouth 2 (two) times a day as needed. Must take with food. 20 tablet 0     omeprazole (PRILOSEC) 20 MG capsule Take 1 capsule (20 mg total) by mouth daily before breakfast. 30 capsule 5     pen needle, diabetic (TRUEPLUS PEN NEEDLE) 32 gauge x 5/32\" Ndle USE TO INJECT INSULIN twice TIMES DAILY OR AS DIRECTED 200 each 4     rosuvastatin (CRESTOR) 40 MG tablet Take 1 tablet (40 mg total) by mouth at bedtime. 90 tablet 4     No current " facility-administered medications for this visit.

## 2021-06-13 NOTE — PROGRESS NOTES
MTM Encounter    Assessment/Plan  Diabetes: Uncontrolled to goal of <8%.  Based on evaluation of Ramona's daily routine and glucose readings, it is apparent that there is significant variation in her diet leading to large blood sugar swings.  I think the addition of a GLP-1 at this time would further exacerbate her hypoglycemia in combination with her insulin.  I think it a better idea to appropriately dose her insulin which will require more blood sugar checks.  We are also working on nutritional education and discussed carbohydrates on nutrition labels.  I have asked her to keep track of the food she eats to help aid in her decision-making and education.  We graphed out an example of her expected glucose pattern throughout the day.  - Check blood sugar TID for a few weeks - before breakfast, before dinner and before bed.  - keep food log for one week.  - refill of lancets (Delica)  - decrease Novolog 70/30 to 24 units twice per day.    Hypertension: Not addressed today, will evaluate at next appointment in 2 weeks.    Dyslipidemia: Well controlled with use of atorvastatin- we are seeing the expected 50-60% reduction in her LDL, also suggesting good adherence to this medication.  - Continue current therapy    Follow Up  Two weeks      Subjective/Objective  Ramona Wilder is a 57 y.o. female here for a medication therapy management (MTM) appointment; her chief concern today is diabetes.    Diabetes: Ramona is taking Novolog 70/30 25 units BID.  She has been on this dose for about two months.  She is eating two meals per day, breakfast (cereal and milk, occasionally fruit) (an hour later has rice), then snacks in between (usually fruit - banana, tad) and dinner (rice w/ padilla, veg, and meat).  Insurance covers Victoza only, not Trulicity.  Eating meals at 9am and 5pm, going to bed around 12am.  Last A1c: 10.1% on 9/20/17  Glucose readings: Ramona checks her glucose 1 time(s) per day.  AM fasting range: , avg  125    Hypertension: previously on propranolol, but was without it for a week and still had a SBP in the 110s.  Hx of angioedema with lisinopril.    Dyslipidemia: Currently taking atorvastatin 80mg.  Lab Results   Component Value Date    CHOL 146 07/15/2016    CHOL 169 06/03/2015    CHOL 245 (H) 03/03/2015     Lab Results   Component Value Date    HDL 41 (L) 07/15/2016    HDL 51 06/03/2015    HDL 46 03/03/2015     Lab Results   Component Value Date    LDLCALC 85 07/15/2016    LDLCALC 105 06/03/2015    LDLCALC 163 (H) 03/03/2015     Lab Results   Component Value Date    TRIG 98 07/15/2016    TRIG 65 06/03/2015    TRIG 179 (H) 03/03/2015     No components found for: CHOLHDL        ----------------    Kyi's medication list was reviewed with them, discussing reason for use, directions for use, and potential side effects of each medication as needed. Indication, safety, efficacy, and convenience was assessed for all medications addressed above.  No environmental factors were noted currently affecting patient.  This care plan was communicated via EMR with her primary care provider, Coreen Kendall MD, who is the authorizing prescriber for this visit.  Direct supervision was available by either the patient's PCP or other available provider.  Time and complexity billing metrics are included in the docflowsheet linked to this visit.  Time spent: 35 minutes      Omega Thorpe, Layla  [facility] Pharmacist  [place] [facility] [employer]  [phone]

## 2021-06-13 NOTE — PROGRESS NOTES
ASSESSMENT/PLAN:    Problem List Items Addressed This Visit     Hypertension     She is unable to take an ACE inhibitor due to angioedema from lisinopril.  She had been on propranolol, but has not taken it in the last week and her blood pressure is very good.  Will have her remain off of it for now, with recheck at upcoming appointment with our pharmacist.         Vitamin D deficiency    Relevant Medications    ergocalciferol (ERGOCALCIFEROL) 50,000 unit capsule    Type 2 diabetes mellitus without complication, with long-term current use of insulin - Primary     Diabetes is uncontrolled.  I think that she is probably getting hypoglycemic and then overeating, resulting in many uncontrolled high blood sugars.  She does not want to give herself more injections, so splitting her long-acting and short-acting insulin is not practical and she has declined doing this in the past as well.  I think she might benefit from trying Trulicity to reduce her appetite and potentially decrease her insulin needs, thus keeping her blood sugars more even overall.  Will have her meet with our pharmacist in this regard.         Hyperlipidemia     I realized after she left that she is due potentially for lipid check.  This was not done today.  However, she needs to continue on atorvastatin really regardless of her lipid profile given her diabetes and hypertension, so we will go ahead and continue that.         Relevant Medications    atorvastatin (LIPITOR) 80 MG tablet      Other Visit Diagnoses     Need for vaccination        Relevant Orders    Td, Adult, Adsorbed (blue label) (Completed)    Influenza, Seasonal Quad, Preservative Free 36+ Months (Completed)        Patient Instructions   Please see our pharmacist, Derrell Thorpe, to start a new diabetes injection (just once per week) and decrease your insulin.  I am hoping this will make your blood sugars more even so that you don't get so hungry.    You can stay off of propranolol for now.  It  "is for your blood pressure and your blood pressure is good today even though you have been out for 1 week.  Our pharmacist will follow-up with you and see if refills are needed.       SUBJECTIVE:  Ramona Wilder is a 57 y.o. female here for diabetes follow-up,    She is generally doing well.  She complains of feeling hot really hungry often and then eats too much and is bothered that she keeps gaining weight.  Feels hungry right now like blood sugar would be low.  (Checked while she was here:  75)    Out of propranolol, lipitor x1 week.    Last A1C 10.1 3 weeks ago.  Had been 8/7 4 months before that.    Readings from her glucometer:  197  100  124  162  172  208  129  216  66  123  192.  139  161  99  156  109  57  62  100  76  215  58  127  93  81  89  78  107  123  216  140  [id number]  187  79  121  88        Patient Active Problem List   Diagnosis     Carotid Artery Stenosis     Moderate Recurrent Major Depression     Anxiety     Insomnia     Hypertension     Vitamin D deficiency     Gastropathy     Type 2 diabetes mellitus without complication, with long-term current use of insulin     Hyperlipidemia     Headache     Chronic Pain     Back Strain     Dermatitis     Metabolic Tests Nonspecific Elevation Of Transaminase Levels     Pain in finger of right hand     Current Outpatient Prescriptions on File Prior to Visit   Medication Sig Dispense Refill     acetaminophen (TYLENOL) 500 MG tablet Take 1 tablet (500 mg total) by mouth every 6 (six) hours as needed for pain. 50 tablet 11     BD ULTRA-FINE CITLALLI PEN NEEDLES 32 gauge x 5/32\" Ndle USE TO INJECT INSULIN FOUR TIMES DAILY OR AS DIRECTED 100 each 1     blood glucose test (GLUCOSE BLOOD) strips Use 1 each As Directed 4 (four) times a day. 150 each 4     CONTOUR TEST STRIPS strips USE TO TEST BLOOD SUGAR TWICE DAILY OR AS DIRECTED 200 strip 1     escitalopram oxalate (LEXAPRO) 10 MG tablet TAKE 1 TABLET DAILY 90 tablet 3     insulin aspart protamine-insulin aspart (NOVOLOG " "MIX 70-30 FLEXPEN) 100 unit/mL (70-30) pen Inject 28 Units under the skin 2 (two) times a day before meals. 15 mL 11     lancets (MICROLET LANCET) Misc Use to check glucose twice daily or as directed. 50 each prn     lancets (ONETOUCH DELICA LANCETS) 33 gauge Misc Use 1 per day. 100 each 4     pantoprazole (PROTONIX) 40 MG tablet TAKE ONE TABLET BY MOUTH DAILY 90 tablet 3     No current facility-administered medications on file prior to visit.         History   Smoking Status     Never Smoker   Smokeless Tobacco     Former User     Types: Chew       DIABETIC MEASURES:  Hemoglobin A1c   Date Value Ref Range Status   09/20/2017 10.1 (H) 3.5 - 6.0 % Final   05/19/2017 8.7 (H) 3.5 - 6.0 % Final   02/07/2017 10.8 (H) 3.5 - 6.0 % Final        Lipids:   Lab Results   Component Value Date    HDL 41 (L) 07/15/2016    HDL 51 06/03/2015    HDL 46 03/03/2015    Lab Results   Component Value Date    LDLCALC 85 07/15/2016    LDLCALC 105 06/03/2015    LDLCALC 163 (H) 03/03/2015    Lab Results   Component Value Date    TRIG 98 07/15/2016    TRIG 65 06/03/2015    TRIG 179 (H) 03/03/2015        Microalbumin  Lab Results   Component Value Date    MICROALBUR <0.50 02/07/2017        Last eye exam: 2/27/17      Recent blood pressures:   BP Readings from Last 3 Encounters:   10/10/17 112/74   05/19/17 108/82   02/21/17 138/88        Recent weight:   Wt Readings from Last 3 Encounters:   10/10/17 156 lb 12 oz (71.1 kg)   09/20/17 156 lb (70.8 kg)   07/05/17 160 lb (72.6 kg)         OBJECTIVE:  :  /74 (Patient Site: Right Arm, Patient Position: Sitting, Cuff Size: Adult Regular)  Pulse 66  Temp 97.8  F (36.6  C) (Oral)   Resp 28  Ht 4' 10.75\" (1.492 m)  Wt 156 lb 12 oz (71.1 kg)  BMI 31.93 kg/m2    Gen:  A&A, NAD  Neck:  supple, no sig LAD or thyromegally  CV:  HRRR, no M/R/G  Resp:  CTAB  Ext:  W&D, no edema    Lab results:    Results for orders placed or performed in visit on 09/20/17   Hemoglobin A1c - Glycosylated   Result " Value Ref Range    Hemoglobin A1c 10.1 (H) 3.5 - 6.0 %

## 2021-06-13 NOTE — PROGRESS NOTES
-Care Guide reminded patient of upcoming appointment with PCP.   -Patient reported that all medications are current and health insurance is active.  -Patient did not need help with form or paperwork today outreach.  -Care Guide will outreach patient again in 4 to 6 weeks and informed to call sooner if needed.

## 2021-06-13 NOTE — PROGRESS NOTES
[facility] Follow Up Encounter  Assessment & Plan                                                     1. Type 2 diabetes mellitus without complication, with long-term current use of insulin (H)  Needs further assessment, last A1c was not meeting goal of less than 7%.  Patient continues to take Metformin and use her insulin as prescribed and reports improved blood sugars.  Recommended patient come in for labs only appointment to recheck her A1c, microalbumin, and BMP as her last kidney function showed decline, patient agreeable.  Recommend continuation of current therapy for now with follow-up next week after labs only appointment.  Plan  -Ordered labs for labs only visit: A1c, microalbumin, and BMP    2. Hypertension  Last BP at goal of less than 140/90.  Recommend continuation of current amlodipine.  If microalbumin remains low, would recommend trial with ARB such as losartan or valsartan.    Future assessment: Pending microalbumin, could consider trial of ARB therapy; previous allergy documented with lisinopril    3. Gastropathy  Patient states that she has not been taking her omeprazole, recommended patient get refill from pharmacy.    4. Other hyperlipidemia  Patient states that she is taking her high intensity, rosuvastatin 40 mg daily, though her lipids remained elevated when last checked, recommend follow-up lipid cascade tomorrow.  Plan  -Ordered follow-up lipid cascade for labs only visit    Follow Up  Return in about 5 days (around 11/24/2020) for Medication Review.    Subjective & Objective                                                       Ramona Wilder is a 60 y.o. female called for a follow up visit for Medication Therapy Management. She was referred to me from Coreen Kendall MD. Professional telephonic Dee  utilized today.    Patient consented to a telehealth visit: Yes  Chief Complaint: Follow up visit from [facility] on 8/25/20  Medication Adherence/Access: Using her pillbox now, this is helping  with remembering to take meds every day.  Self management, no longer gets help from her kids like previously. Does miss medication doses about once monthly.     Diabetes: Novolog 70/30 mix insulin - AM 25 and PM 15 directly before meals, Metformin  mg daily. States that she never misses doses of insulin.    Patient denies any medication side effects at this time.  SMBG: once daily    Ranges (per pt report) : 90, 110, 120 when fasting. Denies any hypoglycemia recently.   Aspirin: Not taking due to reported allergy  Diet: 2 meals a day. Previously states that the meals are equal in size, now states that morning meal is larger.  ACEi/ARB: None, reported allergy to lisinopril  Statin: Rosuvastatin 40 mg daily before bed  Lab Results   Component Value Date    HGBA1C 8.0 (H) 08/05/2020    HGBA1C 9.4 (H) 01/31/2020    HGBA1C 8.2 (H) 11/01/2019     Lab Results   Component Value Date    MICROALBUR 2.55 (H) 02/25/2020    LDLCALC 153 (H) 01/31/2020    CREATININE 1.29 (H) 08/05/2020     GERD: States that she needs more of the omeprazole, was not sure if she had a refill for this. Taking Tums daily prn.     Hypertension: Amlodipine 10 mg daily in the afternoon.  Denies dizziness. Denies any peripheral edema. Does not check her BP at home.   Patient has documented allergy to lisinopril, swelling.  BP Readings from Last 3 Encounters:   08/05/20 100/62   02/25/20 150/80   01/31/20 134/82     Hyperlipidemia: Taking rosuvastatin 40 mg daily.   Lab Results   Component Value Date    CHOL 220 (H) 01/31/2020    CHOL 253 (H) 11/14/2018    CHOL 150 11/02/2017     Lab Results   Component Value Date    HDL 47 (L) 01/31/2020    HDL 59 11/14/2018    HDL 48 (L) 11/02/2017     Lab Results   Component Value Date    LDLCALC 153 (H) 01/31/2020    LDLCALC 165 (H) 11/14/2018    LDLCALC 87 11/02/2017     Lab Results   Component Value Date    TRIG 99 01/31/2020    TRIG 143 11/14/2018    TRIG 75 11/02/2017     PMH: reviewed in EPIC    Allergies/ADRs: reviewed in EPIC   Alcohol: None  Tobacco:   Social History     Tobacco Use   Smoking Status Never Smoker   Smokeless Tobacco Current User     Types: Chew   Tobacco Comment     smokes outside, pt chews betel nut     ----------------  The patient declined an after visit summary    I spent 37 minutes with this patient today;   All changes were made via collaborative practice agreement with Coreen Kendall MD. A copy of the visit note was provided to the patient's provider.     Heather Matthews), PharmD  Medication Therapy Management Pharmacist  [facility]    Telemedicine Visit Details    Type of service:  Telephone     Start Time: 3:02 PM  End Time (time video/phone call stopped): 3:39 PM    Originating Location (pt. Location): Home    Distant Location (provider location):  [facility] MEDICATION THERAPY MANAGEMENT [facility]     Mode of Communication:   Telephone       Current Outpatient Medications   Medication Sig Dispense Refill     acetaminophen (TYLENOL) 500 MG tablet Take 1 tablet (500 mg total) by mouth every 6 (six) hours as needed for pain. 100 tablet 5     amLODIPine (NORVASC) 10 MG tablet Take 1 tablet (10 mg total) by mouth daily. 90 tablet 4     blood glucose test (GLUCOSE BLOOD) strips Use to check blood sugar three times daily. Ok whichever brand is covered by her insurance and compatible with her glucometer. 300 strip 4     blood glucose test (ONETOUCH VERIO TEST STRIPS) strips Use 1 each As Directed 4 (four) times a day. Dispense brand per patient's insurance at pharmacy discretion. 100 strip 11     calcium, as carbonate, (TUMS) 200 mg calcium (500 mg) chewable tablet Chew 1 tablet (200 mg total) daily. 100 tablet 1     ergocalciferol (ERGOCALCIFEROL) 1,250 mcg (50,000 unit) capsule Take 1 capsule (50,000 Units total) by mouth once a week. 12 capsule 3     escitalopram oxalate (LEXAPRO) 10 MG tablet Take 1 tablet (10 mg total) by  "mouth daily. 90 tablet 4     insulin aspart protamine-insulin aspart (NOVOLOG MIX 70-30FLEXPEN U-100) 100 unit/mL (70-30) pen Inject 20 Units before daytime meal and 15 Units before evening meal. 45 mL 4     lancets (ONETOUCH DELICA LANCETS) 33 gauge Misc Use to check blood sugar 3 per day. 100 each 11     metFORMIN (GLUCOPHAGE-XR) 500 MG 24 hr tablet Take 500 mg by mouth daily with supper.       naproxen (NAPROSYN) 375 MG tablet Take 1 tablet (375 mg total) by mouth 2 (two) times a day as needed. Must take with food. 20 tablet 0     omeprazole (PRILOSEC) 20 MG capsule Take 1 capsule (20 mg total) by mouth daily before breakfast. 30 capsule 5     pen needle, diabetic (TRUEPLUS PEN NEEDLE) 32 gauge x 5/32\" Ndle USE TO INJECT INSULIN twice TIMES DAILY OR AS DIRECTED 200 each 4     rosuvastatin (CRESTOR) 40 MG tablet Take 1 tablet (40 mg total) by mouth at bedtime. 90 tablet 4     No current facility-administered medications for this visit.        "

## 2021-06-14 NOTE — PROGRESS NOTES
FEMALE PREVENTATIVE EXAM    Assessment and Plan:     1. Routine general medical examination at a health care facility  See below.  Mammogram not available today; will return for this next month.    2. Vitamin D deficiency  - ergocalciferol (ERGOCALCIFEROL) 1,250 mcg (50,000 unit) capsule; Take 1 capsule (50,000 Units total) by mouth once a week.  Dispense: 12 capsule; Refill: 3    3. Proteinuria due to type 2 diabetes mellitus (H)  The patient had significant proteinuria on last urine microalbumin testing.  She has gotten angioedema with lisinopril in the past, but likely would tolerate losartan.  We will plan to switch her completely from amlodipine to losartan given renal benefit.  Follow-up in about 6 weeks to ensure this change is going well.  - losartan (COZAAR) 100 MG tablet; Take 1 tablet (100 mg total) by mouth daily.  Dispense: 90 tablet; Refill: 3    4. Type 2 diabetes mellitus with hyperglycemia, with long-term current use of insulin (H)  Diabetes mellitus is uncontrolled with last A1c of 9.5.  She is on 70/30 insulin because she does not want to give herself more than 2 injections a day.  Will try adding Invokana if covered by insurance.  Follow-up in 6 weeks.  - Ambulatory referral to Ophthalmology  - canagliflozin (INVOKANA) 100 mg Tab; Take 1 tablet (100 mg total) by mouth daily. Before the 1st meal of the day  Dispense: 30 tablet; Refill: 1    5. Hypertension  Switching from amlodipine to losartan as above due to renal benefits.  - losartan (COZAAR) 100 MG tablet; Take 1 tablet (100 mg total) by mouth daily.  Dispense: 90 tablet; Refill: 3    6. Dermatitis of right ear canal  I do not see a lot of irritation at this point, but itching of the right ear is quite bothersome to her so we will try steroid drops.  - ciprofloxacin-dexamethasone (CIPRODEX) otic suspension; Administer 4 drops to the right ear 2 (two) times a day as needed (ear canal itching or pain).  Dispense: 7.5 mL; Refill: 0     Next  follow up:  Return in about 6 weeks (around 2/26/2021) for Diabetes follow-up.    Immunization Review  Adult Imm Review: She is potentially a candidate for Shingrix vaccine.  However, she may soon be able to get a COVID-19 immunization and I do not want to risk giving it to her now in case she might be able to get this in the next 2 weeks (we are not giving COVID-19 vaccines to be both had any vaccination within the last 14 days).        I discussed the following with the patient:   Adult Healthy Living: Importance of regular exercise  Healthy nutrition    I have had an Advance Directives discussion with the patient.  Has advanced directive and indicates that it is still accurate.    Subjective:   Chief Complaint: Ramona Wilder is an 60 y.o. female here for a preventative health visit.    Patient has been advised of split billing requirements and indicates understanding: Yes  HPI:      Diabetes:  Diabetes . Forgot meter.    Confirms taking 70/30 25U morning and 15 evening  Sometimes gets low, like 60.  This tends to occur early morning.    Sugars are high when eating more rice or sticky rice.      Healthy Habits  Are you taking a daily aspirin? No  Do you typically exercising at least 40 min, 3-4 times per week?  NO  Do you usually eat at least 4 servings of fruit and vegetables a day, include whole grains and fiber and avoid regularly eating high fat foods? NO  Have you had an eye exam in the past two years? Yes  Do you see a dentist twice per year? NO  Do you have any concerns regarding sleep? YES    Safety Screen  If you own firearms, are they secured in a locked gun cabinet or with trigger locks? The patient does not own any firearms  Do you feel you are safe where you are living?: Yes (8/5/2020  3:24 PM)  Do you feel you are safe in your relationship(s)?: Yes (8/5/2020  3:24 PM)      Review of Systems:  Please see above.  The rest of the review of systems are negative for all systems.       Cancer Screening      "  Status Date      MAMMOGRAM Overdue 5/9/2020      Done 5/9/2018 MAMMO SCREENING BILATERAL     Patient has more history with this topic...    PAP SMEAR Next Due 5/9/2023      Done 5/9/2018 GYNECOLOGIC CYTOLOGY (PAP SMEAR)     Patient has more history with this topic...              History     Reviewed By Date/Time Sections Reviewed    Leobardo Vaughn, AIYANA 1/15/2021  3:59 PM Tobacco    Leobardo Vaughn, AIYANA 1/15/2021  3:58 PM Tobacco    Kristyjennifer, Me, [facility] 1/15/2021  3:55 PM Tobacco            Objective:   Vital Signs:   Visit Vitals  /68   Pulse 73   Temp 98.1  F (36.7  C) (Oral)   Resp 20   Ht 4' 10.66\" (1.49 m)   Wt 144 lb (65.3 kg)   LMP 05/09/2014   SpO2 97%   BMI 29.42 kg/m           PHYSICAL EXAM  General Appearance: Alert, cooperative, no distress, appears stated age  Head: Normocephalic, without obvious abnormality, atraumatic  Eyes: PERRL, conjunctiva/corneas clear, EOM's intact  Ears: Normal TM's and external ear canals, both ears  Throat: Lips, mucosa, and tongue normal; teeth and gums normal  Neck: Supple, symmetrical, trachea midline, no adenopathy;  thyroid: not enlarged, symmetric, no tenderness/mass/nodules  Back: Symmetric, no curvature, ROM normal, no CVA tenderness  Lungs: Clear to auscultation bilaterally, respirations unlabored  Breasts:  breasts appear normal, no suspicious masses, no skin or nipple changes or axillary nodes.   Heart: Regular rate and rhythm, S1 and S2 normal, no murmur, rub, or gallop,   Abdomen: Soft, non-tender, no masses, no organomegaly  Pelvic:  Not examined  Extremities: Extremities normal, atraumatic, no cyanosis or edema  Skin: Skin color, texture, turgor normal, no rashes or lesions  Lymph nodes: Cervical and axillary nodes normal  Neurologic: Normal patellar DTR's, normal gait   "

## 2021-06-14 NOTE — TELEPHONE ENCOUNTER
RN cannot approve Refill Request    RN can NOT refill this medication med is not covered by policy/route to provider. Last office visit: 8/5/2020 Coreen Kendall MD Last Physical: 4/13/2018 Last MTM visit: Visit date not found Last visit same specialty: 8/5/2020 Coreen Kendall MD.  Next visit within 3 mo: Visit date not found  Next physical within 3 mo: Visit date not found      Shirin Cole, [facility] Triage/Med Refill 1/13/2021    Requested Prescriptions   Pending Prescriptions Disp Refills     acetaminophen (TYLENOL) 500 MG tablet [Pharmacy Med Name: ACETAMINOPHEN   Tablet] 100 tablet 5     Sig: TAKE 1 TABLET BY MOUTH EVERY 6 HOURS AS NEEDED FOR PAIN.       There is no refill protocol information for this order

## 2021-06-14 NOTE — PROGRESS NOTES
-Care Guide assisted patient rescheduled follow up appointment with pharmacist which patient called and cancelled because patient misunderstood letter that was sent out from [facility] that its insurance will longer cover the visit.   -Care Guide arranged transportation for the visit and reminded patient to bring all medications for pharmacist to review.  -Patient did not need help with form or paperwork on today outreach.  -Care Guide will outreach patient again in 4 to 6 weeks and informed to call sooner if needed.

## 2021-06-15 NOTE — PROGRESS NOTES
"ASSESSMENT/PLAN:    Problem List Items Addressed This Visit     Moderate Recurrent Major Depression     Well controlled on current regimen; would continue         Hypertension     Borderline control on no meds.  Unable to take ACE Inhibitor due to angioedema from lisinopril  Was on propranolol in the past, but we had stopped it because it hadn't seemed necessary (she had come to clinic 10/10/17 after not taking it for a week and blood pressure was good).  Continue to watch carefully.         Vitamin D deficiency    Relevant Orders    Vitamin D, Total (25-Hydroxy) (Completed)    Type 2 diabetes mellitus without complication, with long-term current use of insulin - Primary     Poorly controlled on current regimen.  Will have her see our pharmacist for adjustements (discussed with PharmD Derrell Thorpe).         Relevant Medications    pen needle, diabetic (BD ULTRA-FINE CITLALLI PEN NEEDLES) 32 gauge x 5/32\" Ndle    Other Relevant Orders    Glycosylated Hemoglobin A1c (Completed)    Comprehensive Metabolic Panel (Completed)    Microalbumin, Random Urine (Completed)    Left knee pain     I suspect she may have a ligamentous or meniscal injury given her relatively negative x-ray with history of swelling.  We discussed next steps, which might include an MRI, the patient declined at this time.           Relevant Orders    XR Knee Left 1 or 2 VWS (Completed)    Uric Acid (Completed)      Other Visit Diagnoses     Type 2 diabetes mellitus        Relevant Medications    pen needle, diabetic (BD ULTRA-FINE CITLALLI PEN NEEDLES) 32 gauge x 5/32\" Ndle    Other Relevant Orders    Glycosylated Hemoglobin A1c (Completed)    Comprehensive Metabolic Panel (Completed)    Microalbumin, Random Urine (Completed)    Right flank pain        No urinary/renal cause found; suspect musculoskeletal    Relevant Orders    Urinalysis-UC if Indicated (Completed)        Healthcare maintenance: Patient is slightly overdue for Pap smear.  Will ask her to come " back in 3 months for physical plus diabetes follow-up.  Patient is scheduled to see me for an office visit in April.      SUBJECTIVE:  Ramona Wilder is a 57 y.o. female here for diabetes follow-up diabetes mellitus follow-up plus other complaints as noted below.    Numbers are up and down depending on what she eats.  Confirms taking NovoLog 70/30 mix at 24 units twice daily    Glucometer:  181  92  82  78  240  240  [id number]  204  225  142  239   about  68 lowest recent      Left knee pain, swelling sometimes.  Stairs are especially bad - both up and down. Has to use right first.  x1-2 months.  Only if walking on stairs too much. Cream or hot pack helps. q2-3 weeks.  No injury.    Right flank pain when needs to urinate x3 weeks. Not bad.  Better after urinating. No real dysuria.    Depression:  PHQ-2 Total Score: 1 (1/10/2018 11:00 AM)  PHQ-9 Total Score: 2 (1/10/2018 11:00 AM)   Little interest or pleasure in doing things: Not at all  Feeling down, depressed, or hopeless: Several days  Trouble falling or staying asleep, or sleeping too much: Not at all  Feeling tired or having little energy: Several days  Poor appetite or overeating: Not at all  Feeling bad about yourself - or that you are a failure or have let yourself or your family down: Not at all  Trouble concentrating on things, such as reading the newspaper or watching television: Not at all  Moving or speaking so slowly that other people could have noticed. Or the opposite - being so fidgety or restless that you have been moving around a lot more than usual: Not at all  Thoughts that you would be better off dead, or of hurting yourself in some way: Not at all  PHQ-9 Total Score: 2  If you checked off any problems, how difficult have these problems made it for you to do your work, take care of things at home, or get along with other people?: Not difficult at all     Patient Active Problem List   Diagnosis     Carotid Artery Stenosis     Moderate Recurrent  "Major Depression     Anxiety     Insomnia     Hypertension     Vitamin D deficiency     Gastropathy     Type 2 diabetes mellitus without complication, with long-term current use of insulin     Hyperlipidemia     Headache     Chronic Pain     Back Strain     Dermatitis     Metabolic Tests Nonspecific Elevation Of Transaminase Levels     Pain in finger of right hand     Current Outpatient Prescriptions on File Prior to Visit   Medication Sig Dispense Refill     acetaminophen (TYLENOL) 500 MG tablet Take 1 tablet (500 mg total) by mouth every 6 (six) hours as needed for pain. 50 tablet 11     atorvastatin (LIPITOR) 80 MG tablet TAKE 1 TABLET DAILY AT BEDTIME. 30 tablet 3     blood glucose test (GLUCOSE BLOOD) strips Use 1 each As Directed 4 (four) times a day. 150 each 4     CONTOUR TEST STRIPS strips USE TO TEST BLOOD SUGAR TWICE DAILY OR AS DIRECTED 200 strip 1     ergocalciferol (ERGOCALCIFEROL) 50,000 unit capsule Take 1 capsule (50,000 Units total) by mouth once a week. 12 capsule 3     escitalopram oxalate (LEXAPRO) 10 MG tablet TAKE 1 TABLET DAILY 90 tablet 3     insulin aspart protamine-insulin aspart (NOVOLOG MIX 70-30 FLEXPEN) 100 unit/mL (70-30) pen Inject 23 units in the morning and 24 units in the evenings with meals. 15 mL 11     lancets (ONETOUCH DELICA LANCETS) 33 gauge Misc Use to check blood sugar 3 per day. 300 each 3     pantoprazole (PROTONIX) 40 MG tablet TAKE ONE TABLET BY MOUTH DAILY 90 tablet 3     [DISCONTINUED] BD ULTRA-FINE CITLALLI PEN NEEDLES 32 gauge x 5/32\" Ndle USE TO INJECT INSULIN FOUR TIMES DAILY OR AS DIRECTED 100 each 1     No current facility-administered medications on file prior to visit.         History   Smoking Status     Never Smoker   Smokeless Tobacco     Former User     Types: Chew     Comment:  smoke outside       DIABETIC MEASURES:  Hemoglobin A1c   Date Value Ref Range Status   09/20/2017 10.1 (H) 3.5 - 6.0 % Final   05/19/2017 8.7 (H) 3.5 - 6.0 % Final   02/07/2017 " "10.8 (H) 3.5 - 6.0 % Final        Lipids:   Lab Results   Component Value Date    HDL 48 (L) 11/02/2017    HDL 41 (L) 07/15/2016    HDL 51 06/03/2015    Lab Results   Component Value Date    LDLCALC 87 11/02/2017    LDLCALC 85 07/15/2016    LDLCALC 105 06/03/2015    Lab Results   Component Value Date    TRIG 75 11/02/2017    TRIG 98 07/15/2016    TRIG 65 06/03/2015        Microalbumin  Lab Results   Component Value Date    MICROALBUR <0.50 02/07/2017        Last eye exam: 2/27/17      Recent blood pressures:   BP Readings from Last 3 Encounters:   01/10/18 130/68   11/02/17 138/74   10/10/17 112/74        Recent weight:   Wt Readings from Last 3 Encounters:   01/10/18 155 lb 4 oz (70.4 kg)   11/02/17 154 lb 8 oz (70.1 kg)   10/10/17 156 lb 12 oz (71.1 kg)         OBJECTIVE:  :  /68  Pulse 77  Temp 98.1  F (36.7  C) (Oral)   Resp (!) 32  Ht 4' 10.75\" (1.492 m)  Wt 155 lb 4 oz (70.4 kg)  SpO2 97%  BMI 31.62 kg/m2    Gen:  A&A, NAD  Neck:  supple, no sig LAD or thyromegally  CV:  HRRR, no M/R/G  Resp:  CTAB  Ext:  W&D, no edema  Abdomen: Soft nontender, no mass, no suprapubic tenderness  Back: Possibly slight right costovertebral angle tenderness.  Extremities: Currently small effusion about the left knee.  Some tenderness to palpation of the posterior knee.  Range of motion intact at this time, no ligamentous instability.    Lab results:    Results for orders placed or performed in visit on 01/10/18   Glycosylated Hemoglobin A1c   Result Value Ref Range    Hemoglobin A1c 9.8 (H) 3.5 - 6.0 %   Comprehensive Metabolic Panel   Result Value Ref Range    Sodium 139 136 - 145 mmol/L    Potassium 4.2 3.5 - 5.0 mmol/L    Chloride 104 98 - 107 mmol/L    CO2 22 22 - 31 mmol/L    Anion Gap, Calculation 13 5 - 18 mmol/L    Glucose 204 (H) 70 - 125 mg/dL    BUN 15 8 - 22 mg/dL    Creatinine 0.83 0.60 - 1.10 mg/dL    GFR MDRD Af Amer >60 >60 mL/min/1.73m2    GFR MDRD Non Af Amer >60 >60 mL/min/1.73m2    Bilirubin, " Total 0.7 0.0 - 1.0 mg/dL    Calcium 9.0 8.5 - 10.5 mg/dL    Protein, Total 7.4 6.0 - 8.0 g/dL    Albumin 3.6 3.5 - 5.0 g/dL    Alkaline Phosphatase 88 45 - 120 U/L    AST 25 0 - 40 U/L    ALT 31 0 - 45 U/L   Microalbumin, Random Urine   Result Value Ref Range    Microalbumin, Random Urine 0.88 0.00 - 1.99 mg/dL    Creatinine, Urine 104.7 mg/dL    Microalbumin/Creatinine Ratio Random Urine 8.4 <=19.9 mg/g   Vitamin D, Total (25-Hydroxy)   Result Value Ref Range    Vitamin D, Total (25-Hydroxy) 42.9 30.0 - 80.0 ng/mL   Uric Acid   Result Value Ref Range    Uric Acid 3.6 2.0 - 7.5 mg/dL   Urinalysis-UC if Indicated   Result Value Ref Range    Color, UA Yellow Colorless, Yellow, Straw, Light Yellow    Clarity, UA Clear Clear    Glucose, UA Negative Negative    Bilirubin, UA Negative Negative    Ketones, UA Negative Negative    Specific Gravity, UA 1.010 1.005 - 1.030    Blood, UA Negative Negative    pH, UA 6.0 5.0 - 8.0    Protein, UA Negative Negative mg/dL    Urobilinogen, UA 1.0 E.U./dL 0.2 E.U./dL, 1.0 E.U./dL    Nitrite, UA Negative Negative    Leukocytes, UA Negative Negative       Radiology:  Images reviewed by me personally and interpreted prior to radiology overread.     Xr Knee Left 1 Or 2 Vws    Result Date: 1/10/2018  XR KNEE LEFT 1 OR 2 VWS 1/10/2018 11:50 AM INDICATION: Pain and swelling of left knee x2 months, no injury COMPARISON: None. FINDINGS: Enthesophyte at the quadriceps tendon insertion onto the patella. Otherwise negative left knee. No fracture or dislocation. No joint effusion.

## 2021-06-15 NOTE — PROGRESS NOTES
Assessment: Ramona is here for a follow up on diabetes management.  She recently saw Derrell Rich who changed her insulin from 70/30 to humalog 50/50.  Ramona has not yet started on the humalog as she was confused about her regimen.  She has been taking novomix 70/30 at 25 units BID.  Her morning blood sugars are: 203, 150, 134, 196, 136, 124, 127, 110, 149, 152, 123, 81, 101, 183, 151.  Evenings are: 160, 116, 137, 195, 90, 405, 218, 167.   The 405 was after some indulging is sweets.  I instructed her to follow Derrell's recommendations of discontinuing her novomix and taking 23 units BID of humalog 50/50 instead.  She will start that this evening and continue checking her blood sugars 2x/daily.  Her other complaints today are of arm & finger pain/stiffness.  She also has some burning in the belly that occasionally happens.  I encouraged her to come to see a doctor for those things sooner than 2 months, but she insisted that she is lazy and doesn't want to come in.  I tried scheduling her alongside her next visit with Derrell, but Dr. Kendall or Britton Britt are not available that day.  We discussed getting more exercise at home for better health, blood sugars, and possibly a reduced insulin dose.  She states she does not feel like doing it very often, so I encouraged her to do the best she can and see how that influences her blood sugars.    Plan: Ramona will check her blood sugars 2x/daily before breakfast and before dinner.  She will take humalog 50/50 23 units BID.  Her follow up is with Derrell Rich in 2 weeks.  I will let her work with Derrell primarily on dose changes/adjustments so it does not become too confusing for her.  She is scheduled ot see Dr. Kendall in April.      Subjective and Objective:      Ramona Wilder is referred by Dr. Kendall for Diabetes Education.     Lab Results   Component Value Date    HGBA1C 9.8 (H) 01/10/2018         Current diabetes medications:  novomix 70/30 - 25/25 units    Goals        Patient Stated       I would like to control my diabetes A1C under 8.0. (pt-stated)            Action steps to achieve this goal:    1. I came to follow up with pharmacist on 1/22/2018 and started with changes of medications and recommendations.  2. I will follow up with pharmacist again on 2/22/2018 as scheduled and follow up with diabetic educator on 2/7/2018.  3. I will also follow up with my doctor in April as scheduled.  4. I will try to avoid eating sweet, limit on eating rice and will try to eat healthy foods that are recommended.        Goal Update: 2/1/2018.           Other      Monitoring            9/20/17:  Ramona will check her blood sugar 2x/daily and bring her meter with her to her appointments.            Follow up:   Primary care visit  PharmD  for DM      Education:     Monitoring   Meter (per above goals): Assessed, Discussed and Literature provided  Monitoring: Assessed and Discussed  BG goals: Assessed and Discussed    Nutrition Management  Physical Activity: Assessed and Discussed  Medications: Assessed and Discussed  Orals: Assessed and Discussed  Injected Medications: Assessed and Discussed   Storage/Exp:Assessed and Discussed   Site Rotation: Assessed and Discussed   Sites Assessed: no    Diabetes Disease Process: Assessed and Discussed    Acute Complications: Prevent, Detect, Treat:   Hypoglycemia: Assessed and Discussed  Hyperglycemia: Assessed and Discussed  Sick Days: Assessed and Discussed  Driving: Not addressed        Time spent with the patient: 60 minutes for diabetes education and counseling.   Previous Education: yes  Visit Type:DSMT  Hours Remaining: DSMT 7 and MNT 3      Syed Gonzalez  2/7/2018

## 2021-06-15 NOTE — PROGRESS NOTES
-Care Guide reminded patient of upcoming appointments with diabetic educator, pharmacist and arranged transportation for those appointments.   -Patient reported that all medications are current and health insurance is active.  -Patient did not need help with form or paperwork today outreach.  -Care Guide will outreach patient again in 4 to 6 weeks and informed to call sooner if needed.

## 2021-06-15 NOTE — PROGRESS NOTES
MTM Encounter    Assessment/Plan  Diabetes: Uncontrolled to goal of 7%.  We discussed that with her relatively well-controlled glucose checks first thing in the morning and relatively poor overall control, it is likely that her blood sugars are spiking into the 300s after meals and likely increase slightly throughout the day.  I think getting her more prandial insulin would help to improve her control significantly, though she is not willing to do more than 2 injections a day.  I think we should start by switching her to a 50-50 mix of insulin and could additionally consider starting metformin.  If metformin is started, this would require a dose reduction in her insulin.  She also requests additional test strips for her One Touch Verio.  - switch to humalog 50/50  - One Touch Verio test strips, check glucose BID before meals    Follow Up  One month      Subjective/Objective  Ramona Wilder is a 57 y.o. female here for a follow up medication therapy management (MTM) appointment; her chief concern today is diabetes.    Diabetes: Ramona is taking Novolog 70/30 25 units am, and 23 units pm.  She checks her blood sugar once a day, sometimes a second check in the evening before eating.  She eats two meals per day, with snacks in between; typically fruit (1/2 banana).  AM fastin, 95, 189, 107, 94, 98, 196, 115, 65, 72, 128, 98,   PM fastin  Lab Results   Component Value Date    HGBA1C 9.8 (H) 01/10/2018    HGBA1C 10.1 (H) 2017    HGBA1C 8.7 (H) 2017     Lab Results   Component Value Date    MICROALBUR 0.88 01/10/2018    LDLCALC 87 2017    CREATININE 0.83 01/10/2018       Knee pain: Improving with walking, but now complaining of left elbow pain which is long standing.    ----------------    Ramona's medication list was reviewed with them, discussing reason for use, directions for use, and potential side effects of each medication as needed. Indication, safety, efficacy, and convenience was assessed for all  medications addressed above.  No environmental factors were noted currently affecting patient.  This care plan was communicated via EMR with her primary care provider, Coreen Kendall MD, who is the authorizing prescriber for this visit.  Direct supervision was available by either the patient's PCP or other available provider.  Time and complexity billing metrics are included in the docflowsheet linked to this visit.  Time spent: 30 minutes      Omega Thorpe PharmD  [facility] Pharmacist  [place] [facility]  [phone]

## 2021-06-15 NOTE — PROGRESS NOTES
Unable to reach pt.  Cell does not take incoming calls and person who answered pt phone did not stay on line long enough. Will call back again.

## 2021-06-15 NOTE — PROGRESS NOTES
Please let Ramona know that a new type of insulin is available for her at the pharmacy and that it was covered by her insurance.  She should stop her old insulin and start the new one the next morning.  OK to use current supply of insulin since she just filled it last week.

## 2021-06-15 NOTE — PROGRESS NOTES
ASSESSMENT/PLAN:    Diagnoses and all orders for this visit:    Type 2 diabetes mellitus with hyperglycemia, with long-term current use of insulin (H)  Uncontrolled diabetes with A1c today of 10.0.  Had a lot of trouble getting her blood sugars under control because of difficulty following consistent diet as well as reluctance to increase medication or injections.  It is always been hard to tell why she is sometimes getting hypoglycemia while other times hyperglycemia.  Unfortunately,tolerate Metformin more than 500 mg once per day.  She has done okay with starting dose of Invokana 100 mg daily; will increase to 300 mg daily.  This medication was chosen because she again is reluctant to use injections.  I will see her again in about 3 months, but will have diabetes education and/or MTM pharmacist see her in the interim  -     Glycosylated Hemoglobin A1c  -     canagliflozin (INVOKANA) 300 mg Tab; Take 1 tablet (300 mg total) by mouth daily. Before the 1st meal of the day  Dispense: 90 tablet; Refill: 3  -     Ambulatory referral to Medication Management    Hypertension, microalbuminuria  Patient was switched from amlodipine to losartan last visit with no symptomatic side effects and resultant excellent blood pressure control.  BMP was checked today and her creatinine had increased a little from last visit, but was lower than visit before.  I think that ARB should be continued for renal protection in setting of microalbuminuria.  -     Basic Metabolic Panel    Visit for screening mammogram  Screening mammogram done today       Return in 14 weeks (on 6/4/2021) for Diabetes follow-up.                   SUBJECTIVE:  Ramona Wilder is a 60 y.o. female here for Diabetes        Metformin rx'd two times a day, but taking only once per day.    Checks blood sugar  fasting.        PHQ-2 Total Score: 3 (1/15/2021  3:58 PM)  PHQ-9 Total Score: 8 (1/15/2021  3:58 PM)           OBJECTIVE:  :  /60   Pulse 85   Temp 98.5  F  "(36.9  C) (Oral)   Resp 20   Ht 4' 10.66\" (1.49 m)   Wt 143 lb (64.9 kg)   LMP 05/09/2014   SpO2 97%   BMI 29.22 kg/m    Wt Readings from Last 3 Encounters:   02/26/21 143 lb (64.9 kg)   01/15/21 144 lb (65.3 kg)   08/05/20 142 lb 12 oz (64.8 kg)         Gen:  A&A, NAD  CV:  HRRR, no M/R/G  Resp:  CTAB  Ext:  W&D, no edema    Results for orders placed or performed in visit on 02/26/21   Glycosylated Hemoglobin A1c   Result Value Ref Range    Hemoglobin A1c 10.0 (H) <=5.6 %   Basic Metabolic Panel   Result Value Ref Range    Sodium 137 136 - 145 mmol/L    Potassium 4.7 3.5 - 5.0 mmol/L    Chloride 102 98 - 107 mmol/L    CO2 24 22 - 31 mmol/L    Anion Gap, Calculation 11 5 - 18 mmol/L    Glucose 373 (H) 70 - 125 mg/dL    Calcium 9.0 8.5 - 10.5 mg/dL    BUN 20 8 - 22 mg/dL    Creatinine 1.17 (H) 0.60 - 1.10 mg/dL    GFR MDRD Af Amer 57 (L) >60 mL/min/1.73m2    GFR MDRD Non Af Amer 47 (L) >60 mL/min/1.73m2        "

## 2021-06-16 PROBLEM — N39.3 URINARY, INCONTINENCE, STRESS FEMALE: Status: ACTIVE | Noted: 2019-05-17

## 2021-06-16 PROBLEM — E11.29 PROTEINURIA DUE TO TYPE 2 DIABETES MELLITUS (H): Status: ACTIVE | Noted: 2021-01-15

## 2021-06-16 PROBLEM — M54.16 RIGHT LUMBAR RADICULITIS: Status: ACTIVE | Noted: 2019-08-02

## 2021-06-16 PROBLEM — M25.562 LEFT KNEE PAIN: Status: ACTIVE | Noted: 2018-01-28

## 2021-06-16 PROBLEM — R80.9 PROTEINURIA DUE TO TYPE 2 DIABETES MELLITUS (H): Status: ACTIVE | Noted: 2021-01-15

## 2021-06-16 NOTE — PROGRESS NOTES
"MTM Encounter    Assessment/Plan  Diabetes: Likely controlled to goal of 7%. Discussed options to provide better blood glucose control throughout the day including carbohydrate counting and sliding scale insulin. We discussed carbohydrates on nutrition labels and how to count carbohydrates. This does not seem like a good option since she had difficulty understanding which foods contain carbohydrates, and it seemed she would have difficulty interpreting nutrition food labels. Discussed adding sliding scale insulin based on preprandial glucose readings to her morning and evening insulin doses. This will provide better blood glucose control throughout the day.  - Continue insulin at 25 units AM, 35 units PM, add sliding scale as below:  Extra insulin  If your blood sugar is 100-175 = 0 units  If your blood sugar is 176-250 = +2 units  If your blood sugar is 250-325 = +4 units  If your blood sugar is 325+ = +6 units    Follow Up  Follow up in clinic in 3 to 4 weeks with MTM Pharmacist for diabetes medication management.    Subjective/Objective  Ramona Wilder is a 57 y.o. female here for a follow up medication therapy management (MTM) appointment; her chief concern today is diabetes.    Diabetes: Ramona was recently switched to Humalog 50/50. She has self increased her doses to 25 units in the morning and 35 units in the evening and has been steady on these two doses for the past two weeks. Her morning fasting reading of 52 mg/dL occurred when she didn't eat and gave herself a normal amount of insulin. She shared that she dislikes that she cannot eat some of the \"tasty\" foods that her family or friends eat such as white rice.  AM fastin, 163, 77, 52, 145, 203, 198, 151, 176, 201, 93, 126, 82, 73  PM fastin, 77, 212, 224, 257, 123, 201, 118, 96    Lab Results   Component Value Date    HGBA1C 9.8 (H) 01/10/2018    HGBA1C 10.1 (H) 2017    HGBA1C 8.7 (H) 2017     Lab Results   Component Value Date    " MICROALBUR 0.88 01/10/2018    LDLCALC 87 11/02/2017    CREATININE 0.83 01/10/2018       Dyslipidemia: Ramona was not able to get a refill of her cholesterol medication when she went to her pharmacy recently.    Lab Results   Component Value Date    CHOL 150 11/02/2017    CHOL 146 07/15/2016    CHOL 169 06/03/2015     Lab Results   Component Value Date    HDL 48 (L) 11/02/2017    HDL 41 (L) 07/15/2016    HDL 51 06/03/2015     Lab Results   Component Value Date    LDLCALC 87 11/02/2017    LDLCALC 85 07/15/2016    LDLCALC 105 06/03/2015     Lab Results   Component Value Date    TRIG 75 11/02/2017    TRIG 98 07/15/2016    TRIG 65 06/03/2015     No components found for: CHOLHDL  ----------------    Ramona's medication list was reviewed with them, discussing reason for use, directions for use, and potential side effects of each medication as needed. Indication, safety, efficacy, and convenience was assessed for all medications addressed above.  No environmental factors were noted currently affecting patient.  This care plan was communicated via EMR with her primary care provider, Coreen Kendall MD, who is the authorizing prescriber for this visit.  Direct supervision was available by either the patient's PCP or other available provider.  Time and complexity billing metrics are included in the docflowsheet linked to this visit.  Time spent: 45 minutes    Batsheva Dunlap, Student Pharmacist  Cosigner:  Omega Thorpe PharmD  [facility] Pharmacist  [place] [facility]  [phone]

## 2021-06-16 NOTE — PROGRESS NOTES
MTM Encounter    Assessment/Plan  Diabetes: Not controlled to goal of <8%.  Glucose control is improving, but having mild hypoglycemia 1-2 times per month.  Adjusting insulin doses today to help balance glucose readings throughout the day.  Counseled on importance of consistent eating; advised her to cut insulin dose by 50% when not eating at all.  - Increase AM dose to 28 units  - Decrease PM dose to 32 units    Hypertension: Borderline control to 140/90.  BP at goal today, previously under very good control and recent reports of hypertension during home BP check.  It is not clear that Ramona needs an antihypertensive at this point. If she did, propranolol would be a find choice to go back to, though amlodipine or HCTZ would cause less fatigue.  I have asked her to start going for walks whenever possible, which will probably be enough to bring her BP under good control.  - exercise   - consider starting HCTZ 12.5mg qam    Dysuria: No recent changes; chronic in nauture x 6 mo+. PCP notified.  Ramona plans to discuss this in three weeks with Dr. Kendall.  Advised to come in sooner if pain worsens, or she develops worsening dysuria or hematuria.  Clear culture in Jan with symptoms largely unchanged since then.      Follow Up  Seeing PCP in 3 weeks  Next MTM appt in 2-3 months    Subjective/Objective  Ramona Wilder is a 57 y.o. female here for a follow up medication therapy management (MTM) appointment; her chief concern today is diabetes.    Diabetes:  Ramona is taking Humalog 50/50 25 units in the morning and 35 units in the evening plus sliding scale.  Ramona forgot her glucometer at home.  She felt a little short of breath and shaky when her blood sugar gets down below 80mg/dL.  She had one episode of hypoglycemia in the evening about three weeks ago.  She didn't feel like eating, but still took her full dose of insulin.  Over the past week AM fasting  mg/dL in the morning.  PM fasting has been 110-200    Lab Results    Component Value Date    HGBA1C 9.8 (H) 01/10/2018    HGBA1C 10.1 (H) 09/20/2017    HGBA1C 8.7 (H) 05/19/2017     Lab Results   Component Value Date    MICROALBUR 0.88 01/10/2018    LDLCALC 87 11/02/2017    CREATININE 0.83 01/10/2018     Hypertension:  BP Readings from Last 3 Encounters:   03/21/18 140/74   01/10/18 130/68   11/02/17 138/74     Dysuria: Ramona always feels moderate pain in her low back when she goes to the bathroom.  She used to feel it in her low abdomen six months ago.  The pain is worse at night.  No difficulty with urinating, no blood in the urine.  This is a long standing problem.  Clear culture in [place].  Ramona does not feel like anything needs to be done about this today, she just wants to mention it.  ----------------    Ramona's medication list was reviewed with them, discussing reason for use, directions for use, and potential side effects of each medication as needed. Indication, safety, efficacy, and convenience was assessed for all medications addressed above.  No environmental factors were noted currently affecting patient.  This care plan was communicated via EMR with her primary care provider, Coreen Kendall MD, who is the authorizing prescriber for this visit.  Direct supervision was available by either the patient's PCP or other available provider.  Time and complexity billing metrics are included in the docflowsheet linked to this visit.  Time spent: 30 minutes      Omega Thorpe PharmD  [facility] Pharmacist  [place] and [facility]  [phone]

## 2021-06-16 NOTE — PROGRESS NOTES
-Patient reported that there is no issue refilling medications at pharmacy and health insurance is active.  -Patient stated that there is no form or paperwork that she needs help with at this time.  -Patient agreed to meet with [facility] RN for PCAM to continue service with [facility].  -Care Guide will outreach patient again in 6 weeks and informed to call sooner if needed.          Next outreach date: 4/25/2018.

## 2021-06-16 NOTE — PROGRESS NOTES
"RN Recommendations and Referrals  Pharm-D consult/follow up: Continue MTM. Have upcoming appointment 3/21/18  Dental exam: Care guide assist with scheduling if have not had yearly.   Eye exam: Due Feb 2018. Care guide assist with scheduling if have not had done for 2018.   Foto Exam/Podiarty: Care guide assist if have not had done yearly.     Action Plan    RN Will  Will not add the patient to CCC tracking list  Be available to the patient as nursing needs arise    Care Guide Will  Follow standard work for already enrolled clinic care coordination patient.     Goals  Goals        Patient Stated      I would like to control my diabetes A1C under 8.0. (pt-stated)            Action steps to achieve this goal:    1. I will follow up with pharmacist on 3/21/2018 for MTM.  2. I will come see [facility] RN for PCAM on 3/19/2018 to continue service with Clinic Care Coordination.  3. I will come get my physical done with PCP on 4/13/2018.  4. In the meantime, I will continue taking current medications for diabetes.         Goal Update: 3/14/2018.           Other      Monitoring            9/20/17:  Ramona will check her blood sugar 2x/daily and bring her meter with her to her appointments.          Diabetic population of intervention patient  A1C:1/10/18 9.8.   Last Diabetic Eye Exam: Due. Last appt Feb 2017- [facility].   Last Diabetic Foot Exam: Denies problem.     1. What have blood sugars been running? \" 100- 200.\"   2. How often does the patient check blood sugars? 1- 2 times a day.   3. Has the patient felt symptomatic with low readings?  \" weak.\"   4. What does the patient do to feel better or raise blood sugars? Eat some food in the house.   Clinic Care Coordination RN Assessed Needs  Currently enrolled [facility] patient here for RN Assessment. She denies of concerns requiring [facility] SW or RN service at this time. Continue with medication management at home with support of MTM and care guide outreach. Denies concerns with medication " "& insulin refills and  at [facility]. Discussed upcoming appointments and provided a copy of appointment calendar for patient use.     Patient Centered Assessment Method-PCAM TOTAL SCORE: 15 (3/19/2018  1:52 PM)  Level 1:  A score of 12-24 indicates that the patient has very little to no initial need for RN or SW intervention.  Standard care guide outreach/support should be appropriate at the discretion of the .  The care guide can reach out to the RN/SW as needed for support or with new concerns.    PCAM (Patient Centered Assessment Method)   HEALTH AND WELL-BEING  Physical Health Concerns: Diabetes  Other Physical Health Concerns:: USing insulin.   RN Assessment: Physical Health Needs: No identified areas of uncertainty or problems already being investigated  RN Assessment: Physical Health Problems: Mild impact on mental well-being e.g. \"feeling fed-up\", \"reduced enjoyment\"  Mental Health Concerns: Denies concerns that require further investigation  Other Mental Health Concerns:: Taking  antidepressant and have [employer]. States have adequate support.   RN Assessment:Other Mental Well-Being Concern: No identified areas of concern  Lifestyle/Habit Concerns: Exercise/Activity, Tobacco use  Other Lifestyle/Habit Concerns:: Sedentary. Prefers to stay in door. Chew betel nut \" sometime.\"   RN Assessment: Lifestyle Behaviors: Some mild concern of potential negative impact on well-being  SOCIAL ENVIRONMENT  Home Environment Concerns: Denies concerns that require further investigation  Other Home Environment Concerns:: Patient and spouse lives with their adult children in a house. Total of 10 people. She wants to remain living with her children in their house.   RN Assessment: Home Environment: Consistently safe, supportive, stable, no identified problems  Daily Activities Concerns: Denies concerns that require further investigation  Other Daily Activities Concerns:: States her 28 year old son who is " "working at [employer] helps with SSI and Medical Assistance paper work.   RN Assessment: Daily Activites: No identified problems or perceived positive benefits  Social Network Concerns: Denies concerns that require further investigation  Other Social Network Concerns:: Knows have a [employer] \" \" whoe calls every three months and sees her yearly and [employer] worker comes to see her every 1-2 weeks.   RN Assessment: Social Network: Good participation with social networks  Financial Status and Service Concerns: Disabled  Other Financial Status and Service Concerns:: Have SSI benefit &  Medical Assistance. Overincome for SNAP due to spouse working at times.   RN Assessment: Financial Resources: Financially secure, some resource challenges  HEALTH LITERACY AND COMMUNICATION  Understanding of Health and Wellbeing Concerns: Reasonable to good understanding but does not feel able to engage with advise at this time  Other Undertanding of Health and Wellbeing Concerns:: Shows effort with attending appointments and outreach from care guide.   RN Assessment: Health Literacy: Reasonable to good understanding and already engages in managing health or is willing to undertake better management  Engagement Concerns: Adequate communication, with or without minor barriers  Other Engagement Concerns:: Requires .   RN Assessment: Engagement: Clear and open communication, no identified barriers  Barriers to Compliance with Medical Recommendations: Denies concerns that require further investigation  SERVICE COORDINATION  Other Services: Other care/services not required at this time  Coordination of Services: All required care/services in place and well coordinated  PCAM TOTAL SCORE: 15    Emergency Plan  Diabetes: Sick-Day Plan  Infections, the flu, and even a cold, can cause your blood sugar to rise. And, eating less, nausea, and vomiting may cause your blood glucose to fall (hypoglycemia). Ask your health " care provider to help you develop a sick-day plan. The following information can help.    Call your health care provider if:    You vomit or have diarrhea for more than 6 hours.    Your blood glucose level is higher than usual or over 250 mg/dL after you have taken extra insulin (if recommended in your sick-day plan).    You take oral medicine for diabetes, and your blood sugar is higher than usual or over 250 mg/dL, before a meal and stays that high for more than 24 hours.    Your blood glucose is lower than usual or less than 70 mg/dL    You have moderate to large amounts of ketones in your blood or urine.    You aren t better after 2 days.

## 2021-06-16 NOTE — PROGRESS NOTES
Medication Therapy Management (MTM) Encounter  Assessment:                                                      1. Type 2 diabetes mellitus without complication, with long-term current use of insulin (H)  A1c continues to increase, not at goal of less than 7%.  Although, patient's blood sugars fluctuate drastically and have been lower lately.  Ideally, would have patient checking blood sugars twice daily, though she refuses and previously has not been able to use the freestyle digna CGM.  Recommended alternating morning testing and evening testing every other day, patient will try.  Additionally, unclear whether patient has received/been taking Invokana 300 mg daily for the last couple months, therefore recommend restarting lower dose, Invokana 100 mg daily with a goal of eventually decreasing insulin doses.    2. Gastropathy  Patient continues to take omeprazole and Tums as needed, no changes recommended today.      3. Moderate Recurrent Major Depression  Patient recently started on Escitalopram for depression, patient not experiencing any side effects.  MTM pharmacist suggested referral to cognitive behavioral therapy, patient declines at this time.  Defer to PCP for continued management of depression.    4. Hypertension  Today's BP was not meeting goal of less than 140/90, though previously has been.  Recommend continued monitoring of blood pressure, could consider adding hydrochlorothiazide in the future if blood pressure remains elevated.  Last BMP was WNL.    5. Other hyperlipidemia  Patient appropriately taking high intensity, rosuvastatin 40 mg daily.  Lipids monitored within the last year, no changes recommended.    6. Vitamin D deficiency  Last vitamin D was WNL, though greater than a year old.  Could consider rechecking vitamin D in the future to determine if supplementation appropriate.    7. High priority for 2019-nCoV vaccine  Patient open to getting the COVID-19 vaccine, will have clinic contact  patient for [facility] clinic schedule on 4/10/2021.    Plan:                                                     1.  Patient to restart Invokana 100 mg once daily; removed Invokana 300 mg from med list today  2.  [facility] to reach out and schedule patient for COVID-19 vaccine clinic on 4/10/2021    Future:  --Goal is to decrease insulin requirement and increase dose of Invokana when able; recommend monitoring of creatinine and potassium at follow-up visit    Follow Up  Return in about 1 month (around 5/4/2021) for Medication Review.  PCP 6/4  Subjective & Objective                                                     Ramona Wilder is a 60 y.o. female coming in for an initial visit for Medication Therapy Management. She was referred to me from Coreen Kendall MD. Professional telephonic Dee  utilized today.    Reason for visit: A1c>9  Medication Adherence/Access: Using a once daily pillbox for her medications.   Self management, no longer gets help from her kids like previously. States that she is not missing her medications.     Diabetes: Novolog 70/30 mix insulin - AM 25 and PM 15 directly before meals, Metformin ER 1,000 mg daily. States that she rarely misses doses of insulin, only 1-2 times a month. Denies any medication side effects. Patient admits that sometimes she only takes 1 metformin pill a day because she feels like she is taking too many medications.   -- Brings in an empty vial of invokana 100 mg and wondering if she should still be taking this anymore, no more refills at this time. Per medication list, patient to be taking invokana 300 mg - per pharmacy, rx was filled and dispensed on 2/26/21 90 day supply, patient does not recall this.   SMBG: once daily    Ranges (per pt report) : Patient not interested in checking BG more than once daily - previously tried and failed digna CGM.   Date FBG Date FBG   4/1 105 3/25 159   3/31 132 3/24 76   3/30 137 3/23 105   3/29 204 3/22 134   3/28  170 3/20 230   3/27 79 3/19 75   3/26 168 3/18 71   Hypoglycemia: Having some lows lately - states that she feels shaky sweaty, etc and knows that she has to eat some cereal and milk in the morning if she feels like this. Mostly only happening in the morning, twice monthly  ACEi/ARB: None, reported allergy to lisinopril  Statin: Rosuvastatin 40 mg daily before bed  Aspirin: Not taking due to reported allergy  Diet: Eating 2 meals a day. When she eats sticky rice or sugar with a snack then her number is high the next day. States that if she eats less than her BG is lower. Thinks her appetite is getting better.   Lab Results   Component Value Date    HGBA1C 10.0 (H) 02/26/2021    HGBA1C 9.5 (H) 11/20/2020    HGBA1C 8.0 (H) 08/05/2020     Lab Results   Component Value Date    MICROALBUR 2.54 (H) 11/20/2020    LDLCALC 120 11/20/2020    CREATININE 1.17 (H) 02/26/2021     Wt Readings from Last 3 Encounters:   04/02/21 147 lb (66.7 kg)   02/26/21 143 lb (64.9 kg)   01/15/21 144 lb (65.3 kg)     GERD: Has been out of her omeprazole 20 mg daily and Tums prn. Patient has been having symptoms of heartburn lately. Mostly pain after eating. Not eating spicy foods.     Depression: escitalopram 10 mg daily. She is still not sleeping well. She has been crying a lot because of what is happening in her home country. Patient does think that the medication is helping a little bit. When asked about therapy, patient states that she has been offered that but she does not want to do that yet because of COVID19.  Patient becomes tearful today when discussing with happening in her home country.  PHQ-9 Total Score: 8 (1/15/2021  3:58 PM)    Vitamin D Deficiency: Ergocalciferol 50,000 units once weekly.   Vitamin D, Total (25-Hydroxy)   Date Value Ref Range Status   01/10/2018 42.9 30.0 - 80.0 ng/mL Final     Hypertension/Microalbuminuria: Losartan 100 mg daily.  Denies dizziness, slight headache. Denies any pain today.   Patient has  documented allergy to lisinopril, swelling.  BP Readings from Last 3 Encounters:   04/02/21 (!) 142/92   02/26/21 120/60   01/15/21 120/68     Lab Results   Component Value Date    K 4.7 02/26/2021     Hyperlipidemia: Taking rosuvastatin 40 mg daily.   Lab Results   Component Value Date    CHOL 192 11/20/2020    CHOL 220 (H) 01/31/2020    CHOL 253 (H) 11/14/2018     Lab Results   Component Value Date    HDL 62 11/20/2020    HDL 47 (L) 01/31/2020    HDL 59 11/14/2018     Lab Results   Component Value Date    LDLCALC 120 11/20/2020    LDLCALC 153 (H) 01/31/2020    LDLCALC 165 (H) 11/14/2018     Lab Results   Component Value Date    TRIG 52 11/20/2020    TRIG 99 01/31/2020    TRIG 143 11/14/2018     COVID-19 Vaccine: Patient asking to get COVID-19 vaccinated. Previously had declined vaccine, now she is asking for it.     PMH: reviewed in EPIC   Allergies/ADRs: reviewed in EPIC   Alcohol: Unable to review today.    Tobacco:   Social History     Tobacco Use   Smoking Status Never Smoker   Smokeless Tobacco Current User     Types: Chew   Tobacco Comment     smokes outside, pt chews betel nut     Today's Vitals:   Vitals:    04/02/21 1400 04/02/21 1430 04/02/21 1434   BP: 150/88 162/88 (!) 142/92   Pulse:  83    SpO2:  98%    Weight: 147 lb (66.7 kg)       ----------------  The patient was given a summary of these recommendations as an after visit summary    I spent 60 minutes with this patient today.   All changes were made via collaborative practice agreement with Coreen Kendall MD. A copy of the visit note was provided to the patient's provider.     Heather Matthews), PharmD, BCACP  Medication Therapy Management Pharmacist  [facility]    Current Outpatient Medications   Medication Sig Dispense Refill     acetaminophen (TYLENOL) 500 MG tablet TAKE 1 TABLET BY MOUTH EVERY 6 HOURS AS NEEDED FOR PAIN. 100 tablet 5     blood glucose test (GLUCOSE BLOOD) strips Use to check blood  "sugar three times daily. Ok whichever brand is covered by her insurance and compatible with her glucometer. 300 strip 4     blood glucose test (ONETOUCH VERIO TEST STRIPS) strips Use 1 each As Directed 4 (four) times a day. Dispense brand per patient's insurance at pharmacy discretion. 100 strip 11     calcium, as carbonate, (TUMS) 200 mg calcium (500 mg) chewable tablet Chew 1 tablet (200 mg total) daily. 100 tablet 1     canagliflozin (INVOKANA) 100 mg Tab Take 1 tablet (100 mg total) by mouth daily. 30 tablet 3     ergocalciferol (ERGOCALCIFEROL) 1,250 mcg (50,000 unit) capsule Take 1 capsule (50,000 Units total) by mouth once a week. 12 capsule 3     escitalopram oxalate (LEXAPRO) 10 MG tablet Take 1 tablet (10 mg total) by mouth daily. 90 tablet 4     insulin aspart protamine-insulin aspart (NOVOLOG MIX 70-30FLEXPEN U-100) 100 unit/mL (70-30) pen Inject 20 Units before daytime meal and 15 Units before evening meal. (Patient taking differently: Inject 25 Units before daytime meal and 15 Units before evening meal.) 45 mL 4     lancets (ONETOUCH DELICA LANCETS) 33 gauge Misc Use to check blood sugar 3 per day. 100 each 11     losartan (COZAAR) 100 MG tablet Take 1 tablet (100 mg total) by mouth daily. 90 tablet 3     metFORMIN (GLUCOPHAGE-XR) 500 MG 24 hr tablet Take 1,000 mg by mouth daily with supper.       omeprazole (PRILOSEC) 20 MG capsule Take 1 capsule (20 mg total) by mouth daily before breakfast. 30 capsule 5     pen needle, diabetic (TRUEPLUS PEN NEEDLE) 32 gauge x 5/32\" Ndle USE TO INJECT INSULIN twice TIMES DAILY OR AS DIRECTED 200 each 4     rosuvastatin (CRESTOR) 40 MG tablet Take 1 tablet (40 mg total) by mouth at bedtime. 90 tablet 4     No current facility-administered medications for this visit.         Medication Therapy Recommendations  No medication therapy recommendations to display         "

## 2021-06-17 NOTE — PROGRESS NOTES
Medication Therapy Management (MTM) Encounter  Assessment:                                                      1. Type 2 diabetes mellitus without complication, with long-term current use of insulin (H)  A1c not yet at goal of less than 7%.  Patient recently restarted Invokana, also taking NovoLog 70/30 mix insulin and Metformin (though not currently at prescribed dose).  Recommended patient titrate Metformin dose by adding 1 tablet every 2 weeks as tolerated, considering recent GI symptoms.  Additionally, recommend follow-up monitoring kidney function and potassium prior to increasing Invokana dose again, only recommend Invokana 300 mg daily if GFR greater than 60.  Additionally, if considering dose increase of Invokana in the future, would recommend insulin dose decrease. Only getting patients fasting sugars, patient has previously failed CGM and declines monitoring more often.    2. Gastropathy  Patient continues to take omeprazole and Tums, recommend continuation without change.    3. Moderate Recurrent Major Depression  Stable on current therapy, no changes recommended today.    4. Hypertension  At last visit, BP was not meeting goal of less than 140/90.  Recommend follow-up BP at next PCP visit and follow-up BMP.  Recommend continuation without change.    5. Other hyperlipidemia  Patient appropriately taking high intensity, rosuvastatin 40 mg daily.  Lipids monitored within the last year, no changes recommended.    6. Vitamin D deficiency  Last vitamin D was WNL, though greater than a year old.  Could consider rechecking vitamin D in the future to determine if supplementation appropriate.    Plan:                                                     1. Order labs for follow up: Scr and K+  2. Recommended increasing metformin dose up to 2 g daily as tolerated     Future:   1. If GFR >60, could consider increasing invokana to 300 mg daily with goal to decrease daily insulin requirement    Follow Up  Return in  about 8 weeks (around 6/30/2021) for Medication Review.  PCP 6/4  Subjective & Objective                                                     Ramona Wilder is a 60 y.o. female called for a follow up visit for Medication Therapy Management. She was referred to me from Coreen Kendall MD. Professional telephonic Dee  utilized today.    Reason for visit: Follow Up from [facility] visit on 4/2/21  Medication Adherence/Access: Using a once daily pillbox for her medications.   Self management, no longer gets help from her kids like previously. States that she is not missing her medications.     Diabetes: Novolog 70/30 mix insulin - AM 25 and PM 15 directly before meals, Metformin  mg 2 times daily (prescribed 1000 two times a day), and invokana 100 mg daily (restarted on 4/2/21).  Denies any medication side effects, having some bloating recently. Patient admits that sometimes she only takes 1 metformin pill a day because she feels like she is taking too many medications. States that she is staying hydrated.  -- States that if her appetite is down she will notice her blood sugar getting lower. States that she is eating regularly every day, but will only eat a smaller amount at times.   SMBG: once daily    Ranges (per pt report) : Patient not interested in checking BG more than once daily - previously tried and failed digna CGM.   Date FBG Date FBG   5/4 122 4/27 158 PM   5/3 134 4/26 84   5/2 131 4/25 118   5/1 80 4/24 116   4/30 81 4/23 94   4/29 70 4/22 121   4/28 90 4/21 139   Hypoglycemia: Having some lows lately - states that she feels shaky sweaty, etc and knows that she has to drink some juice and she feels better.   ACEi/ARB: losartan 100 mg daily  Statin: Rosuvastatin 40 mg daily before bed  Aspirin: Not taking due to reported allergy  Diet: Eating 2 meals a day. When she eats sticky rice or sugar with a snack then her number is high the next day. States that if she eats less than her BG is lower. Thinks  her appetite is getting better.   Lab Results   Component Value Date    HGBA1C 10.0 (H) 02/26/2021    HGBA1C 9.5 (H) 11/20/2020    HGBA1C 8.0 (H) 08/05/2020     Lab Results   Component Value Date    MICROALBUR 2.54 (H) 11/20/2020    LDLCALC 120 11/20/2020    CREATININE 1.17 (H) 02/26/2021     Wt Readings from Last 3 Encounters:   04/02/21 147 lb (66.7 kg)   02/26/21 143 lb (64.9 kg)   01/15/21 144 lb (65.3 kg)     GERD: Has been out of her omeprazole 20 mg daily AM before eating and Tums two times a day prn. Patient has been having symptoms of heartburn lately. Mostly pain after eating. Not eating spicy foods.     Depression: escitalopram 10 mg daily. States that the current medication has been helping her mood, mood has been better lately. Sometimes eating well, sometimes less. Sleep has been good.   At previous visit, when asked about therapy, patient states that she has been offered that but she does not want to do that yet because of COVID19.  PHQ-9 Total Score: 8 (1/15/2021  3:58 PM)    Vitamin D Deficiency: Ergocalciferol 50,000 units once weekly.   Vitamin D, Total (25-Hydroxy)   Date Value Ref Range Status   01/10/2018 42.9 30.0 - 80.0 ng/mL Final     Hypertension/Microalbuminuria: Losartan 100 mg daily.  Denies dizziness, slight headache. Denies any pain today.   Patient has documented allergy to lisinopril, swelling.  BP Readings from Last 3 Encounters:   04/02/21 (!) 142/92   02/26/21 120/60   01/15/21 120/68     Lab Results   Component Value Date    K 4.7 02/26/2021     Hyperlipidemia: Taking rosuvastatin 40 mg daily. Denies symptoms of myalgias.   Lab Results   Component Value Date    LDLCALC 120 11/20/2020     COVID-19 Vaccine: Patient received COVID-19 vaccine - BAC ON TRAC one dose. No side effects. 4/10/21.     PMH: reviewed in EPIC   Allergies/ADRs: reviewed in EPIC   Alcohol: No use per patient  Tobacco:   Social History     Tobacco Use   Smoking Status Never Smoker   Smokeless Tobacco Current User      Types: Chew   Tobacco Comment     smokes outside, pt chews betel nut     ----------------  The patient declined an after visit summary    I spent 45 minutes with this patient today.   All changes were made via collaborative practice agreement with Coreen Kendall MD. A copy of the visit note was provided to the patient's provider.     Heather Matthews), PharmD, BCACP  Medication Therapy Management Pharmacist  [facility]    Telemedicine Visit Details    Type of service:  Telephone     Start Time: 10:00 AM  End Time (time video/phone call stopped): 10:45 AM    Originating Location (pt. Location): Home    Distant Location (provider location):  [facility] MEDICATION [facility]ERAPY [facility]    Mode of Communication:   Telephone     Current Outpatient Medications   Medication Sig Dispense Refill     acetaminophen (TYLENOL) 500 MG tablet TAKE 1 TABLET BY MOUTH EVERY 6 HOURS AS NEEDED FOR PAIN. 100 tablet 5     blood glucose test (GLUCOSE BLOOD) strips Use to check blood sugar three times daily. Ok whichever brand is covered by her insurance and compatible with her glucometer. 300 strip 4     blood glucose test (ONETOUCH VERIO TEST STRIPS) strips Use 1 each As Directed 4 (four) times a day. Dispense brand per patient's insurance at pharmacy discretion. 100 strip 11     calcium, as carbonate, (TUMS) 200 mg calcium (500 mg) chewable tablet Chew 1 tablet (200 mg total) daily. 100 tablet 1     canagliflozin (INVOKANA) 100 mg Tab Take 1 tablet (100 mg total) by mouth daily. 30 tablet 3     ergocalciferol (ERGOCALCIFEROL) 1,250 mcg (50,000 unit) capsule Take 1 capsule (50,000 Units total) by mouth once a week. 12 capsule 3     escitalopram oxalate (LEXAPRO) 10 MG tablet Take 1 tablet (10 mg total) by mouth daily. 90 tablet 4     insulin aspart protamine-insulin aspart (NOVOLOG MIX 70-30FLEXPEN U-100) 100 unit/mL (70-30) pen Inject 20 Units before daytime meal and 15 Units before  "evening meal. (Patient taking differently: Inject 25 Units before daytime meal and 15 Units before evening meal.) 45 mL 4     lancets (ONETOUCH DELICA LANCETS) 33 gauge Misc Use to check blood sugar 3 per day. 100 each 11     losartan (COZAAR) 100 MG tablet Take 1 tablet (100 mg total) by mouth daily. 90 tablet 3     metFORMIN (GLUCOPHAGE-XR) 500 MG 24 hr tablet Take 1,000 mg by mouth daily with supper.       omeprazole (PRILOSEC) 20 MG capsule Take 1 capsule (20 mg total) by mouth daily before breakfast. 30 capsule 5     pen needle, diabetic (TRUEPLUS PEN NEEDLE) 32 gauge x 5/32\" Ndle USE TO INJECT INSULIN twice TIMES DAILY OR AS DIRECTED 200 each 4     rosuvastatin (CRESTOR) 40 MG tablet Take 1 tablet (40 mg total) by mouth at bedtime. 90 tablet 4     No current facility-administered medications for this visit.         Medication Therapy Recommendations  Type 2 diabetes mellitus with hyperglycemia, with long-term current use of insulin (H)    Current Medication: canagliflozin (INVOKANA) 100 mg Tab   Rationale: Medication requires monitoring - Needs additional monitoring - Effectiveness   Recommendation: Order Lab - f/u SCr and K+   Status: Accepted per CPA                "

## 2021-06-17 NOTE — PROGRESS NOTES
ASSESSMENT/PLAN:    Problem List Items Addressed This Visit     None      Visit Diagnoses     Lateral epicondylitis, left elbow    -  Primary  We discussed conservative care with epicondylitis elbow brace and NSAIDs versus injection and patient prefers to go for injection.  Injected meds as noted through 25-gauge 1 inch needle into point of maximum tenderness over the lateral condyle and thinning.  No complications.    Relevant Medications    lidocaine 10 mg/mL (1 %) injection 2 mL (Completed)    triamcinolone acetonide 40 mg/mL injection 40 mg (KENALOG-40) (Completed)    Dysuria        Urinalysis did not look very worrisome when she was here and so we elected to not treat and await culture, which ultimately came back negative.    Urinalysis-UC if Indicated (Completed)    Culture, Urine (Completed)    Screening for cervical cancer        Relevant Orders    Gynecologic Cytology (PAP Smear) (Completed)    HPV High Risk DNA Cervical (Completed)    Left axillary pain      Because the pain seems most consistent sebaceous cyst is minimally symptomatic no treatment was initiated.  However if it is worsening or not improving, would need to reexamine and consider whether is definitely cyst versus could be a lymph node         Diabetes:  Reports normal eye exam in the last year; will bring in ophtho card so we can get records.    SUBJECTIVE:  Ramona Wilder is a 57 y.o. female here for Pap smear.  She recently had a physical and had not quite been ready to get a Pap smear that day but she is ready now.  She does have some other concerns.    She has recurrent dysuria which is been a problem off and on in the past.  A little worse lately.  Want to check a urine.    When she goes for mammogram she brings up some left axillary pain which she has had in the past gets bumps in this area.  They come and go.    Her biggest concern today is left elbow pain.  She points to the left lateral elbow.  Is not sure what started it.  Has not  "particularly tried any medicines or special treatment.  Getting worse if anything.      PHQ-2 Total Score: 1 (1/10/2018 11:00 AM)  PHQ-9 Total Score: 2 (1/10/2018 11:00 AM)     The following portions of the patient's history were reviewed and updated as appropriate: allergies, current medications and problem list  Current Outpatient Prescriptions on File Prior to Visit   Medication Sig Dispense Refill     atorvastatin (LIPITOR) 80 MG tablet TAKE 1 TABLET BY MOUTH DAILY AT BEDTIME. 30 tablet 11     blood glucose test (GLUCOSE BLOOD) strips Use to check blood sugar three times daily.  One touch verio test strips. 300 strip 3     capsaicin (ZOSTRIX) 0.025 % cream Apply to affected area 4 times daily 60 g 11     ergocalciferol (ERGOCALCIFEROL) 50,000 unit capsule Take 1 capsule (50,000 Units total) by mouth once a week. 12 capsule 3     escitalopram oxalate (LEXAPRO) 10 MG tablet TAKE 1 TABLET DAILY 90 tablet 3     insulin lispro protamin-lispro 100 unit/mL (50-50) Susp Inject 28 units in the morning and 32 units in the evenings with food. 15 mL 11     lancets (ONETOUCH DELICA LANCETS) 33 gauge Misc Use to check blood sugar 3 per day. 300 each 3     MAPAP EXTRA STRENGTH 500 mg tablet TAKE 1 TABLET BY MOUTH EVERY 6 HOURS AS NEEDED FOR PAIN 50 tablet 10     pantoprazole (PROTONIX) 40 MG tablet TAKE ONE TABLET BY MOUTH DAILY 90 tablet 3     pen needle, diabetic (BD ULTRA-FINE CITLALLI PEN NEEDLES) 32 gauge x 5/32\" Ndle USE TO INJECT INSULIN FOUR TIMES DAILY OR AS DIRECTED 100 each 11     ONETOUCH VERIO strips TEST 4 TIMES DAILY. 100 strip 11     No current facility-administered medications on file prior to visit.          History   Smoking Status     Never Smoker   Smokeless Tobacco     Former User     Types: Chew     Comment:  smoke outside       OBJECTIVE:  :  /80  Pulse 72  Temp 97.8  F (36.6  C) (Oral)   Resp 20  Ht 4' 10.75\" (1.492 m)  Wt 158 lb 4 oz (71.8 kg)  LMP 05/09/2014  SpO2 98%  BMI 32.24 " kg/m2  Wt Readings from Last 3 Encounters:   05/09/18 158 lb 4 oz (71.8 kg)   04/13/18 160 lb (72.6 kg)   03/21/18 161 lb 5 oz (73.2 kg)         Gen:  A&A, NAD  Breasts: Breast exam is negative without or lesion.  Neuro: Distal sensation and strength of the left upper extremity is normal.  Cardiovascular: Normal distal circulation of the left upper extremity.  Skin: There is a medium sized cyst in the left axilla, perhaps 2 cm in diameter which is non-erythematous and minimally tender.  Musculoskeletal: There is tenderness over the lateral epicondyles.  Diabetic foot exam: No edema.  No sores or significant callus.  Monofilament testing shows intact sensation.  Abdomen: Soft.  Nontender.  Pelvic:Normally developed genitalia with no external lesions or eruptions. Vagina and cervix show no lesions, inflammation, discharge or tenderness. Uterus normal.  No adnexal mass or tenderness.    Results for orders placed or performed in visit on 05/09/18   Culture, Urine   Result Value Ref Range    Culture No Growth    Urinalysis-UC if Indicated   Result Value Ref Range    Color, UA Yellow Colorless, Yellow, Straw, Light Yellow    Clarity, UA Clear Clear    Glucose,  mg/dL (!) Negative    Bilirubin, UA Negative Negative    Ketones, UA Negative Negative    Specific Gravity, UA <=1.005 1.005 - 1.030    Blood, UA Negative Negative    pH, UA 6.0 5.0 - 8.0    Protein, UA Negative Negative mg/dL    Urobilinogen, UA 0.2 E.U./dL 0.2 E.U./dL, 1.0 E.U./dL    Nitrite, UA Negative Negative    Leukocytes, UA Trace (!) Negative    Bacteria, UA None Seen None Seen hpf    RBC, UA None Seen None Seen, 0-2 hpf    WBC, UA 0-5 None Seen, 0-5 hpf    Squam Epithel, UA 0-5 None Seen, 0-5 lpf   Gynecologic Cytology (PAP Smear)   Result Value Ref Range    Case Report       Gynecologic Cytology Report                       Case: [id number]                                   Authorizing Provider:  Coreen Kendall MD      Collected:            05/09/2018 1107              Ordering Location:     [facility]            Received:            05/09/2018 1107                                     Medicine/OB                                                                  First Screen:          ASHLEIGH Tatum                                                                            (ASCP)                                                                       Specimen:    SUREPATH PAP, SCREENING, Endocervical/cervical                                             Interpretation       Negative for squamous intraepithelial lesion or malignancy    Result Flag Normal Normal    Specimen Adequacy       Satisfactory for evaluation, endocervical/transformation zone component present    HPV Reflex? Yes regardless of result     HIGH RISK No     LMP/Menopause Date Menopause     Abnormal Bleeding No     Pt Status Not Applicable     Birth Control/Hormones None     Previous Normal/Date 11/16/12     Prev Abn Date/Dx N/A     Cervical Appearance Normal    HPV High Risk DNA Cervical   Result Value Ref Range    HPV Source SurePath     HPV16 DNA Negative NEG    HPV18 DNA Negative NEG    Other HR HPV Negative NEG    Final Diagnosis SEE NOTES     Specimen Description Cervical Cells

## 2021-06-17 NOTE — PROGRESS NOTES
FEMALE PREVENTATIVE EXAM    Assessment and Plan:     Problem List Items Addressed This Visit     Moderate Recurrent Major Depression    Hypertension    Relevant Orders    Basic Metabolic Panel (Completed)    Type 2 diabetes mellitus without complication, with long-term current use of insulin     Reports eye exam last month at a different clinic; will bring info to next appt.    Patient Instructions   Insulin:  27 Units every morning  35 Units every evening for normal meals, increase to 40 Units if bigger meal or having dessert.            Relevant Orders    Glycosylated Hemoglobin A1c (Completed)    Basic Metabolic Panel (Completed)    Hyperlipidemia    Metabolic Tests Nonspecific Elevation Of Transaminase Levels    Relevant Orders    Hepatic Profile (Completed)      Other Visit Diagnoses     Routine general medical examination at a health care facility    -  Primary    was not prepared for Pap today, but will do on another day.  Mammo next visit.         Immunization Review  Adult Imm Review: No immunizations due today      I discussed the following with the patient:   Adult Healthy Living: Importance of regular exercise  Healthy nutrition  Stress management      Subjective:   Chief Complaint: Ramona Wilder is an 57 y.o. female here for a preventative health visit.     HPI:     Diabetes:  Currently taking Insulin: 27 am, 35 evening.  Doesn't ever adjust dose.  One touch Verio flex meter.  's     If eats meat, gets joint pain/cramping.  Fingers really stiff in morning.  Knees, elbows. Mostly left side, lovely elbow joint and muscle.  Swelling sometimes in elbow.  Knees worse with walking and stairs.    Sometimes eating sweets/breads right before bed.      Healthy Habits  Are you taking a daily aspirin? No  Do you typically exercising at least 40 min, 3-4 times per week?  NO  Do you usually eat at least 4 servings of fruit and vegetables a day, include whole grains and fiber and avoid regularly eating high fat foods?  "Yes  Have you had an eye exam in the past two years? Yes  Do you see a dentist twice per year? NO  Do you have any concerns regarding sleep? No    Safety Screen  If you own firearms, are they secured in a locked gun cabinet or with trigger locks? NO  Do you feel you are safe where you are living?: Yes (4/13/2018  9:57 AM)  Do you feel you are safe in your relationship(s)?: Yes (4/13/2018  9:57 AM)    Review of Systems:  Please see above.  The rest of the review of systems are negative for all systems.       Cancer Screening       Status Date      PAP SMEAR Overdue 11/16/2017      Done 11/16/2012 GYNECOLOGIC CYTOLOGY (PAP SMEAR)    MAMMOGRAM Next Due 5/4/2018      Done 5/4/2016 MAMMO SCREENING BILATERAL     Patient has more history with this topic...    COLONOSCOPY Next Due 2/6/2022      Done 2/6/2012 ENDOSCOPY, COLON              History     Reviewed By Date/Time Sections Reviewed    Coreen Kendall MD 4/13/2018 10:34 AM Family    Coreen Kendall MD 4/13/2018 10:33 AM Surgical    Me Thee, [facility] 4/13/2018  9:59 AM Tobacco            Objective:   Vital Signs:   Visit Vitals     BP 98/80     Pulse 73     Temp 97.4  F (36.3  C) (Oral)     Resp 28     Ht 4' 10.75\" (1.492 m)     Wt 160 lb (72.6 kg)     SpO2 96%     BMI 32.59 kg/m2      Wt Readings from Last 3 Encounters:   04/13/18 160 lb (72.6 kg)   03/21/18 161 lb 5 oz (73.2 kg)   01/10/18 155 lb 4 oz (70.4 kg)        PHYSICAL EXAM  General Appearance: Alert, cooperative, no distress, appears stated age  Head: Normocephalic, without obvious abnormality, atraumatic  Eyes: PERRL, conjunctiva/corneas clear, EOM's intact  Ears: Normal TM's and external ear canals, both ears  Throat: Lips, mucosa, and tongue normal; teeth and gums normal  Neck: Supple, symmetrical, trachea midline, no adenopathy;  thyroid: not enlarged, symmetric, no tenderness/mass/nodules  Back: Symmetric, no curvature, ROM normal, no CVA tenderness  Lungs: Clear to auscultation bilaterally, " respirations unlabored  Breasts: No breast masses, tenderness, asymmetry, or nipple discharge.  Heart: Regular rate and rhythm, S1 and S2 normal, no murmur, rub, or gallop,   Abdomen: Soft, non-tender, no masses, no organomegaly  Pelvic: Not examined  Extremities: Extremities normal, atraumatic, no cyanosis or edema  Skin: Skin color, texture, turgor normal, no rashes or lesions  Lymph nodes: Cervical and axillary nodes normal  Neurologic: Normal patellar DTR's, normal gait           The 10-year ASCVD risk score (Haydee MARLEN Jr, et al., 2013) is: 2.1%    Values used to calculate the score:      Age: 57 years      Sex: Female      Is Non- : No      Diabetic: Yes      Tobacco smoker: No      Systolic Blood Pressure: 98 mmHg      Is BP treated: No      HDL Cholesterol: 48 mg/dL      Total Cholesterol: 150 mg/dL          Additional Screenings Completed Today:     Results for orders placed or performed in visit on 04/13/18   Glycosylated Hemoglobin A1c   Result Value Ref Range    Hemoglobin A1c 8.8 (H) 3.5 - 6.0 %   Basic Metabolic Panel   Result Value Ref Range    Sodium 140 136 - 145 mmol/L    Potassium 4.0 3.5 - 5.0 mmol/L    Chloride 105 98 - 107 mmol/L    CO2 24 22 - 31 mmol/L    Anion Gap, Calculation 11 5 - 18 mmol/L    Glucose 200 (H) 70 - 125 mg/dL    Calcium 9.4 8.5 - 10.5 mg/dL    BUN 14 8 - 22 mg/dL    Creatinine 0.78 0.60 - 1.10 mg/dL    GFR MDRD Af Amer >60 >60 mL/min/1.73m2    GFR MDRD Non Af Amer >60 >60 mL/min/1.73m2   Hepatic Profile   Result Value Ref Range    Bilirubin, Total 0.7 0.0 - 1.0 mg/dL    Bilirubin, Direct 0.2 <=0.5 mg/dL    Protein, Total 7.4 6.0 - 8.0 g/dL    Albumin 3.7 3.5 - 5.0 g/dL    Alkaline Phosphatase 89 45 - 120 U/L    AST 25 0 - 40 U/L    ALT 35 0 - 45 U/L

## 2021-06-18 NOTE — PROGRESS NOTES
Attempt 1: Care Guide called patient.  If this patient is returning my call, please transfer to Care Guide at ext [phone].          Next outreach date: 06/04/2018.

## 2021-06-18 NOTE — PROGRESS NOTES
Assessment: /    Plan:    1. Acute cystitis without hematuria  cephalexin (KEFLEX) 500 MG capsule   2. Burning with urination  Urinalysis-UC if Indicated    Culture, Urine   3. Acute right-sided low back pain without sciatica         Cephalexin 3 times daily for 1 week.  Tylenol for the low back pain.  Recheck if any problem.  Patient was seen with professional Dee , Oswaldo Sutherland.      Subjective:    HPI:  Ramona Wilder is a 58-year-old female presenting with dysuria.  Symptoms began 5 days ago.  She took a dose of amoxicillin earlier today.  She has associated lower abdominal discomfort.  Symptoms have been moderate in intensity.  UA was negative on May 9 when she had dysuria.  She also has right low back pain near the SI joint for the past 2 days.  She did not have an injury to the back.    Social Hx: Never smoker.    Past medical history: She has diabetes    Review of Systems: No fever, nausea, vomiting, weakness of extremities.      Current Outpatient Prescriptions   Medication Sig Dispense Refill     atorvastatin (LIPITOR) 80 MG tablet TAKE 1 TABLET BY MOUTH DAILY AT BEDTIME. 30 tablet 11     blood glucose test (GLUCOSE BLOOD) strips Use to check blood sugar three times daily.  One touch verio test strips. 300 strip 3     capsaicin (ZOSTRIX) 0.025 % cream Apply to affected area 4 times daily 60 g 11     cephalexin (KEFLEX) 500 MG capsule Take 1 capsule (500 mg total) by mouth 3 (three) times a day for 7 days. 21 capsule 0     ergocalciferol (ERGOCALCIFEROL) 50,000 unit capsule Take 1 capsule (50,000 Units total) by mouth once a week. 12 capsule 3     escitalopram oxalate (LEXAPRO) 10 MG tablet TAKE 1 TABLET DAILY 90 tablet 3     insulin lispro protamin-lispro 100 unit/mL (50-50) Susp Inject 28 units in the morning and 32 units in the evenings with food. 15 mL 11     lancets (ONETOUCH DELICA LANCETS) 33 gauge Misc Use to check blood sugar 3 per day. 300 each 3     MAPAP EXTRA STRENGTH 500 mg tablet TAKE 1  "TABLET BY MOUTH EVERY 6 HOURS AS NEEDED FOR PAIN 50 tablet 10     ONETOUCH VERIO strips TEST 4 TIMES DAILY. 100 strip 11     pantoprazole (PROTONIX) 40 MG tablet TAKE ONE TABLET BY MOUTH DAILY 90 tablet 3     pen needle, diabetic (BD ULTRA-FINE CITLALLI PEN NEEDLES) 32 gauge x 5/32\" Ndle USE TO INJECT INSULIN FOUR TIMES DAILY OR AS DIRECTED 100 each 11     No current facility-administered medications for this visit.          Objective:    Vitals:    06/05/18 1543   BP: 136/78   Pulse:    Resp:    Temp:    SpO2:        Gen:  NAD, VSS  Lungs:  normal  Heart:  normal  Abdomen:  No HSM, mass or tenderness  Back without CVA tenderness.  Tenderness of the right lumbar paraspinal muscles near the SI joint.  Straight leg raise normal bilateral.    UA is positive for leukocyte Esterase.      ADDITIONAL HISTORY SUMMARIZED (2): Reviewed May 9 note by Dr. Kendall.  DECISION TO OBTAIN EXTRA INFORMATION (1): None.   RADIOLOGY TESTS (1): None.  LABS (1): UA.  MEDICINE TESTS (1): None.  INDEPENDENT REVIEW (2 each): None.     Total Data Points: 3    "

## 2021-06-18 NOTE — PROGRESS NOTES
MTM Follow Up Encounter  Assessment & Plan                                                     1. Type 2 diabetes mellitus without complication, with long-term current use of insulin  Uncontrolled to goal of 7%.  Glucose readings much improved in the past few weeks.  We discussed that when she skips a meal or eats a small meal, she should take 1/2 of her regular insulin dose.  I think as long as she finds her diet, her insulin doses are appropriate and her A1c should continue to follow.  -Half doses of Humalog 50-50 when eating normal meal    2. Chronic pain of left knee  Suspicion that this could be related to statin induced myopathy.  We will do a 1 month trial off of her atorvastatin to see if this improves.  - hold atorvastatin x1 month    3. Other hyperlipidemia  Suspicion that this could be related to statin induced myopathy.  We will do a 1 month trial off of her atorvastatin to see if this improves.  - hold atorvastatin x1 month    4. Heartburn  Advised that pantoprazole safe to take with the rest of her medications including her new antibiotic.  -Resume pantoprazole    Medication Adherence/Access: Minor concerns noted above and below.    Follow Up  Return in about 1 month (around 7/6/2018) for Myalgia, A1c.        Subjective & Objective                                                       Ramona Wilder is a 58 y.o. female coming in for a follow up visit for Medication Therapy Management. She was referred to me from Coreen Kendall MD    Chief Complaint: New antibiotic is making her dizzy    UTI: Ramona was seen for a bladder infection yesterday and treated with Keflex three times a day.  It makes her dizzy for 2-3 hours after each dose.  The dizziness comes back with each subsequent dose.  She has been eating food before each dose.  She feels like she can finish it.  Her bladder infection symptoms are starting to improve.    Diabetes: Ramona is taking Humalog 50/50 25 units in the morning and 37-38 units in the  evening, depending on the size of her meal.  Ramona starts to feel her blood sugar is low when her blood sugar gets below 60mg/dL.  She had on episode of hypoglycemia two weeks ago in the afternoon because she did not eat breakfast.  A review of high blood sugars in the more distant past demonstrated some upper 200s and 300s - Ramona states these were because of ice cream.  AM fastin-120 mg/dL  Lab Results   Component Value Date    HGBA1C 8.8 (H) 2018    HGBA1C 9.8 (H) 01/10/2018    HGBA1C 10.1 (H) 2017     Lab Results   Component Value Date    MICROALBUR 0.88 01/10/2018    LDLCALC 87 2017    CREATININE 0.78 2018     GERD: Ramona is having a lot of heartburn lately.  She stopped taking her pantoprazole    Dyslipidemia:  Leg burning and joint pain: Ramona states this started about three years ago - a long time ago.  She also started atorvastatin 3.5 years ago.    Medication Adherence/Access:  Ramona discontinued 1 of her medications when she started an antibiotic because she felt like she was taking too many medications.  No other concerns.    PMH: reviewed in EPIC   Allergies/ADRs: reviewed in EPIC   Alcohol: reviewed in EPIC   Tobacco:   History   Smoking Status     Never Smoker   Smokeless Tobacco     Current User     Types: Chew     Comment:  smoke outside     Today's Vitals:   Vitals:    18 1510   BP: 136/78   Pulse: 76   Weight: 155 lb 3 oz (70.4 kg)     ----------------    Much or all of the text in this note was generated through the use of Dragon Dictate voice-to-text software. Errors in spelling or words which seem out of context are unintentional. Sound alike errors, in particular, may have escaped editing.    The patient declined an after visit summary    I spent 30 minutes with this patient today; Patient is not Medicare Part D. All changes were made via collaborative practice agreement with Coreen Kendall MD. A copy of the visit note was provided to the patient's  "provider.     Omega Thorpe, PharmD, BCACP  MTM Pharmacist at [facility]        Current Outpatient Prescriptions   Medication Sig Dispense Refill     atorvastatin (LIPITOR) 80 MG tablet TAKE 1 TABLET BY MOUTH DAILY AT BEDTIME. 30 tablet 11     blood glucose test (GLUCOSE BLOOD) strips Use to check blood sugar three times daily.  One touch verio test strips. 300 strip 3     capsaicin (ZOSTRIX) 0.025 % cream Apply to affected area 4 times daily 60 g 11     cephalexin (KEFLEX) 500 MG capsule Take 1 capsule (500 mg total) by mouth 3 (three) times a day for 7 days. 21 capsule 0     ergocalciferol (ERGOCALCIFEROL) 50,000 unit capsule Take 1 capsule (50,000 Units total) by mouth once a week. 12 capsule 3     escitalopram oxalate (LEXAPRO) 10 MG tablet TAKE 1 TABLET DAILY 90 tablet 3     insulin lispro protamin-lispro 100 unit/mL (50-50) Susp Inject 28 units in the morning and 32 units in the evenings with food. 15 mL 11     lancets (ONETOUCH DELICA LANCETS) 33 gauge Misc Use to check blood sugar 3 per day. 300 each 3     MAPAP EXTRA STRENGTH 500 mg tablet TAKE 1 TABLET BY MOUTH EVERY 6 HOURS AS NEEDED FOR PAIN 50 tablet 10     ONETOUCH VERIO strips TEST 4 TIMES DAILY. 100 strip 11     pantoprazole (PROTONIX) 40 MG tablet TAKE ONE TABLET BY MOUTH DAILY 90 tablet 3     pen needle, diabetic (BD ULTRA-FINE CITLALLI PEN NEEDLES) 32 gauge x 5/32\" Ndle USE TO INJECT INSULIN FOUR TIMES DAILY OR AS DIRECTED 100 each 11     No current facility-administered medications for this visit.                            "

## 2021-06-18 NOTE — PROGRESS NOTES
ASSESMENT AND PLAN:  Diagnoses and all orders for this visit:    1. Dysuria, consistent with UTI  -  Sxs started yesterday.  Pt has hx of frequent UTI. No hematuria, no flank pain.  -  Most likely UTI, will start tx.  No allergies to Abx.  UA results given to Pt in clinic; PENDING culture.   -  Discussed indications for emergent and routine f/u.  -  F/u in 3-4 weeks.  May consider US or referral to Urology.    Ordered:  -     Urinalysis-UC if Indicated  -     Culture, Urine  -     cephalexin (KEFLEX) 500 MG capsule; Take 1 capsule (500 mg total) by mouth 2 (two) times a day for 10 days.  Dispense: 20 capsule; Refill: 0  -     phenazopyridine (PYRIDIUM) 100 MG tablet; Take 2 tablets (200 mg total) by mouth 3 (three) times a day as needed for pain.  Dispense: 12 tablet; Refill: 0    2.  Muscle spasm  -  Occasional UE and LE muscle spasms.  No paresthesia, decreased strength/sensation.  -  Risks/benefits of meds discussed. Pt ma take later in day or evening to avoid drowsiness.  -  Follow up if symptoms not better or worsens.    Ordered:  -     cyclobenzaprine (FLEXERIL) 5 MG tablet; Take 2 tablets (10 mg total) by mouth every 8 (eight) hours as needed for muscle spasms.  Dispense: 30 tablet; Refill: 1    3. Immunization due  -  Discussed risks/benefits.    Ordered:  -     Tdap vaccine greater than or equal to 8yo IM    Patient is brought in by Son and present with a Professional , Leila.   Reviewed Medical/Social history and Medications.  See new changes above.   Discussed indications for emergent medical attention and routine F/u.  Patient/Parent/Guardian engaged in decision making process and verbalized understanding of the options discussed and agreed with the final treatment plan.     SUBJECTIVE:  Ramona Wilder is a 58 y.o. Female who presents with Dysuria since yesterday.    Pt hx of UTI and was recently treated 1 month ago for UTI.   Pt and Son is concerned about frequent UTI.  Discussed etiology of UTI  and recommended Pt to keep hydrated, void often, good hygiene, avoiding tight or non-breathable underwear/clothing, avoiding strong/harsh detergent, good control of her DM, exercise, healthy diet.      Pt also complains of occasional muscle spasms in her arms and calves if she stretches or moves the wrong way. Pt denies fever/chills, hematuria, wheezing, SOB, CP, n/v, abdominal pain, diarrhea/constipation, hematochezia, paresthesia, decreased pain or sensation.     ROS:  Comprehensive Review of Systems Negative except stated in HPI.     No past medical history on file.  Patient Active Problem List   Diagnosis     Carotid Artery Stenosis     Moderate Recurrent Major Depression     Anxiety     Insomnia     Hypertension     Vitamin D deficiency     Gastropathy     Type 2 diabetes mellitus without complication, with long-term current use of insulin (H)     Hyperlipidemia     Headache     Chronic Pain     Back Strain     Dermatitis     Metabolic Tests Nonspecific Elevation Of Transaminase Levels     Pain in finger of right hand     Left knee pain     Current Outpatient Prescriptions   Medication Sig Dispense Refill     atorvastatin (LIPITOR) 80 MG tablet TAKE 1 TABLET BY MOUTH DAILY AT BEDTIME. 30 tablet 11     blood glucose test (GLUCOSE BLOOD) strips Use to check blood sugar three times daily.  One touch verio test strips. 300 strip 3     capsaicin (ZOSTRIX) 0.025 % cream Apply to affected area 4 times daily 60 g 11     ergocalciferol (ERGOCALCIFEROL) 50,000 unit capsule Take 1 capsule (50,000 Units total) by mouth once a week. 12 capsule 3     escitalopram oxalate (LEXAPRO) 10 MG tablet TAKE 1 TABLET DAILY 90 tablet 3     insulin lispro protamin-lispro 100 unit/mL (50-50) Susp Inject 28 units in the morning and 32 units in the evenings with food. 15 mL 11     lancets (ONETOUCH DELICA LANCETS) 33 gauge Misc Use to check blood sugar 3 per day. 300 each 3     MAPAP EXTRA STRENGTH 500 mg tablet TAKE 1 TABLET BY MOUTH  "EVERY 6 HOURS AS NEEDED FOR PAIN 50 tablet 10     ONETOUCH VERIO strips TEST 4 TIMES DAILY. 100 strip 11     pantoprazole (PROTONIX) 40 MG tablet TAKE ONE TABLET BY MOUTH DAILY 90 tablet 3     pen needle, diabetic (BD ULTRA-FINE CITLALLI PEN NEEDLES) 32 gauge x 5/32\" Ndle USE TO INJECT INSULIN FOUR TIMES DAILY OR AS DIRECTED 100 each 11     cephalexin (KEFLEX) 500 MG capsule Take 1 capsule (500 mg total) by mouth 2 (two) times a day for 10 days. 20 capsule 0     phenazopyridine (PYRIDIUM) 100 MG tablet Take 2 tablets (200 mg total) by mouth 3 (three) times a day as needed for pain. 12 tablet 0     No current facility-administered medications for this visit.      History   Smoking Status     Never Smoker   Smokeless Tobacco     Current User     Types: Chew     Comment:  smokes outside, pt chews betel nut     OBJECTIVE: /86  Pulse 80  Temp 97.9  F (36.6  C) (Oral)   Resp 24  Ht 4' 10.75\" (1.492 m)  Wt 154 lb 8 oz (70.1 kg)  LMP 05/09/2014  SpO2 99%  BMI 31.47 kg/m2   Recent Results (from the past 24 hour(s))   Urinalysis-UC if Indicated    Collection Time: 06/27/18  4:43 PM   Result Value Ref Range    Color, UA Yellow Colorless, Yellow, Straw, Light Yellow    Clarity, UA Clear Clear    Glucose,  mg/dL (!) Negative    Bilirubin, UA Negative Negative    Ketones, UA Negative Negative    Specific Gravity, UA 1.015 1.005 - 1.030    Blood, UA Negative Negative    pH, UA 6.5 5.0 - 8.0    Protein, UA Negative Negative mg/dL    Urobilinogen, UA 0.2 E.U./dL 0.2 E.U./dL, 1.0 E.U./dL    Nitrite, UA Negative Negative    Leukocytes, UA Small (!) Negative    Bacteria, UA None Seen None Seen hpf    RBC, UA None Seen None Seen, 0-2 hpf    WBC, UA 0-5 None Seen, 0-5 hpf    Squam Epithel, UA 0-5 None Seen, 0-5 lpf     PHYSICAL:  General Alert, awake, not in acute distress.   HEENT:             -Head Atraumatic, normocephalic.            -Eyes PERRL, no erythema, discharge, conjunctiva clear.             -Ears TMs " intact, no drainage, no erythema or edema.            -Nose    Nostrils patent,  no edema.            -Throat Oropharynx without edema, erythema.  Uvula midline, no deviation.             -Neck Neck FROM, no adenopathy.  Thyroid not visibly enlarged.   CV Normal S1 & S2. No murmurs.   RESP Non-labored, RRR, CTAB. No wheezes or crackles.    BACK No flank pain/tenderness.    ABDOMEN Soft,non-tender. No rigidity, guarding.  Normal bowel sounds.     Normal external genitalia.  No cystocele, masses, lesions, discharge.        Britton Britt PA-C

## 2021-06-19 NOTE — PROGRESS NOTES
MTM Follow Up Encounter  Assessment & Plan                                                     1. Type 2 diabetes mellitus without complication, with long-term current use of insulin (H)  Likely controlled to goal of 7%.  Still having severe hypoglycemia 1-2 times per month due to not eating.  I expressed my concerns about ongoing hypoglycemia and reiterated today that she should only take a 1/2 dose when not eating or eating a very small meal.  I do not think she will be adequately controlled if we adjust her insulin to the few days when she is not eating a second meal.  I have asked her to follow up in a month to ensure that this instruction is being carried out as it poses a significant health risk.  - A1c  - Use 17 units of insulin when not eating a full meal instead of 35 units    2. Chronic pain of left knee  No improvement with discontinuation of atorvastatin.  Given other neuropathy symptoms, will do a trial of gabapentin.  - start gabapentin 100mg three times a day     3. Other hyperlipidemia  No improvement in leg/knee pain since stopping atorvastatin, will restart today.  - resume atorvastatin 80mg daily    4. Diabetic polyneuropathy associated with type 2 diabetes mellitus (H)  Ramona is interested in trying something for the burning and tingling pain in her feet, which is present all day.  Advised that this may make her dizzy/drowsy at first, but that this should improve with continued use.  - start gabapentin 100mg three times a day     5. Dysuria  Sx suggestive of UTI.  Symptoms returning after completion of Cipro. Discussed with Britton Britt, who gave verbal for repeat urinalysis.  - Urinalysis-UC if Indicated      Follow Up  Return in about 1 month (around 9/13/2018) for diabetes.      Subjective & Objective                                                       Ramona Wilder is a 58 y.o. female coming in for a follow up visit for Medication Therapy Management. She was referred to me from Coreen Kendall,  MD    Chief Complaint: myalgia    Medication Adherence/Access: currently holding atorvastatin to see if myalgia symptoms are related or not.    Myalgia:   Ramona's knees still hurt, especially in the morning.  The oints in her arms sometimes hurt too.  She also notes burning and tingling in her feet all time; this started about a year ago.    Diabetes: Humalog 50/50 28 units in the morning and 35 units in the evening  Randys blood sugar has had several instances of low blood sugar in the past month - due to not eating/not eating enough.  We have discussed taking 1/2 doses of insulin when this happens, but she has not been doing this because she forgets.    Am fasting glucose: 38, 67, 77, 135, 61, 132, 196, 115, 92, 144. 91, 87, 49, 92, 68, 101, 140, 146, 121, 118, 89, 110, 103, 160.  Lab Results   Component Value Date    HGBA1C 8.5 (H) 08/13/2018    HGBA1C 8.8 (H) 04/13/2018    HGBA1C 9.8 (H) 01/10/2018     Lab Results   Component Value Date    MICROALBUR 0.88 01/10/2018    LDLCALC 87 11/02/2017    CREATININE 0.82 07/09/2018     Urinary frequency:   Ramona states that she was given something for this recently which helped.  She has to go to the bathroom 10 times per day, is not voiding much and it is burning when she goes to the bathroom.  Recently had a clear urinalysis on 7/25.  Recently treated with Cipro two times a day for 10 days.  These symptoms have restarted in the past 2-3 days.    PMH: reviewed in EPIC   Allergies/ADRs: reviewed in EPIC   Alcohol: reviewed in EPIC   Tobacco:   History   Smoking Status     Never Smoker   Smokeless Tobacco     Current User     Types: Chew     Comment:  smokes outside, pt chews betel nut     Today's Vitals:   Vitals:    08/13/18 1134   BP: 134/80   Weight: 152 lb 8 oz (69.2 kg)     ----------------    Much or all of the text in this note was generated through the use of Dragon Dictate voice-to-text software. Errors in spelling or words which seem out of context are  "unintentional. Sound alike errors, in particular, may have escaped editing.    The patient was given a summary of these recommendations as an after visit summary    I spent 30 minutes with this patient today;  All changes were made via collaborative practice agreement with Coreen Kendall MD. A copy of the visit note was provided to the patient's provider.     Omega Thorpe, PharmD, BCACP  MTM Pharmacist at [facility]     Current Outpatient Prescriptions   Medication Sig Dispense Refill     atorvastatin (LIPITOR) 80 MG tablet TAKE 1 TABLET BY MOUTH DAILY AT BEDTIME. 30 tablet 11     blood glucose test (GLUCOSE BLOOD) strips Use to check blood sugar three times daily.  One touch verio test strips. 300 strip 3     capsaicin (ZOSTRIX) 0.025 % cream Apply to affected area 4 times daily 60 g 11     cyclobenzaprine (FLEXERIL) 5 MG tablet Take 2 tablets (10 mg total) by mouth every 8 (eight) hours as needed for muscle spasms. 30 tablet 1     ergocalciferol (ERGOCALCIFEROL) 50,000 unit capsule Take 1 capsule (50,000 Units total) by mouth once a week. 12 capsule 3     escitalopram oxalate (LEXAPRO) 10 MG tablet TAKE 1 TABLET DAILY 90 tablet 3     gabapentin (NEURONTIN) 100 MG capsule Take 100 mg by mouth 3 (three) times a day. 90 capsule 1     insulin lispro protamin-lispro 100 unit/mL (50-50) Susp Inject 28 units in the morning and 35 units in the evenings with food. 1/2 dose if not eating full meal. 15 mL 11     lancets (ONETOUCH DELICA LANCETS) 33 gauge Misc Use to check blood sugar 3 per day. 300 each 3     MAPAP EXTRA STRENGTH 500 mg tablet TAKE 1 TABLET BY MOUTH EVERY 6 HOURS AS NEEDED FOR PAIN 50 tablet 10     ONETOUCH VERIO strips TEST 4 TIMES DAILY. 100 strip 11     pantoprazole (PROTONIX) 40 MG tablet Take 1 tablet (40 mg total) by mouth daily. 90 tablet 3     pen needle, diabetic (BD ULTRA-FINE CITLALLI PEN NEEDLES) 32 gauge x 5/32\" Ndle USE TO INJECT INSULIN FOUR TIMES DAILY OR AS " DIRECTED 100 each 11     No current facility-administered medications for this visit.

## 2021-06-19 NOTE — PROGRESS NOTES
ASSESSMENT/PLAN:  1. Recurrent UTI (urinary tract infection)  Patient has struggled with intermittent recurrent UTI for the last couple of months.  She has consistently had E. coli, but with different sensitivities.  Since her symptoms are now worsening on nitrofurantoin, I am going to switch her to ciprofloxacin even though her last infection should have been sensitive for culture to nitrofurantoin.  We will also treat her symptomatically with Pyridium, check CT scan for etiology of recurrent infection, and have her return for repeat urine culture once she has completed a full course of ciprofloxacin.  Depending on results and clinical situation, may need to additionally consider referring to neurology.  - ciprofloxacin HCl (CIPRO) 500 MG tablet; Take 1 tablet (500 mg total) by mouth 2 (two) times a day for 10 days.  Dispense: 20 tablet; Refill: 0  - phenazopyridine (PYRIDIUM) 200 MG tablet; Take 1 tablet (200 mg total) by mouth 2 (two) times a day as needed for pain.  Dispense: 10 tablet; Refill: 0  - CT Abdomen Pelvis Without Oral Without IV Contrast; Future  - Urinalysis-UC if Indicated; Future  - Culture, Urine; Future    2.  Gastropathy  Chronic.  Refilled PPI  - pantoprazole (PROTONIX) 40 MG tablet; Take 1 tablet (40 mg total) by mouth daily.  Dispense: 90 tablet; Refill: 3    3.  Lumbago  Difficult to tell at this point whether her symptoms are related to her UTI versus musculoskeletal complaints.  It seems that she may have low back pain with right-sided sciatica.  We have discussed that we will workup this UTI Vargas and see if symptoms improve with that and if not will need to continue to readdress this point.      Patient Instructions   Stop macrobid (antibiotic).  Take ciprofloxacin instead.  Ok to take pyridium for pain.  We will check CT scan to see if there is a reason you keep getting infections.  We will also recheck a urine culture in about 2 weeks to make sure this current infection goes away  completely.        DATA REVIEWED:  Additional History from Old Records Summarized (2): 2  Decision to Obtain Records (1): 1  Radiology Tests Summarized or Ordered (1): 1  Labs Reviewed or Ordered (1): 1      CHIEF COMPLAINT:  Chief Complaint   Patient presents with     ED and UTI F/U       HISTORY OF PRESENT ILLNESS:  Ramona is a 58 y.o. female here with a Dee  for emergency room follow-up.    She was at the [facility] emergency room on 7/9/2018 with suprapubic abdominal pain with dysuria, urgency, and frequency.  She was diagnosed with pyelonephritis based on urinary symptoms and urinalysis, although WBC was normal, she was afebrile, and no imaging was done.  She received a dose of IV ceftriaxone and Toradol and was discharged on nitrofurantoin.    She has had a couple of months of intermittent dysuria.  History carefully reviewed as below.    5/9/18 Clinic visit.  UCx showed no growth.  Not treated.    6/5/18 Clinic visit.  UCX 50,000-100,000 col/ml Escherichia coli (!)  Treated with keflex (sensitive to cefazolin).    6/27/18 Clinic visit. UCx 50,000-100,000 col/ml Escherichia coli (!).  Treated with keflex. Resistant to cefazolin. It doesn't look like med was changed.    7/9/18:   ER visit.   UCx 50,000-100,000 col/ml Escherichia coli (!).  Treated with IV ceftriaxone, discharged with macrobid (sensitive)    Today (7/11/18):  Still having back pain and difficult urination.  Seems like her urinary frequency improved a bit immediately after going to the ER, but now is back to how it was, possibly worse.  Still frequency - uses bathroom 20 times per day, even today!  Feel like she has chills. Tylenol helps.  Has nitrofurnatoin rx with her; has taken 5 dose so far, has 5 left just as would be expected at this point.    Right sided pain radiating from yosvany to foot, back pain is in the middle of her spine (not CVA).  Not sure if this is related to kidney issue.  Has been going on for weeks or months.  Does  "seem to be a bit worse lately.  Not sure what makes it better or worse.    She requests refill of pantoprazole.  She has a history of strep gastritis.  At one point in 2012 she had reactive gastropathy on EGD, but then follow-up in 2015 with negative H. pylori biopsies.    REVIEW OF SYSTEMS:   All other systems are negative.    PFSH:  Reviewed, unchanged.    MEDICATIONS:  Current Outpatient Prescriptions   Medication Sig Dispense Refill     blood glucose test (GLUCOSE BLOOD) strips Use to check blood sugar three times daily.  One touch verio test strips. 300 strip 3     capsaicin (ZOSTRIX) 0.025 % cream Apply to affected area 4 times daily 60 g 11     ergocalciferol (ERGOCALCIFEROL) 50,000 unit capsule Take 1 capsule (50,000 Units total) by mouth once a week. 12 capsule 3     escitalopram oxalate (LEXAPRO) 10 MG tablet TAKE 1 TABLET DAILY 90 tablet 3     insulin lispro protamin-lispro 100 unit/mL (50-50) Susp Inject 28 units in the morning and 32 units in the evenings with food. 15 mL 11     lancets (ONETOUCH DELICA LANCETS) 33 gauge Misc Use to check blood sugar 3 per day. 300 each 3     MAPAP EXTRA STRENGTH 500 mg tablet TAKE 1 TABLET BY MOUTH EVERY 6 HOURS AS NEEDED FOR PAIN 50 tablet 10     ONETOUCH VERIO strips TEST 4 TIMES DAILY. 100 strip 11     pen needle, diabetic (BD ULTRA-FINE CITLALLI PEN NEEDLES) 32 gauge x 5/32\" Ndle USE TO INJECT INSULIN FOUR TIMES DAILY OR AS DIRECTED 100 each 11     atorvastatin (LIPITOR) 80 MG tablet TAKE 1 TABLET BY MOUTH DAILY AT BEDTIME. 30 tablet 11     ciprofloxacin HCl (CIPRO) 500 MG tablet Take 1 tablet (500 mg total) by mouth 2 (two) times a day for 10 days. 20 tablet 0     cyclobenzaprine (FLEXERIL) 5 MG tablet Take 2 tablets (10 mg total) by mouth every 8 (eight) hours as needed for muscle spasms. 30 tablet 1     pantoprazole (PROTONIX) 40 MG tablet Take 1 tablet (40 mg total) by mouth daily. 90 tablet 3     phenazopyridine (PYRIDIUM) 200 MG tablet Take 1 tablet (200 mg " "total) by mouth 2 (two) times a day as needed for pain. 10 tablet 0     No current facility-administered medications for this visit.        TOBACCO USE:  History   Smoking Status     Never Smoker   Smokeless Tobacco     Current User     Types: Chew     Comment:  smokes outside, pt chews betel nut       VITALS:  Vitals:    07/11/18 1133   BP: 136/82   Pulse: 80   Resp: 24   Temp: 97.9  F (36.6  C)   TempSrc: Oral   Weight: 154 lb (69.9 kg)   Height: 4' 11\" (1.499 m)     Wt Readings from Last 3 Encounters:   07/11/18 154 lb (69.9 kg)   07/09/18 152 lb (68.9 kg)   06/27/18 154 lb 8 oz (70.1 kg)       PHYSICAL EXAM:  Gen:  A&A, NAD  CV:  HRRR, no M/R/G  Resp:  CTAB  Abd:  S&NT, no mass  Ext:  W&D, no edema  Back: Nontender to palpation over thoracolumbar spine.  No costovertebral angle pain with percussion.          "

## 2021-06-19 NOTE — LETTER
Letter by Coreen Kendall MD at      Author: Coreen Kendall MD Service: -- Author Type: --    Filed:  Encounter Date: 6/5/2019 Status: (Other)         Ramona VALD[address]IN[address]  [place] [address]             Tracy 10, 2019         Dear Ms. Wilder,    Below are the results from your recent visit:    Resulted Orders   Urinalysis-UC if Indicated   Result Value Ref Range    Color, UA Yellow Colorless, Yellow, Straw, Light Yellow    Clarity, UA Clear Clear    Glucose, UA Negative Negative    Bilirubin, UA Negative Negative    Ketones, UA Negative Negative    Specific Gravity, UA 1.010 1.005 - 1.030    Blood, UA Negative Negative    pH, UA 6.0 5.0 - 8.0    Protein, UA Negative Negative mg/dL    Urobilinogen, UA 0.2 E.U./dL 0.2 E.U./dL, 1.0 E.U./dL    Nitrite, UA Negative Negative    Leukocytes, UA Negative Negative    Narrative    Microscopic not indicated  UC not indicated   Culture, Urine   Result Value Ref Range    Culture No Growth        You do not have a urinary tract infection. Your doctor would like you to stop taking the antibiotic that was ordered as it is not necessary.     Please call with questions or contact us using [employer].    Sincerely,        Electronically signed by Coreen Kendall MD

## 2021-06-20 NOTE — LETTER
Letter by Coreen Kendall MD at      Author: Coreen Kendall MD Service: -- Author Type: --    Filed:  Encounter Date: 3/10/2020 Status: (Other)        [facility] PATIENT ACCESS  [address]  [facility] [address]  [phone]         Ramona Wilder  [address]  [place] [address]        03/10/20    Dear Ramona Wilder,     At [facility] we care about your health and well-being. Your primary care provider is committed to ensuring you receive high quality care and has chosen a network of specialists to assist in providing that care. Recently Dr. Kendall referred you to Mammography for specialty care.      Please call [phone] at your earliest convenience for assistance in scheduling an appointment.  If you have already scheduled this appointment, please disregard this notice.  Thank you for choosing [facility] for your healthcare needs.       Sincerely,       [facility] Specialty Scheduling

## 2021-06-20 NOTE — LETTER
Letter by Ellie Leal RN at      Author: Dah, Ellie, RN Service: -- Author Type: --    Filed:  Encounter Date: 8/26/2020 Status: (Other)       CARE COORDINATION  [facility]  [address]  [place] [address]      August 26, 2020    [facility]  [address] [place] [address]      Dear Ramona,  Your Care Team congratulates you on your journey to maintain wellness. This document will help guide you on your journey to maintain a healthy lifestyle.  You can use this to help you overcome any barriers you may encounter.  If you should have any questions or concerns, you can contact the members of your Care Team or contact your Primary Care Clinic for assistance.    Health Maintenance  Health Maintenance Reviewed:      My Access Plan  Medical Emergency 911   Primary Clinic Line Coreen Kendall MD - [phone]   24 Hour Appointment Line [phone] or  [phone]) (toll-free)   24 Hour Nurse Line [phone]   Preferred Urgent Care     Preferred Hospital     Preferred Pharmacy [facility] - [place], [place] - [address] Behavioral Health Crisis Line The National Suicide Prevention Lifeline at 1-794.427.5485 or 915     My Care Team Members  Patient Care Team       Relationship Specialty Notifications Start End    Coreen Kendall MD PCP - General   5/9/07     Phone: [phone] Fax: [phone]         [address] [place] [address] [address] [place] [address]    Viktoria Nam  Behavioral Health  10/19/16     Individual Therapy    Phone: [phone] Fax: [phone]        [address] [place] [address]     Amisha Kong OD  Optometry  5/9/18     [employer]    Phone: [phone] Fax: [phone]        [address] [place]  [address]    Judy Thomas MD Physician Ophthalmology  3/20/19     Phone: [phone] Fax: [phone] [address] [address]    Heather Chavez, PharmD  Pharmacist Pharmacist  5/17/19     Phone: [phone] Fax: [phone]         [facility] [address] [facility] [address]    Coreen Kendall MD Assigned PCP   7/28/19     Phone: [phone] Fax: [phone]         [address] [place] [address]              Goals        Patient Stated    ? COMPLETED: I would like to reduce abdominal burning pain (stomach ulcer) symptoms. (pt-stated)      Action steps to achieve this goal:  1. I'd like to just meet this goal because taking the medicine is helping and I am not having sever pain. I will make sure to avoid eating spicy and other gassy foods.  2. I will come see my doctor next week to get travel shots and will get some tips and advises for travelling in term of health.      ? COMPLETED: Other (pt-stated)      Goal Statement: I would like to reinstate my health insurance in the next 60 days.  Date Goal set: 03/10/2020.  Barriers: Language barrier, lack of understanding of chronic illness and diabetes A1C uncontrolled.  Strengths: Taking medications daily.  Date to Achieve By: 05/07/2020.  Patient expressed understanding of goal: Yes.    Action steps to achieve this goal:    1. I will answer my phone today at 1:00 PM when FRW calls to assist me with health insurance.  2. I will send in all required documents to [facility] to reactive my health insurance.  3. I will contact the clinic CHW for coordination assistance when needed.  4. I will call [facility] RN for medications concern.      Goal update: 04/21/2020; 05/21/2020,      ? COMPLETED: RN goal: I would like [facility] RN to teach me how to take my medications corretly in the next 90 days to help lower my A1c <7.  (pt-stated)      Action steps to achieve this goal    [facility] RN will update action steps at the next follow up visit due to medical goal.  1.  I will attend my appt with [facility] RN on 6/3/19.  2.  I will attend my appt with MTM on 5/17/19.  3.  I will bring all my medications  including insulin and glucometer to my medical appts.   4.  I will call [facility] CG or CCC RN with any concerns or questions.  5. I will go out for walks around the late 30 mins 3x/wk.   6. I will eat more vegetables daily.     Discussed today, 8/30/19.   Date goal set:  4/9/19         Other    ? COMPLETED: medication      Continue with Humalog 50/50 20 Units before breakfast and Decrease Humalog 50/50 to 24 Units before supper      ? COMPLETED: medications      Take as prescribed at the same time each day.      ? COMPLETED: medications      Humalog 50/50 -injecting 25 Units before breakfast increase to 30 Units before breakfast    -pt is injecting 35 Units before dinner meals-increase to 37 Units before dinner         ? COMPLETED: Monitoring      9/20/17:  Ramona will check her blood sugar 2x/daily and bring her meter with her to her appointments.  ONLY TESTING FBG      ? COMPLETED: monitoring      Treat sx of hypoglycemia immediately after testing BG and retest after 15 min      ? COMPLETED: monitoring      Test BG two times a day and when sx of hypoglycemia.      ? COMPLETED: nutrition      Include a protein food at each meal  Limit sweets and juice      ? COMPLETED: nutrition      Include three meals per day with protein and non-starchy veg at each meal.               Advance Care Plans/Directives Type:          It has been your Clinic Care Team's pleasure to work with you on your goals.    Regards,  Your Clinic Care Team

## 2021-06-20 NOTE — PROGRESS NOTES
"Assessment:   Ramona arrived today with her BG meter.  She is testing 0-1 x/day with results as indicated below.  She noted that when her BG is elevated in the morning she ate a large portion of noodles or rice the previous night.  She could not recall what she ate when she woke with BG < 80 mg/dl.  Reviewed sx and tx of hypoglycemia.  Discussed testing BG at other times during the day.  Pt stated that she has pain with testing.  She is not changing the lancet each time.  Encouraged her to do so.  Also she has the lancing device set at \"5\" and we were able to test today at \"2\" also asked pt to wash hands and test on the sides of fingers.  Today in office BG was 318 mg/dl.  Pt was eating a small piece of candy since she did not want hypoglycemia.  Reviewed sx and tx of hypoglycemia and recommended testing prior to treating.  She retested today with a BG of 374 mg/dl.  She is agreeable to test more often and return in one month with BG meter for further adjustments.    She is injecting 20 Units of Humalog 50/50 before breakfast and 25 Units of Humalog 50/50 before supper meals.  Based on the Clinical Guidelines for Adult Diabetes Management and 31% of FBG below target instructed her to decrease Humalog 50/50 to 24 Units before supper meals.   Reviewed  rotating injection sites and avoiding muscle.    Pt stated that she is frustrated with being very hungry at times and then eating a large portion and still being hungry.  She may benefit from a GLP-1, however pt prefers not to start due to \"stomach pain.\" No records of pancreatitis available.  She asked about being cured of diabetes.  Discussed the chronic nature of diabetes and typical progression.    Pt will be due for A1c in one month.      Subjective and Objective:      Ramona Wilder is referred by Dr. Kendall for Diabetes Education.     Lab Results   Component Value Date    HGBA1C 8.5 (H) 08/13/2018         Current diabetes medications:    Humalog 50/50 20 Units before " breakfast    25 Units before dinner meal decrease to 24 Units     For the past month with most recent first.    FB, 101, 257, 65, 73, 62, 195, 133, 142, 120, 302, 102, 72, 59, 93, 100    Meals:  B-brown rice with veg coffee with artificial sweetener sweet n-low and some cereal   L-coffee with cereal or sometimes with biscuit   D-brown rice with veg  HS-biscuit     Goals       I would like to control my diabetes A1C under 8.0. (pt-stated)            Action steps to achieve this goal:    1. I came to follow up with pharmacist last month and changes were made to my medications and I will follow through recommendations that made.  2. I will come see diabetic educator on 10/10/2018.  3. I will follow up with my doctor as recommended.  4. I will remember taking my medications daily as prescribed.          Goal Update: 10/04/2018.          medication            Continue with Humalog 50/50 20 Units before breakfast and Decrease Humalog 50/50 to 24 Units before supper        Monitoring            17:  Ramona will check her blood sugar 2x/daily and bring her meter with her to her appointments.  ONLY TESTING FBG            Follow up:   Primary care visit  PharmD  for DM      Education:     Monitoring   Meter (per above goals): Assessed, Discussed and Competent  Monitoring: Assessed and Discussed  BG goals: Assessed and Discussed    Nutrition Management  Physical Activity: Assessed and Discussed  Medications: Assessed and Discussed  Orals: Assessed and Discussed  Injected Medications: Assessed and Discussed   Storage/Exp:Assessed and Discussed   Site Rotation: Assessed and Discussed   Sites Assessed: no    Diabetes Disease Process: Assessed and Discussed    Acute Complications: Prevent, Detect, Treat:   Hypoglycemia: Assessed and Discussed  Hyperglycemia: Assessed and Discussed  Sick Days: Assessed and Discussed  Driving: Not addressed        Time spent with the patient: 60 minutes for diabetes education and  counseling.   Previous Education: yes  Visit Type:MNT  Hours Remaining: DSMT 7 and MNT 2      Liv Sagastume RD, LD, CDE  10/10/2018

## 2021-06-20 NOTE — PROGRESS NOTES
MTM Follow Up Encounter  Assessment & Plan                                                     1. Uncontrolled type 2 diabetes mellitus with hypoglycemia without coma, with long-term current use of insulin  Uncontrolled to goal 7%. Ramona continues to take her full dose of insulin when she does not eat, despite having previously been instructed to take only half dose.  She is frequently skipping meals and has started to self adjust her own insulin.  The combination of these is resulting in hypoglycemia in the 40s twice weekly.  I have instructed her to significantly reduce her insulin today and advised her that her blood sugars may run high, but this is better than what is currently occurring, which I do not feel is safe.  She agrees to continue follow-up with our diabetes educator who will help her continue to work on insulin adjustments and dietary considerations.  -Reduce Humalog 50/50 to 20 units in the morning and 25 units in the evening    2. Hyperlipidemia, unspecified hyperlipidemia type  Advised Ramona that her intent was to restart atorvastatin after a trial off of it did nothing to improve her knee pain.  We will ruling out arthralgia secondary to statins, and no change was observed upon stopping.  She agrees to resume today.  -Restart atorvastatin 80 mg daily    3. Diabetic peripheral neuropathy (H)  Gabapentin is causing stomach upset and Ramona no longer wishes to continue taking this.  -Stop gabapentin 100 mg 3 times daily    4. Depression, unspecified depression type   Ramona feels that Lexapro is working well for her and she cannot sleep without it.  She would like a refill of this today.  -Refill Lexapro 10 mg daily    5. Vitamin D deficiency   Ramona is additionally requesting a refill on her vitamin D.  -Refill ergocalciferol 50,000 units once weekly    Follow Up  Diabetes education in 2-4 weeks for insulin titration and dietary management    Subjective & Objective                                                        Ramona Wilder is a 58 y.o. female coming in for a follow up visit for Medication Therapy Management. She was referred to me from Coreen Kendall MD    Chief Complaint: diabetes follow up    Medication Adherence/Access: has stopped taking gabapentin due to side effects.  Is not taking atorvastatin because she thought we had stopped it permanently.    Diabetes: Humalog 50/50 28 units in the morning and 35 units in the evening  Ramona has continued to have severe hypoglycemia - 41 mg/dL on 9/13, 58 on 9/5, 49 on 8/27, 49 on 8/24, 49 on 7/26.    She is taking 20-25 units in the morning and 30 units in the evening.  She is eating twice daily, but often skipping meals, especially when her blood sugar is low.  Lab Results   Component Value Date    HGBA1C 8.5 (H) 08/13/2018    HGBA1C 8.8 (H) 04/13/2018    HGBA1C 9.8 (H) 01/10/2018     Lab Results   Component Value Date    MICROALBUR 0.88 01/10/2018    LDLCALC 87 11/02/2017    CREATININE 0.82 07/09/2018     Dyslipidemia: Atorvastatin 80mg daily  Lab Results   Component Value Date    CHOL 150 11/02/2017    CHOL 146 07/15/2016    CHOL 169 06/03/2015     Lab Results   Component Value Date    HDL 48 (L) 11/02/2017    HDL 41 (L) 07/15/2016    HDL 51 06/03/2015     Lab Results   Component Value Date    LDLCALC 87 11/02/2017    LDLCALC 85 07/15/2016    LDLCALC 105 06/03/2015     Lab Results   Component Value Date    TRIG 75 11/02/2017    TRIG 98 07/15/2016    TRIG 65 06/03/2015     No components found for: CHOLHDL    Neuropathy: Gabapentin 100mg three times a day   Ramona has stopped taking her gabapentin because it is causing her to have more frequent heartburn.  She is taking it after meals.  She has Tylenol at home which she takes every other day.    Dysuria: Fluconazole 150mg x2  Ramona was seen at [facility] urology and given a two dose course of fluconazole.  She is still having some symptoms, but is feeling much better than before.  She has a follow up appointment in  November.      PMH: reviewed in EPIC   Allergies/ADRs: reviewed in EPIC   Alcohol: reviewed in EPIC   Tobacco:   History   Smoking Status     Never Smoker   Smokeless Tobacco     Current User     Types: Chew     Comment:  smokes outside, pt chews betel nut     Today's Vitals: There were no vitals filed for this visit.  ----------------    Much or all of the text in this note was generated through the use of Dragon Dictate voice-to-text software. Errors in spelling or words which seem out of context are unintentional. Sound alike errors, in particular, may have escaped editing.    The patient declined an after visit summary    I spent 30 minutes with this patient today;  All changes were made via collaborative practice agreement with Coreen Kendall MD. A copy of the visit note was provided to the patient's provider.     Omega Thorpe, PharmD, BCACP  MTM Pharmacist at [facility]     Current Outpatient Prescriptions   Medication Sig Dispense Refill     atorvastatin (LIPITOR) 80 MG tablet TAKE 1 TABLET BY MOUTH DAILY AT BEDTIME. 30 tablet 11     blood glucose test (GLUCOSE BLOOD) strips Use to check blood sugar three times daily.  One touch verio test strips. 300 strip 3     capsaicin (ZOSTRIX) 0.025 % cream Apply to affected area 4 times daily 60 g 11     cyclobenzaprine (FLEXERIL) 5 MG tablet Take 2 tablets (10 mg total) by mouth every 8 (eight) hours as needed for muscle spasms. 30 tablet 1     ergocalciferol (ERGOCALCIFEROL) 50,000 unit capsule Take 1 capsule (50,000 Units total) by mouth once a week. 12 capsule 3     escitalopram oxalate (LEXAPRO) 10 MG tablet TAKE 1 TABLET DAILY 90 tablet 3     gabapentin (NEURONTIN) 100 MG capsule Take 100 mg by mouth 3 (three) times a day. 90 capsule 1     insulin lispro protamin-lispro 100 unit/mL (50-50) Susp Inject 28 units in the morning and 35 units in the evenings with food. 1/2 dose if not eating full meal. 15 mL 11     lancets  "(ONETOUCH DELICA LANCETS) 33 gauge Misc Use to check blood sugar 3 per day. 300 each 3     MAPAP EXTRA STRENGTH 500 mg tablet TAKE 1 TABLET BY MOUTH EVERY 6 HOURS AS NEEDED FOR PAIN 50 tablet 10     ONETOUCH VERIO strips TEST 4 TIMES DAILY. 100 strip 11     pantoprazole (PROTONIX) 40 MG tablet Take 1 tablet (40 mg total) by mouth daily. 90 tablet 3     pen needle, diabetic (BD ULTRA-FINE CITLALLI PEN NEEDLES) 32 gauge x 5/32\" Ndle USE TO INJECT INSULIN FOUR TIMES DAILY OR AS DIRECTED 100 each 11     No current facility-administered medications for this visit.                              "

## 2021-06-21 NOTE — LETTER
Letter by Heather Chavez PharmD at      Author: Heather Chavez PharmD Service: -- Author Type: --    Filed:  Encounter Date: 3/15/2021 Status: (Other)         [facility]  [address]  [place] [address]                     March 15, 2021    Dear Ramona:    We've been unable to reach you by telephone to reschedule your doctor's appointment with Clinic Pharmacist.   Due to schedule changes, we are asking that you call back at your earliest convenience to reschedule your appointment on 03/19/2021 at 9:30am*.    Please disregard this letter if you've already reschedule.  Thank you for your cooperation.    If you have any questions or concerns, please don't hesitate to call.    Sincerely,        Electronically signed by Heather Chavez PharmD

## 2021-06-21 NOTE — PROGRESS NOTES
"Assessment:     Ramona arrived today with her BG meter.  BG readings from the past two weeks listed below.  She has had sx of hypoglycemia and BG < 80 mg/dl approximately twice per week.  She is able to treat with juice and now carries \"sugar\" with her.  Discussed testing BG immediately with sx and treating immediately and retesting BG until > 80 mg/dl.  She has had varied BG results.   Pt may not be an accurate historian and writer is uncertain of the amount of insulin she is currently injecting. Pt has given several different Units of insulin she is injecting and even after demonstrating the amount on an insulin pen, pt identified varying amounts.   Upon further questioning pt is injecting Humalog 50/50 from 4-8 PM before dinner meals.  Educated pt on injecting before breakfast and dinner meals and ideally at the same times each day since she is taking a fixed dose.    Pt did note elevations in BG with sticky rice and juice and \"sweet\" foods.  She has decided to D/C these foods.    Discussed including protein foods at each meal and non-starchy veg.  She stated she does not eat many \"meats\" due to dental pain.  Discussed alternatives.    She is due for labs today which were drawn.  Recommended she f/u with Dr Kendall.        Past note:   She may benefit from a GLP-1, however pt prefers not to start due to \"stomach pain.\" No records of pancreatitis available.  She asked about being cured of diabetes.  Discussed the chronic nature of diabetes and typical progression.         Subjective and Objective:      Ramona Wilder is referred by Dr. Kendall for Diabetes Education.     Lab Results   Component Value Date    HGBA1C 8.5 (H) 2018         Current diabetes medications:    Humalog 50/50 20 Units before breakfast  -injecting 24-25 Units before breakfast   25 Units before dinner meal decrease to 24 Units -pt is injecting 35 Units before dinner meals    For the past month with most recent first.    FB, 172, 183, 120, " 116, 203, 338(1 c juice), 50, 125, 341, 53, 105, 109, 246,   PM-46, 207, 66, 81, 75  HS:  132, 195, 213, 121, 199    Meals:  B-brown rice with veg coffee with artificial sweetener sweet n-low and some cereal   L-5-8 PM  coffee with cereal or sometimes with biscuit or rice with veg and soup   D-brown rice with veg or noodles or sticky rice   HS-biscuit     Goals       I would like to control my diabetes A1C under 8.0. (pt-stated)      Action steps to achieve this goal:    1. I will follow up with diabetes educator again tomorrow, 11/14/2018 as scheduled   2. I will follow through the recommendations from diabetic educator on insulins and diet.  3. I will remind myself to eat brown rice instead of white rice and adding more vegetables to each meal.        Goal Update: 11/13/2018.              Follow up:   Primary care visit  PharmD  for DM      Education:     Monitoring   Meter (per above goals): Assessed, Discussed and Competent  Monitoring: Assessed and Discussed  BG goals: Assessed and Discussed    Nutrition Management  Physical Activity: Assessed and Discussed  Medications: Assessed and Discussed  Orals: Assessed and Discussed  Injected Medications: Assessed and Discussed   Storage/Exp:Assessed and Discussed   Site Rotation: Assessed and Discussed   Sites Assessed: no    Diabetes Disease Process: Assessed and Discussed    Acute Complications: Prevent, Detect, Treat:   Hypoglycemia: Assessed and Discussed  Hyperglycemia: Assessed and Discussed  Sick Days: Assessed and Discussed  Driving: Not addressed        Time spent with the patient: 60 minutes for diabetes education and counseling.   Previous Education: yes  Visit Type:MNT  Hours Remaining: DSMT 7 and MNT 1      Liv Sagastume RD, LD, CDE  11/14/2018

## 2021-06-21 NOTE — LETTER
Letter by Coreen Kendall MD at      Author: Coreen Kendall MD Service: -- Author Type: --    Filed:  Encounter Date: 1/15/2021 Status: (Other)                    My Depression Action Plan  Name: Ramona Wilder   Date of Birth 1960  Date: 1/15/2021    My Doctor: Coreen Kendall MD   My Clinic: [address] [address]  [phone]          GREEN    ZONE   Good Control    What it looks like:     Things are going generally well. You have normal ups and downs. You may even feel depressed from time to time, but bad moods usually last less than a day.   What you need to do:  1. Continue to care for yourself (see self care plan)  2. Check your depression survival kit and update it as needed  3. Follow your physicians recommendations including any medication.  4. Do not stop taking medication unless you consult with your physician first.           YELLOW         ZONE Getting Worse    What it looks like:     Depression is starting to interfere with your life.     It may be hard to get out of bed; you may be starting to isolate yourself from others.    Symptoms of depression are starting to last most all day and this has happened for several days.     You may have suicidal thoughts but they are not constant.   What you need to do:     1. Call your care team. Your response to treatment will improve if you keep your care team informed of your progress. Yellow periods are signs an adjustment may need to be made.     2. Continue your self-care.  Just get dressed and ready for the day.  Don't give yourself time to talk yourself out of it.    3. Talk to someone in your support network.    4. Open up your depression Depression Self-Care Plan / Wellness kit.           RED    ZONE Medical Alert - Get Help    What it looks like:     Depression is seriously interfering with your life.     You may experience these or other symptoms: You cant get out of bed most days, cant  work or engage in other necessary activities, you have trouble taking care of basic hygiene, or basic responsibilities, thoughts of suicide or death that will not go away, self-injurious behavior.     What you need to do:  1. Call your care team and request a same-day appointment. If they are not available (weekends or after hours) call your local crisis line, emergency room or 911.          Depression Self-Care Plan / Wellness Kit    Many people find that medication and therapy are helpful treatments for managing depression. In addition, making small changes to your everyday life can help to boost your mood and improve your wellbeing. Below are some tips for you to consider. Be sure to talk with your medical provider and/or behavioral health consultant if your symptoms are worsening or not improving.     Sleep  Sleep hygiene means all of the habits that support good, restful sleep. It includes maintaining a consistent bedtime and wake time, using your bedroom only for sleeping or sex, and keeping the bedroom dark and free of distractions like a computer, smartphone, or television.     Develop a Healthy Routine  Maintain good hygiene. Get out of bed in the morning, make your bed, brush your teeth, take a shower, and get dressed. Dont spend too much time viewing media that makes you feel stressed. Find time to relax each day.    Exercise  Get some form of exercise every day. This will help reduce pain and release endorphins, the feel good chemicals in your brain. It can be as simple as just going for a walk or doing some gardening, anything that will get you moving.      Diet  Strive to eat healthy foods, including fruits and vegetables. Drink plenty of water. Avoid excessive sugar, caffeine, alcohol, and other mood-altering substances.     Stay Connected with Others  Stay in touch with friends and family members.    Manage Your Mood  Try deep breathing, massage therapy, biofeedback, or meditation. Take part in fun  activities when you can. Try to find something to smile about each day.     Psychotherapy  Be open to working with a therapist if your provider recommends it.     Medication  Be sure to take your medication as prescribed. Most anti-depressants need to be taken every day. It usually takes several weeks for medications to work. Not all medicines work for all people. It is important to follow-up with your provider to make sure you have a treatment plan that is working for you. Do not stop your medication abruptly without first discussing it with your provider.    Crisis Resources   These hotlines are for both adults and children. They and are open 24 hours a day, 7 days a week unless noted otherwise.      National Suicide Prevention Lifeline   2-545-054-TALK (9331)      Crisis Text Line    www.crisistextline.org  Text HOME to 565492 from anywhere in the [place], anytime, about any type of crisis. A live, trained crisis counselor will receive the text and respond quickly.      [place] Lifeline for LGBTQ Youth  A national crisis intervention and suicide lifeline for LGBTQ youth under 25. Provides a safe place to talk without judgement. Call 1-223.735.1672; text START to 050387 or visit www.[employer].org to talk to a trained counselor.      For [facility] crisis numbers, visit the [facility] website at:  https://[place].gov/[place]hs/people-we-serve/adults/health-care/mental-health/resources/crisis-contacts.jsp

## 2021-06-22 NOTE — PROGRESS NOTES
Attempt 1: Care Guide called patient.  If this patient is returning my call, please transfer to Care Guide at ext [phone].        Will outreach again on 1/10/2019.

## 2021-06-22 NOTE — PROGRESS NOTES
ASSESMENT AND PLAN:  Diagnoses and all orders for this visit:    1. Dysuria  -  One week of dysuria and urinary frequency.  Denies fever/chillls, flank pain, vaginal itching/discharge, hematuria.  Declines wet prep,std testing,  exam.   -  UAC: NEGATIVE for UTI; Culture not indicated.   -  Will treat sxs and empirically given symptomatic.  -  Follow up if symptoms not better or worsens.    Ordered:  -     Urinalysis-UC if Indicated  -     phenazopyridine (PYRIDIUM) 100 MG tablet  Dispense: 20 tablet; Refill: 0  -     sulfamethoxazole-trimethoprim (BACTRIM DS) 800-160 mg per tablet  Dispense: 6 tablet; Refill: 0    2.  DM F/u.  -  DM education given. Discussed foods low in carbs and foods to avoid.  Discussed food plan. Pt states very difficult since she loves rice.  Also discussed exercise and keeping active.    3. Shoulder injection  -  Pt requesting Steroid injections in shoulder. Reports one year of relief.     -  F/u with Dr. Kendall, first available appt.    Pt is present with professional , Leila.   Reviewed Medical/Social history and Medications.  Over 40 minutes total time spent with patient, with more than 50% in counseling and coordination of care on the above issues.   Discussed indications for emergent medical attention and routine F/u.  Patient/Parent/Guardian engaged in decision making process and verbalized understanding of the options discussed and agreed with the final treatment plan.     SUBJECTIVE:  Ramona Wilder is a 58 y.o. female who presents  for evaluation of her Possible UTI.     Pt has urinary frequency and dysuria x 1 week.  Denies flank pain, fever/chills, hematuria, vaginal itching/discharge.     Denies fever/chills, wheezing, SOB, CP, n/v, abdominal pain, diarrhea/constipation, hematochezia, hematemesis.     ROS:  Comprehensive Review of Systems Negative except stated in HPI.     No past medical history on file.  Patient Active Problem List   Diagnosis     Carotid Artery  "Stenosis     Moderate Recurrent Major Depression     Anxiety     Insomnia     Hypertension     Vitamin D deficiency     Gastropathy     Type 2 diabetes mellitus without complication, with long-term current use of insulin (H)     Hyperlipidemia     Headache     Chronic Pain     Back Strain     Dermatitis     Metabolic Tests Nonspecific Elevation Of Transaminase Levels     Pain in finger of right hand     Left knee pain     Current Outpatient Medications   Medication Sig Dispense Refill     atorvastatin (LIPITOR) 80 MG tablet Take 1 tablet (80 mg total) by mouth at bedtime. 30 tablet 11     blood glucose test (GLUCOSE BLOOD) strips Use to check blood sugar three times daily.  One touch verio test strips. 300 strip 3     ergocalciferol (ERGOCALCIFEROL) 50,000 unit capsule Take 1 capsule (50,000 Units total) by mouth once a week. 12 capsule 3     escitalopram oxalate (LEXAPRO) 10 MG tablet Take 1 tablet (10 mg total) by mouth daily. 90 tablet 3     insulin lispro protamin-lispro 100 unit/mL (50-50) Susp Inject 28 units in the morning and 35 units in the evenings with food. 1/2 dose if not eating full meal. (Patient taking differently: Inject 30 units in the morning and 37 units in the evenings with food. 1/2 dose if not eating full meal.      ) 15 mL 11     lancets (ONETOUCH DELICA LANCETS) 33 gauge Misc Use to check blood sugar 3 per day. 100 each 11     MAPAP EXTRA STRENGTH 500 mg tablet TAKE 1 TABLET BY MOUTH EVERY 6 HOURS AS NEEDED FOR PAIN 50 tablet 10     ONETOUCH VERIO strips TEST 4 TIMES DAILY. 100 strip 11     pantoprazole (PROTONIX) 40 MG tablet Take 1 tablet (40 mg total) by mouth daily. 90 tablet 3     pen needle, diabetic (BD ULTRA-FINE CITLALLI PEN NEEDLES) 32 gauge x 5/32\" Ndle USE TO INJECT INSULIN FOUR TIMES DAILY OR AS DIRECTED 100 each 11     phenazopyridine (PYRIDIUM) 100 MG tablet Take 1 tablet (100 mg total) by mouth 3 (three) times a day as needed for pain. 20 tablet 0     " "sulfamethoxazole-trimethoprim (BACTRIM DS) 800-160 mg per tablet Take 1 tablet by mouth 2 (two) times a day for 3 days. 6 tablet 0     No current facility-administered medications for this visit.      Social History     Tobacco Use   Smoking Status Never Smoker   Smokeless Tobacco Current User     Types: Chew   Tobacco Comment     smokes outside, pt chews betel nut     OBJECTIVE: /72   Pulse 70   Temp 97.6  F (36.4  C) (Oral)   Resp 18   Ht 4' 11\" (1.499 m)   Wt 157 lb 8 oz (71.4 kg)   LMP 05/09/2014   SpO2 98%   BMI 31.81 kg/m     No results found for this or any previous visit (from the past 24 hour(s)).    PHYSICAL:  General Alert, awake, not in acute distress.   CV Normal S1 & S2. No murmurs.   RESP Non-labored, RRR, CTAB. No wheezes or crackles.     Decline.        Britton Britt PA-C         "

## 2021-06-22 NOTE — PROGRESS NOTES
"Assessment:   Ramona arrived today with  and BG meter.  She is testing FBG and some pre-dinner meal with approximately 26 % of FBG in target range and 0 % of pre-dinner BG in target range.    Pt has had two FBG < 80 mg/dl and was able to treat herself without complications.  Reviewed sx and tx of hypoglycemia.  She is injecting 25 Units of Humalog 50/50 before breakfast and 35 Units of Humalog 50/50 before supper meals.  She forgets approximately once per week when out with friends.    Based on the Clinical Guidelines for Adult Diabetes Management instructed pt to increase Humalog 50/50 to 30 Units before breakfast and 37 Units before supper meals.  Written instructions provided.     Pt is concerned about hypoglycemia. She could not recall anything different about foods or activities two days ago prior to waking with FBG at 58 mg/dl.  She  tends to focus on the area around the umbilicus.  She has also injected occasionally into her legs and may have injected into muscle.  Recommended avoiding areas of hyper lipodystrophy and any muscle.    Pt stated she is trying to avoid \"sweets and sticky rice\" however she is frequently \"hungry.\"  Reviewed including three meals per day with protein and non-starchy veg.  She has a garden in the summer and has used up all of the veg she grew.  Discussed using canned veg which she considers only for people who are unable to chew foods.  Also discussed frozen veg which she thought was too expensive.    She may benefit from a GLP-1.    Pt excused herself to use the bathroom and stated she would like to be seen today for frequent urination.  She is agreeable to schedule soonest available appt.          Past note:   She may benefit from a GLP-1, however pt prefers not to start due to \"stomach pain.\" No records of pancreatitis available.  She asked about being cured of diabetes.  Discussed the chronic nature of diabetes and typical progression.         Subjective and Objective: "      Ramona Wilder is referred by Dr. Kendall for Diabetes Education.     Lab Results   Component Value Date    HGBA1C 9.4 (H) 2018         Current diabetes medications:    Humalog 50/50 -injecting 25 Units before breakfast increase to 30 Units before breakfast    -pt is injecting 35 Units before dinner meals-increase to 37 Units before dinner     For the past  two weeks with most recent first.    FB, 58 then retested after 3 hr at 346 no insulin, 129, -, 112, 65, 164, 231, 368, 112, 210, 160, 191, 133, 161, 170  Pre- meal PM-264, 259, 168, 193, -, -, - -, 241, 191, 315      Meals:  B-brown rice with veg coffee with artificial sweetener sweet n-low and some cereal   L-5-8 PM  coffee with cereal or sometimes with biscuit or rice with veg and soup   D-brown rice with veg or noodles or sticky rice   HS-biscuit     Goals       I would like to control my diabetes A1C under 8.0. (pt-stated)      Action steps to achieve this goal:    1. I will follow up with diabetes educator again tomorrow, 2018 as scheduled   2. I will follow through the recommendations from diabetic educator on insulins and diet.  3. I will remind myself to eat brown rice instead of white rice and adding more vegetables to each meal.        Goal Update: 2018.              Follow up:   Primary care visit  PharmD  for DM      Education:     Monitoring   Meter (per above goals): Assessed, Discussed and Competent  Monitoring: Assessed and Discussed  BG goals: Assessed and Discussed    Nutrition Management  Physical Activity: Assessed and Discussed  Medications: Assessed and Discussed  Orals: Assessed and Discussed  Injected Medications: Assessed and Discussed   Storage/Exp:Assessed and Discussed   Site Rotation: Assessed and Discussed   Sites Assessed: no    Diabetes Disease Process: Assessed and Discussed    Acute Complications: Prevent, Detect, Treat:   Hypoglycemia: Assessed and Discussed  Hyperglycemia: Assessed and Discussed  Sick  Days: Assessed and Discussed  Driving: Not addressed        Time spent with the patient: 60 minutes for diabetes education and counseling.   Previous Education: yes  Visit Type:MNT  Hours Remaining: DSMT 7 and MNT 1      Liv Sagastume RD, LD, CDE  1/2/2019

## 2021-06-23 NOTE — TELEPHONE ENCOUNTER
"Refill Approved    Rx renewed per Medication Renewal Policy. Medication was last renewed on 1/10/18.    Ov: 1/4/19    Karli Tamez, [facility] Triage/Med Refill 1/26/2019     Requested Prescriptions   Pending Prescriptions Disp Refills     acetaminophen (TYLENOL) 500 MG tablet [Pharmacy Med Name: ACETAMINOPHEN 500 MG TABLET] 50 tablet 0     Sig: TAKE 1 TABLET BY MOUTH EVERY 6 HOURS AS NEEDED FOR PAIN    There is no refill protocol information for this order        TRUEPLUS PEN NEEDLE 32 gauge x 5/32\" Ndle [Pharmacy Med Name: TRUEPLUS PEN NEEDLE 32GX5/32\"] 100 each 0     Sig: USE TO INJECT INSULIN FOUR TIMES DAILY OR AS DIRECTED    Diabetic Supplies Refill Protocol Passed - 1/23/2019  5:16 PM       Passed - Visit with PCP or prescribing provider visit in last 6 months    Last office visit with prescriber/PCP: 7/11/2018 Coreen Kendall MD OR same dept: 1/4/2019 Britton Britt PA-C OR same specialty: 1/4/2019 Britton Britt PA-C  Last physical: 4/13/2018 Last MTM visit: Visit date not found   Next visit within 3 mo: Visit date not found  Next physical within 3 mo: Visit date not found  Prescriber OR PCP: Coreen Kendall MD  Last diagnosis associated with med order: 1. Pain  - acetaminophen (TYLENOL) 500 MG tablet [Pharmacy Med Name: ACETAMINOPHEN 500 MG TABLET]; TAKE 1 TABLET BY MOUTH EVERY 6 HOURS AS NEEDED FOR PAIN  Dispense: 50 tablet; Refill: 0    2. Type 2 diabetes mellitus (H)  - TRUEPLUS PEN NEEDLE 32 gauge x 5/32\" Ndle [Pharmacy Med Name: TRUEPLUS PEN NEEDLE 32GX5/32\"]; USE TO INJECT INSULIN FOUR TIMES DAILY OR AS DIRECTED  Dispense: 100 each; Refill: 0    If protocol passes may refill for 12 months if within 3 months of last provider visit (or a total of 15 months).            Passed - A1C in last 6 months    Hemoglobin A1c   Date Value Ref Range Status   11/14/2018 9.4 (H) 3.5 - 6.0 % Final                           "

## 2021-06-24 NOTE — PROGRESS NOTES
ASSESSMENT/PLAN:    Problem List Items Addressed This Visit        ENT/CARD/PULM/ENDO Problems    Hypertension     BP up a bit today.  Was on antihypertensive for awhile, but off recently.  Unable to take ACE Inhibitor due to angioedema from lisinopril.  Will start low-dose amlodipine.           Relevant Medications    amLODIPine (NORVASC) 2.5 MG tablet    Type 2 diabetes mellitus without complication, with long-term current use of insulin (H) - Primary     Remains uncontrolled with A1C of 9.4 today.  Will have her see [facility] pharmacist for insulin regimen adjustment.         Relevant Orders    Glycosylated Hemoglobin A1c (Completed)    Microalbumin, Random Urine (Completed)    Amb Referral to Medication Management      Other Visit Diagnoses     Lateral epicondylitis, left elbow     Injected last year with good relief until recently, pt wants repeat injection today.  Injected meds as noted through 25-gauge 1 inch needle into point of maximum tenderness over the lateral condyle and epiconyle.  No complications.    Relevant Medications    triamcinolone acetonide 40 mg/mL injection 40 mg (KENALOG-40) (Completed)    lidocaine 10 mg/mL (1 %) injection 2 mL (Completed)          Return in about 3 months (around 5/15/2019) for Diabetes follow-up, Blood pressure follow-up.     SUBJECTIVE:  Ramona Wilder is a 58 y.o. female here wanting left elbow injection, but also having diabetes and htn f/u.     Regarding her left elbow pain: This is been ongoing for about a year.  She had a lateral epicondylitis injection on 5/9/18 that helped for a while, but lately the pain is back.  Is an achiness.  Does not hurt much if she is relaxed but hurts quite a bit if she is carrying something.  No known injury.  No known repetitive movements that have caused it.    She does have diabetes which has recently been uncontrolled.  Denies any polydipsia, polyuria, abdominal pain, blurry vision.  Her blood sugars seem to really swing depending on what she  eats.  She last saw the diabetes educator in January and was going to see her again in April, but the diabetes educator has since left the clinic and so no future appointments are scheduled this regard.    We followed up her blood pressure as well.  She does note headaches every day lately.      Patient Active Problem List   Diagnosis     Carotid Artery Stenosis     Moderate Recurrent Major Depression     Anxiety     Insomnia     Hypertension     Vitamin D deficiency     Gastropathy     Type 2 diabetes mellitus without complication, with long-term current use of insulin (H)     Hyperlipidemia     Headache     Chronic Pain     Back Strain     Dermatitis     Metabolic Tests Nonspecific Elevation Of Transaminase Levels     Pain in finger of right hand     Left knee pain     Medication Sig     acetaminophen (TYLENOL) 500 MG tablet TAKE 1 TABLET BY MOUTH EVERY 6 HOURS AS NEEDED FOR PAIN     atorvastatin (LIPITOR) 80 MG tablet Take 1 tablet (80 mg total) by mouth at bedtime.     blood glucose test (GLUCOSE BLOOD) strips Use to check blood sugar three times daily.  One touch verio test strips.     ergocalciferol (ERGOCALCIFEROL) 50,000 unit capsule Take 1 capsule (50,000 Units total) by mouth once a week.     escitalopram oxalate (LEXAPRO) 10 MG tablet Take 1 tablet (10 mg total) by mouth daily.     insulin lispro protamin-lispro 100 unit/mL (50-50) Susp Inject 28 units in the morning and 35 units in the evenings with food. 1/2 dose if not eating full meal. (Patient taking differently: Inject 30 units in the morning and 37 units in the evenings with food. 1/2 dose if not eating full meal.      )     lancets (ONETOUCH DELICA LANCETS) 33 gauge Misc Use to check blood sugar 3 per day.     ONETOUCH VERIO strips TEST 4 TIMES DAILY.     pantoprazole (PROTONIX) 40 MG tablet Take 1 tablet (40 mg total) by mouth daily.     phenazopyridine (PYRIDIUM) 100 MG tablet Take 1 tablet (100 mg total) by mouth 3 (three) times a day as needed  "for pain.     TRUEPLUS PEN NEEDLE 32 gauge x 5/32\" Ndle USE TO INJECT INSULIN FOUR TIMES DAILY OR AS DIRECTED     No current facility-administered medications on file prior to visit.         Social History     Tobacco Use   Smoking Status Never Smoker   Smokeless Tobacco Current User     Types: Chew   Tobacco Comment     smokes outside, pt chews betel nut       DIABETIC MEASURES:  Hemoglobin A1c   Date Value Ref Range Status   11/14/2018 9.4 (H) 3.5 - 6.0 % Final   08/13/2018 8.5 (H) 3.5 - 6.0 % Final   04/13/2018 8.8 (H) 3.5 - 6.0 % Final        Lipids:   Lab Results   Component Value Date    HDL 59 11/14/2018    HDL 48 (L) 11/02/2017    HDL 41 (L) 07/15/2016    Lab Results   Component Value Date    LDLCALC 165 (H) 11/14/2018    LDLCALC 87 11/02/2017    LDLCALC 85 07/15/2016    Lab Results   Component Value Date    TRIG 143 11/14/2018    TRIG 75 11/02/2017    TRIG 98 07/15/2016        Microalbumin  Lab Results   Component Value Date    MICROALBUR 0.80 02/15/2019        Last eye exam: 2/27/17?  She thinks she has had an eye exam since that time but we do not have any records of it.       Recent blood pressures:   BP Readings from Last 3 Encounters:   02/15/19 140/80   01/04/19 110/72   09/19/18 132/80        Recent weight:   Wt Readings from Last 3 Encounters:   02/15/19 153 lb 4 oz (69.5 kg)   01/04/19 157 lb 8 oz (71.4 kg)   01/02/19 156 lb (70.8 kg)         OBJECTIVE:  :  /80   Pulse 81   Temp 97.8  F (36.6  C) (Oral)   Resp 16   Ht 4' 11\" (1.499 m)   Wt 153 lb 4 oz (69.5 kg)   LMP 05/09/2014   SpO2 96%   BMI 30.95 kg/m      Gen:  A&A, NAD  Neck:  supple, no sig LAD or thyromegally  CV:  HRRR, no M/R/G  Resp:  CTAB  Ext:  W&D, no edema.  Tender over right lateral epicondyle.      Lab results:    Results for orders placed or performed in visit on 02/15/19   Glycosylated Hemoglobin A1c   Result Value Ref Range    Hemoglobin A1c 9.4 (H) 3.5 - 6.0 %   Microalbumin, Random Urine   Result Value Ref " Range    Microalbumin, Random Urine 0.80 0.00 - 1.99 mg/dL    Creatinine, Urine 47.1 mg/dL    Microalbumin/Creatinine Ratio Random Urine 17.0 <=19.9 mg/g

## 2021-06-24 NOTE — TELEPHONE ENCOUNTER
3/5/19 Attempt to reschedule appointment for MTM services with pharmacist Heather.    Outcome: left message on VM and provided [facility] phone number to call back to schedule.    Davon Mcclellan, MTM intern

## 2021-06-24 NOTE — TELEPHONE ENCOUNTER
Per pt, does not want to be seen until insurance is active again. Greg are you able to help her or schedule her with SW.

## 2021-06-24 NOTE — TELEPHONE ENCOUNTER
Patient submitted renewal form with help from son along with required documents a week ago and awaiting to be active. Patient was informed to call back if not heard back in the next two weeks.

## 2021-06-24 NOTE — TELEPHONE ENCOUNTER
Please assist pt in scheduling a MEDICATION MANAGEMENT INITIAL APPT for DM with Heather Main when pt returns call.

## 2021-06-25 NOTE — PROGRESS NOTES
Programs Applying For: Renewal Tracking   County:  Case #:  [facility] Worker:   Deja #:   PMI #:   Tracking:   Date Applied:     Outreach:   3/21/2019: FRG called patient to discuss referral, NATHAN educated patient that [facility] takes 30-45 days to process documents. [facility] will make outreach call to patient and [facility] in 2 weeks.     Health Insurance Information:         Referral:   Hi,     I would like to refer this patient to assist with renewal for health insurance. Patient did submit her renewal form and the [facility] sent her a letter that pt have to submit back statement which she did send her bank statement about two weeks ago. Patient called requested for help to follow up with [facility], please contact patient at your convenience and assist with renewal.     Thanks,   -Greg

## 2021-06-25 NOTE — PROGRESS NOTES
Care Guide Delegation:  Due Date: Within 2 weeks from today's date [Insert date]  Delegation: No Show for RN Appointment. Please Follow unsuccessful outreach, no show standard work.    Inactive insurance. No transportation.

## 2021-06-25 NOTE — TELEPHONE ENCOUNTER
Refill Approved    Rx renewed per Medication Renewal Policy. Medication was last renewed on 8/25/20.    Andrew Escobar, [facility] Triage/Med Refill 6/2/2021     Requested Prescriptions   Pending Prescriptions Disp Refills     omeprazole (PRILOSEC) 20 MG capsule [Pharmacy Med Name: OMEPRAZOLE 20 MG CPDR 20 Capsule] 30 capsule 5     Sig: TAKE ONE CAPSULE BY MOUTH DAILY BEFORE BREAKFAST       GI Medications Refill Protocol Passed - 6/1/2021 11:28 AM        Passed - PCP or prescribing provider visit in last 12 or next 3 months.     Last office visit with prescriber/PCP: 2/26/2021 Coreen Kendall MD OR same dept: 2/26/2021 Coreen Kendall MD OR same specialty: 2/26/2021 Coreen Kendall MD  Last physical: 1/15/2021 Last MTM visit: Visit date not found   Next visit within 3 mo: Visit date not found  Next physical within 3 mo: Visit date not found  Prescriber OR PCP: Coreen Kendall MD  Last diagnosis associated with med order: 1. Gastropathy  - omeprazole (PRILOSEC) 20 MG capsule [Pharmacy Med Name: OMEPRAZOLE 20 MG CPDR 20 Capsule]; TAKE ONE CAPSULE BY MOUTH DAILY BEFORE BREAKFAST  Dispense: 30 capsule; Refill: 5    If protocol passes may refill for 12 months if within 3 months of last provider visit (or a total of 15 months).

## 2021-06-26 NOTE — PATIENT INSTRUCTIONS - HE
Hemoglobin A1c   Date Value Ref Range Status   06/04/2021 8.7 (H) <=5.6 % Final   02/26/2021 10.0 (H) <=5.6 % Final     Your diabetes has improved!

## 2021-06-26 NOTE — PROGRESS NOTES
ASSESSMENT/PLAN:    1. Type 2 diabetes mellitus with hyperglycemia, with long-term current use of insulin (H)  A1C improved but still above goal.  We had held off on increasing Invokana in past due to last GFR <60.  At the time she left clinic,plan was: Needs to either increase invokana or insulin; depends on kidney function.  GFR ultimately came back normal, so will go ahead and increase Invokana.  Will follow-up with [facility] Pharmacist in a few weeks, then see me in Sept.  - Glycosylated Hemoglobin A1c  - [facility] RED  - Basic Metabolic Panel  - insulin asp prt-insulin aspart (NOVOLOG MIX 70-30 U-100 INSULN) 100 unit/mL (70-30) Soln; Inject 15 Units with daytime meals and 25 Units with evening meals  Dispense: 10 mL; Refill: 4  - canagliflozin (INVOKANA) 300 mg Tab; Take 1 tablet (300 mg total) by mouth daily.  Dispense: 90 tablet; Refill: 3  -Of note, we have no eye exam records from last 12m. She isn't sure where she goes for eye doctor, but prefers to arrange herself.    2. Hypertension  BP still above goal today with systolic 140.  Will continue losartan, add hydrochlorothiazide. She get BMP recheck next visit (ordered(  - losartan (COZAAR) 100 MG tablet; Take 1 tablet (100 mg total) by mouth daily.  Dispense: 90 tablet; Refill: 3  - hydroCHLOROthiazide (HYDRODIURIL) 25 MG tablet; Take 1 tablet (25 mg total) by mouth daily.  Dispense: 90 tablet; Refill: 3  - blood pressure monitor Kit; Blood pressure machine and cuff for home use.  Dispense: 1 each; Refill: 0    3. Vitamin D deficiency  Vit D checked today and just barely in normal range. Continue weekly Vit D supplement.  - Vitamin D, Total (25-Hydroxy)  - ergocalciferol (ERGOCALCIFEROL) 1,250 mcg (50,000 unit) capsule; Take 1 capsule (50,000 Units total) by mouth once a week.  Dispense: 12 capsule; Refill: 3    4. Depression, unspecified depression type  Adequately controlled on current regimen. Does not wish to try trial off meds; continue.  - escitalopram oxalate  (LEXAPRO) 10 MG tablet; Take 1 tablet (10 mg total) by mouth daily.  Dispense: 90 tablet; Refill: 4    5. Proteinuria due to type 2 diabetes mellitus (H)  Hx angioedema on ACE-I, so on ARB.   - losartan (COZAAR) 100 MG tablet; Take 1 tablet (100 mg total) by mouth daily.  Dispense: 90 tablet; Refill: 3    6. Hyperlipidemia, unspecified hyperlipidemia type  Continue statin.  - rosuvastatin (CRESTOR) 40 MG tablet; Take 1 tablet (40 mg total) by mouth at bedtime.  Dispense: 90 tablet; Refill: 4    7. Chronic right-sided low back pain without sciatica  Had steroid injection that helped back in 2013.  Discussed referral to spine clinic, PT, etc. Pt didn't think they would help and so decided on no action for now.      I spent a total of 47 minutes on the day of the visit.  . Time spent doing chart review, history and exam, documentation and further activities per the note       Return in about 4 months (around 10/4/2021) for Diabetes follow-up.     SUBJECTIVE:  Ramoan Wilder is a 61 y.o. female here for diabetes follow-up      Blood sugars mostly less than 120 fasting, but occasionally up to 220.  States carbs/noodles with highs.    Thinks her bp is high because she at red meat  A few mate    Depression stable on meds.    Continues to have right sided abd/back/trunk pain.     Review of Systems:  Reminder of complete review systems is negative.     Patient Active Problem List   Diagnosis     Carotid Artery Stenosis     Moderate Recurrent Major Depression     Anxiety     Insomnia     Hypertension     Vitamin D deficiency     Gastropathy     Type 2 diabetes mellitus with hyperglycemia, with long-term current use of insulin (H)     Hyperlipidemia     Headache     Chronic Pain     Back Strain     Dermatitis     Metabolic Tests Nonspecific Elevation Of Transaminase Levels     Pain in finger of right hand     Left knee pain     Urinary, incontinence, stress female     Right lumbar radiculitis     Proteinuria due to type 2 diabetes  "mellitus (H)     Medication Sig     acetaminophen (TYLENOL) 500 MG tablet TAKE 1 TABLET BY MOUTH EVERY 6 HOURS AS NEEDED FOR PAIN.     blood glucose test (GLUCOSE BLOOD) strips Use to check blood sugar three times daily. Ok whichever brand is covered by her insurance and compatible with her glucometer.     blood glucose test (ONETOUCH VERIO TEST STRIPS) strips Use 1 each As Directed 4 (four) times a day. Dispense brand per patient's insurance at pharmacy discretion.     calcium, as carbonate, (TUMS) 200 mg calcium (500 mg) chewable tablet Chew 1 tablet (200 mg total) daily.     lancets (ONETOUCH DELICA LANCETS) 33 gauge Misc Use to check blood sugar 3 per day.     metFORMIN (GLUCOPHAGE-XR) 500 MG 24 hr tablet Take 1,000 mg by mouth daily with supper.     omeprazole (PRILOSEC) 20 MG capsule TAKE ONE CAPSULE BY MOUTH DAILY BEFORE BREAKFAST     pen needle, diabetic (TRUEPLUS PEN NEEDLE) 32 gauge x 5/32\" Ndle USE TO INJECT INSULIN twice TIMES DAILY OR AS DIRECTED     [DISCONTINUED] canagliflozin (INVOKANA) 100 mg Tab Take 1 tablet (100 mg total) by mouth daily.     [DISCONTINUED] ergocalciferol (ERGOCALCIFEROL) 1,250 mcg (50,000 unit) capsule Take 1 capsule (50,000 Units total) by mouth once a week.     [DISCONTINUED] escitalopram oxalate (LEXAPRO) 10 MG tablet Take 1 tablet (10 mg total) by mouth daily.     [DISCONTINUED] insulin aspart protamine-insulin aspart (NOVOLOG MIX 70-30FLEXPEN U-100) 100 unit/mL (70-30) pen Inject 20 Units before daytime meal and 15 Units before evening meal. (Patient taking differently: Inject 25 Units before daytime meal and 15 Units before evening meal.)     [DISCONTINUED] losartan (COZAAR) 100 MG tablet Take 1 tablet (100 mg total) by mouth daily.     [DISCONTINUED] rosuvastatin (CRESTOR) 40 MG tablet Take 1 tablet (40 mg total) by mouth at bedtime.       Social History     Tobacco Use   Smoking Status Never Smoker   Smokeless Tobacco Current User     Types: Chew   Tobacco Comment    " " smokes outside, pt chews betel nut       DIABETIC MEASURES:  Hemoglobin A1c   Date Value Ref Range Status   06/04/2021 8.7 (H) <=5.6 % Final   02/26/2021 10.0 (H) <=5.6 % Final   11/20/2020 9.5 (H) <=5.6 % Final     Comment:     Normal <5.7% Prediabete 5.7-6.4% Diabletes 6.5% or higher - adopted from ADA consensus guidelines        Lipids:   Lab Results   Component Value Date    HDL 62 11/20/2020    HDL 47 (L) 01/31/2020    HDL 59 11/14/2018    Lab Results   Component Value Date    LDLCALC 120 11/20/2020    LDLCALC 153 (H) 01/31/2020    LDLCALC 165 (H) 11/14/2018    Lab Results   Component Value Date    TRIG 52 11/20/2020    TRIG 99 01/31/2020    TRIG 143 11/14/2018        Microalbumin  Lab Results   Component Value Date    MICROALBUR 2.54 (H) 11/20/2020        Last eye exam: Thinks she is up to date      Recent blood pressures:   BP Readings from Last 3 Encounters:   06/04/21 140/62   04/02/21 (!) 142/92   02/26/21 120/60        Recent weight:   Wt Readings from Last 3 Encounters:   06/04/21 146 lb (66.2 kg)   04/02/21 147 lb (66.7 kg)   02/26/21 143 lb (64.9 kg)         OBJECTIVE:  :  /62   Pulse 79   Temp 98.3  F (36.8  C) (Oral)   Resp 20   Ht 4' 10.66\" (1.49 m)   Wt 146 lb (66.2 kg)   LMP 05/09/2014   SpO2 96%   BMI 29.83 kg/m      Gen:  A&A, NAD  Neck:  supple, no sig LAD or thyromegally  CV:  HRRR, no M/R/G  Resp:  CTAB  Ext:  W&D, no edema    Lab results:    Results for orders placed or performed in visit on 06/04/21   Glycosylated Hemoglobin A1c   Result Value Ref Range    Hemoglobin A1c 8.7 (H) <=5.6 %   Basic Metabolic Panel   Result Value Ref Range    Sodium 142 136 - 145 mmol/L    Potassium 4.6 3.5 - 5.0 mmol/L    Chloride 106 98 - 107 mmol/L    CO2 27 22 - 31 mmol/L    Anion Gap, Calculation 9 5 - 18 mmol/L    Glucose 235 (H) 70 - 125 mg/dL    Calcium 8.9 8.5 - 10.5 mg/dL    BUN 15 8 - 22 mg/dL    Creatinine 0.94 0.60 - 1.10 mg/dL    GFR MDRD Af Amer >60 >60 mL/min/1.73m2    GFR " MDRD Non Af Amer >60 >60 mL/min/1.73m2   Vitamin D, Total (25-Hydroxy)   Result Value Ref Range    Vitamin D, Total (25-Hydroxy) 34.7 30.0 - 80.0 ng/mL

## 2021-06-28 NOTE — PROGRESS NOTES
Progress Notes by Ellie Leal RN at 10/1/2019  1:00 PM     Author: Ellie Leal RN Service: -- Author Type: Registered Nurse    Filed: 10/4/2019  7:03 AM Encounter Date: 10/1/2019 Status: Signed    : Ellie Leal RN (Registered Nurse)       Clinic Care Coordination Contact    Clinic Care Coordination Contact     Clinic Care Coordination Medication Education FOLLOW UP Visit     Patient presents for:  Medication education, Compliance monitoring and Medication reconciliation     Language: Dee     Communication: Literate in other languages     Accompanied by: unaccompanied     Patient Living Situation:  Dependent with family     Primary Care Provider:  Coreen Kendall MD     Barriers:  Language, Cultural, Poor insight into disease process and Non-compliance of medications     Medication List (see cited below): Patient presents with all ordered medications             Current Outpatient Medications   Medication Sig   ? acetaminophen (TYLENOL) 500 MG tablet TAKE 1 TABLET BY MOUTH EVERY 6 HOURS AS NEEDED FOR PAIN   ? amLODIPine (NORVASC) 2.5 MG tablet Take 1 tablet (2.5 mg total) by mouth daily.   ? amoxicillin-clavulanate (AUGMENTIN) 875-125 mg per tablet Take 1 tablet by mouth 2 (two) times a day for 10 days.   ? atorvastatin (LIPITOR) 80 MG tablet Take 1 tablet (80 mg total) by mouth at bedtime.   ? atovaquone-proguanil (MALARONE) 250-100 mg Tab Take 1 tablet daily, starting 2 days before leaving [place] and continuing until 7 days after returning to [place].   ? azithromycin (ZITHROMAX) 500 MG tablet Take 2 tablets for severe travel diarrhea (more than 4 unformed stools daily, fever, or blood, pus, or mucus in the stool)   ? blood glucose test (GLUCOSE BLOOD) strips Use to check blood sugar three times daily.  One touch verio test strips.   ? ergocalciferol (ERGOCALCIFEROL) 50,000 unit capsule Take 1 capsule (50,000 Units total) by mouth once a week.   ? escitalopram oxalate (LEXAPRO) 10 MG tablet Take 1 tablet (10  "mg total) by mouth daily.   ? flash glucose scanning reader (FREESTYLE AILYN 14 DAY READER) Misc Use 1 Units As Directed every 8 (eight) hours.   ? flash glucose sensor (FREESTYLE AILYN 14 DAY SENSOR) Kit Use 1 Units As Directed every 14 (fourteen) days.   ? insulin lispro protamin-lispro 100 unit/mL (50-50) Susp Inject 20 units in the morning and 30 units in the evenings with food. 1/2 dose if not eating full meal.   ? lancets (ONETOUCH DELICA LANCETS) 33 gauge Misc Use to check blood sugar 3 per day.   ? ONETOUCH VERIO strips TEST 4 TIMES DAILY.   ? pantoprazole (PROTONIX) 40 MG tablet Take 1 tablet (40 mg total) by mouth daily.   ? pen needle, diabetic (TRUEPLUS PEN NEEDLE) 32 gauge x 5/32\" Ndle USE TO INJECT INSULIN FOUR TIMES DAILY OR AS DIRECTED   ? phenazopyridine (PYRIDIUM) 100 MG tablet Take 1 tablet (100 mg total) by mouth 3 (three) times a day as needed for pain.         Equipment:      Pill Box was         Compliance: 80%                 Future Appointments   Date Time Provider Department Center   6/3/2019 10:00 AM [facility] RN [facility]   7/9/2019  8:00 AM Heather Chavez, PharmD [facility]   8/30/2019 11:00 AM [facility] RN [facility]         Action Plan  RN Will:  F/u appt with CCC RN on 8/30/19      Care Guide Will:  Care Guide Delegation     Nursing Notes:      Patient takes her medications on her own. Able to verbalize how to take her meds correctly but sometime she tends to forget to take it.      1) Lexapram 10mg daily     2) Amlodipine 2.5mg daily     3) Vit D 50, 000 IU weekly     4) Tylenol - as needed     5) Atorvastatin 80mg HS     6) Pantoprazole 40mg      Reports she wants to work on lowering A1c <7.      Seeing MTM regularly - next f/u on 9/28/19     F/u appt with PCP on 11/8/19.      Planning to step down to maintenance once A1c <7.      Last visit Pt states she's due to renew her healthcare very soon. Will bring in paperwork when she receives it in the mail to get " assist from G. Confirmed with patient today and it's ready re-activated. It's active now. No further further assist needed.     Patient was out of the [place] in June, and July. Was out of most meds.     A1c not goal yet. A1c goal <8.    Reports BG results <200s.     A1c results:        Ref Range & Units 8/2/19 1052 5/17/19 1247 2/15/19 1406 11/14/18 1548 8/13/18 1201 4/13/18 0946    Hemoglobin A1c 3.5 - 6.0 % 8.4High   9.0High   9.4High   9.4High   8.5High   8.8High               Writer received a message from [facility]:    Heather Chavez, PharmD  AbdiEllie, RN             Hello!    I just confirmed with [facility] that they need confirmation from the patient in order to send out the freestyle digna continuous glucose monitor.       At your visit with her, could you contact [facility] at [phone] to have pt confirm that this device should be shipped out to her ASAP? Also confirm when it will be delivered. I have appt set up for 10/4 to discuss how to use it and DM follow up, if it wont be delivered before then can you have her reschedule her appt with me?     Let me know if you have any questions or if this will not work for any reason!     Ajay Stovall called  pharmacy today and confirmed that they will deliver digna glucometer will be delivered on 10/3/19. Instructed patient to stay home for the deliver. To call writer if they didn't deliver it so that [facility] RN could reschedule [facility] appt per request.

## 2021-06-30 PROCEDURE — 99606 MTMS BY PHARM EST 15 MIN: CPT | Performed by: PHARMACIST

## 2021-07-02 ENCOUNTER — COMMUNICATION - HEALTHEAST (OUTPATIENT)
Dept: FAMILY MEDICINE | Facility: CLINIC | Age: 61
End: 2021-07-02

## 2021-07-02 DIAGNOSIS — R12 HEARTBURN: ICD-10-CM

## 2021-07-02 DIAGNOSIS — Z79.4 TYPE 2 DIABETES MELLITUS WITH HYPERGLYCEMIA, WITH LONG-TERM CURRENT USE OF INSULIN (H): ICD-10-CM

## 2021-07-02 DIAGNOSIS — E11.65 TYPE 2 DIABETES MELLITUS WITH HYPERGLYCEMIA, WITH LONG-TERM CURRENT USE OF INSULIN (H): ICD-10-CM

## 2021-07-02 RX ORDER — CALCIUM CARBONATE 500 MG/1
TABLET, CHEWABLE ORAL
Qty: 30 TABLET | Refills: 5 | Status: SHIPPED | OUTPATIENT
Start: 2021-07-02 | End: 2022-01-21

## 2021-07-02 RX ORDER — METFORMIN HCL 500 MG
1000 TABLET, EXTENDED RELEASE 24 HR ORAL 2 TIMES DAILY
Qty: 120 TABLET | Refills: 11 | Status: SHIPPED | OUTPATIENT
Start: 2021-07-02 | End: 2022-07-03

## 2021-07-03 NOTE — ADDENDUM NOTE
Addendum Note by Danae Burton MD at 5/17/2019  1:00 PM     Author: Danae Burton MD Service: -- Author Type: Physician    Filed: 5/19/2019 10:50 AM Encounter Date: 5/17/2019 Status: Signed    : Danae Burton MD (Physician)    Addended by: DANAE BURTON on: 5/19/2019 10:50 AM        Modules accepted: Orders

## 2021-07-03 NOTE — ADDENDUM NOTE
Addendum Note by Danae Burton MD at 6/16/2020  3:34 PM     Author: Danae Burton MD Service: -- Author Type: Physician    Filed: 6/16/2020  4:40 PM Encounter Date: 6/16/2020 Status: Signed    : Danae Burton MD (Physician)    Addended by: DANAE BURTON on: 6/16/2020 04:40 PM        Modules accepted: Orders

## 2021-07-04 NOTE — TELEPHONE ENCOUNTER
Telephone Encounter by Roxy Addison MD at 7/2/2021 11:54 AM     Author: Roxy Addison MD Service: -- Author Type: Physician    Filed: 7/2/2021 11:55 AM Encounter Date: 7/2/2021 Status: Signed    : Roxy Addison MD (Physician)       Unclear which dose of metformin patient is supposed to be on. Will route to MTM pharmacist who has recently seen patient to address.        VN cued into room for q2h rounding.  Pt resting comfortably in bed, pt currently sleeping, respirations even and unlabored.  VN will continue to follow and be available as needed.

## 2021-07-04 NOTE — TELEPHONE ENCOUNTER
Telephone Encounter by Andrew Escobar, RN at 7/2/2021  9:34 AM     Author: Andrew Escobar RN Service: -- Author Type: Registered Nurse    Filed: 7/2/2021  9:35 AM Encounter Date: 7/2/2021 Status: Signed    : Andrew Escobar, RN (Registered Nurse)       RN cannot approve Refill Request    RN can NOT refill this medication med is not covered by policy/route to provider, Protocol failed and NO refill given and historical medication requested. Last office visit: 6/4/2021 Coreen Kendall MD Last Physical: 1/15/2021 Last MTM visit: Visit date not found Last visit same specialty: 6/4/2021 Coreen Kendall MD.  Next visit within 3 mo: Visit date not found  Next physical within 3 mo: Visit date not found      Andrew Escobar, [facility] Triage/Med Refill 7/2/2021    Requested Prescriptions   Pending Prescriptions Disp Refills   ? calcium, as carbonate, (TUMS) 200 mg calcium (500 mg) chewable tablet [Pharmacy Med Name: CALCIUM CARB 500 MG TAB JUVE 500 Tablet] 30 tablet 5     Sig: CHEW 1 TABLET BY MOUTH DAILY       There is no refill protocol information for this order      ? metFORMIN (GLUCOPHAGE-XR) 500 MG 24 hr tablet [Pharmacy Med Name: METFORMIN HCL  MG  Tablet] 60 tablet 12     Sig: TAKE 1 TABLET BY MOUTH 2 TIMES A DAY WITH MEALS       Metformin Refill Protocol Failed - 7/2/2021  8:25 AM        Failed - LFT or AST or ALT in last 12 months     Albumin   Date Value Ref Range Status   05/17/2019 3.8 3.5 - 5.0 g/dL Final     Bilirubin, Total   Date Value Ref Range Status   05/17/2019 0.5 0.0 - 1.0 mg/dL Final     Bilirubin, Direct   Date Value Ref Range Status   04/13/2018 0.2 <=0.5 mg/dL Final     Alkaline Phosphatase   Date Value Ref Range Status   05/17/2019 68 45 - 120 U/L Final     AST   Date Value Ref Range Status   05/17/2019 24 0 - 40 U/L Final     ALT   Date Value Ref Range Status   05/17/2019 23 0 - 45 U/L Final     Protein, Total   Date Value Ref Range Status   05/17/2019 7.2 6.0 - 8.0 g/dL  Final                Passed - Blood pressure in last 12 months     BP Readings from Last 1 Encounters:   06/04/21 140/62             Passed - GFR or Serum Creatinine in last 6 months     GFR MDRD Non Af Amer   Date Value Ref Range Status   06/04/2021 >60 >60 mL/min/1.73m2 Final     GFR MDRD Af Amer   Date Value Ref Range Status   06/04/2021 >60 >60 mL/min/1.73m2 Final             Passed - Visit with PCP or prescribing provider visit in last 6 months or next 3 months     Last office visit with prescriber/PCP: 6/4/2021 OR same dept: 6/4/2021 Coreen Kendall MD OR same specialty: 6/4/2021 Coreen Kendall MD Last physical: 1/15/2021 Last MTM visit: Visit date not found         Next appt within 3 mo: Visit date not found  Next physical within 3 mo: Visit date not found  Prescriber OR PCP: Coreen Kendall MD  Last diagnosis associated with med order: 1. Heartburn  - calcium, as carbonate, (TUMS) 200 mg calcium (500 mg) chewable tablet [Pharmacy Med Name: CALCIUM CARB 500 MG TAB JUVE 500 Tablet]; CHEW 1 TABLET BY MOUTH DAILY  Dispense: 30 tablet; Refill: 5     If protocol passes may refill for 12 months if within 3 months of last provider visit (or a total of 15 months).           Passed - A1C in last 6 months     Hemoglobin A1c   Date Value Ref Range Status   06/04/2021 8.7 (H) <=5.6 % Final               Passed - Microalbumin in last year      Microalbumin, Random Urine   Date Value Ref Range Status   11/20/2020 2.54 (H) 0.00 - 1.99 mg/dL Final

## 2021-07-04 NOTE — TELEPHONE ENCOUNTER
Telephone Encounter by Noel Elmore, Layla at 7/2/2021 12:02 PM     Author: Noel Elmore, Layla Service: -- Author Type: Pharmacist    Filed: 7/2/2021 12:04 PM Encounter Date: 7/2/2021 Status: Signed    : Noel Elmore PharmD (Pharmacist)       Per 5/4 MTM note, pt was supposed to increase to 1000 mg two times a day. Pt had not increased in June, but was again advised to do so.     Sent Rx. Has MTM follow-up at end of July.     Noel Elmore, PharmD  Medication Therapy Management (MTM) Pharmacist  [facility]

## 2021-07-06 VITALS
HEIGHT: 59 IN | SYSTOLIC BLOOD PRESSURE: 140 MMHG | TEMPERATURE: 98.3 F | BODY MASS INDEX: 29.43 KG/M2 | DIASTOLIC BLOOD PRESSURE: 62 MMHG | OXYGEN SATURATION: 96 % | WEIGHT: 146 LBS | RESPIRATION RATE: 20 BRPM | HEART RATE: 79 BPM

## 2021-07-21 ENCOUNTER — RECORDS - HEALTHEAST (OUTPATIENT)
Dept: ADMINISTRATIVE | Facility: CLINIC | Age: 61
End: 2021-07-21

## 2021-08-20 ENCOUNTER — OFFICE VISIT (OUTPATIENT)
Dept: PHARMACY | Facility: CLINIC | Age: 61
End: 2021-08-20
Payer: COMMERCIAL

## 2021-08-20 VITALS
SYSTOLIC BLOOD PRESSURE: 144 MMHG | DIASTOLIC BLOOD PRESSURE: 76 MMHG | BODY MASS INDEX: 29.22 KG/M2 | WEIGHT: 143 LBS | OXYGEN SATURATION: 99 % | HEART RATE: 75 BPM

## 2021-08-20 DIAGNOSIS — I10 ESSENTIAL HYPERTENSION: Primary | ICD-10-CM

## 2021-08-20 DIAGNOSIS — G89.29 OTHER CHRONIC PAIN: ICD-10-CM

## 2021-08-20 DIAGNOSIS — E11.65 TYPE 2 DIABETES MELLITUS WITH HYPERGLYCEMIA, WITH LONG-TERM CURRENT USE OF INSULIN (H): ICD-10-CM

## 2021-08-20 DIAGNOSIS — Z79.4 TYPE 2 DIABETES MELLITUS WITH HYPERGLYCEMIA, WITH LONG-TERM CURRENT USE OF INSULIN (H): ICD-10-CM

## 2021-08-20 DIAGNOSIS — F41.1 ANXIETY STATE: ICD-10-CM

## 2021-08-20 DIAGNOSIS — K31.9 DISEASE OF STOMACH AND DUODENUM: ICD-10-CM

## 2021-08-20 PROCEDURE — 99607 MTMS BY PHARM ADDL 15 MIN: CPT | Performed by: PHARMACIST

## 2021-08-20 PROCEDURE — 99606 MTMS BY PHARM EST 15 MIN: CPT | Performed by: PHARMACIST

## 2021-08-20 NOTE — PROGRESS NOTES
Medication Therapy Management (MTM) Encounter    ASSESSMENT:                            Medication Adherence/Access: No issues identified    1. Type 2 diabetes mellitus with hyperglycemia, with long-term current use of insulin (H)  Due for follow-up A1c in September, not yet at goal of less than 7%.  Patient reports appropriately taking medications as prescribed today.  Over the last 2 weeks, patient has had many fasting blood sugars that reflect hypoglycemia, therefore recommended dose decrease of insulin today, patient agreeable and able to verbalize appropriate dose change.  Also recommended patient contact clinic if continuing to experience hypoglycemia.  Patient refuses to monitor blood sugars more than once daily and has previously failed CGM use.  Patient appropriately taking ARB and statin per ADA guidelines.    2. Essential hypertension  BP elevated, still not at goal of less than 140/90 for several months.  Therefore, recommended adding low-dose amlodipine 2.5 mg daily today.  Though patient refuses to add additional medication today and would prefer to continue current therapy with increased exercise until follow-up visit.  If BP remains elevated at next visit would recommend initiating calcium channel blocker.    3. Gastropathy  Stable, no changes recommended.    4. Chronic Pain  Patient describing ongoing pain, recommended further discussion with PCP regarding type of pain and pain control.  Unable to discern today if patient experiencing symptoms of neuropathy or potentially myalgias.  If muscle related could consider dose decrease of statin in the future, such as rosuvastatin 20 mg daily.    5. Anxiety/vitamin D deficiency  Stable, no changes recommended.    PLAN:                            1. Decrease insulin dose today: Novolog 70/30 mix insulin 20 units in AM and 10 units in PM  2. Recommended switching hydrochlorothiazide administration to AM rather than at bedtime  3. Recommended adding  amlodipine 2.5 mg dialy; pt refused medicaiton today    Future considerations:  1.  If BP elevated would recommend adding amlodipine  2.  Recommend patient discuss chronic pain with PCP, may consider dose decrease of statin if possibly related to myalgias from statin use    Follow-up: Return in about 9 weeks (around 10/22/2021) for with me.  PCP 9/17  SUBJECTIVE/OBJECTIVE:                          Ramona Wilder is a 61 year old female coming in for a follow-up visit. She was referred to me from Coreen Kendall.  (ID# Way FV) was used during today's visit.  Today's visit is a follow-up MTM visit from 6/30/21     Reason for visit: MTM follow up.    Allergies/ADRs: Reviewed in chart  Past Medical History: Reviewed in chart  Tobacco: She reports that she has never smoked. She has quit using smokeless tobacco.  Her smokeless tobacco use included chew.Tobacco Cessation Action Plan:   no longer using tobacco  Alcohol: never used    Medication Adherence/Access: Previosuly using a once daily pillbox for her medications.   Self management, now just taking medications from the vials daily. Only missing doses maybe once monthly. Admits that she does sometimes forgets to take her oral medications, reports just sometimes.     Diabetes: Novolog 70/30 mix insulin - AM 25 and PM 15 directly before meals, Metformin  mg 2 times daily with meals, and invokana 300 mg daily AM (dose increased per PCP 6/4/21).  Denies any medication side effects. States that if her appetite is up and down.   SMBG: once daily    Ranges (per pt report) : Patient not interested in checking BG more than once daily because it hurts her fingers - previously tried and failed digna CGM.   Date FBG Date FBG   8/19 65 8/12 80   8/18 63 8/11 68   8/17 64 8/10 84   8/16 78 8/9 95   8/15 88 8/8 85   8/14 133 8/7 78   8/13 180 8/6 121   Hypoglycemia: Having multiple lows lately - states that she feels shaky sweaty, etc and knows that she has to drink some  juice and she feels better. Only having lows in the morning when first waking up.   ACEi/ARB: losartan 100 mg daily  Statin: Rosuvastatin 40 mg daily before bed  Aspirin: Not taking due to reported allergy  Diet: Eating 2 meals a day, both meals are the same, she eats rice/veggies/padilla, and eats biscuit/fruit for snacks. Trying not to eat sweets, trying not to eat them at all. States that the portion of her food has not changed. Only thing that changed was her rice, not eating mix of long grain rice and white rice instead of just white rice.   Hemoglobin A1C   Date Value Ref Range Status   06/04/2021 8.7 (H) <=5.6 % Final   02/26/2021 10.0 (H) <=5.6 % Final   11/20/2020 9.5 (H) <=5.6 % Final     Comment:     Normal <5.7% Prediabete 5.7-6.4% Diabletes 6.5% or higher - adopted from ADA consensus guidelines      No results found for: UMALCR   LDL Cholesterol Calculated   Date Value Ref Range Status   11/20/2020 120 <=129 mg/dL Final     LDL Cholesterol Direct   Date Value Ref Range Status   02/11/2014 64 <130 mg/dL Final     Creatinine   Date Value Ref Range Status   06/04/2021 0.94 0.60 - 1.10 mg/dL Final     Wt Readings from Last 3 Encounters:   08/20/21 143 lb (64.9 kg)   06/04/21 146 lb (66.2 kg)   04/02/21 147 lb (66.7 kg)     HTN: losartan 100 mg daily PM and hydrochlorothiazide 25 mg at bedtime. States that she has been urinating a lot, during the day and at night as well, maybe 10 times daily that she urinates. States that she does drink a lot of water to stay hydrated. Does not have a BP cuff at home.   Denies any dizziness but endorsing headaches sometimes.   BP Readings from Last 3 Encounters:   08/20/21 (!) 144/76   06/04/21 (!) 140/62   04/02/21 (!) 142/92      Pulse Readings from Last 3 Encounters:   08/20/21 75   06/04/21 79   04/02/21 83     Potassium   Date Value Ref Range Status   06/04/2021 4.6 3.5 - 5.0 mmol/L Final     GERD: Prescribed omeprazole 20 mg daily AM before eating and Tums two times a  day prn. States that she is still having symptoms of heartburn, 2-3 times per week, though the Tums are helping.     Pain: Describing bilateral pain that has radiated from her hip to her calves, feels numb. Her hip is hurting. States that it has been like this for about a month. States that pain is on and off. No injury that she knows of. Taking acetaminophen 500 mg 1-2 tablets daily as needed for the pain which is helping. Has not yet reviewed with her provider. States that this is not new.   Today requesting refill for lidocaine 5% ointment which she has been applying to her ear and head when its itching or if skin flakes off, states that the lidocaine helps that (originally prescribed for joint pain). States that her both of her ears are painful and itching.   Recent Labs   Lab Test 11/20/20  1601 01/31/20  0946   CHOL 192 220*   HDL 62 47*    153*   TRIG 52 99     Anxiety/Vitamin D Deficiency: escitalopram 10 mg daily AM and ergocalciferol 50,000 units weekly. States that she thinks her mood has been great because she has been spending a lot of time outside.    Ref. Range 6/4/2021 11:44   Vitamin D, Total (25-Hydroxy) Latest Ref Range: 30.0 - 80.0 ng/mL 34.7       Today's Vitals: BP (!) 144/76   Pulse 75   Wt 143 lb (64.9 kg)   SpO2 99%   BMI 29.22 kg/m    ----------------  I spent 60 minutes with this patient today. All changes were made via collaborative practice agreement with Coreen Kendall. A copy of the visit note was provided to the patient's primary care provider.    The patient declined a summary of these recommendations.     Heather Morales, PharmD, BCACP  Medication Therapy Management Pharmacist     Medication Therapy Recommendations  Essential hypertension    Current Medication: hydroCHLOROthiazide (HYDRODIURIL) 25 MG tablet   Rationale: Incorrect administration - Adverse medication event - Safety   Recommendation: Change Administration Time - Recommended switching hydrochlorothiazide  administration to AM rather than at bedtime   Status: Patient Agreed - Adherence/Education          Rationale: Synergistic therapy - Needs additional medication therapy - Indication   Recommendation: Start Medication - amLODIPine 5 MG tablet - start amlodipine   Status: Declined per Patient         Type 2 diabetes mellitus with hyperglycemia, with long-term current use of insulin (H)    Current Medication: insulin aspart prot & aspart (NOVOLOG MIX 70/30 PEN) (70-30) 100 UNIT/ML pen   Rationale: Dose too high - Dosage too high - Safety   Recommendation: Decrease Dose - Novolog 70/30 mix insulin 20 units in AM and 10 units in PM   Status: Accepted per CPA

## 2021-08-20 NOTE — PATIENT INSTRUCTIONS
1. Decrease insulin dose today: Novolog 70/30 mix insulin 20 units in AM and 10 units in PM  2. Recommended switching hydrochlorothiazide administration to AM rather than at bedtime

## 2021-09-17 ENCOUNTER — OFFICE VISIT (OUTPATIENT)
Dept: FAMILY MEDICINE | Facility: CLINIC | Age: 61
End: 2021-09-17
Payer: COMMERCIAL

## 2021-09-17 VITALS
HEART RATE: 84 BPM | OXYGEN SATURATION: 98 % | DIASTOLIC BLOOD PRESSURE: 80 MMHG | SYSTOLIC BLOOD PRESSURE: 144 MMHG | TEMPERATURE: 98.3 F

## 2021-09-17 DIAGNOSIS — L30.9 DERMATITIS: ICD-10-CM

## 2021-09-17 DIAGNOSIS — E11.65 TYPE 2 DIABETES MELLITUS WITH HYPERGLYCEMIA, WITH LONG-TERM CURRENT USE OF INSULIN (H): Primary | ICD-10-CM

## 2021-09-17 DIAGNOSIS — L85.3 XEROSIS CUTIS: ICD-10-CM

## 2021-09-17 DIAGNOSIS — I10 ESSENTIAL HYPERTENSION: ICD-10-CM

## 2021-09-17 DIAGNOSIS — N89.8 VAGINAL ITCHING: ICD-10-CM

## 2021-09-17 DIAGNOSIS — L90.0 LICHEN SCLEROSUS ET ATROPHICUS: ICD-10-CM

## 2021-09-17 DIAGNOSIS — R30.0 DYSURIA: ICD-10-CM

## 2021-09-17 DIAGNOSIS — Z23 NEED FOR INFLUENZA VACCINATION: ICD-10-CM

## 2021-09-17 DIAGNOSIS — Z79.4 TYPE 2 DIABETES MELLITUS WITH HYPERGLYCEMIA, WITH LONG-TERM CURRENT USE OF INSULIN (H): Primary | ICD-10-CM

## 2021-09-17 LAB
ALBUMIN UR-MCNC: NEGATIVE MG/DL
APPEARANCE UR: CLEAR
BILIRUB UR QL STRIP: NEGATIVE
CLUE CELLS: NORMAL
COLOR UR AUTO: YELLOW
GLUCOSE UR STRIP-MCNC: >=1000 MG/DL
HBA1C MFR BLD: 7.1 % (ref 0–5.6)
HGB UR QL STRIP: NEGATIVE
HOLD SPECIMEN: NORMAL
KETONES UR STRIP-MCNC: NEGATIVE MG/DL
LEUKOCYTE ESTERASE UR QL STRIP: NEGATIVE
NITRATE UR QL: NEGATIVE
PH UR STRIP: 6.5 [PH] (ref 5–7)
SP GR UR STRIP: 1.01 (ref 1–1.03)
TRICHOMONAS, WET PREP: NORMAL
UROBILINOGEN UR STRIP-ACNC: 0.2 E.U./DL
WBC'S/HIGH POWER FIELD, WET PREP: NORMAL
YEAST, WET PREP: NORMAL

## 2021-09-17 PROCEDURE — 90682 RIV4 VACC RECOMBINANT DNA IM: CPT | Performed by: FAMILY MEDICINE

## 2021-09-17 PROCEDURE — 83036 HEMOGLOBIN GLYCOSYLATED A1C: CPT | Performed by: FAMILY MEDICINE

## 2021-09-17 PROCEDURE — 99215 OFFICE O/P EST HI 40 MIN: CPT | Mod: 25 | Performed by: FAMILY MEDICINE

## 2021-09-17 PROCEDURE — 81003 URINALYSIS AUTO W/O SCOPE: CPT | Performed by: FAMILY MEDICINE

## 2021-09-17 PROCEDURE — 36415 COLL VENOUS BLD VENIPUNCTURE: CPT | Performed by: FAMILY MEDICINE

## 2021-09-17 PROCEDURE — 90471 IMMUNIZATION ADMIN: CPT | Performed by: FAMILY MEDICINE

## 2021-09-17 PROCEDURE — 87210 SMEAR WET MOUNT SALINE/INK: CPT | Performed by: FAMILY MEDICINE

## 2021-09-17 RX ORDER — TRIAMCINOLONE ACETONIDE 1 MG/G
OINTMENT TOPICAL 2 TIMES DAILY
Qty: 30 G | Refills: 1 | Status: SHIPPED | OUTPATIENT
Start: 2021-09-17 | End: 2023-09-12

## 2021-09-17 RX ORDER — CLOBETASOL PROPIONATE 0.5 MG/G
OINTMENT TOPICAL 2 TIMES DAILY
Qty: 60 G | Refills: 4 | Status: SHIPPED | OUTPATIENT
Start: 2021-09-17 | End: 2024-01-23

## 2021-09-17 RX ORDER — GLUCOSAMINE HCL/CHONDROITIN SU 500-400 MG
CAPSULE ORAL
Qty: 100 EACH | Refills: 3 | Status: SHIPPED | OUTPATIENT
Start: 2021-09-17 | End: 2023-12-04

## 2021-09-17 RX ORDER — LANCETS
EACH MISCELLANEOUS
Qty: 100 EACH | Refills: 3 | Status: SHIPPED | OUTPATIENT
Start: 2021-09-17 | End: 2023-09-12

## 2021-09-17 ASSESSMENT — PATIENT HEALTH QUESTIONNAIRE - PHQ9: SUM OF ALL RESPONSES TO PHQ QUESTIONS 1-9: 2

## 2021-09-17 NOTE — PROGRESS NOTES
Assessment & Plan     Type 2 diabetes mellitus with hyperglycemia, with long-term current use of insulin (H)  About the same control. Re-ordered a new meter and kit so she can continue to follow her sugars. Encouraged eating veggies, which she does, and drinking enough water.  - Hemoglobin A1c  - Adult Eye Referral  - Hemoglobin A1c  - blood glucose monitoring (NO BRAND SPECIFIED) meter device kit  Dispense: 1 kit; Refill: 0  - blood glucose (NO BRAND SPECIFIED) test strip  Dispense: 100 strip; Refill: 6  - blood glucose calibration (NO BRAND SPECIFIED) solution  Dispense: 5 each; Refill: 4  - thin (NO BRAND SPECIFIED) lancets  Dispense: 100 each; Refill: 3  - alcohol swab prep pads  Dispense: 100 each; Refill: 3  - Extra Tube  - Extra Tube    Essential hypertension  Not controlled today, but she's quite uncomfortable from the perineal itching  - Adult Eye Referral    Dysuria  Check for UTI - did not find it  - UA Macro with Reflex to Micro and Culture - lab collect  - UA Macro with Reflex to Micro and Culture - lab collect    Vaginal itching  Checked for yeast infection, did not find it  - Wet prep - Clinic Collect    Need for influenza vaccination  given  - INFLUENZA QUAD, PF (RIV4) (FLUBLOK)    Xerosis cutis  All over. On the scaly, itchy areas use medicated ointment prn  - triamcinolone (KENALOG) 0.1 % external ointment  Dispense: 30 g; Refill: 1    Dermatitis  Use on scalp  - triamcinolone (KENALOG) 0.1 % external ointment  Dispense: 30 g; Refill: 1    Lichen sclerosus et atrophicus  I believe she has lichen sclerosus causing her itching. She'll try the clobetasol. We discussed not over-using it. She agrees to stop using this for one week out of every month at the least.  - clobetasol (TEMOVATE) 0.05 % external ointment  Dispense: 60 g; Refill: 4        40 minutes spent on the date of the encounter doing chart review, history and exam, documentation and further activities per the note       BMI:   Estimated  "body mass index is 29.22 kg/m  as calculated from the following:    Height as of 6/4/21: 1.49 m (4' 10.66\").    Weight as of 8/20/21: 64.9 kg (143 lb).       Work on weight loss  Regular exercise  Try ointments to help itching, etc. Return for check up on that and for recheck of BP    Return in about 3 months (around 12/17/2021).    Renita Mcfarland MD  [facility] SANDY Greene is a 61 year old who presents for the following health issues:    HPI   Follow up on diabetes - meter is not working- needs a new one  Feeling OK but not great because her bottom/vaginal area is very itchy and hurts for the urine to come out. This has been going on for a while. She has had no fever. No bad or smelly vaginal discharge. Sometimes the itch is so bad she cannot get to sleep.    She also has a rash behind ears and on hand.    Review of Systems         Objective    BP (!) 144/80 (BP Location: Right arm, Patient Position: Sitting, Cuff Size: Adult Regular)   Pulse 84   Temp 98.3  F (36.8  C) (Oral)   SpO2 98%   There is no height or weight on file to calculate BMI.  Physical Exam   Gen:NAD  Scalp: lower than the occiput, there is flaking on the scalp where it itches, no erythema, no bleeding or scratch marks.    : almost no pubic hair; the labia majora is all that is present - no labia minora at all; near the clitoral aaron it is lightly erythematous and wet-appearing; swab taken from vagina for wet prep      Results for orders placed or performed in visit on 09/17/21   Hemoglobin A1c     Status: Abnormal   Result Value Ref Range    Hemoglobin A1C 7.1 (H) 0.0 - 5.6 %   UA Macro with Reflex to Micro and Culture - lab collect     Status: Abnormal    Specimen: Urine, Midstream   Result Value Ref Range    Color Urine Yellow Colorless, Straw, Light Yellow, Yellow    Appearance Urine Clear Clear    Glucose Urine >=1000 (A) Negative mg/dL    Bilirubin Urine Negative Negative    Ketones Urine Negative " Negative mg/dL    Specific Gravity Urine 1.010 1.005 - 1.030    Blood Urine Negative Negative    pH Urine 6.5 5.0 - 7.0    Protein Albumin Urine Negative Negative mg/dL    Urobilinogen Urine 0.2 0.2, 1.0 E.U./dL    Nitrite Urine Negative Negative    Leukocyte Esterase Urine Negative Negative    Narrative    Microscopic not indicated   Extra Red Top Tube     Status: None   Result Value Ref Range    Hold Specimen [facility]    Wet prep - Clinic Collect     Status: Normal    Specimen: Vagina; Swab   Result Value Ref Range    Trichomonas Absent Absent    Yeast Absent Absent    Clue Cells Absent Absent    WBCs/high power field None None   Extra Tube     Status: None    Narrative    The following orders were created for panel order Extra Tube.  Procedure                               Abnormality         Status                     ---------                               -----------         ------                     Extra Red Top Tube[[id number]]                               Final result                 Please view results for these tests on the individual orders.

## 2021-10-05 ENCOUNTER — TRANSFERRED RECORDS (OUTPATIENT)
Dept: HEALTH INFORMATION MANAGEMENT | Facility: CLINIC | Age: 61
End: 2021-10-05
Payer: COMMERCIAL

## 2021-10-05 LAB — RETINOPATHY: POSITIVE

## 2021-10-08 DIAGNOSIS — Z76.0 ENCOUNTER FOR MEDICATION REFILL: Primary | ICD-10-CM

## 2021-10-08 DIAGNOSIS — E11.9 TYPE 2 DIABETES MELLITUS (H): ICD-10-CM

## 2021-10-08 RX ORDER — PEN NEEDLE, DIABETIC 32GX 5/32"
NEEDLE, DISPOSABLE MISCELLANEOUS
Qty: 100 EACH | Refills: 9 | Status: SHIPPED | OUTPATIENT
Start: 2021-10-08 | End: 2023-09-13

## 2021-10-22 ENCOUNTER — OFFICE VISIT (OUTPATIENT)
Dept: PHARMACY | Facility: CLINIC | Age: 61
End: 2021-10-22
Payer: COMMERCIAL

## 2021-10-22 VITALS
WEIGHT: 142.75 LBS | BODY MASS INDEX: 29.17 KG/M2 | SYSTOLIC BLOOD PRESSURE: 130 MMHG | DIASTOLIC BLOOD PRESSURE: 74 MMHG | OXYGEN SATURATION: 99 % | HEART RATE: 80 BPM

## 2021-10-22 DIAGNOSIS — K31.9 DISEASE OF STOMACH AND DUODENUM: ICD-10-CM

## 2021-10-22 DIAGNOSIS — R80.9 PROTEINURIA DUE TO TYPE 2 DIABETES MELLITUS (H): ICD-10-CM

## 2021-10-22 DIAGNOSIS — I10 ESSENTIAL HYPERTENSION: ICD-10-CM

## 2021-10-22 DIAGNOSIS — Z79.4 TYPE 2 DIABETES MELLITUS WITH HYPERGLYCEMIA, WITH LONG-TERM CURRENT USE OF INSULIN (H): Primary | ICD-10-CM

## 2021-10-22 DIAGNOSIS — E11.65 TYPE 2 DIABETES MELLITUS WITH HYPERGLYCEMIA, WITH LONG-TERM CURRENT USE OF INSULIN (H): Primary | ICD-10-CM

## 2021-10-22 DIAGNOSIS — E11.29 PROTEINURIA DUE TO TYPE 2 DIABETES MELLITUS (H): ICD-10-CM

## 2021-10-22 DIAGNOSIS — G89.29 OTHER CHRONIC PAIN: ICD-10-CM

## 2021-10-22 DIAGNOSIS — F41.1 ANXIETY STATE: ICD-10-CM

## 2021-10-22 PROCEDURE — 99606 MTMS BY PHARM EST 15 MIN: CPT | Performed by: PHARMACIST

## 2021-10-22 PROCEDURE — 99607 MTMS BY PHARM ADDL 15 MIN: CPT | Performed by: PHARMACIST

## 2021-10-22 RX ORDER — LOSARTAN POTASSIUM 100 MG/1
100 TABLET ORAL DAILY
Qty: 30 TABLET | Refills: 11 | Status: SHIPPED | OUTPATIENT
Start: 2021-10-22 | End: 2022-09-30

## 2021-10-22 NOTE — PROGRESS NOTES
Medication Therapy Management (MTM) Encounter    ASSESSMENT:                            Medication Adherence/Access: No issues identified    1. Type 2 diabetes mellitus with hyperglycemia, with long-term current use of insulin (H)  A1c recently improved, much closer to goal of less than 7% and blood sugars appear much better controlled.  Therefore, recommend continuing current therapy without change.  Patient appropriately taking ARB and statin per ADA guidelines.    2. Essential hypertension/Proteinuria due to type 2 diabetes mellitus (H)  BP shows improvement in meeting goal of less than 140/90, despite patient not currently taking losartan recently.  Recommend restarting losartan, sent refill for prescription today.    3. Gastropathy  Stable on current therapy, no changes recommended.    4. Chronic Pain  Recommended patient use additional acetaminophen for pain, up to 3000 mg daily.  Also, with patient's pain primarily localized in her knees, could consider trial with diclofenac gel for localized pain,  Will send recommendation to PCP for further discussion at next visit.    5. Anxiety  Stable, no changes recommended.    PLAN:                            1. Patient to take up to acetaminophen 3000 daily, recommended 1000 mg three times daily prn for pain  2. Restart losartan 100 mg daily; sent new prescription today    Future:  1.  Recommend rechecking annual lipids, and micralbumin at next PCP visit  2.  Consider diclofenac gel for patient's localized pain pain    Follow-up: Return in about 3 months (around 1/22/2022) for with me. Patient wants to wait to [facility] f/u MTM until insurance confirmed in the new year.   PCP 12/17  SUBJECTIVE/OBJECTIVE:                          Ramona Wilder is a 61 year old female coming in for a follow-up visit. She was referred to me from Coreen Kendall.  (ID# [id number]) was used during today's visit.  Today's visit is a follow-up MTM visit from 8/20/21     Reason for visit: MTM  follow up.    Allergies/ADRs: Reviewed in chart  Past Medical History: Reviewed in chart  Tobacco: She reports that she has never smoked. She has quit using smokeless tobacco.  Her smokeless tobacco use included chew.Tobacco Cessation Action Plan:   no longer using tobacco  Alcohol: never used    Medication Adherence/Access: Previosuly using a once daily pillbox for her medications.   Self management, now just taking medications from the vials daily. Only missing doses maybe once monthly. Admits that she does sometimes forgets to take her oral medications, reports just sometimes.     Diabetes: Novolog 70/30 mix insulin - AM 20 and PM 10 directly before meals, Metformin  mg 2 times daily with meals, and invokana 300 mg daily AM (dose increased per PCP 6/4/21).  Denies any medication side effects. Dysuria last month, September, symptoms are resolved.   SMBG: once daily    Ranges (per pt report) : Patient not interested in checking BG more than once daily because it hurts her fingers - previously tried and failed digna CGM.   Date FBG Date FBG   10/22 106 10/13 100   10/21 124 10/12 177   10/20 140 10/11 124   10/19 154 10/9 93   10/18 105 10/8 109   10/16 119 10/7 71   10/14 70 10/6 216   Hypoglycemia: Still occurring 1-2 times monthly with symptoms of cold/sweating, she is aware to drink a glass of juice for correction.   ACEi/ARB: losartan 100 mg daily (not currently taking)  Statin: Rosuvastatin 40 mg daily  Aspirin: Not taking due to reported allergy  Diet: Eating 2 meals a day, both meals are the same, she eats rice/veggies/padilla, and eats biscuit/fruit for snacks. Trying not to eat sweets, trying not to eat them at all. States that the portion of her food has not changed. Only thing that changed was her rice, not eating mix of long grain rice and white rice instead of just white rice.   Hemoglobin A1C   Date Value Ref Range Status   09/17/2021 7.1 (H) 0.0 - 5.6 % Final     Comment:     Normal <5.7%    Prediabetes 5.7-6.4%    Diabetes 6.5% or higher     Note: Adopted from ADA consensus guidelines.   06/04/2021 8.7 (H) <=5.6 % Final   02/26/2021 10.0 (H) <=5.6 % Final      LDL Cholesterol Calculated   Date Value Ref Range Status   11/20/2020 120 <=129 mg/dL Final     LDL Cholesterol Direct   Date Value Ref Range Status   02/11/2014 64 <130 mg/dL Final     Creatinine   Date Value Ref Range Status   06/04/2021 0.94 0.60 - 1.10 mg/dL Final     Wt Readings from Last 3 Encounters:   10/22/21 142 lb 12 oz (64.8 kg)   08/20/21 143 lb (64.9 kg)   06/04/21 146 lb (66.2 kg)     HTN: Taking hydrochlorothiazide 25 mg in the morning. Patient does NOT bring with vial of losartan, states that she was not able to get refilled at retail pharmacy, last filled #30 on 8/4/21. Does not have a BP cuff at home. Denies any dizziness but endorsing headaches sometimes.   BP Readings from Last 3 Encounters:   10/22/21 130/74   09/17/21 (!) 144/80   08/20/21 (!) 144/76      Pulse Readings from Last 3 Encounters:   10/22/21 80   09/17/21 84   08/20/21 75     Potassium   Date Value Ref Range Status   06/04/2021 4.6 3.5 - 5.0 mmol/L Final      Ref. Range 11/20/2020 16:01   Microalbumin Urine mg/dL Latest Ref Range: 0.00 - 1.99 mg/dL 2.54 (H)     GERD: Prescribed omeprazole 20 mg daily AM before eating and Tums two times a day prn. States that she is still having symptoms of heartburn sometimes - usually when she eats spicy foods. States that she has been trying to avoid spicy foods for this reason. States that she takes the Tums as needed for symptoms of heartburn and it helps.     Pain: Taking acetaminophen 500 mg 1-2 tablets daily as needed for the pain which is helping. Usually taking every 2-3 days for pain. Describing leg pain primarily in her knees (mainly right knee) and back. States that this pain has been present for a long time, sometimes better and sometimes worse.   When her pain is worse she takes the acetaminophen 1000 mg up to  twice daily and states that this does help.   Recent Labs   Lab Test 11/20/20  1601 01/31/20  0946   CHOL 192 220*   HDL 62 47*    153*   TRIG 52 99     Anxiety/Vitamin D Deficiency: escitalopram 10 mg daily AM and ergocalciferol 50,000 units weekly on mondays. States that sometimes she is happy and sometimes she is sad. States that the medications to help her. Overall well controlled lately.    Ref. Range 6/4/2021 11:44   Vitamin D, Total (25-Hydroxy) Latest Ref Range: 30.0 - 80.0 ng/mL 34.7     Today's Vitals: /74   Pulse 80   Wt 142 lb 12 oz (64.8 kg)   SpO2 99%   BMI 29.17 kg/m    ----------------  I spent 60 minutes with this patient today. All changes were made via collaborative practice agreement with Coreen Kendall. A copy of the visit note was provided to the patient's primary care provider.    The patient declined a summary of these recommendations.     Heather Morales, PharmD, BCACP  Medication Therapy Management Pharmacist     Medication Therapy Recommendations  Essential hypertension    Current Medication: losartan (COZAAR) 100 MG tablet   Rationale: Untreated condition - Needs additional medication therapy - Indication   Recommendation: Start Medication - restart losratn   Status: Accepted per CPA         Left knee pain    Current Medication: acetaminophen (TYLENOL) 500 MG tablet   Rationale: Dose too low - Dosage too low - Effectiveness   Recommendation: Increase Dose - up to 1000 mg tid prn   Status: Accepted per CPA

## 2021-11-13 DIAGNOSIS — Z76.0 ENCOUNTER FOR MEDICATION REFILL: Primary | ICD-10-CM

## 2021-11-13 DIAGNOSIS — R52 PAIN: ICD-10-CM

## 2021-11-15 RX ORDER — PSEUDOEPHED/ACETAMINOPH/DIPHEN 30MG-500MG
TABLET ORAL
Qty: 100 TABLET | Refills: 5 | Status: SHIPPED | OUTPATIENT
Start: 2021-11-15 | End: 2022-12-20

## 2021-12-17 ENCOUNTER — OFFICE VISIT (OUTPATIENT)
Dept: FAMILY MEDICINE | Facility: CLINIC | Age: 61
End: 2021-12-17
Payer: COMMERCIAL

## 2021-12-17 VITALS
OXYGEN SATURATION: 98 % | WEIGHT: 143.75 LBS | BODY MASS INDEX: 28.98 KG/M2 | HEART RATE: 79 BPM | TEMPERATURE: 98.3 F | RESPIRATION RATE: 20 BRPM | DIASTOLIC BLOOD PRESSURE: 68 MMHG | HEIGHT: 59 IN | SYSTOLIC BLOOD PRESSURE: 118 MMHG

## 2021-12-17 DIAGNOSIS — E11.65 TYPE 2 DIABETES MELLITUS WITH HYPERGLYCEMIA, WITH LONG-TERM CURRENT USE OF INSULIN (H): Primary | ICD-10-CM

## 2021-12-17 DIAGNOSIS — R30.0 DYSURIA: ICD-10-CM

## 2021-12-17 DIAGNOSIS — E55.9 VITAMIN D DEFICIENCY: ICD-10-CM

## 2021-12-17 DIAGNOSIS — Z23 NEED FOR VACCINATION: ICD-10-CM

## 2021-12-17 DIAGNOSIS — E11.29 PROTEINURIA DUE TO TYPE 2 DIABETES MELLITUS (H): ICD-10-CM

## 2021-12-17 DIAGNOSIS — R80.9 PROTEINURIA DUE TO TYPE 2 DIABETES MELLITUS (H): ICD-10-CM

## 2021-12-17 DIAGNOSIS — E11.319 DIABETIC RETINOPATHY ASSOCIATED WITH TYPE 2 DIABETES MELLITUS, MACULAR EDEMA PRESENCE UNSPECIFIED, UNSPECIFIED LATERALITY, UNSPECIFIED RETINOPATHY SEVERITY (H): ICD-10-CM

## 2021-12-17 DIAGNOSIS — Z79.4 TYPE 2 DIABETES MELLITUS WITH HYPERGLYCEMIA, WITH LONG-TERM CURRENT USE OF INSULIN (H): Primary | ICD-10-CM

## 2021-12-17 LAB
ALBUMIN SERPL-MCNC: 3.9 G/DL (ref 3.5–5)
ALP SERPL-CCNC: 71 U/L (ref 45–120)
ALT SERPL W P-5'-P-CCNC: 23 U/L (ref 0–45)
ANION GAP SERPL CALCULATED.3IONS-SCNC: 10 MMOL/L (ref 5–18)
AST SERPL W P-5'-P-CCNC: 30 U/L (ref 0–40)
BILIRUB SERPL-MCNC: 0.7 MG/DL (ref 0–1)
BUN SERPL-MCNC: 23 MG/DL (ref 8–22)
CALCIUM SERPL-MCNC: 9.6 MG/DL (ref 8.5–10.5)
CHLORIDE BLD-SCNC: 104 MMOL/L (ref 98–107)
CHOLEST SERPL-MCNC: 224 MG/DL
CO2 SERPL-SCNC: 24 MMOL/L (ref 22–31)
CREAT SERPL-MCNC: 0.86 MG/DL (ref 0.6–1.1)
CREAT UR-MCNC: 11 MG/DL
FASTING STATUS PATIENT QL REPORTED: ABNORMAL
GFR SERPL CREATININE-BSD FRML MDRD: 74 ML/MIN/1.73M2
GLUCOSE BLD-MCNC: 171 MG/DL (ref 70–125)
HBA1C MFR BLD: 8.3 % (ref 0–5.6)
HDLC SERPL-MCNC: 58 MG/DL
LDLC SERPL CALC-MCNC: 147 MG/DL
MICROALBUMIN UR-MCNC: <0.5 MG/DL (ref 0–1.99)
MICROALBUMIN/CREAT UR: NORMAL MG/G{CREAT}
POTASSIUM BLD-SCNC: 4.6 MMOL/L (ref 3.5–5)
PROT SERPL-MCNC: 7.7 G/DL (ref 6–8)
SODIUM SERPL-SCNC: 138 MMOL/L (ref 136–145)
TRIGL SERPL-MCNC: 94 MG/DL

## 2021-12-17 PROCEDURE — 99214 OFFICE O/P EST MOD 30 MIN: CPT | Performed by: FAMILY MEDICINE

## 2021-12-17 PROCEDURE — 80053 COMPREHEN METABOLIC PANEL: CPT | Performed by: FAMILY MEDICINE

## 2021-12-17 PROCEDURE — 82043 UR ALBUMIN QUANTITATIVE: CPT | Performed by: FAMILY MEDICINE

## 2021-12-17 PROCEDURE — 36415 COLL VENOUS BLD VENIPUNCTURE: CPT | Performed by: FAMILY MEDICINE

## 2021-12-17 PROCEDURE — 99207 PR FOOT EXAM NO CHARGE: CPT | Performed by: FAMILY MEDICINE

## 2021-12-17 PROCEDURE — 91306 COVID-19,PF,MODERNA (18+ YRS BOOSTER .25ML): CPT | Performed by: FAMILY MEDICINE

## 2021-12-17 PROCEDURE — 0064A COVID-19,PF,MODERNA (18+ YRS BOOSTER .25ML): CPT | Performed by: FAMILY MEDICINE

## 2021-12-17 PROCEDURE — 83036 HEMOGLOBIN GLYCOSYLATED A1C: CPT | Performed by: FAMILY MEDICINE

## 2021-12-17 PROCEDURE — 82306 VITAMIN D 25 HYDROXY: CPT | Performed by: FAMILY MEDICINE

## 2021-12-17 PROCEDURE — 80061 LIPID PANEL: CPT | Performed by: FAMILY MEDICINE

## 2021-12-17 RX ORDER — ERGOCALCIFEROL 1.25 MG/1
1 CAPSULE ORAL WEEKLY
Qty: 12 CAPSULE | Refills: 3 | Status: SHIPPED | OUTPATIENT
Start: 2021-12-17 | End: 2022-12-20

## 2021-12-17 ASSESSMENT — MIFFLIN-ST. JEOR: SCORE: 1122.28

## 2021-12-17 NOTE — PROGRESS NOTES
ASSESSMENT/PLAN:    Type 2 diabetes mellitus with hyperglycemia, with long-term current use of insulin (H)  A1c has increased from 7.1-8.3 today in spite of same medications and same diet.  We discussed that her change in activity level certainly would explain this and if she could get her activity level increased we may be able to keep her on the same doses of medications.  She was out more physically active during the summer but now just stays home all the time in the winter.  We discussed some strategies to get more active activity and she will give this a try.  If blood sugar still high next visit, would recommend changing medication.  - Hemoglobin A1c  - Albumin Random Urine Quantitative with Creat Ratio  - FOOT EXAM  - Lipid panel reflex to direct LDL Fasting  - Comprehensive metabolic panel (BMP + Alb, Alk Phos, ALT, AST, Total. Bili, TP)  - ASPIRIN NOT PRESCRIBED (INTENTIONAL)    Proteinuria due to type 2 diabetes mellitus (H)  Checking labs today for surveillance    Diabetic retinopathy associated with type 2 diabetes mellitus, macular edema presence unspecified, unspecified laterality, unspecified retinopathy severity (H)  There is a note in patient's chart from [facility] on 10/5/21 that she has had diabetic retinopathy that requires treatment since at least July 2020, and that she was referred for treatment but never went.  I have put in a call to [facility] today to see where they referred her and see if we can help facilitate follow-up in this regard.  They are going to call me back.    Vitamin D deficiency  - ergocalciferol (ERGOCALCIFEROL) 1.25 MG (33863 UT) capsule  Dispense: 12 capsule; Refill: 3  - Vitamin D Deficiency    Dysuria  We will check UA and urine culture today.  I did not know she had this complaint till the very end of the visit, so I did not have the results back when she was still here.  - UA Macro with Reflex to Micro and Culture - lab collect  - Urine Culture    Need for  "vaccination  Covid booster given today  - COVID-19,PF,MODERNA (18+ YRS BOOSTER .25ML)       Return in about 3 months (around 3/17/2022) for Diabetes follow-up.     I spent a total of 33 minutes on the day of the visit.   Time spent doing chart review, history and exam, documentation and further activities per the note     SUBJECTIVE:  Ramona Wilder is a 61 year old female here for Diabetes      Tries to avoid sweets as blood sugars definitely get high if she eats sweets.  Last A1c 9/17/21 was 7.1, now 8.3.  Thinks due to not going outside as much.    Meter: .    Urination feels burning, itching for long time. Says last time she was given a cream and it didn't help. Hx multiple UTI.  Record reviewed:  UA neg 9/17/21 except glucosuria. Wet prep neg. It looks like she was rx'd clobetasol for potential lichen sclerosis chronicus      OBJECTIVE:  :  /68   Pulse 79   Temp 98.3  F (36.8  C) (Oral)   Resp 20   Ht 1.49 m (4' 10.66\")   Wt 65.2 kg (143 lb 12 oz)   SpO2 98%   BMI 29.37 kg/m      Gen:  A&A, NAD      Results for orders placed or performed in visit on 12/17/21   Result Value Ref Range    Hemoglobin A1C 8.3 (H) 0.0 - 5.6 %      "

## 2022-01-21 DIAGNOSIS — R12 HEARTBURN: ICD-10-CM

## 2022-01-21 RX ORDER — CALCIUM CARBONATE 500 MG/1
TABLET, CHEWABLE ORAL
Qty: 30 TABLET | Refills: 5 | Status: SHIPPED | OUTPATIENT
Start: 2022-01-21 | End: 2022-09-07

## 2022-02-04 ENCOUNTER — TELEPHONE (OUTPATIENT)
Dept: PHARMACY | Facility: CLINIC | Age: 62
End: 2022-02-04
Payer: COMMERCIAL

## 2022-02-04 NOTE — TELEPHONE ENCOUNTER
[facility] Outbound Call:    [facility]  Line: [phone] (new)  Dee  [phone]    Reason for call: due for MTM visit  Call attempt: x1  Voicemail left: Yes, left VM to return call to [facility] [phone] to make an MTM appointment.    Lanette Montez PharmD  Medication Therapy Management (MTM) Pharmacist  [facility]  Pager: [phone]

## 2022-03-18 ENCOUNTER — OFFICE VISIT (OUTPATIENT)
Dept: FAMILY MEDICINE | Facility: CLINIC | Age: 62
End: 2022-03-18
Payer: COMMERCIAL

## 2022-03-18 VITALS
OXYGEN SATURATION: 99 % | DIASTOLIC BLOOD PRESSURE: 78 MMHG | HEART RATE: 69 BPM | RESPIRATION RATE: 16 BRPM | SYSTOLIC BLOOD PRESSURE: 134 MMHG | HEIGHT: 59 IN | BODY MASS INDEX: 29.58 KG/M2 | TEMPERATURE: 97.5 F | WEIGHT: 146.75 LBS

## 2022-03-18 DIAGNOSIS — E11.65 TYPE 2 DIABETES MELLITUS WITH HYPERGLYCEMIA, WITH LONG-TERM CURRENT USE OF INSULIN (H): ICD-10-CM

## 2022-03-18 DIAGNOSIS — N30.00 ACUTE CYSTITIS WITHOUT HEMATURIA: ICD-10-CM

## 2022-03-18 DIAGNOSIS — Z00.00 ROUTINE GENERAL MEDICAL EXAMINATION AT A HEALTH CARE FACILITY: Primary | ICD-10-CM

## 2022-03-18 DIAGNOSIS — Z12.11 SCREENING FOR COLON CANCER: ICD-10-CM

## 2022-03-18 DIAGNOSIS — R30.0 DYSURIA: ICD-10-CM

## 2022-03-18 DIAGNOSIS — Z79.4 TYPE 2 DIABETES MELLITUS WITH HYPERGLYCEMIA, WITH LONG-TERM CURRENT USE OF INSULIN (H): ICD-10-CM

## 2022-03-18 DIAGNOSIS — R10.9 CHRONIC ABDOMINAL PAIN: ICD-10-CM

## 2022-03-18 DIAGNOSIS — G89.29 CHRONIC ABDOMINAL PAIN: ICD-10-CM

## 2022-03-18 DIAGNOSIS — F33.1 MAJOR DEPRESSIVE DISORDER, RECURRENT EPISODE, MODERATE (H): ICD-10-CM

## 2022-03-18 LAB
ALBUMIN SERPL-MCNC: 3.8 G/DL (ref 3.5–5)
ALBUMIN UR-MCNC: 30 MG/DL
ALP SERPL-CCNC: 80 U/L (ref 45–120)
ALT SERPL W P-5'-P-CCNC: 15 U/L (ref 0–45)
ANION GAP SERPL CALCULATED.3IONS-SCNC: 9 MMOL/L (ref 5–18)
APPEARANCE UR: ABNORMAL
AST SERPL W P-5'-P-CCNC: 25 U/L (ref 0–40)
BACTERIA #/AREA URNS HPF: ABNORMAL /HPF
BILIRUB SERPL-MCNC: 0.9 MG/DL (ref 0–1)
BILIRUB UR QL STRIP: NEGATIVE
BUN SERPL-MCNC: 14 MG/DL (ref 8–22)
CALCIUM SERPL-MCNC: 9.2 MG/DL (ref 8.5–10.5)
CHLORIDE BLD-SCNC: 106 MMOL/L (ref 98–107)
CO2 SERPL-SCNC: 26 MMOL/L (ref 22–31)
COLOR UR AUTO: YELLOW
CREAT SERPL-MCNC: 0.79 MG/DL (ref 0.6–1.1)
GFR SERPL CREATININE-BSD FRML MDRD: 85 ML/MIN/1.73M2
GLUCOSE BLD-MCNC: 157 MG/DL (ref 70–125)
GLUCOSE UR STRIP-MCNC: 250 MG/DL
HBA1C MFR BLD: 9.9 % (ref 0–5.6)
HGB UR QL STRIP: NEGATIVE
HYALINE CASTS #/AREA URNS LPF: ABNORMAL /LPF
KETONES UR STRIP-MCNC: NEGATIVE MG/DL
LEUKOCYTE ESTERASE UR QL STRIP: ABNORMAL
MUCOUS THREADS #/AREA URNS LPF: PRESENT /LPF
NITRATE UR QL: POSITIVE
PH UR STRIP: 5.5 [PH] (ref 5–7)
POTASSIUM BLD-SCNC: 4.4 MMOL/L (ref 3.5–5)
PROT SERPL-MCNC: 7.2 G/DL (ref 6–8)
RBC #/AREA URNS AUTO: ABNORMAL /HPF
SODIUM SERPL-SCNC: 141 MMOL/L (ref 136–145)
SP GR UR STRIP: 1.02 (ref 1–1.03)
SQUAMOUS #/AREA URNS AUTO: ABNORMAL /LPF
TRANS CELLS #/AREA URNS HPF: ABNORMAL /HPF
UROBILINOGEN UR STRIP-ACNC: 0.2 E.U./DL
WBC #/AREA URNS AUTO: ABNORMAL /HPF
WBC CLUMPS #/AREA URNS HPF: PRESENT /HPF

## 2022-03-18 PROCEDURE — 36415 COLL VENOUS BLD VENIPUNCTURE: CPT | Performed by: FAMILY MEDICINE

## 2022-03-18 PROCEDURE — 80053 COMPREHEN METABOLIC PANEL: CPT | Performed by: FAMILY MEDICINE

## 2022-03-18 PROCEDURE — 99396 PREV VISIT EST AGE 40-64: CPT | Performed by: FAMILY MEDICINE

## 2022-03-18 PROCEDURE — 83036 HEMOGLOBIN GLYCOSYLATED A1C: CPT | Performed by: FAMILY MEDICINE

## 2022-03-18 PROCEDURE — 87086 URINE CULTURE/COLONY COUNT: CPT | Performed by: FAMILY MEDICINE

## 2022-03-18 PROCEDURE — 99214 OFFICE O/P EST MOD 30 MIN: CPT | Mod: 25 | Performed by: FAMILY MEDICINE

## 2022-03-18 PROCEDURE — 87186 SC STD MICRODIL/AGAR DIL: CPT | Performed by: FAMILY MEDICINE

## 2022-03-18 PROCEDURE — 81001 URINALYSIS AUTO W/SCOPE: CPT | Performed by: FAMILY MEDICINE

## 2022-03-18 ASSESSMENT — PATIENT HEALTH QUESTIONNAIRE - PHQ9: SUM OF ALL RESPONSES TO PHQ QUESTIONS 1-9: 4

## 2022-03-18 NOTE — PATIENT INSTRUCTIONS
Lab Results   Component Value Date    A1C 9.9 03/18/2022    A1C 8.3 12/17/2021    A1C 7.1 09/17/2021    A1C 8.7 06/04/2021    A1C 10.0 02/26/2021     Goal range for blood sugars.    Fasting (before breakfast):   Before meals:   2 hours after meals: 150-160 or less  Bedtime: 100-140    Goals for optimal diabetic care:   1. BP less than 130/80   2. Hgb A1C less than 8  3. Aspirin therapy  4. LDL less than 100  5. No tobacco use     .      Preventive Health Recommendations  Female Ages 50 - 64    Yearly exam: See your health care provider every year in order to  o Review health changes.   o Discuss preventive care.    o Review your medicines if your doctor has prescribed any.      Get a Pap test every three years (unless you have an abnormal result and your provider advises testing more often).    If you get Pap tests with HPV test, you only need to test every 5 years, unless you have an abnormal result.     You do not need a Pap test if your uterus was removed (hysterectomy) and you have not had cancer.    You should be tested each year for STDs (sexually transmitted diseases) if you're at risk.     Have a mammogram every 1 to 2 years.    Have a colonoscopy at age 50. These exams screen for colon cancer.      Have a cholesterol test every 5 years, or more often if advised.    Have a diabetes test (fasting glucose) every three years. If you are at risk for diabetes, you should have this test more often.     If you are at risk for osteoporosis (brittle bone disease), think about having a bone density scan (DEXA).    Shots: Get a flu shot each year. Get a tetanus shot every 10 years.    Nutrition:     Eat at least 5 servings of fruits and vegetables each day.    Eat whole-grain bread, whole-wheat pasta and brown rice instead of white grains and rice.    Get adequate Calcium and Vitamin D.     Lifestyle    Exercise at least 150 minutes a week (30 minutes a day, 5 days a week). This will help you control your  weight and prevent disease.    Limit alcohol to one drink per day.    No smoking.     Wear sunscreen to prevent skin cancer.     See your dentist every six months for an exam and cleaning.    See your eye doctor every 1 to 2 years.

## 2022-03-18 NOTE — PROGRESS NOTES
SUBJECTIVE:   CC: Ramona Wilder is an 61 year old woman who presents for preventive health visit.       HPI    Diabetes:  A1c high today. She is Not sure why high, but then states that she thinks it is because she is eating a lot of noodles and sugary snacks.  Eats mohinga - lots of noddles and soup broth.    Is on cangliflozein (invokana) 300mg, metformin 1000mg bid, novolog mix (25 morning, 15 afternoon).    Glucometer:   FASTING in the morning.    Notices noodles really spike sugar.    Routine:  Gets up. Makes bed, then checks blood sugar.  Eats, then does some exercise.    Eats two meals daily; evening meal about 6:00pm.  Does insulin shots before eating those 2 meals. Doesn't miss.  Has been snacking on sugary snacks; snack is coated in sugar.    Sometimes gets discouraged that her blood sugars are always high even when she takes her medications.  However, does not want to take more injections.    Sometime sounds funny when she sits, in area of rectal repair x2-3 months.  However, does not want an exam of this area today.    Brings up at the end of a very long visit that she is having some right sided abdominal pain which she has had since her last colonoscopy.  Also mentions that her urine smells bad and would like her urine checked.    Today's PHQ-2 Score: No flowsheet data found.    Abuse: Current or Past (Physical, Sexual or Emotional) - No  Do you feel safe in your environment? Yes        Social History     Tobacco Use     Smoking status: Never Smoker     Smokeless tobacco: Former User     Types: Chew     Tobacco comment:  smokes outside, pt chews betel nut once in  a while, not tobacco   Substance Use Topics     Alcohol use: No       No flowsheet data found.    Reviewed orders with patient.  Reviewed health maintenance and updated orders accordingly - Yes      Breast Cancer Screening:  Any new diagnosis of family breast, ovarian, or bowel cancer? No    FHS-7: No flowsheet data  "found.    Mammogram Screening: Recommended mammography every 1-2 years with patient discussion and risk factor consideration  Pertinent mammograms are reviewed under the imaging tab.    History of abnormal Pap smear: NO - age 30-65 PAP every 5 years with negative HPV co-testing recommended  PAP / HPV Latest Ref Rng & Units 2018   PAP - Negative for squamous intraepithelial lesion or malignancy  Electronically signed by Kong Stoddard CT (ASCP) on 2018 at 11:07 AM     HPV16 NEG Negative   HPV18 NEG Negative   HRHPV NEG Negative     Reviewed and updated as needed this visit by clinical staff   Tobacco  Allergies  Meds              Reviewed and updated as needed this visit by Provider                 No past medical history on file.   Past Surgical History:   Procedure Laterality Date     UT ESOPHAGOGASTRODUODENOSCOPY TRANSORAL DIAGNOSTIC      Description: Esophagogastroduodenoscopy;  Proc Date: 2012;  Comments: Reactive Gastropathy. Neg H. Pylori.     ZZC REPAIR OF ANAL SPHINCTER,ADULT  2011    Sphincteroplasty and levetaroplasty at [facility] by Dr Leonardo Gibbs and Allen Jones; Repair of childbirth-related large cloacal defect.  Complicated by post-op wound infection requiring readmission.     OB History    Para Term  AB Living   6 6 6 0 0 0   SAB IAB Ectopic Multiple Live Births   0 0 0 0 0      # Outcome Date GA Lbr Jarrod/2nd Weight Sex Delivery Anes PTL Lv   6 Term            5 Term            4 Term            3 Term            2 Term            1 Term                Review of Systems       OBJECTIVE:   /78 (BP Location: Left arm, Patient Position: Sitting, Cuff Size: Adult Regular)   Pulse 69   Temp 97.5  F (36.4  C) (Oral)   Resp 16   Ht 1.495 m (4' 10.86\")   Wt 66.6 kg (146 lb 12 oz)   SpO2 99%   BMI 29.78 kg/m    Physical Exam          ASSESSMENT/PLAN:     Routine general medical examination at a health care facility  See " below    Type 2 diabetes mellitus with hyperglycemia, with long-term current use of insulin (H)  A1c is poorly controlled today.  She notes that she is eating a lot of noodles and eating sugary snacks every day.  She feels like she would be able to cut back on the sugary snacks.  We discussed that sometimes if you could just get through the first 3 days of eliminating processed sugar, the cravings will go down so, so I have encouraged her to try that.  She also states that now that the weather is better she is planning on doing a lot more exercise and hoping that will be helpful.  She declines any medication changes.  Insulin changes have been historically challenging because her eating is erratic and there is not been any great pattern in her blood sugars even when she is continuous glucose monitor.  For today, we left her medications as they are however, the A1c persists at next visit, I think Renaf to come up with a different regimen for her.  Its been challenging to both because of the erratic blood sugars and because of her reluctance to try a more injectable medications.  - Hemoglobin A1c  - Comprehensive metabolic panel (BMP + Alb, Alk Phos, ALT, AST, Total. Bili, TP)    Screening for colon cancer  Last colonoscopy was 10 years ago; ordered to be done now.  - Adult Gastro Ref - Procedure Only    Chronic abdominal pain  Upon discussion of the need for routine colonoscopy, patient states that she is having abdominal pain since her last colonoscopy.  She is also has known chronic abdominal pain.  She saw GI way back in the past but has not seen them recently and is interested in seeing them again now.  Consult entered.  - Adult Gastro Ref - Consult Only    Dysuria  Patient brought up at the end of the visit that she has malodorous urine and wants her kidneys checked.  To check UA UC today.  Results pending  - Urine Culture  - UA Macro with Reflex to Micro and Culture - lab collect  - Urine Microscopic Exam    "  Moderate Recurrent Major Depression  Patient is currently on Lexapro for depression.  Although she answered her PHQ-9 questions affirming that she would feel better off dead or hurting herself in someway as \"several days\", upon further discussion she actually says that she feels like her depression is pretty well controlled currently and that she feels pretty well and does not want to adjust medications.         COUNSELING:  Reviewed preventive health counseling, as reflected in patient instructions    Estimated body mass index is 29.78 kg/m  as calculated from the following:    Height as of this encounter: 1.495 m (4' 10.86\").    Weight as of this encounter: 66.6 kg (146 lb 12 oz).    Weight management plan: Discussed healthy diet and exercise guidelines    She reports that she has never smoked. She has quit using smokeless tobacco.  Her smokeless tobacco use included chew.      Counseling Resources:  ATP IV Guidelines  Pooled Cohorts Equation Calculator  Breast Cancer Risk Calculator  BRCA-Related Cancer Risk Assessment: FHS-7 Tool  FRAX Risk Assessment  ICSI Preventive Guidelines  Dietary Guidelines for Americans, 2010  USDA's MyPlate  ASA Prophylaxis  Lung CA Screening    Coreen Kendall MD  [facility]  "

## 2022-03-20 LAB — BACTERIA UR CULT: ABNORMAL

## 2022-03-21 ENCOUNTER — TELEPHONE (OUTPATIENT)
Dept: FAMILY MEDICINE | Facility: CLINIC | Age: 62
End: 2022-03-21
Payer: COMMERCIAL

## 2022-03-21 RX ORDER — NITROFURANTOIN 25; 75 MG/1; MG/1
100 CAPSULE ORAL 2 TIMES DAILY
Qty: 10 CAPSULE | Refills: 0 | Status: SHIPPED | OUTPATIENT
Start: 2022-03-21 | End: 2022-03-26

## 2022-03-21 NOTE — TELEPHONE ENCOUNTER
Called pt and left VM to call clinic.  1st attempt to call pt.  Ok to relay below should pt call back. thanks            ----- Message from Coreen Kendall MD sent at 3/21/2022  8:40 AM CDT -----  Please call pt and let her know that her urine sample from Friday grew out bacteria, mean she has a bladder infection.  I'm sending a prescription for her to take twice daily.

## 2022-03-21 NOTE — LETTER
March 24, 2022      Ramona Wilder  [address]  [place] [facility] [id number]        Dear ,    We are writing to inform you of your test results.    Your urine test was abnormal. You have a urinary tract infection. Your doctor has sent a medication to your pharmacy to take twice daily.     You can find this medicine at [address].     Clinic staff has attempted to reach you by phone but has not been successful.     Resulted Orders   Hemoglobin A1c   Result Value Ref Range    Hemoglobin A1C 9.9 (H) 0.0 - 5.6 %      Comment:      Normal <5.7%   Prediabetes 5.7-6.4%    Diabetes 6.5% or higher     Note: Adopted from ADA consensus guidelines.   Comprehensive metabolic panel (BMP + Alb, Alk Phos, ALT, AST, Total. Bili, TP)   Result Value Ref Range    Sodium 141 136 - 145 mmol/L    Potassium 4.4 3.5 - 5.0 mmol/L    Chloride 106 98 - 107 mmol/L    Carbon Dioxide (CO2) 26 22 - 31 mmol/L    Anion Gap 9 5 - 18 mmol/L    Urea Nitrogen 14 8 - 22 mg/dL    Creatinine 0.79 0.60 - 1.10 mg/dL    Calcium 9.2 8.5 - 10.5 mg/dL    Glucose 157 (H) 70 - 125 mg/dL    Alkaline Phosphatase 80 45 - 120 U/L    AST 25 0 - 40 U/L    ALT 15 0 - 45 U/L    Protein Total 7.2 6.0 - 8.0 g/dL    Albumin 3.8 3.5 - 5.0 g/dL    Bilirubin Total 0.9 0.0 - 1.0 mg/dL    GFR Estimate 85 >60 mL/min/1.73m2      Comment:      Effective December 21, 2021 eGFRcr in adults is calculated using the 2021 CKD-EPI creatinine equation which includes age and gender (Rhys et al., NEJM, DOI: 10.1056/NTGGik3798097)   Urine Culture   Result Value Ref Range    Culture >100,000 CFU/mL Escherichia coli ESBL (A)    UA Macro with Reflex to Micro and Culture - lab collect   Result Value Ref Range    Color Urine Yellow Colorless, Straw, Light Yellow, Yellow    Appearance Urine Slightly Cloudy (A) Clear    Glucose Urine 250  (A) Negative mg/dL    Bilirubin Urine Negative Negative    Ketones Urine Negative Negative mg/dL    Specific Gravity Urine  1.020 1.005 - 1.030    Blood Urine Negative Negative    pH Urine 5.5 5.0 - 7.0    Protein Albumin Urine 30  (A) Negative mg/dL    Urobilinogen Urine 0.2 0.2, 1.0 E.U./dL    Nitrite Urine Positive (A) Negative    Leukocyte Esterase Urine Small (A) Negative   Urine Microscopic Exam   Result Value Ref Range    Bacteria Urine Many (A) None Seen /HPF    RBC Urine None Seen 0-2 /HPF /HPF    WBC Urine 10-25 (A) 0-5 /HPF /HPF    Squamous Epithelials Urine Moderate (A) None Seen /LPF    WBC Clumps Urine Present (A) None Seen /HPF    Transitional Epithelials Urine Few (A) None Seen /HPF    Mucus Urine Present (A) None Seen /LPF    Hyaline Casts Urine 0-2 (A) None Seen /LPF       If you have any questions or concerns, please call the clinic at the number listed above.       Sincerely,

## 2022-03-23 NOTE — TELEPHONE ENCOUNTER
Called pt and left VM to call clinic.  If no response by tomorrow will send letter with lab results and med needed.  thanks

## 2022-03-28 ENCOUNTER — TELEPHONE (OUTPATIENT)
Dept: FAMILY MEDICINE | Facility: CLINIC | Age: 62
End: 2022-03-28
Payer: COMMERCIAL

## 2022-03-28 DIAGNOSIS — Z76.0 ENCOUNTER FOR MEDICATION REFILL: Primary | ICD-10-CM

## 2022-03-28 DIAGNOSIS — K31.9 DISEASE OF STOMACH AND DUODENUM: ICD-10-CM

## 2022-03-28 NOTE — TELEPHONE ENCOUNTER
Pt stated that she is only available on Fridays. The pharmacist is not here on fridays. She does not want transportation due to covid. She only feels comfortable with her kids driving her.  Was unable to schedule at this time.

## 2022-03-28 NOTE — TELEPHONE ENCOUNTER
----- Message from Destiny Montez RPH sent at 3/26/2022  2:06 PM CDT -----  Regarding: due for MTM  Please contact patient to make a follow-up MTM appointment 60 minute visit in person.    Thank you,    Lanette Montez, PharmD  Medication Therapy Management (MTM) Pharmacist

## 2022-04-15 ENCOUNTER — TELEPHONE (OUTPATIENT)
Dept: FAMILY MEDICINE | Facility: CLINIC | Age: 62
End: 2022-04-15
Payer: COMMERCIAL

## 2022-04-15 NOTE — TELEPHONE ENCOUNTER
Per encounter on 3/28:   Pt stated that she is only available on Fridays. The pharmacist is not here on fridays. She does not want transportation due to covid. She only feels comfortable with her kids driving her.  Was unable to schedule at this time.

## 2022-04-15 NOTE — TELEPHONE ENCOUNTER
Called patient to schedule initial Mtm Visit patients states she does not have transportation right now and is not getting in any strangers cars so no appointment was made       ----- Message from Heather Morales PharmD sent at 4/14/2022  3:57 PM CDT -----  Recommend MTM initial visit for DM check. Please call to schedule.    Heather Morales PharmD, [facility]  Medication Therapy Management Pharmacist

## 2022-04-15 NOTE — TELEPHONE ENCOUNTER
----- Message from Heather Morales PharmD sent at 4/14/2022  3:57 PM CDT -----  Recommend MTM initial visit for DM check. Please call to schedule.    Heather Morales, PharmD, BCACP  Medication Therapy Management Pharmacist

## 2022-04-18 NOTE — TELEPHONE ENCOUNTER
Scheduling - can you please reach out to the patient to see if they will schedule a MTM visit either over the phone or in person with a different MTM pharmacist, potentially [facility] MTM (since I am not available on a Friday?).     Thank you,  Ajay

## 2022-06-24 ENCOUNTER — OFFICE VISIT (OUTPATIENT)
Dept: FAMILY MEDICINE | Facility: CLINIC | Age: 62
End: 2022-06-24
Payer: COMMERCIAL

## 2022-06-24 VITALS
TEMPERATURE: 98 F | RESPIRATION RATE: 16 BRPM | WEIGHT: 148.5 LBS | HEIGHT: 59 IN | SYSTOLIC BLOOD PRESSURE: 130 MMHG | DIASTOLIC BLOOD PRESSURE: 72 MMHG | HEART RATE: 70 BPM | OXYGEN SATURATION: 99 % | BODY MASS INDEX: 29.94 KG/M2

## 2022-06-24 DIAGNOSIS — E11.65 TYPE 2 DIABETES MELLITUS WITH HYPERGLYCEMIA, WITH LONG-TERM CURRENT USE OF INSULIN (H): Primary | ICD-10-CM

## 2022-06-24 DIAGNOSIS — Z79.4 TYPE 2 DIABETES MELLITUS WITH HYPERGLYCEMIA, WITH LONG-TERM CURRENT USE OF INSULIN (H): Primary | ICD-10-CM

## 2022-06-24 DIAGNOSIS — K62.89 RECTAL PAIN: ICD-10-CM

## 2022-06-24 DIAGNOSIS — Z12.11 SCREEN FOR COLON CANCER: ICD-10-CM

## 2022-06-24 LAB — HBA1C MFR BLD: 9.4 % (ref 0–5.6)

## 2022-06-24 PROCEDURE — 36415 COLL VENOUS BLD VENIPUNCTURE: CPT | Performed by: FAMILY MEDICINE

## 2022-06-24 PROCEDURE — 83036 HEMOGLOBIN GLYCOSYLATED A1C: CPT | Performed by: FAMILY MEDICINE

## 2022-06-24 PROCEDURE — 99214 OFFICE O/P EST MOD 30 MIN: CPT | Performed by: FAMILY MEDICINE

## 2022-06-24 NOTE — PATIENT INSTRUCTIONS
Results for orders placed or performed in visit on 06/24/22   Result Value Ref Range    Hemoglobin A1C 9.4 (H) 0.0 - 5.6 %

## 2022-06-24 NOTE — PROGRESS NOTES
Assessment & Plan     Type 2 diabetes mellitus with hyperglycemia, with long-term current use of insulin (H)  Patient has uncontrolled diabetes, with A1c today of 9.4.    I have tried everything and can try it with her to get her blood sugars under better control and have been unable.    She is reluctant to use injections, does not check blood sugars very often, and does not eat regularly.  She gets some low blood sugars, which makes her regimen difficult to change.  previously tried and failed digna CGM.   I would like to have her see endocrinology to see if they might have some ideas.  Unfortunately, she is not able to get into endocrinology for 7 months.  We will have her see our MTM pharmacist in the interim, as she has had some success pinpointing some areas of change that have improved her blood sugars.  -     Hemoglobin A1c; Future  -     Adult Endocrinology  Referral; Future    Rectal pain  Screen for colon cancer  Patient has deep trouble some rectal pain in the last few months, with a history of complicated [facility]inct[facility].  I had her meet with our specialty  today to try to get back in with her surgeon as soon as possible, but she is not able to get in for a few weeks.  If her pain gets significantly worse in the interim, I think she will need to go to the emergency room in order to access care sooner.  Of note, she is due for colon cancer screening and assuming they can work that into her evaluation at some point, so did not order this separately.  -     Colorectal Surgery Referral; Future    Of note, we waited more than 20 minutes for telephone  for this patient and we do not have access to in person interpreters.  The patient finally herself got frustrated and called her daughter to interpret this visit.  This may have limited her ability to be entirely forthcoming with her history.      I spent a total of 32  minutes on the day of the visit.   Time spent doing chart review, history and exam, documentation and further activities per the note           Return in about 3 months (around 9/24/2022) for Diabetes follow-up.    Coreen Kendall MD  [facility] SANDY Greene is a 62 year old, presenting for the following health issues:  Diabetes and Concern  (Had surgery about 10 yrs ago on large intestine and its starting to bother her again )      History of Present Illness       Diabetes:   She presents for follow up of diabetes.  She is checking home blood glucose one time daily. She checks blood glucose before meals.  Blood glucose is sometimes over 200 and sometimes under 70. When her blood glucose is low, the patient is asymptomatic for confusion, blurred vision, lethargy and reports not feeling dizzy, shaky, or weak.  She has no concerns regarding her diabetes at this time.  She is not experiencing numbness or burning in feet, excessive thirst, blurry vision, weight changes or redness, sores or blisters on feet.         Glucometer (all fasting):    132  157  166  144  103  64  116  90  54  123  135  61  102  155  75  89  38  115  130  81  78  208  198    Also complains of rectal pain, ongoing from several months, worse the last few weeks.  Is an aching pain that is particularly bothersome for her at night.  No bowel problems.  She has a history of anal sphincter repair in 2011 and feels like it is in the same area as the surgery.      Current Outpatient Medications   Medication     ACETAMINOPHEN EXTRA STRENGTH 500 MG tablet     alcohol swab prep pads     ANTACID REGULAR STRENGTH 500 MG chewable tablet     ASPIRIN NOT PRESCRIBED (INTENTIONAL)     blood glucose (NO BRAND SPECIFIED) test strip     blood glucose calibration (NO BRAND SPECIFIED) solution     blood glucose monitoring (NO BRAND SPECIFIED) meter device kit     blood glucose test (GLUCOSE BLOOD) strips     blood glucose test (ONETOUCH  VERIO TEST STRIPS) strips     blood pressure monitor Kit     canagliflozin (INVOKANA) 300 mg Tab     clobetasol (TEMOVATE) 0.05 % external ointment     ergocalciferol (ERGOCALCIFEROL) 1.25 MG (84897 UT) capsule     escitalopram oxalate (LEXAPRO) 10 MG tablet     hydroCHLOROthiazide (HYDRODIURIL) 25 MG tablet     insulin aspart prot & aspart (NOVOLOG MIX 70/30 PEN) (70-30) 100 UNIT/ML pen     lancets (ONETOUCH DELICA LANCETS) 33 gauge Misc     losartan (COZAAR) 100 MG tablet     metFORMIN (GLUCOPHAGE XR) 500 MG 24 hr tablet     omeprazole (PRILOSEC) 20 MG DR capsule     rosuvastatin (CRESTOR) 40 MG tablet     thin (NO BRAND SPECIFIED) lancets     triamcinolone (KENALOG) 0.1 % external ointment     TRUEPLUS PEN NEEDLES 32G X 4 MM miscellaneous     No current facility-administered medications for this visit.         Allergies   Allergen Reactions     Aspirin (Tartrazine Only) [Tartrazine] Unknown     Abdominal pain       Lisinopril Angioedema        Patient Active Problem List   Diagnosis     Carotid Artery Stenosis     Moderate Recurrent Major Depression     Anxiety     Insomnia     Hypertension     Vitamin D deficiency     Gastropathy     Type 2 diabetes mellitus with hyperglycemia, with long-term current use of insulin (H)     Hyperlipidemia     Headache     Chronic Pain     Back Strain     Dermatitis     Metabolic Tests Nonspecific Elevation Of Transaminase Levels     Pain in finger of right hand     Left knee pain     Urinary, incontinence, stress female     Right lumbar radiculitis     Proteinuria due to type 2 diabetes mellitus (H)     Lichen sclerosus et atrophicus     Diabetic retinopathy associated with type 2 diabetes mellitus (H)        Past Surgical History:   Procedure Laterality Date     OR ESOPHAGOGASTRODUODENOSCOPY TRANSORAL DIAGNOSTIC      Description: Esophagogastroduodenoscopy;  Proc Date: 02/28/2012;  Comments: Reactive Gastropathy. Neg H. Pylori.     ZZC REPAIR OF ANAL SPHINCTER,ADULT  02/21/2011  "   Sphincteroplasty and levetaroplasty at [facility] by Dr Leonardo Gibbs and Allen Jones; Repair of childbirth-related large cloacal defect.  Complicated by post-op wound infection requiring readmission.       Family History   Problem Relation Age of Onset     Cancer Sister         Maybe lung cancer?  Lung problem.  in [place].     Asthma Sister      Breast Cancer No family hx of        Social History     Tobacco Use     Smoking status: Never Smoker     Smokeless tobacco: Former User     Types: Chew     Tobacco comment:  smokes outside, pt chews betel nut once in  a while, not tobacco   Substance Use Topics     Alcohol use: No           Objective    /72   Pulse 70   Temp 98  F (36.7  C) (Oral)   Resp 16   Ht 1.495 m (4' 10.86\")   Wt 67.4 kg (148 lb 8 oz)   SpO2 99%   BMI 30.14 kg/m    Body mass index is 30.14 kg/m .  Physical Exam   Gen:  A&A, NAD  Neck:  supple, no sig LAD or thyromegally  CV:  HRRR, no M/R/G  Resp:  CTAB  Abd:  S&NT, no mass.  External anorectal exam is normal without mass, hemorrhoids, bleeding, tears, etc.  Ext:  W&D, no edema       Results for orders placed or performed in visit on 22   Result Value Ref Range    Hemoglobin A1C 9.4 (H) 0.0 - 5.6 %          .  ..  "

## 2022-06-28 ENCOUNTER — TELEPHONE (OUTPATIENT)
Dept: FAMILY MEDICINE | Facility: CLINIC | Age: 62
End: 2022-06-28

## 2022-06-28 NOTE — TELEPHONE ENCOUNTER
Called to schedule appt no answer. Unable to LVM the mailbox is full.    [facility] Can[facility] 6.28.22

## 2022-06-28 NOTE — TELEPHONE ENCOUNTER
MTM referral from: [facility] visit (referral by provider)    MTM referral outreach attempt #2 on June 28, 2022 at 9:02 AM      Outcome: Patient not reachable after several attempts, will route to [facility] Pharmacist/Provider as an FYI.  [facility] scheduling number is [phone].  Thank you for the referral.    Michelle Aguilar [facility]

## 2022-07-02 DIAGNOSIS — E11.65 TYPE 2 DIABETES MELLITUS WITH HYPERGLYCEMIA, WITH LONG-TERM CURRENT USE OF INSULIN (H): ICD-10-CM

## 2022-07-02 DIAGNOSIS — Z79.4 TYPE 2 DIABETES MELLITUS WITH HYPERGLYCEMIA, WITH LONG-TERM CURRENT USE OF INSULIN (H): ICD-10-CM

## 2022-07-02 DIAGNOSIS — E78.5 HYPERLIPIDEMIA, UNSPECIFIED HYPERLIPIDEMIA TYPE: ICD-10-CM

## 2022-07-02 DIAGNOSIS — I10 ESSENTIAL HYPERTENSION: ICD-10-CM

## 2022-07-03 RX ORDER — METFORMIN HCL 500 MG
TABLET, EXTENDED RELEASE 24 HR ORAL
Qty: 360 TABLET | Refills: 3 | Status: SHIPPED | OUTPATIENT
Start: 2022-07-03 | End: 2023-07-27

## 2022-07-03 RX ORDER — CANAGLIFLOZIN 300 MG/1
TABLET, FILM COATED ORAL
Qty: 90 TABLET | Refills: 3 | Status: SHIPPED | OUTPATIENT
Start: 2022-07-03 | End: 2023-07-27

## 2022-07-03 RX ORDER — HYDROCHLOROTHIAZIDE 25 MG/1
TABLET ORAL
Qty: 90 TABLET | Refills: 3 | Status: SHIPPED | OUTPATIENT
Start: 2022-07-03 | End: 2023-07-27

## 2022-07-03 RX ORDER — ROSUVASTATIN CALCIUM 40 MG/1
TABLET, COATED ORAL
Qty: 90 TABLET | Refills: 3 | Status: SHIPPED | OUTPATIENT
Start: 2022-07-03 | End: 2023-07-27

## 2022-07-03 NOTE — TELEPHONE ENCOUNTER
"Last Written Prescription Date:  6/4/21  Last Fill Quantity: 90,  # refills: 3   Last office visit provider:  6/24/22     Requested Prescriptions   Pending Prescriptions Disp Refills     INVOKANA 300 MG tablet [Pharmacy Med Name: INVOKANA 300 MG TABS 300 Tablet] 30 tablet 3     Sig: TAKE 1 TABLET BY MOUTH DAILY.       Sodium Glucose Co-Transport Inhibitor Agents Passed - 7/2/2022  9:42 AM        Passed - Patient has documented A1c within the specified period of time.     If HgbA1C is 8 or greater, it needs to be on file within the past 3 months.  If less than 8, must be on file within the past 6 months.     Recent Labs   Lab Test 06/24/22  1003   A1C 9.4*             Passed - No creatinine >1.4 or GFR <45 within the past 12 mos     Recent Labs   Lab Test 03/18/22  1127 12/17/21  1133 06/04/21  1144   GFRESTIMATED 85   < > >60   GFRESTBLACK  --   --  >60    < > = values in this interval not displayed.       Recent Labs   Lab Test 03/18/22  1127   CR 0.79             Passed - Medication is active on med list        Passed - Patient is age 18 or older        Passed - Patient is not pregnant        Passed - Patient has documented normal Potassium within the last 12 mos.     Recent Labs   Lab Test 03/18/22  1127   POTASSIUM 4.4             Passed - Patient has no positive pregnancy test within the past 12 mos.        Passed - Recent (6 mo) or future (30 days) visit within the authorizing provider's specialty     Patient had office visit in the last 6 months or has a visit in the next 30 days with authorizing provider or within the authorizing provider's specialty.  See \"Patient Info\" tab in inbasket, or \"Choose Columns\" in Meds & Orders section of the refill encounter.               metFORMIN (GLUCOPHAGE XR) 500 MG 24 hr tablet [Pharmacy Med Name: METFORMIN HCL  MG  Tablet] 120 tablet 11     Sig: TAKE 2 TABLETS (1,000 MG TOTAL) BY MOUTH 2 (TWO) TIMES A DAY.       Biguanide Agents Passed - 7/2/2022  9:42 AM " "       Passed - Patient is age 10 or older        Passed - Patient has documented A1c within the specified period of time.     If HgbA1C is 8 or greater, it needs to be on file within the past 3 months.  If less than 8, must be on file within the past 6 months.     Recent Labs   Lab Test 06/24/22  1003   A1C 9.4*             Passed - Patient's CR is NOT>1.4 OR Patient's EGFR is NOT<45 within past 12 mos.     Recent Labs   Lab Test 03/18/22  1127 12/17/21  1133 06/04/21  1144   GFRESTIMATED 85   < > >60   GFRESTBLACK  --   --  >60    < > = values in this interval not displayed.       Recent Labs   Lab Test 03/18/22  1127   CR 0.79             Passed - Patient does NOT have a diagnosis of CHF.        Passed - Medication is active on med list        Passed - Patient is not pregnant        Passed - Patient has not had a positive pregnancy test within the past 12 mos.         Passed - Recent (6 mo) or future (30 days) visit within the authorizing provider's specialty     Patient had office visit in the last 6 months or has a visit in the next 30 days with authorizing provider or within the authorizing provider's specialty.  See \"Patient Info\" tab in inbasket, or \"Choose Columns\" in Meds & Orders section of the refill encounter.               hydrochlorothiazide (HYDRODIURIL) 25 MG tablet [Pharmacy Med Name: HYDROCHLOROTHIAZIDE 25 MG T 25 Tablet] 30 tablet 3     Sig: TAKE 1 TABLET (25 MG TOTAL) BY MOUTH DAILY.       Diuretics (Including Combos) Protocol Passed - 7/2/2022  9:42 AM        Passed - Blood pressure under 140/90 in past 12 months     BP Readings from Last 3 Encounters:   06/24/22 130/72   03/18/22 134/78   12/17/21 118/68                 Passed - Recent (12 mo) or future (30 days) visit within the authorizing provider's specialty     Patient has had an office visit with the authorizing provider or a provider within the authorizing providers department within the previous 12 mos or has a future within next 30 " "days. See \"Patient Info\" tab in inbasket, or \"Choose Columns\" in Meds & Orders section of the refill encounter.              Passed - Medication is active on med list        Passed - Patient is age 18 or older        Passed - No active pregancy on record        Passed - Normal serum creatinine on file in past 12 months     Recent Labs   Lab Test 03/18/22  1127   CR 0.79              Passed - Normal serum potassium on file in past 12 months     Recent Labs   Lab Test 03/18/22  1127   POTASSIUM 4.4                    Passed - Normal serum sodium on file in past 12 months     Recent Labs   Lab Test 03/18/22  1127                 Passed - No positive pregnancy test in past 12 months           rosuvastatin (CRESTOR) 40 MG tablet [Pharmacy Med Name: ROSUVASTATIN CALCIUM 40 MG 40 Tablet] 30 tablet 4     Sig: TAKE 1 TABLET (40 MG TOTAL) BY MOUTH AT BEDTIME.       Statins Protocol Passed - 7/2/2022  9:42 AM        Passed - LDL on file in past 12 months     Recent Labs   Lab Test 12/17/21  1133   *             Passed - No abnormal creatine kinase in past 12 months     No lab results found.             Passed - Recent (12 mo) or future (30 days) visit within the authorizing provider's specialty     Patient has had an office visit with the authorizing provider or a provider within the authorizing providers department within the previous 12 mos or has a future within next 30 days. See \"Patient Info\" tab in inbasket, or \"Choose Columns\" in Meds & Orders section of the refill encounter.              Passed - Medication is active on med list        Passed - Patient is age 18 or older        Passed - No active pregnancy on record        Passed - No positive pregnancy test in past 12 months             Jayshree Norman 07/03/22 6:59 AM  "

## 2022-07-05 NOTE — TELEPHONE ENCOUNTER
Tried to call and schedule appt, No answer mailbox is full so unable to leave message    [facility] 7.5.22

## 2022-07-11 ENCOUNTER — TRANSFERRED RECORDS (OUTPATIENT)
Dept: HEALTH INFORMATION MANAGEMENT | Facility: CLINIC | Age: 62
End: 2022-07-11

## 2022-07-12 NOTE — TELEPHONE ENCOUNTER
Called to schedule appt no answer and mailbox if full so unable to to leave a message.    [facility] 7.12.22

## 2022-08-03 ENCOUNTER — TRANSFERRED RECORDS (OUTPATIENT)
Dept: HEALTH INFORMATION MANAGEMENT | Facility: CLINIC | Age: 62
End: 2022-08-03

## 2022-09-06 DIAGNOSIS — R12 HEARTBURN: ICD-10-CM

## 2022-09-07 RX ORDER — CALCIUM CARBONATE 500 MG/1
TABLET, CHEWABLE ORAL
Qty: 30 TABLET | Refills: 5 | Status: SHIPPED | OUTPATIENT
Start: 2022-09-07 | End: 2023-01-27

## 2022-09-07 NOTE — TELEPHONE ENCOUNTER
Routing refill request to provider for review/approval because:  Drug not on the [facility] refill protocol     Last Written Prescription Date:  1/21/22  Last Fill Quantity: 30,  # refills: 5   Last office visit provider:  6/24/22     Requested Prescriptions   Pending Prescriptions Disp Refills     ANTACID REGULAR STRENGTH 500 MG chewable tablet [Pharmacy Med Name: ANTACID REGULAR STRENGTH 50 500 Tablet] 30 tablet 5     Sig: CHEW 1 TABLET BY MOUTH DAILY       There is no refill protocol information for this order          Andrew Escobar RN 09/07/22 7:09 AM

## 2022-09-30 ENCOUNTER — OFFICE VISIT (OUTPATIENT)
Dept: FAMILY MEDICINE | Facility: CLINIC | Age: 62
End: 2022-09-30
Payer: COMMERCIAL

## 2022-09-30 VITALS
BODY MASS INDEX: 29.03 KG/M2 | SYSTOLIC BLOOD PRESSURE: 120 MMHG | HEIGHT: 59 IN | TEMPERATURE: 97.8 F | WEIGHT: 144 LBS | DIASTOLIC BLOOD PRESSURE: 68 MMHG | OXYGEN SATURATION: 99 % | HEART RATE: 66 BPM

## 2022-09-30 DIAGNOSIS — E11.65 TYPE 2 DIABETES MELLITUS WITH HYPERGLYCEMIA, WITH LONG-TERM CURRENT USE OF INSULIN (H): Primary | ICD-10-CM

## 2022-09-30 DIAGNOSIS — E11.319 DIABETIC RETINOPATHY ASSOCIATED WITH TYPE 2 DIABETES MELLITUS, MACULAR EDEMA PRESENCE UNSPECIFIED, UNSPECIFIED LATERALITY, UNSPECIFIED RETINOPATHY SEVERITY (H): ICD-10-CM

## 2022-09-30 DIAGNOSIS — B35.4 TINEA CORPORIS: ICD-10-CM

## 2022-09-30 DIAGNOSIS — E11.29 PROTEINURIA DUE TO TYPE 2 DIABETES MELLITUS (H): ICD-10-CM

## 2022-09-30 DIAGNOSIS — Z79.4 TYPE 2 DIABETES MELLITUS WITH HYPERGLYCEMIA, WITH LONG-TERM CURRENT USE OF INSULIN (H): Primary | ICD-10-CM

## 2022-09-30 DIAGNOSIS — R80.9 PROTEINURIA DUE TO TYPE 2 DIABETES MELLITUS (H): ICD-10-CM

## 2022-09-30 DIAGNOSIS — I10 ESSENTIAL HYPERTENSION: ICD-10-CM

## 2022-09-30 LAB — HBA1C MFR BLD: 7.9 % (ref 0–5.6)

## 2022-09-30 PROCEDURE — 90682 RIV4 VACC RECOMBINANT DNA IM: CPT | Performed by: FAMILY MEDICINE

## 2022-09-30 PROCEDURE — 0134A COVID-19,PF,MODERNA BIVALENT: CPT | Performed by: FAMILY MEDICINE

## 2022-09-30 PROCEDURE — 83036 HEMOGLOBIN GLYCOSYLATED A1C: CPT | Performed by: FAMILY MEDICINE

## 2022-09-30 PROCEDURE — 36415 COLL VENOUS BLD VENIPUNCTURE: CPT | Performed by: FAMILY MEDICINE

## 2022-09-30 PROCEDURE — 90471 IMMUNIZATION ADMIN: CPT | Performed by: FAMILY MEDICINE

## 2022-09-30 PROCEDURE — 91313 COVID-19,PF,MODERNA BIVALENT: CPT | Performed by: FAMILY MEDICINE

## 2022-09-30 PROCEDURE — 99214 OFFICE O/P EST MOD 30 MIN: CPT | Mod: 25 | Performed by: FAMILY MEDICINE

## 2022-09-30 RX ORDER — KETOCONAZOLE 20 MG/G
CREAM TOPICAL
Qty: 60 G | Refills: 0 | Status: SHIPPED | OUTPATIENT
Start: 2022-09-30 | End: 2023-11-10

## 2022-09-30 RX ORDER — LOSARTAN POTASSIUM 100 MG/1
100 TABLET ORAL DAILY
Qty: 30 TABLET | Refills: 11 | Status: SHIPPED | OUTPATIENT
Start: 2022-09-30 | End: 2023-09-29

## 2022-09-30 NOTE — PROGRESS NOTES
ASSESSMENT/PLAN:    Type 2 diabetes mellitus with hyperglycemia, with long-term current use of insulin (H)  At patient's last visit with me in June, I had referred her to endocrinology due to having no success in getting her A1c down to goal while avoiding hypoglycemia.  Her A1c actually quite a bit better today at 7.9, but she is quite young and I do recommend an A1c less than 7 if we can get there.  Therefore I do think she should still keep her upcoming endocrinology appointment.  Hemoglobin A1C   Date Value Ref Range Status   09/30/2022 7.9 (H) 0.0 - 5.6 % Final   06/24/2022 9.4 (H) 0.0 - 5.6 % Final   03/18/2022 9.9 (H) 0.0 - 5.6 % Final   12/17/2021 8.3 (H) 0.0 - 5.6 % Final       Diabetic retinopathy associated with type 2 diabetes mellitus, macular edema presence unspecified, unspecified laterality, unspecified retinopathy severity (H)  Our only eye exam records are from 10/5/2021 [facility].  They noted diabetic retinopathy at that point and had also noted that they had been trying to refer her since at least 2020 to a retinal specialist.  Patient reports seeing the regular eye doctor again about July, but has not seen the specialist.  I explained need for specialty referral to reduce risk of severe permanent vision changes to the patient and will put in a referral through my system in hopes that will help get things going.  - Vitreoretinal Surgery Referral    Tinea corporis  - ketoconazole (NIZORAL) 2 % external cream  Dispense: 60 g; Refill: 0    Essential hypertension  Proteinuria due to type 2 diabetes mellitus (H)  Blood pressure is well-controlled on current regimen; continue losartan for both blood pressure control as well as reduction of proteinuria.  - losartan (COZAAR) 100 MG tablet  Dispense: 30 tablet; Refill: 11         Return in about 4 months (around 1/30/2023) for Diabetes follow-up.          SUBJECTIVE:  Ramona Wilder is a 62 year old female here for Diabetes    Records show eye  "exam [facility] Oct 5.  Shows that they found diabetic retinopathy.  [facility], but no f/u  Pt reports last eye exam around July.    \"wound\" on left shing  Itchy, so scratched it.  There long time about 2 months.  Cream before, but not helping.      Blood sugars: mostly under 120      History of Present Illness       Reason for visit:  DM          OBJECTIVE:  :  /68 (BP Location: Right arm, Patient Position: Sitting, Cuff Size: Adult Regular)   Pulse 66   Temp 97.8  F (36.6  C) (Oral)   Ht 1.499 m (4' 11\")   Wt 65.3 kg (144 lb)   SpO2 99%   BMI 29.08 kg/m    Wt Readings from Last 3 Encounters:   09/30/22 65.3 kg (144 lb)   06/24/22 67.4 kg (148 lb 8 oz)   03/18/22 66.6 kg (146 lb 12 oz)         Gen:  A&A, NAD  Neck:  supple, no sig LAD or thyromegally  CV:  HRRR, no M/R/G  Resp:  CTAB  Ext:  W&D, no edema  Skin: Scaly erythematous rash left shin        Results for orders placed or performed in visit on 09/30/22   Result Value Ref Range    Hemoglobin A1C 7.9 (H) 0.0 - 5.6 %      "

## 2022-10-13 DIAGNOSIS — Z79.4 TYPE 2 DIABETES MELLITUS WITH HYPERGLYCEMIA, WITH LONG-TERM CURRENT USE OF INSULIN (H): ICD-10-CM

## 2022-10-13 DIAGNOSIS — E11.65 TYPE 2 DIABETES MELLITUS WITH HYPERGLYCEMIA, WITH LONG-TERM CURRENT USE OF INSULIN (H): ICD-10-CM

## 2022-10-14 RX ORDER — INSULIN ASPART 100 [IU]/ML
INJECTION, SUSPENSION SUBCUTANEOUS
Qty: 15 ML | Refills: 1 | Status: SHIPPED | OUTPATIENT
Start: 2022-10-14 | End: 2022-12-29

## 2022-10-14 NOTE — TELEPHONE ENCOUNTER
"Last Written Prescription Date:  8/20/21  Last Fill Quantity: 30 ml,  # refills: 11   Last office visit provider:  9/30/22     Requested Prescriptions   Pending Prescriptions Disp Refills     Insulin Aspart Prot & Aspart (INSULIN ASP PROT & ASP FLEXPEN) (70-30) 100 UNIT/ML SUPN [Pharmacy Med Name: INSULIN ASP PROT & ASP FLEX (70-30) 100 Suspension Pen-injector] 15 mL 11     Sig: INJECT 20 UNITS BEFORE BREAKFAST AND 10 UNITS BEFORE DINNER       Insulin Mixes Protocol Failed - 10/14/2022  9:49 AM        Failed - Recent (6 mo) or future (30 days) visit within the authorizing provider's specialty     Patient had office visit in the last 6 months or has a visit in the next 30 days with authorizing provider or within the authorizing provider's specialty.  See \"Patient Info\" tab in inbasket, or \"Choose Columns\" in Meds & Orders section of the refill encounter.            Passed - Serum creatinine on file in past 12 months     Recent Labs   Lab Test 03/18/22  1127   CR 0.79       Ok to refill medication if creatinine is low          Passed - HgbA1C in past 3 or 6 months     If HgbA1C is 8 or greater, it needs to be on file within the past 3 months.  If less than 8, must be on file within the past 6 months.     Recent Labs   Lab Test 09/30/22  1000   A1C 7.9*             Passed - Medication is active on mded list        Passed - Patient is age 18 or older             Andrew Escobar RN 10/14/22 9:50 AM  "

## 2022-11-18 DIAGNOSIS — E11.65 TYPE 2 DIABETES MELLITUS WITH HYPERGLYCEMIA, WITH LONG-TERM CURRENT USE OF INSULIN (H): ICD-10-CM

## 2022-11-18 DIAGNOSIS — Z79.4 TYPE 2 DIABETES MELLITUS WITH HYPERGLYCEMIA, WITH LONG-TERM CURRENT USE OF INSULIN (H): ICD-10-CM

## 2022-12-19 DIAGNOSIS — Z76.0 ENCOUNTER FOR MEDICATION REFILL: ICD-10-CM

## 2022-12-19 DIAGNOSIS — E55.9 VITAMIN D DEFICIENCY: ICD-10-CM

## 2022-12-20 RX ORDER — ERGOCALCIFEROL 1.25 MG/1
CAPSULE, LIQUID FILLED ORAL
Qty: 4 CAPSULE | Refills: 3 | Status: SHIPPED | OUTPATIENT
Start: 2022-12-20 | End: 2023-04-29

## 2022-12-20 RX ORDER — PSEUDOEPHED/ACETAMINOPH/DIPHEN 30MG-500MG
TABLET ORAL
Qty: 100 TABLET | Refills: 5 | Status: SHIPPED | OUTPATIENT
Start: 2022-12-20 | End: 2023-09-29

## 2022-12-21 NOTE — TELEPHONE ENCOUNTER
"Routing refill request for Vitamin D 50,000 IU to provider for review/approval because:  Drug not on the [facility] refill protocol     Last Written Prescription Date:  12/17/2021  Last Fill Quantity: 12,  # refills: 3     Acetaminophen: (passed protocol)  Last Written Prescription Date: 11/15/2021  Last Fill Quantity: 100, # refills: 5    Last office visit provider: 9/30/2022 with PCP Dr MARY Kendall     Requested Prescriptions   Pending Prescriptions Disp Refills     ACETAMINOPHEN EXTRA STRENGTH 500 MG tablet [Pharmacy Med Name: ACETAMINOPHEN   Tablet] 100 tablet 5     Sig: TAKE 1 TABLET BY MOUTH EVERY 6 HOURS AS NEEDED FOR PAIN.       Analgesics (Non-Narcotic Tylenol and ASA Only) Passed - 12/19/2022 12:23 PM        Passed - Recent (12 mo) or future (30 days) visit within the authorizing provider's specialty     Patient has had an office visit with the authorizing provider or a provider within the authorizing providers department within the previous 12 mos or has a future within next 30 days. See \"Patient Info\" tab in inbasket, or \"Choose Columns\" in Meds & Orders section of the refill encounter.              Passed - Patient is 7 months old or older     If patient is a peds patient of the age 7 mos -12 years, ok to refill using weight-based dosing.     If >3g daily and/or sig is not \"prn\", check for liver enzymes. If normal in the last year, ok to refill.  If not, refer to the provider.          Passed - Medication is active on med list           vitamin D2 (ERGOCALCIFEROL) 57766 units (1250 mcg) capsule [Pharmacy Med Name: VIT D2 1.25 MG (50,000 UNIT 1.25 MG Capsule] 4 capsule 3     Sig: TAKE 1 CAPSULE (50,000 UNITS) BY MOUTH ONCE A WEEK       There is no refill protocol information for this order          Saskia Cole RN 12/20/22 8:38 PM  "

## 2022-12-29 DIAGNOSIS — Z79.4 TYPE 2 DIABETES MELLITUS WITH HYPERGLYCEMIA, WITH LONG-TERM CURRENT USE OF INSULIN (H): ICD-10-CM

## 2022-12-29 DIAGNOSIS — E11.65 TYPE 2 DIABETES MELLITUS WITH HYPERGLYCEMIA, WITH LONG-TERM CURRENT USE OF INSULIN (H): ICD-10-CM

## 2022-12-29 DIAGNOSIS — Z76.0 ENCOUNTER FOR MEDICATION REFILL: Primary | ICD-10-CM

## 2022-12-29 RX ORDER — INSULIN ASPART 100 [IU]/ML
INJECTION, SUSPENSION SUBCUTANEOUS
Qty: 15 ML | Refills: 3 | Status: SHIPPED | OUTPATIENT
Start: 2022-12-29 | End: 2023-07-04

## 2023-01-27 ENCOUNTER — OFFICE VISIT (OUTPATIENT)
Dept: FAMILY MEDICINE | Facility: CLINIC | Age: 63
End: 2023-01-27
Payer: COMMERCIAL

## 2023-01-27 VITALS
RESPIRATION RATE: 20 BRPM | BODY MASS INDEX: 30.59 KG/M2 | WEIGHT: 145.75 LBS | HEIGHT: 58 IN | SYSTOLIC BLOOD PRESSURE: 130 MMHG | HEART RATE: 78 BPM | DIASTOLIC BLOOD PRESSURE: 70 MMHG | TEMPERATURE: 97.8 F | OXYGEN SATURATION: 99 %

## 2023-01-27 DIAGNOSIS — Z12.4 CERVICAL CANCER SCREENING: ICD-10-CM

## 2023-01-27 DIAGNOSIS — E55.9 VITAMIN D DEFICIENCY: ICD-10-CM

## 2023-01-27 DIAGNOSIS — Z12.31 VISIT FOR SCREENING MAMMOGRAM: ICD-10-CM

## 2023-01-27 DIAGNOSIS — E11.319 DIABETIC RETINOPATHY ASSOCIATED WITH TYPE 2 DIABETES MELLITUS, MACULAR EDEMA PRESENCE UNSPECIFIED, UNSPECIFIED LATERALITY, UNSPECIFIED RETINOPATHY SEVERITY (H): ICD-10-CM

## 2023-01-27 DIAGNOSIS — K62.89 ANAL PAIN: ICD-10-CM

## 2023-01-27 DIAGNOSIS — Z79.4 TYPE 2 DIABETES MELLITUS WITH HYPERGLYCEMIA, WITH LONG-TERM CURRENT USE OF INSULIN (H): Primary | ICD-10-CM

## 2023-01-27 DIAGNOSIS — R12 HEARTBURN: ICD-10-CM

## 2023-01-27 DIAGNOSIS — K31.9 DISEASE OF STOMACH AND DUODENUM: ICD-10-CM

## 2023-01-27 DIAGNOSIS — E11.65 TYPE 2 DIABETES MELLITUS WITH HYPERGLYCEMIA, WITH LONG-TERM CURRENT USE OF INSULIN (H): Primary | ICD-10-CM

## 2023-01-27 LAB
ALBUMIN SERPL BCG-MCNC: 4.3 G/DL (ref 3.5–5.2)
ALP SERPL-CCNC: 90 U/L (ref 35–104)
ALT SERPL W P-5'-P-CCNC: 40 U/L (ref 10–35)
ANION GAP SERPL CALCULATED.3IONS-SCNC: 13 MMOL/L (ref 7–15)
AST SERPL W P-5'-P-CCNC: 95 U/L (ref 10–35)
BILIRUB SERPL-MCNC: 0.4 MG/DL
BUN SERPL-MCNC: 18.1 MG/DL (ref 8–23)
CALCIUM SERPL-MCNC: 9.6 MG/DL (ref 8.8–10.2)
CHLORIDE SERPL-SCNC: 102 MMOL/L (ref 98–107)
CHOLEST SERPL-MCNC: 180 MG/DL
CREAT SERPL-MCNC: 0.81 MG/DL (ref 0.51–0.95)
CREAT UR-MCNC: 145 MG/DL
DEPRECATED HCO3 PLAS-SCNC: 24 MMOL/L (ref 22–29)
GFR SERPL CREATININE-BSD FRML MDRD: 82 ML/MIN/1.73M2
GLUCOSE SERPL-MCNC: 243 MG/DL (ref 70–99)
HBA1C MFR BLD: 10.2 % (ref 0–5.6)
HDLC SERPL-MCNC: 61 MG/DL
LDLC SERPL CALC-MCNC: 94 MG/DL
MICROALBUMIN UR-MCNC: 77.7 MG/L
MICROALBUMIN/CREAT UR: 53.59 MG/G CR (ref 0–25)
NONHDLC SERPL-MCNC: 119 MG/DL
POTASSIUM SERPL-SCNC: 4.6 MMOL/L (ref 3.4–5.3)
PROT SERPL-MCNC: 7.5 G/DL (ref 6.4–8.3)
SODIUM SERPL-SCNC: 139 MMOL/L (ref 136–145)
TRIGL SERPL-MCNC: 124 MG/DL

## 2023-01-27 PROCEDURE — 82570 ASSAY OF URINE CREATININE: CPT | Performed by: FAMILY MEDICINE

## 2023-01-27 PROCEDURE — 80053 COMPREHEN METABOLIC PANEL: CPT | Performed by: FAMILY MEDICINE

## 2023-01-27 PROCEDURE — 80061 LIPID PANEL: CPT | Performed by: FAMILY MEDICINE

## 2023-01-27 PROCEDURE — 83036 HEMOGLOBIN GLYCOSYLATED A1C: CPT | Performed by: FAMILY MEDICINE

## 2023-01-27 PROCEDURE — 99214 OFFICE O/P EST MOD 30 MIN: CPT | Performed by: FAMILY MEDICINE

## 2023-01-27 PROCEDURE — 82043 UR ALBUMIN QUANTITATIVE: CPT | Performed by: FAMILY MEDICINE

## 2023-01-27 PROCEDURE — 36415 COLL VENOUS BLD VENIPUNCTURE: CPT | Performed by: FAMILY MEDICINE

## 2023-01-27 PROCEDURE — 82306 VITAMIN D 25 HYDROXY: CPT | Performed by: FAMILY MEDICINE

## 2023-01-27 RX ORDER — HYDROCORTISONE ACETATE 25 MG/1
25 SUPPOSITORY RECTAL DAILY
Qty: 24 SUPPOSITORY | Refills: 3 | Status: SHIPPED | OUTPATIENT
Start: 2023-01-27 | End: 2023-11-10

## 2023-01-27 RX ORDER — CALCIUM CARBONATE 500 MG/1
1 TABLET, CHEWABLE ORAL DAILY
Qty: 30 TABLET | Refills: 5 | Status: SHIPPED | OUTPATIENT
Start: 2023-01-27 | End: 2023-10-24

## 2023-01-27 RX ORDER — LANCETS
EACH MISCELLANEOUS
Qty: 100 EACH | Refills: 3 | Status: CANCELLED | OUTPATIENT
Start: 2023-01-27

## 2023-01-27 ASSESSMENT — PATIENT HEALTH QUESTIONNAIRE - PHQ9
10. IF YOU CHECKED OFF ANY PROBLEMS, HOW DIFFICULT HAVE THESE PROBLEMS MADE IT FOR YOU TO DO YOUR WORK, TAKE CARE OF THINGS AT HOME, OR GET ALONG WITH OTHER PEOPLE: VERY DIFFICULT
SUM OF ALL RESPONSES TO PHQ QUESTIONS 1-9: 8
SUM OF ALL RESPONSES TO PHQ QUESTIONS 1-9: 8

## 2023-01-27 NOTE — PROGRESS NOTES
Assessment & Plan     Type 2 diabetes mellitus with hyperglycemia, with long-term current use of insulin (H)  Diabetes poorly controlled with a1c today of 10.2, up from 7.9 just 4 months ago.  She reports the only thing that changed was that she ate a lot more fruit (got a good prior on several durian fruits and at them all). Elected not to change meds today, but will need to do so if blood sugars not improving.  ALSO, she is just checking fasting blood sugars and these are pretty good, giving her false reassurance about how well her blood sugars are controlled. I recommend checking readings at other times during the day to gain more feedback.  Put in referral for diabetes ed - can either have in-person appointments on Fridays or virtual on other days.  - Lipid panel reflex to direct LDL Non-fasting  - Albumin Random Urine Quantitative with Creat Ratio  - Hemoglobin A1c  - Comprehensive metabolic panel (BMP + Alb, Alk Phos, ALT, AST, Total. Bili, TP)  - [facility] Adult Diabetes Educator Referral  - blood glucose (NO BRAND SPECIFIED) lancets standard  Dispense: 60 each; Refill: 11    Diabetic retinopathy associated with type 2 diabetes mellitus (H)  We have received records from [facility], whose notes indicate into see retinal specialist since July 2020.  I also referred her 9/20/22, but still no appt made.  She didn't seem to have understood that there was problem detected that needed treatment.  I stressed importance of follow-up to reduce her risk of acquired blindness and had her meet with specialty scheduling department today to get this scheduled.  Per there notes today:  Referral fax to [facility] ([facility] @ [phone].  Patient schedule appt on 02/17/2023 at 11:00 A.M.      Anal pain  This is her biggest concern today.  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  Had done  "perfect well until developing pain in the last several months.  Was referred back to [facility] in June 2022, saw Dr. Jordan there 7/11/22 and per record review, exam was normal with no cause found. Referred for screening colonoscopy (looks like this was done 8/3/22 - requesting records).  Exam today also unremarkable except some anal tenderness.  Would like to offer some attempt at relief; will try steroid suppository to see if that might help.  Will have full exam next time she comes here for pap, etc, to further investigate.  - hydrocortisone (ANUSOL-HC) 25 MG suppository  Dispense: 24 suppository; Refill: 3    Vitamin D deficiency  - Vitamin D Deficiency    Visit for screening mammogram  - MA SCREENING DIGITAL BILAT - Future  (s+30)    Heartburn  - calcium carbonate (ANTACID REGULAR STRENGTH) 500 MG chewable tablet  Dispense: 30 tablet; Refill: 5    Disease of stomach and duodenum  Med refilled  - omeprazole (PRILOSEC) 20 MG DR capsule  Dispense: 90 capsule; Refill: 3    Cervical cancer screening  Near-due for Pap smear; recommend scheduling physical by May 2023.                   BMI:   Estimated body mass index is 29.98 kg/m  as calculated from the following:    Height as of this encounter: 1.485 m (4' 10.47\").    Weight as of this encounter: 66.1 kg (145 lb 12 oz).           Return in about 4 months (around 5/27/2023) for Routine preventive, Pap Smear.    Coreen Kendall MD  [facility] SANDY Greene is a 62 year old, presenting for the following health issues:  follow up  (DM ) and Rectal Problem      History of Present Illness       Reason for visit:  Follow up dm    She eats 2-3 servings of fruits and vegetables daily.She consumes 0 sweetened beverage(s) daily.She exercises with enough effort to increase her heart rate 9 or less minutes per day.  She exercises with enough effort to increase her heart rate 3 or less days per week.   She is taking medications " "regularly.    Today's PHQ-9         PHQ-9 Total Score: 8    PHQ-9 Q9 Thoughts of better off dead/self-harm past 2 weeks :   Not at all    How difficult have these problems made it for you to do your work, take care of things at home, or get along with other people: Very difficult       Having trouble with recto-vaginal pain in area where she had surgery in the past.  Feels pain  all the time  Feels swelling in the shower.  Needle-prick type pain/sharp  Not tender to her touching it.    Had been fine until last year.  Seen here for same in June and was sent to [facility].  They said exam was fine, referred her for colonoscopy.    Bm's not painful.    Sitting too long makes it worse  Can be worse with lying on side, but then tightens anus and feels better  No rectal bleeding.      Diabetes:  Eating too much durian? Bought 5 because they were a good deal? Out of them now.    Fasting glucose .  Only checks around 10:00am (fasting).            Review of Systems         Objective    /70 (BP Location: Left arm, Patient Position: Sitting, Cuff Size: Adult Regular)   Pulse 78   Temp 97.8  F (36.6  C) (Oral)   Resp 20   Ht 1.485 m (4' 10.47\")   Wt 66.1 kg (145 lb 12 oz)   SpO2 99%   BMI 29.98 kg/m    Body mass index is 29.98 kg/m .  Physical Exam       Results for orders placed or performed in visit on 01/27/23   Lipid panel reflex to direct LDL Non-fasting   Result Value Ref Range    Cholesterol 180 <200 mg/dL    Triglycerides 124 <150 mg/dL    Direct Measure HDL 61 >=50 mg/dL    LDL Cholesterol Calculated 94 <=100 mg/dL    Non HDL Cholesterol 119 <130 mg/dL   Albumin Random Urine Quantitative with Creat Ratio   Result Value Ref Range    Creatinine Urine mg/dL 145.0 mg/dL    Albumin Urine mg/L 77.7 mg/L    Albumin Urine mg/g Cr 53.59 (H) 0.00 - 25.00 mg/g Cr   Result Value Ref Range    Hemoglobin A1C 10.2 (H) 0.0 - 5.6 %   Comprehensive metabolic panel (BMP + Alb, Alk Phos, ALT, AST, Total. Bili, TP)   Result " Value Ref Range    Sodium 139 136 - 145 mmol/L    Potassium 4.6 3.4 - 5.3 mmol/L    Chloride 102 98 - 107 mmol/L    Carbon Dioxide (CO2) 24 22 - 29 mmol/L    Anion Gap 13 7 - 15 mmol/L    Urea Nitrogen 18.1 8.0 - 23.0 mg/dL    Creatinine 0.81 0.51 - 0.95 mg/dL    Calcium 9.6 8.8 - 10.2 mg/dL    Glucose 243 (H) 70 - 99 mg/dL    Alkaline Phosphatase 90 35 - 104 U/L    AST 95 (H) 10 - 35 U/L    ALT 40 (H) 10 - 35 U/L    Protein Total 7.5 6.4 - 8.3 g/dL    Albumin 4.3 3.5 - 5.2 g/dL    Bilirubin Total 0.4 <=1.2 mg/dL    GFR Estimate 82 >60 mL/min/1.73m2

## 2023-01-30 LAB — DEPRECATED CALCIDIOL+CALCIFEROL SERPL-MC: 26 UG/L (ref 20–75)

## 2023-02-10 ENCOUNTER — ANCILLARY PROCEDURE (OUTPATIENT)
Dept: MAMMOGRAPHY | Facility: CLINIC | Age: 63
End: 2023-02-10
Attending: FAMILY MEDICINE
Payer: COMMERCIAL

## 2023-02-10 DIAGNOSIS — Z12.31 VISIT FOR SCREENING MAMMOGRAM: ICD-10-CM

## 2023-02-10 PROCEDURE — 77067 SCR MAMMO BI INCL CAD: CPT | Mod: TC | Performed by: RADIOLOGY

## 2023-02-17 ENCOUNTER — TRANSFERRED RECORDS (OUTPATIENT)
Dept: HEALTH INFORMATION MANAGEMENT | Facility: CLINIC | Age: 63
End: 2023-02-17
Payer: COMMERCIAL

## 2023-02-17 LAB — RETINOPATHY: POSITIVE

## 2023-04-29 DIAGNOSIS — E55.9 VITAMIN D DEFICIENCY: ICD-10-CM

## 2023-04-29 RX ORDER — ERGOCALCIFEROL 1.25 MG/1
CAPSULE, LIQUID FILLED ORAL
Qty: 4 CAPSULE | Refills: 3 | Status: SHIPPED | OUTPATIENT
Start: 2023-04-29 | End: 2023-09-29

## 2023-05-30 ENCOUNTER — TRANSFERRED RECORDS (OUTPATIENT)
Dept: HEALTH INFORMATION MANAGEMENT | Facility: CLINIC | Age: 63
End: 2023-05-30
Payer: COMMERCIAL

## 2023-06-02 ENCOUNTER — MEDICAL CORRESPONDENCE (OUTPATIENT)
Dept: HEALTH INFORMATION MANAGEMENT | Facility: CLINIC | Age: 63
End: 2023-06-02

## 2023-06-09 ENCOUNTER — TRANSFERRED RECORDS (OUTPATIENT)
Dept: HEALTH INFORMATION MANAGEMENT | Facility: CLINIC | Age: 63
End: 2023-06-09
Payer: COMMERCIAL

## 2023-06-09 LAB — RETINOPATHY: NEGATIVE

## 2023-06-20 ENCOUNTER — MEDICAL CORRESPONDENCE (OUTPATIENT)
Dept: HEALTH INFORMATION MANAGEMENT | Facility: CLINIC | Age: 63
End: 2023-06-20
Payer: COMMERCIAL

## 2023-07-03 DIAGNOSIS — Z76.0 ENCOUNTER FOR MEDICATION REFILL: ICD-10-CM

## 2023-07-03 DIAGNOSIS — Z79.4 TYPE 2 DIABETES MELLITUS WITH HYPERGLYCEMIA, WITH LONG-TERM CURRENT USE OF INSULIN (H): ICD-10-CM

## 2023-07-03 DIAGNOSIS — E11.65 TYPE 2 DIABETES MELLITUS WITH HYPERGLYCEMIA, WITH LONG-TERM CURRENT USE OF INSULIN (H): ICD-10-CM

## 2023-07-04 RX ORDER — INSULIN ASPART 100 [IU]/ML
INJECTION, SUSPENSION SUBCUTANEOUS
Qty: 15 ML | Refills: 3 | Status: SHIPPED | OUTPATIENT
Start: 2023-07-04 | End: 2023-09-15

## 2023-07-04 NOTE — TELEPHONE ENCOUNTER
"Routing refill request to provider for review/approval because:  Labs not current:  A1c    Last Written Prescription Date:  12/29/2022  Last Fill Quantity: 15ml,  # refills: 3   Last office visit provider:  1/27/2023     Requested Prescriptions   Pending Prescriptions Disp Refills     Insulin Aspart Prot & Aspart (INSULIN ASP PROT & ASP FLEXPEN) (70-30) 100 UNIT/ML SUPN [Pharmacy Med Name: INSULIN ASP PROT & ASP FLEX (70-30) 100 Suspension Pen-injector] 15 mL 3     Sig: INJECT 20 UNITS BEFORE BREAKFAST AND 10 UNITS BEFORE DINNER       Insulin Mixes Protocol Failed - 7/3/2023  4:51 PM        Failed - HgbA1C in past 3 or 6 months     If HgbA1C is 8 or greater, it needs to be on file within the past 3 months.  If less than 8, must be on file within the past 6 months.     Recent Labs   Lab Test 01/27/23  1449   A1C 10.2*             Passed - Serum creatinine on file in past 12 months     Recent Labs   Lab Test 01/27/23  1449   CR 0.81       Ok to refill medication if creatinine is low          Passed - Medication is active on mded list        Passed - Patient is age 18 or older        Passed - Recent (6 mo) or future (30 days) visit within the authorizing provider's specialty     Patient had office visit in the last 6 months or has a visit in the next 30 days with authorizing provider or within the authorizing provider's specialty.  See \"Patient Info\" tab in inbasket, or \"Choose Columns\" in Meds & Orders section of the refill encounter.                 Karli Moreno RN 07/03/23 11:55 PM  "

## 2023-07-07 ENCOUNTER — OFFICE VISIT (OUTPATIENT)
Dept: FAMILY MEDICINE | Facility: CLINIC | Age: 63
End: 2023-07-07
Payer: COMMERCIAL

## 2023-07-07 ENCOUNTER — TELEPHONE (OUTPATIENT)
Dept: FAMILY MEDICINE | Facility: CLINIC | Age: 63
End: 2023-07-07

## 2023-07-07 VITALS
TEMPERATURE: 98.2 F | HEIGHT: 58 IN | RESPIRATION RATE: 16 BRPM | WEIGHT: 146.19 LBS | BODY MASS INDEX: 30.69 KG/M2 | HEART RATE: 72 BPM | DIASTOLIC BLOOD PRESSURE: 72 MMHG | OXYGEN SATURATION: 98 % | SYSTOLIC BLOOD PRESSURE: 148 MMHG

## 2023-07-07 DIAGNOSIS — K62.89 ANAL PAIN: Primary | ICD-10-CM

## 2023-07-07 DIAGNOSIS — Z79.4 TYPE 2 DIABETES MELLITUS WITH HYPERGLYCEMIA, WITH LONG-TERM CURRENT USE OF INSULIN (H): ICD-10-CM

## 2023-07-07 DIAGNOSIS — E11.319 DIABETIC RETINOPATHY ASSOCIATED WITH TYPE 2 DIABETES MELLITUS, MACULAR EDEMA PRESENCE UNSPECIFIED, UNSPECIFIED LATERALITY, UNSPECIFIED RETINOPATHY SEVERITY (H): ICD-10-CM

## 2023-07-07 DIAGNOSIS — E11.65 TYPE 2 DIABETES MELLITUS WITH HYPERGLYCEMIA, WITH LONG-TERM CURRENT USE OF INSULIN (H): ICD-10-CM

## 2023-07-07 PROCEDURE — 99214 OFFICE O/P EST MOD 30 MIN: CPT | Performed by: FAMILY MEDICINE

## 2023-07-07 RX ORDER — LIDOCAINE 50 MG/G
OINTMENT TOPICAL 2 TIMES DAILY PRN
Qty: 50 G | Refills: 4 | Status: SHIPPED | OUTPATIENT
Start: 2023-07-07 | End: 2024-01-23

## 2023-07-07 ASSESSMENT — PATIENT HEALTH QUESTIONNAIRE - PHQ9
SUM OF ALL RESPONSES TO PHQ QUESTIONS 1-9: 0
10. IF YOU CHECKED OFF ANY PROBLEMS, HOW DIFFICULT HAVE THESE PROBLEMS MADE IT FOR YOU TO DO YOUR WORK, TAKE CARE OF THINGS AT HOME, OR GET ALONG WITH OTHER PEOPLE: NOT DIFFICULT AT ALL
SUM OF ALL RESPONSES TO PHQ QUESTIONS 1-9: 0

## 2023-07-07 NOTE — TELEPHONE ENCOUNTER
Patient Quality Outreach    Patient is due for the following:   Cervical Cancer Screening - PAP Needed  Physical Preventive Adult Physical    Next Steps:   Schedule a Adult Preventative    Type of outreach:    Chart review performed, no outreach needed.  Patient is schedule 8/2023 for physical.  Next Steps:  Reach out within 90 days via Phone.    Max number of attempts reached: Yes. Will try again in 90 days if patient still on fail list.    Questions for provider review:    None           Roxy Hooper  Chart routed to Care Team.

## 2023-07-07 NOTE — PROGRESS NOTES
"      Anal pain  - Adult Colorectal Surgery [facility] Referral; Future  - lidocaine (XYLOCAINE) 5 % external ointment; Apply topically 2 times daily as needed for moderate pain  Steroid suppository did not help.  We will try lidocaine cream.  Referred to colorectal rectal surgery again.    Type 2 diabetes mellitus with hyperglycemia, with long-term current use of insulin (H)  Follow-up with PCP as planned.    Diabetic retinopathy associated with type 2 diabetes mellitus, macular edema presence unspecified, unspecified laterality, unspecified retinopathy severity (H)  Follow-up with PCP as planned.    Remedios Greene is a 63 year old, presenting for the following health issues:  Rectal Problem (Rectal surgical area pain ( surgery was 10 yrs ago ) pain is getting worse for the past 1yr )    I am meeting this patient for the first time today.  Her main concern is ongoing anal pain.  She was seen by her PCP for this 6 months ago, note reviewed.  She was given steroid suppository but states it did not help.  She had surgery done in 2011 due to childbirth related large cloacal defect.  It was complicated by wound infection requiring readmission at that time.  She has been having anal pain since the surgery, worsening pain in the past year.  She was seen by colorectal surgery in June 2022 per chart review, no finding on exam.    She states the pain is worse after eating, sitting and standing.           7/7/2023    10:19 AM   Additional Questions   Roomed by Roxy Hooper   Accompanied by Self       Review of Systems   CONSTITUTIONAL: NEGATIVE for fever, chills, change in weight  RESP: NEGATIVE for significant cough or SOB  CV: NEGATIVE for chest pain/chest pressure      Objective    BP (!) 142/74 (BP Location: Right arm, Patient Position: Sitting, Cuff Size: Adult Regular)   Pulse 72   Temp 98.2  F (36.8  C) (Oral)   Resp 16   Ht 1.485 m (4' 10.47\")   Wt 66.3 kg (146 lb 3 oz)   SpO2 98%   BMI 30.06 kg/m    Body mass " index is 30.06 kg/m .  Physical Exam   GENERAL: healthy, alert and no distress  NECK: Supple  RESP: lungs clear to auscultation - no rales, rhonchi or wheezes  CV: regular rate and rhythm, normal S1 S2, no S3 or S4, no murmur, click or rub, no peripheral edema and peripheral pulses strong  RECTAL (female): No external hemorrhoid.  Mild tenderness along all incision site.  No signs of infection.                Answers for HPI/ROS submitted by the patient on 7/7/2023  If you checked off any problems, how difficult have these problems made it for you to do your work, take care of things at home, or get along with other people?: Not difficult at all  PHQ9 TOTAL SCORE: 0  What is the reason for your visit today? : rectal surgical site pain

## 2023-07-24 ENCOUNTER — TELEPHONE (OUTPATIENT)
Dept: FAMILY MEDICINE | Facility: CLINIC | Age: 63
End: 2023-07-24
Payer: COMMERCIAL

## 2023-07-24 DIAGNOSIS — H25.9 SENILE CATARACT, UNSPECIFIED AGE-RELATED CATARACT TYPE, UNSPECIFIED LATERALITY: Primary | ICD-10-CM

## 2023-07-24 DIAGNOSIS — Z79.4 TYPE 2 DIABETES MELLITUS WITH HYPERGLYCEMIA, WITH LONG-TERM CURRENT USE OF INSULIN (H): ICD-10-CM

## 2023-07-24 DIAGNOSIS — E11.65 TYPE 2 DIABETES MELLITUS WITH HYPERGLYCEMIA, WITH LONG-TERM CURRENT USE OF INSULIN (H): ICD-10-CM

## 2023-07-24 NOTE — TELEPHONE ENCOUNTER
Did she already have an eye exam and just needs to get glasses?  If so, she doesn't need a referral.  Just needs to bring her vision prescription to any optometry clinic.  Will route to RN to address in case I've missed some of the information.

## 2023-07-24 NOTE — TELEPHONE ENCOUNTER
Called son back to clarify what the referral is for. Son stated this is regarding Cataract surgery. Pt's son stated there are different packages regarding this assessment/ procedure. Package A, B, and C. The insurance covers package A but not B and C. Pt and family would like to go with package B and is willing to pay out of pocket but the provider is hesitant to provide package B for pt, so son would pt to go to a different eye clinic. Son would like pt to go see [facility] to see if they will have a better option for them. Son is requesting referral to [facility] and would like a call back regarding status of referral.      Mich Greco Cem Say, BSN RN  [facility]

## 2023-07-26 DIAGNOSIS — E78.5 HYPERLIPIDEMIA, UNSPECIFIED HYPERLIPIDEMIA TYPE: ICD-10-CM

## 2023-07-26 DIAGNOSIS — I10 ESSENTIAL HYPERTENSION: ICD-10-CM

## 2023-07-26 DIAGNOSIS — E11.65 TYPE 2 DIABETES MELLITUS WITH HYPERGLYCEMIA, WITH LONG-TERM CURRENT USE OF INSULIN (H): ICD-10-CM

## 2023-07-26 DIAGNOSIS — Z79.4 TYPE 2 DIABETES MELLITUS WITH HYPERGLYCEMIA, WITH LONG-TERM CURRENT USE OF INSULIN (H): ICD-10-CM

## 2023-07-26 NOTE — TELEPHONE ENCOUNTER
I've put in a referral for [facility].    Please let son know, though:  I've reviewed records from [facility].  It looks like they are worried there her very bad diabetes control could make cataract surgery less successful (her last A1C was 10.2, goal is less than 7). It would be best if she got her diabetes under better control before getting surgery, then be sure to keep it under good control  I had referred her to diabetes education in January, but it doesn't look she ever scheduled.  I'm putting in a new referral now and someone will call to schedule.  If they prefer, they can call to schedule: [phone]

## 2023-07-26 NOTE — TELEPHONE ENCOUNTER
Called son, Jeniffer Root (C2C on file) to relay provider referral information and recommendations below.  Both referral contacts ([facility] and Diabetes Educator) information also provided.  Son will call to make the appointments.    JOSE RuvalcabaN, RN  [facility]      I've put in a referral for [facility].     Please let son know, though:  I've reviewed records from [facility].  It looks like they are worried there her very bad diabetes control could make cataract surgery less successful (her last A1C was 10.2, goal is less than 7). It would be best if she got her diabetes under better control before getting surgery, then be sure to keep it under good control  I had referred her to diabetes education in January, but it doesn't look she ever scheduled.  I'm putting in a new referral now and someone will call to schedule.  If they prefer, they can call to schedule: [phone]

## 2023-07-27 RX ORDER — METFORMIN HCL 500 MG
TABLET, EXTENDED RELEASE 24 HR ORAL
Qty: 120 TABLET | Refills: 3 | Status: SHIPPED | OUTPATIENT
Start: 2023-07-27 | End: 2023-09-15

## 2023-07-27 RX ORDER — ROSUVASTATIN CALCIUM 40 MG/1
TABLET, COATED ORAL
Qty: 90 TABLET | Refills: 3 | Status: SHIPPED | OUTPATIENT
Start: 2023-07-27 | End: 2023-10-24

## 2023-07-27 RX ORDER — CANAGLIFLOZIN 300 MG/1
TABLET, FILM COATED ORAL
Qty: 30 TABLET | Refills: 3 | Status: SHIPPED | OUTPATIENT
Start: 2023-07-27 | End: 2023-10-24

## 2023-07-27 RX ORDER — HYDROCHLOROTHIAZIDE 25 MG/1
TABLET ORAL
Qty: 30 TABLET | Refills: 3 | Status: SHIPPED | OUTPATIENT
Start: 2023-07-27 | End: 2023-12-04

## 2023-07-27 NOTE — TELEPHONE ENCOUNTER
Refilled rosuvastatin 40 mg as requested.     Routing refill request to provider for review/approval because:  Hgb A1C > 8, none on file for last 3 months; last A1C 10.2 (1/27/2023)  BP > 140/90 in past 12 months; /72 (7/7/2023)    Metformin 500 mg  Last Written Prescription Date:  7/3/2022  Last Fill Quantity: 360,  # refills: 3   Last office visit: 7/7/2023   Future Office Visit: 8/4/2023    Hydrochlorotiazide 25 mg  Last Written Prescription Date:  7/3/2022  Last Fill Quantity: 90,  # refills: 3   Last office visit provider:  7/7/2023     Invokana 300 mg  Last Written Prescription Date:  7/3/2022  Last Fill Quantity: 90,  # refills: 3   Last office visit provider:  7/7/2023     Rosuvastatin 40 mg  Last Written Prescription Date:  7/3/2022  Last Fill Quantity: 90,  # refills: 3   Last office visit provider:  7/7/2023     Requested Prescriptions   Pending Prescriptions Disp Refills    metFORMIN (GLUCOPHAGE XR) 500 MG 24 hr tablet [Pharmacy Med Name: METFORMIN HCL  MG TB2 500 Tablet] 120 tablet 3     Sig: TAKE 2 TABLETS BY MOUTH (1,000 MG TOTAL) 2 TIMES DAILY       Biguanide Agents Failed - 7/26/2023  3:04 PM        Failed - Patient has documented A1c within the specified period of time.     If HgbA1C is 8 or greater, it needs to be on file within the past 3 months.  If less than 8, must be on file within the past 6 months.     Recent Labs   Lab Test 01/27/23  1449   A1C 10.2*             Passed - Patient is age 10 or older        Passed - Patient's CR is NOT>1.4 OR Patient's EGFR is NOT<45 within past 12 mos.     Recent Labs   Lab Test 01/27/23  1449 12/17/21  1133 06/04/21  1144   GFRESTIMATED 82   < > >60   GFRESTBLACK  --   --  >60    < > = values in this interval not displayed.       Recent Labs   Lab Test 01/27/23  1449   CR 0.81             Passed - Patient does NOT have a diagnosis of CHF.        Passed - Medication is active on med list        Passed - Patient is not pregnant        Passed -  "Patient has not had a positive pregnancy test within the past 12 mos.         Passed - Recent (6 mo) or future (30 days) visit within the authorizing provider's specialty     Patient had office visit in the last 6 months or has a visit in the next 30 days with authorizing provider or within the authorizing provider's specialty.  See \"Patient Info\" tab in inbasket, or \"Choose Columns\" in Meds & Orders section of the refill encounter.              hydrochlorothiazide (HYDRODIURIL) 25 MG tablet [Pharmacy Med Name: HYDROCHLOROTHIAZIDE 25 MG T 25 Tablet] 30 tablet 3     Sig: TAKE 1 TABLET (25 MG TOTAL) BY MOUTH DAILY.       Diuretics (Including Combos) Protocol Failed - 7/26/2023  3:04 PM        Failed - Blood pressure under 140/90 in past 12 months     BP Readings from Last 3 Encounters:   07/07/23 (!) 148/72   01/27/23 130/70   09/30/22 120/68                 Passed - Recent (12 mo) or future (30 days) visit within the authorizing provider's specialty     Patient has had an office visit with the authorizing provider or a provider within the authorizing providers department within the previous 12 mos or has a future within next 30 days. See \"Patient Info\" tab in inbasket, or \"Choose Columns\" in Meds & Orders section of the refill encounter.              Passed - Medication is active on med list        Passed - Patient is age 18 or older        Passed - No active pregancy on record        Passed - Normal serum creatinine on file in past 12 months     Recent Labs   Lab Test 01/27/23  1449   CR 0.81              Passed - Normal serum potassium on file in past 12 months     Recent Labs   Lab Test 01/27/23  1449   POTASSIUM 4.6                    Passed - Normal serum sodium on file in past 12 months     Recent Labs   Lab Test 01/27/23  1449                 Passed - No positive pregnancy test in past 12 months          INVOKANA 300 MG tablet [Pharmacy Med Name: INVOKANA 300 MG TABS 300 Tablet] 30 tablet 3     Sig: TAKE " "1 TABLET BY MOUTH DAILY.       Sodium Glucose Co-Transport Inhibitor Agents Failed - 7/26/2023  3:04 PM        Failed - Patient has documented A1c within the specified period of time.     If HgbA1C is 8 or greater, it needs to be on file within the past 3 months.  If less than 8, must be on file within the past 6 months.     Recent Labs   Lab Test 01/27/23  1449   A1C 10.2*             Passed - No creatinine >1.4 or GFR <45 within the past 12 mos     Recent Labs   Lab Test 01/27/23  1449 12/17/21  1133 06/04/21  1144   GFRESTIMATED 82   < > >60   GFRESTBLACK  --   --  >60    < > = values in this interval not displayed.       Recent Labs   Lab Test 01/27/23  1449   CR 0.81             Passed - Medication is active on med list        Passed - Patient is age 18 or older        Passed - Patient is not pregnant        Passed - Patient has documented normal Potassium within the last 12 mos.     Recent Labs   Lab Test 01/27/23  1449   POTASSIUM 4.6             Passed - Patient has no positive pregnancy test within the past 12 mos.        Passed - Recent (6 mo) or future (30 days) visit within the authorizing provider's specialty     Patient had office visit in the last 6 months or has a visit in the next 30 days with authorizing provider or within the authorizing provider's specialty.  See \"Patient Info\" tab in inbasket, or \"Choose Columns\" in Meds & Orders section of the refill encounter.              rosuvastatin (CRESTOR) 40 MG tablet [Pharmacy Med Name: ROSUVASTATIN CALCIUM 40 MG 40 Tablet] 30 tablet 3     Sig: TAKE 1 TABLET (40 MG TOTAL) BY MOUTH AT BEDTIME.       Statins Protocol Passed - 7/26/2023  3:04 PM        Passed - LDL on file in past 12 months     Recent Labs   Lab Test 01/27/23  1449   LDL 94             Passed - No abnormal creatine kinase in past 12 months     No lab results found.             Passed - Recent (12 mo) or future (30 days) visit within the authorizing provider's specialty     Patient has had " "an office visit with the authorizing provider or a provider within the authorizing providers department within the previous 12 mos or has a future within next 30 days. See \"Patient Info\" tab in inbasket, or \"Choose Columns\" in Meds & Orders section of the refill encounter.              Passed - Medication is active on med list        Passed - Patient is age 18 or older        Passed - No active pregnancy on record        Passed - No positive pregnancy test in past 12 months             Mary Pierce RN 07/27/23 10:48 AM  "

## 2023-08-04 ENCOUNTER — TELEPHONE (OUTPATIENT)
Dept: FAMILY MEDICINE | Facility: CLINIC | Age: 63
End: 2023-08-04

## 2023-08-04 ENCOUNTER — OFFICE VISIT (OUTPATIENT)
Dept: FAMILY MEDICINE | Facility: CLINIC | Age: 63
End: 2023-08-04
Payer: COMMERCIAL

## 2023-08-04 VITALS
HEIGHT: 59 IN | WEIGHT: 146.75 LBS | TEMPERATURE: 97.6 F | DIASTOLIC BLOOD PRESSURE: 73 MMHG | OXYGEN SATURATION: 97 % | SYSTOLIC BLOOD PRESSURE: 114 MMHG | BODY MASS INDEX: 29.58 KG/M2 | RESPIRATION RATE: 16 BRPM | HEART RATE: 99 BPM

## 2023-08-04 DIAGNOSIS — H26.9 CATARACT, UNSPECIFIED CATARACT TYPE, UNSPECIFIED LATERALITY: ICD-10-CM

## 2023-08-04 DIAGNOSIS — E11.65 TYPE 2 DIABETES MELLITUS WITH HYPERGLYCEMIA, WITH LONG-TERM CURRENT USE OF INSULIN (H): ICD-10-CM

## 2023-08-04 DIAGNOSIS — Z79.4 TYPE 2 DIABETES MELLITUS WITH HYPERGLYCEMIA, WITH LONG-TERM CURRENT USE OF INSULIN (H): ICD-10-CM

## 2023-08-04 DIAGNOSIS — K62.89 ANAL OR RECTAL PAIN: ICD-10-CM

## 2023-08-04 DIAGNOSIS — Z12.4 CERVICAL CANCER SCREENING: ICD-10-CM

## 2023-08-04 DIAGNOSIS — Z00.00 ROUTINE GENERAL MEDICAL EXAMINATION AT A HEALTH CARE FACILITY: Primary | ICD-10-CM

## 2023-08-04 DIAGNOSIS — F33.1 MAJOR DEPRESSIVE DISORDER, RECURRENT EPISODE, MODERATE (H): ICD-10-CM

## 2023-08-04 LAB
ALBUMIN SERPL BCG-MCNC: 4.6 G/DL (ref 3.5–5.2)
ALP SERPL-CCNC: 76 U/L (ref 35–104)
ALT SERPL W P-5'-P-CCNC: 31 U/L (ref 0–50)
ANION GAP SERPL CALCULATED.3IONS-SCNC: 12 MMOL/L (ref 7–15)
AST SERPL W P-5'-P-CCNC: 80 U/L (ref 0–45)
BILIRUB SERPL-MCNC: 0.7 MG/DL
BUN SERPL-MCNC: 16.6 MG/DL (ref 8–23)
CALCIUM SERPL-MCNC: 10.1 MG/DL (ref 8.8–10.2)
CHLORIDE SERPL-SCNC: 102 MMOL/L (ref 98–107)
CREAT SERPL-MCNC: 0.99 MG/DL (ref 0.51–0.95)
DEPRECATED HCO3 PLAS-SCNC: 26 MMOL/L (ref 22–29)
GFR SERPL CREATININE-BSD FRML MDRD: 64 ML/MIN/1.73M2
GLUCOSE SERPL-MCNC: 128 MG/DL (ref 70–99)
HBA1C MFR BLD: 9.1 % (ref 0–5.6)
POTASSIUM SERPL-SCNC: 4.4 MMOL/L (ref 3.4–5.3)
PROT SERPL-MCNC: 7.9 G/DL (ref 6.4–8.3)
SODIUM SERPL-SCNC: 140 MMOL/L (ref 136–145)

## 2023-08-04 PROCEDURE — 90471 IMMUNIZATION ADMIN: CPT | Performed by: FAMILY MEDICINE

## 2023-08-04 PROCEDURE — 99396 PREV VISIT EST AGE 40-64: CPT | Mod: 25 | Performed by: FAMILY MEDICINE

## 2023-08-04 PROCEDURE — 83036 HEMOGLOBIN GLYCOSYLATED A1C: CPT | Performed by: FAMILY MEDICINE

## 2023-08-04 PROCEDURE — 99214 OFFICE O/P EST MOD 30 MIN: CPT | Mod: 25 | Performed by: FAMILY MEDICINE

## 2023-08-04 PROCEDURE — 36415 COLL VENOUS BLD VENIPUNCTURE: CPT | Performed by: FAMILY MEDICINE

## 2023-08-04 PROCEDURE — 90677 PCV20 VACCINE IM: CPT | Performed by: FAMILY MEDICINE

## 2023-08-04 PROCEDURE — 80053 COMPREHEN METABOLIC PANEL: CPT | Performed by: FAMILY MEDICINE

## 2023-08-04 ASSESSMENT — ENCOUNTER SYMPTOMS
BREAST MASS: 0
NERVOUS/ANXIOUS: 0
SORE THROAT: 0
SHORTNESS OF BREATH: 1
ARTHRALGIAS: 1
JOINT SWELLING: 0
NAUSEA: 0
DIARRHEA: 0
DIZZINESS: 1
WEAKNESS: 0
COUGH: 0
DYSURIA: 0
CHILLS: 0
FEVER: 0
HEMATURIA: 0
FREQUENCY: 0
HEADACHES: 1
EYE PAIN: 0
HEMATOCHEZIA: 0
ABDOMINAL PAIN: 0
MYALGIAS: 0

## 2023-08-04 ASSESSMENT — PATIENT HEALTH QUESTIONNAIRE - PHQ9
10. IF YOU CHECKED OFF ANY PROBLEMS, HOW DIFFICULT HAVE THESE PROBLEMS MADE IT FOR YOU TO DO YOUR WORK, TAKE CARE OF THINGS AT HOME, OR GET ALONG WITH OTHER PEOPLE: VERY DIFFICULT
SUM OF ALL RESPONSES TO PHQ QUESTIONS 1-9: 11
SUM OF ALL RESPONSES TO PHQ QUESTIONS 1-9: 11

## 2023-08-04 NOTE — TELEPHONE ENCOUNTER
Patient Quality Outreach    Patient is due for the following:   Cervical Cancer Screening - PAP Needed    Next Steps:   Patient was scheduled for pap only appt with pcp    Type of outreach:    Patient was seen in clinic by pcp today but declined to have her pap done because she stated she didn't shower yet so she doesn't feel comfortable.        Questions for provider review:    None           Antwan Johnston  Chart routed to Care Team.

## 2023-08-04 NOTE — PROGRESS NOTES
SUBJECTIVE:   CC: Ramona is an 63 year old who presents for preventive health visit.       8/4/2023     8:01 AM   Additional Questions   Roomed by Antwan HERNANDEZ   Accompanied by son       Healthy Habits:     Getting at least 3 servings of Calcium per day:  Yes    Bi-annual eye exam:  Yes    Dental care twice a year:  NO    Sleep apnea or symptoms of sleep apnea:  None    Diet:  Regular (no restrictions)    Frequency of exercise:  2-3 days/week    Duration of exercise:  Greater than 60 minutes    Taking medications regularly:  Yes    Medication side effects:  None    Additional concerns today:  No      Anorectal pain continues - ok sitting, but worse with lying on side and right after eating. Aches. Bad.  GI appt 8/25 but doesn't know time or place!    Eye surgery - has appt [facility] 8[facility].    Glucometer:  most fasting 70-90, occasional 184.  Morning 25  Evening 15 (with evening meal)    Has HCD  - will bring next time or drop off.      Today's PHQ-9 Score:       8/4/2023     7:54 AM   PHQ-9 SCORE   PHQ-9 Total Score MyChart 11 (Moderate depression)   PHQ-9 Total Score 11                       Social History     Tobacco Use     Smoking status: Never     Passive exposure: Current     Smokeless tobacco: Former     Types: Chew     Tobacco comments:      smokes outside, pt chews betel nut once in  a while, not tobacco   Substance Use Topics     Alcohol use: No             8/4/2023     8:03 AM   Alcohol Use   Prescreen: >3 drinks/day or >7 drinks/week? Not Applicable     Reviewed orders with patient.  Reviewed health maintenance and updated orders accordingly - Yes  BP Readings from Last 3 Encounters:   08/04/23 114/73   07/07/23 (!) 148/72   01/27/23 130/70    Wt Readings from Last 3 Encounters:   08/04/23 66.6 kg (146 lb 12 oz)   07/07/23 66.3 kg (146 lb 3 oz)   01/27/23 66.1 kg (145 lb 12 oz)                  Patient Active Problem List   Diagnosis     Carotid Artery Stenosis     Moderate Recurrent  Major Depression     Anxiety     Insomnia     Hypertension     Vitamin D deficiency     Gastropathy     Type 2 diabetes mellitus with hyperglycemia, with long-term current use of insulin (H)     Hyperlipidemia     Headache     Chronic Pain     Back Strain     Dermatitis     Metabolic Tests Nonspecific Elevation Of Transaminase Levels     Pain in finger of right hand     Left knee pain     Urinary, incontinence, stress female     Right lumbar radiculitis     Proteinuria due to type 2 diabetes mellitus (H)     Lichen sclerosus et atrophicus     Diabetic retinopathy associated with type 2 diabetes mellitus (H)     Past Surgical History:   Procedure Laterality Date     [place] ESOPHAGOGASTRODUODENOSCOPY TRANSORAL DIAGNOSTIC      Description: Esophagogastroduodenoscopy;  Proc Date: 2012;  Comments: Reactive Gastropathy. Neg H. Pylori.     C REPAIR OF ANAL SPHINCTER,ADULT  2011    Sphincteroplasty and levetaroplasty at [facility] by Dr Leonardo Gibbs and Allen Jones; Repair of childbirth-related large cloacal defect.  Complicated by post-op wound infection requiring readmission.       Social History     Tobacco Use     Smoking status: Never     Passive exposure: Current     Smokeless tobacco: Former     Types: Chew     Tobacco comments:      smokes outside, pt chews betel nut once in  a while, not tobacco   Substance Use Topics     Alcohol use: No     Family History   Problem Relation Age of Onset     Cancer Sister         Maybe lung cancer?  Lung problem.  in [place].     Asthma Sister      Breast Cancer No family hx of      Ovarian Cancer No family hx of          Current Outpatient Medications   Medication Sig Dispense Refill     ACETAMINOPHEN EXTRA STRENGTH 500 MG tablet TAKE 1 TABLET BY MOUTH EVERY 6 HOURS AS NEEDED FOR PAIN. 100 tablet 5     alcohol swab prep pads Use to swab area of injection/josesito as directed. 100 each 3     ASPIRIN NOT PRESCRIBED (INTENTIONAL) Please choose  reason not prescribed from choices below.       blood glucose (NO BRAND SPECIFIED) lancets standard Use to test blood sugar 2 times daily or as directed. 60 each 11     blood glucose calibration (NO BRAND SPECIFIED) solution To accompany: Blood Glucose Monitor Brands: per insurance. 5 each 4     blood glucose monitoring (NO BRAND SPECIFIED) meter device kit Use to test blood sugar one times daily or as directed. Preferred blood glucose meter OR supplies to accompany: Blood Glucose Monitor Brands: per insurance. 1 kit 0     blood glucose test (GLUCOSE BLOOD) strips [BLOOD GLUCOSE TEST (GLUCOSE BLOOD) STRIPS] Use to check blood sugar three times daily. Ok whichever brand is covered by her insurance and compatible with her glucometer. 300 strip 4     blood glucose test (ONETOUCH VERIO TEST STRIPS) strips [BLOOD GLUCOSE TEST (ONETOUCH VERIO TEST STRIPS) STRIPS] Use 1 each As Directed 4 (four) times a day. Dispense brand per patient's insurance at pharmacy discretion. 100 strip 11     calcium carbonate (ANTACID REGULAR STRENGTH) 500 MG chewable tablet Take 1 tablet (500 mg) by mouth daily 30 tablet 5     clobetasol (TEMOVATE) 0.05 % external ointment Apply topically 2 times daily You must take 1 week off out of every 4 weeks 60 g 4     CONTOUR NEXT TEST test strip USE TO TEST BLOOD SUGAR 1 TIMES DAILY OR AS DIRECTED. 50 strip 6     escitalopram oxalate (LEXAPRO) 10 MG tablet [ESCITALOPRAM OXALATE (LEXAPRO) 10 MG TABLET] Take 1 tablet (10 mg total) by mouth daily. 90 tablet 4     hydrochlorothiazide (HYDRODIURIL) 25 MG tablet TAKE 1 TABLET (25 MG TOTAL) BY MOUTH DAILY. 30 tablet 3     hydrocortisone (ANUSOL-HC) 25 MG suppository Place 1 suppository (25 mg) rectally daily 24 suppository 3     Insulin Aspart Prot & Aspart (INSULIN ASP PROT & ASP FLEXPEN) (70-30) 100 UNIT/ML SUPN INJECT 20 UNITS BEFORE BREAKFAST AND 10 UNITS BEFORE DINNER 15 mL 3     INVOKANA 300 MG tablet TAKE 1 TABLET BY MOUTH DAILY. 30 tablet 3      ketoconazole (NIZORAL) 2 % external cream Apply to rash on left leg twice daily for 2 week or until rash goes away. 60 g 0     lancets (ONETOUCH DELICA LANCETS) 33 gauge Misc [LANCETS (ONETOUCH DELICA LANCETS) 33 GAUGE MISC] Use to check blood sugar 3 per day. 100 each 11     lidocaine (XYLOCAINE) 5 % external ointment Apply topically 2 times daily as needed for moderate pain 50 g 4     losartan (COZAAR) 100 MG tablet Take 1 tablet (100 mg) by mouth daily 30 tablet 11     metFORMIN (GLUCOPHAGE XR) 500 MG 24 hr tablet TAKE 2 TABLETS BY MOUTH (1,000 MG TOTAL) 2 TIMES DAILY 120 tablet 3     omeprazole (PRILOSEC) 20 MG DR capsule TAKE ONE CAPSULE BY MOUTH DAILY BEFORE BREAKFAST Strength: 20 mg 90 capsule 3     rosuvastatin (CRESTOR) 40 MG tablet TAKE 1 TABLET (40 MG TOTAL) BY MOUTH AT BEDTIME. 90 tablet 3     thin (NO BRAND SPECIFIED) lancets Use with lanceting device. To accompany: Blood Glucose Monitor Brands: per insurance. 100 each 3     triamcinolone (KENALOG) 0.1 % external ointment Apply topically 2 times daily Apply to ear and scalp 30 g 1     TRUEPLUS PEN NEEDLES 32G X 4 MM miscellaneous USE TO INJECT INSULIN TWO TIMES DAILY OR AS DIRECTED 100 each 9     vitamin D2 (ERGOCALCIFEROL) 50419 units (1250 mcg) capsule TAKE 1 CAPSULE (50,000 UNITS) BY MOUTH ONCE A WEEK 4 capsule 3     blood pressure monitor Kit [BLOOD PRESSURE MONITOR KIT] Blood pressure machine and cuff for home use. (Patient not taking: Reported on 8/4/2023) 1 each 0     Allergies   Allergen Reactions     Aspirin (Tartrazine Only) [Tartrazine] Unknown     Abdominal pain       Lisinopril Angioedema     Recent Labs   Lab Test 08/04/23  0812 01/27/23  1449 09/30/22  1000 06/24/22  1003 03/18/22  1127 03/18/22  1013 12/17/21  1133 09/17/21  1402 06/04/21  1144 06/04/21  1044 02/26/21  1443 11/20/20  1601   A1C 9.1* 10.2* 7.9*   < >  --    < >  --    < >  --    < > 10.0* 9.5*   LDL  --  94  --   --   --   --  147*  --   --   --   --  120   HDL  --  61   --   --   --   --  58  --   --   --   --  62   TRIG  --  124  --   --   --   --  94  --   --   --   --  52   ALT 31 40*  --   --  15  --  23  --   --   --   --   --    CR 0.99* 0.81  --   --  0.79  --  0.86  --  0.94  --  1.17* 0.81   GFRESTIMATED 64 82  --   --  85  --  74   < > >60  --  47* >60   GFRESTBLACK  --   --   --   --   --   --   --   --  >60  --  57* >60   POTASSIUM 4.4 4.6  --   --  4.4  --  4.6  --  4.6  --  4.7 4.8    < > = values in this interval not displayed.        Breast Cancer Screenin/4/2023     8:03 AM   Breast CA Risk Assessment (FHS-7)   Do you have a family history of breast, colon, or ovarian cancer? No / Unknown       click delete button to remove this line now  Mammogram Screening: Recommended mammography every 1-2 years with patient discussion and risk factor consideration  Pertinent mammograms are reviewed under the imaging tab.    History of abnormal Pap smear: NO - age 30-65 PAP every 5 years with negative HPV co-testing recommended      Latest Ref Rng & Units 2018    11:07 AM   PAP / HPV   PAP  Negative for squamous intraepithelial lesion or malignancy  Electronically signed by Kong Stoddard CT (ASCP) on 2018 at 11:07 AM      HPV 16 DNA NEG Negative    HPV 18 DNA NEG Negative    Other HR HPV NEG Negative      Reviewed and updated as needed this visit by clinical staff   Tobacco   Meds              Reviewed and updated as needed this visit by Provider                 No past medical history on file.   Past Surgical History:   Procedure Laterality Date     [place] ESOPHAGOGASTRODUODENOSCOPY TRANSORAL DIAGNOSTIC      Description: Esophagogastroduodenoscopy;  Proc Date: 2012;  Comments: Reactive Gastropathy. Neg H. Pylori.     [facility] REPAIR OF ANAL SPHINCTER,ADULT  2011    Sphincteroplasty and levetaroplasty at [facility] by Dr Leonardo Gibbs and Allen Jones; Repair of childbirth-related large cloacal defect.  Complicated by  "post-op wound infection requiring readmission.     OB History    Para Term  AB Living   6 6 6 0 0 0   SAB IAB Ectopic Multiple Live Births   0 0 0 0 0      # Outcome Date GA Lbr Jarrod/2nd Weight Sex Delivery Anes PTL Lv   6 Term            5 Term            4 Term            3 Term            2 Term            1 Term                Review of Systems   Constitutional:  Negative for chills and fever.   HENT:  Positive for ear pain. Negative for congestion, hearing loss and sore throat.    Eyes:  Positive for visual disturbance. Negative for pain.   Respiratory:  Positive for shortness of breath. Negative for cough.    Cardiovascular:  Positive for chest pain and peripheral edema.   Gastrointestinal:  Negative for abdominal pain, diarrhea, hematochezia and nausea.   Breasts:  Negative for tenderness, breast mass and discharge.   Genitourinary:  Negative for dysuria, frequency, genital sores, hematuria, pelvic pain, vaginal bleeding and vaginal discharge.   Musculoskeletal:  Positive for arthralgias. Negative for joint swelling and myalgias.   Neurological:  Positive for dizziness and headaches. Negative for weakness.   Psychiatric/Behavioral:  Negative for mood changes. The patient is not nervous/anxious.           OBJECTIVE:   /73   Pulse 99   Temp 97.6  F (36.4  C) (Oral)   Resp 16   Ht 1.49 m (4' 10.66\")   Wt 66.6 kg (146 lb 12 oz)   LMP  (LMP Unknown)   SpO2 97%   Breastfeeding No   BMI 29.98 kg/m    Physical Exam  GENERAL: healthy, alert and no distress  EYES: Eyes grossly normal to inspection, PERRL and conjunctivae and sclerae normal  HENT: ear canals and TM's normal, nose and mouth without ulcers or lesions  NECK: no adenopathy, no asymmetry, masses, or scars and thyroid normal to palpation  RESP: lungs clear to auscultation - no rales, rhonchi or wheezes  CV: regular rate and rhythm, normal S1 S2, no S3 or S4, no murmur, click or rub, no peripheral edema and peripheral pulses " strong  ABDOMEN: soft, nontender, no hepatosplenomegaly, no masses and bowel sounds normal  MS: no gross musculoskeletal defects noted, no edema  SKIN: no suspicious lesions or rashes  NEURO: Normal strength and tone, mentation intact and speech normal  PSYCH: mentation appears normal, affect normal/bright        ASSESSMENT/PLAN:     Ramona was seen today for physical.    Diagnoses and all orders for this visit:    Routine general medical examination at a health care facility  Cervical cancer screening  Declined Pap smear today but agrees to do this at next visit.  She did not want to do it today because she had not showered and she declined cleansing wipes.    Type 2 diabetes mellitus with hyperglycemia, with long-term current use of insulin (H)  Diabetes remains poorly controlled although slightly better than last visit, with an A1c today of 9.1%.  I recommend checking blood sugars at some different times because most the readings she is getting fasting are normal so she must have pretty elevated readings at other times a day.  It sounds like she does not qualify for continuous glucose monitoring which would otherwise I think be beneficial to her as she does not like to poke her finger so much.  She has an appointment coming up with diabetes education in about a month and hopefully she will have some additional blood sugars available at that time.  Additionally, I had referred her to endocrinology quite a long time ago and per record review it looks like they canceled her appointment because the doctor became unavailable and then it was never successfully rescheduled.  I had her meet with my staff today to try to get this rescheduled.  We have tried every way to get her to adjust her medications here before without any success.  Part of the difficulty has been that she really wants to try to minimize her number of injections.  -     Hemoglobin A1c  -     Comprehensive metabolic panel (BMP + Alb, Alk Phos, ALT, AST,  Total. Ervin, TP)  -     Adult Endocrinology  Referral; Future    Cataract, unspecified cataract type, unspecified laterality  Patient is pursuing cataract surgery and has had some trouble getting the ideal procedure according to her son.  I do wonder if some of the reluctance to do certain kind of procedure might be based on her uncontrolled diabetes, so we should definitely optimize that.  Additionally, she is going to see [facility] soon (previously had seen [facility]) for second opinion.    Major depressive disorder, recurrent episode, moderate (H)  Her depression is marginally controlled.  Although she endorses some feelings of being better off dead she is absolutely not suicidal, but just has more of these passive thoughts where she thinks no one would miss her she was gone.  Actually, her depression in general is bit better than has been in the past.  We will continue Lexapro.    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  She been doing well until the last year or so, where she has been having a lot of anal rectal pain.  I have seen her for last fall and she saw colorectal surgeon (Dr. Jordan at [facility])  7/11/22, where she had a normal exam and no certain cause was found.  Additionally, she had normal colonoscopy on 8/3/2022.  Most recently, she saw one of our other doctors here on 7/7/2023 and was given lidocaine ointment and referred back to colorectal surgery.  Patient reports she does have some kind of an appointment in a few weeks but she does not know where or when it is.  We will have her try to meet with our specialty  to see if they can figure out the details so she does not miss her appointment, since this has been a source of major distress to her in the last year or so.  Other orders  -     REVIEW OF HEALTH MAINTENANCE PROTOCOL ORDERS  -      "Pneumococcal 20 Valent Conjugate (Prevnar 20)       Follow-up for diabetes follow-up and Pap smear in about 3 months.      Depression Screening Follow Up        8/4/2023     7:54 AM   PHQ   PHQ-9 Total Score 11   Q9: Thoughts of better off dead/self-harm past 2 weeks More than half the days   F/U: Thoughts of suicide or self-harm No   F/U: Safety concerns No         8/4/2023     7:54 AM   Last PHQ-9   1.  Little interest or pleasure in doing things 1   2.  Feeling down, depressed, or hopeless 0   3.  Trouble falling or staying asleep, or sleeping too much 3   4.  Feeling tired or having little energy 3   5.  Poor appetite or overeating 0   6.  Feeling bad about yourself 1   7.  Trouble concentrating 0   8.  Moving slowly or restless 1   Q9: Thoughts of better off dead/self-harm past 2 weeks 2   PHQ-9 Total Score 11   In the past two weeks have you had thoughts of suicide or self harm? No   Do you have concerns about your personal safety or the safety of others? No           BMI:   Estimated body mass index is 29.98 kg/m  as calculated from the following:    Height as of this encounter: 1.49 m (4' 10.66\").    Weight as of this encounter: 66.6 kg (146 lb 12 oz).   Weight management plan: Discussed healthy diet and exercise guidelines      She reports that she has never smoked. She has been exposed to tobacco smoke. She has quit using smokeless tobacco.  Her smokeless tobacco use included chew.          Coreen Kendall MD  [facility] submitted by the patient for this visit:  Patient Health Questionnaire (Submitted on 8/4/2023)  If you checked off any problems, how difficult have these problems made it for you to do your work, take care of things at home, or get along with other people?: Very difficult  PHQ9 TOTAL SCORE: 11      Prior to immunization administration, verified patients identity using patient s name and date of birth. Please see Immunization Activity for additional " information.     Screening Questionnaire for Adult Immunization    Are you sick today?   No   Do you have allergies to medications, food, a vaccine component or latex?   No   Have you ever had a serious reaction after receiving a vaccination?   No   Do you have a long-term health problem with heart, lung, kidney, or metabolic disease (e.g., diabetes), asthma, a blood disorder, no spleen, complement component deficiency, a cochlear implant, or a spinal fluid leak?  Are you on long-term aspirin therapy?   Yes, HTN, DM, hyperlipidemia   Do you have cancer, leukemia, HIV/AIDS, or any other immune system problem?   No   Do you have a parent, brother, or sister with an immune system problem?   No   In the past 3 months, have you taken medications that affect  your immune system, such as prednisone, other steroids, or anticancer drugs; drugs for the treatment of rheumatoid arthritis, Crohn s disease, or psoriasis; or have you had radiation treatments?   No   Have you had a seizure, or a brain or other nervous system problem?   Yes, neuropathy from DM   During the past year, have you received a transfusion of blood or blood    products, or been given immune (gamma) globulin or antiviral drug?   No   For women: Are you pregnant or is there a chance you could become       pregnant during the next month?   No   Have you received any vaccinations in the past 4 weeks?   No     Immunization questionnaire was positive for at least one answer.  Dr Kendall is aware.  Patient instructed to remain in clinic for 15 minutes afterwards, and to report any adverse reactions.   Screening performed by Antwan Johnston on 8/4/2023 at 9:11 AM.

## 2023-08-04 NOTE — PATIENT INSTRUCTIONS
Hemoglobin A1C   Date Value Ref Range Status   08/04/2023 9.1 (H) 0.0 - 5.6 % Final     Comment:     Normal <5.7%   Prediabetes 5.7-6.4%    Diabetes 6.5% or higher     Note: Adopted from ADA consensus guidelines.   01/27/2023 10.2 (H) 0.0 - 5.6 % Final     Comment:     Normal <5.7%   Prediabetes 5.7-6.4%    Diabetes 6.5% or higher     Note: Adopted from ADA consensus guidelines.   09/30/2022 7.9 (H) 0.0 - 5.6 % Final     Comment:     Normal <5.7%   Prediabetes 5.7-6.4%    Diabetes 6.5% or higher     Note: Adopted from ADA consensus guidelines.   06/24/2022 9.4 (H) 0.0 - 5.6 % Final     Comment:     Normal <5.7%   Prediabetes 5.7-6.4%    Diabetes 6.5% or higher     Note: Adopted from ADA consensus guidelines.             Preventive Health Recommendations  Female Ages 50 - 64    Yearly exam: See your health care provider every year in order to  Review health changes.   Discuss preventive care.    Review your medicines if your doctor has prescribed any.    Get a Pap test every three years (unless you have an abnormal result and your provider advises testing more often).  If you get Pap tests with HPV test, you only need to test every 5 years, unless you have an abnormal result.   You do not need a Pap test if your uterus was removed (hysterectomy) and you have not had cancer.  You should be tested each year for STDs (sexually transmitted diseases) if you're at risk.   Have a mammogram every 1 to 2 years.  Have a colonoscopy at age 50, or have a yearly FIT test (stool test). These exams screen for colon cancer.    Have a cholesterol test every 5 years, or more often if advised.  Have a diabetes test (fasting glucose) every three years. If you are at risk for diabetes, you should have this test more often.   If you are at risk for osteoporosis (brittle bone disease), think about having a bone density scan (DEXA).    Shots: Get a flu shot each year. Get a tetanus shot every 10 years.    Nutrition:   Eat at least 5  servings of fruits and vegetables each day.  Eat whole-grain bread, whole-wheat pasta and brown rice instead of white grains and rice.  Get adequate Calcium and Vitamin D.     Lifestyle  Exercise at least 150 minutes a week (30 minutes a day, 5 days a week). This will help you control your weight and prevent disease.  Limit alcohol to one drink per day.  No smoking.   Wear sunscreen to prevent skin cancer.   See your dentist every six months for an exam and cleaning.  See your eye doctor every 1 to 2 years.

## 2023-08-15 NOTE — TELEPHONE ENCOUNTER
Patient Quality Outreach    Patient is due for the following:   Hypertension -  Hypertension follow-up visit    Next Steps:   Patient has upcoming appointment, these items will be addressed at that time. Patient was last seen on 8/4/23 for routine physical care.  Vitals stable.     Type of outreach:    No call is needed.  Patient has a follow up appt with pcp  11/10/2023      Questions for provider review:    None           Jae Dickson RN

## 2023-08-21 ENCOUNTER — TRANSFERRED RECORDS (OUTPATIENT)
Dept: HEALTH INFORMATION MANAGEMENT | Facility: CLINIC | Age: 63
End: 2023-08-21
Payer: COMMERCIAL

## 2023-08-21 LAB — RETINOPATHY: POSITIVE

## 2023-09-01 ENCOUNTER — ALLIED HEALTH/NURSE VISIT (OUTPATIENT)
Dept: EDUCATION SERVICES | Facility: CLINIC | Age: 63
End: 2023-09-01
Attending: FAMILY MEDICINE
Payer: COMMERCIAL

## 2023-09-01 VITALS — BODY MASS INDEX: 29.65 KG/M2 | WEIGHT: 145.12 LBS

## 2023-09-01 DIAGNOSIS — Z79.4 TYPE 2 DIABETES MELLITUS WITH HYPERGLYCEMIA, WITH LONG-TERM CURRENT USE OF INSULIN (H): ICD-10-CM

## 2023-09-01 DIAGNOSIS — E11.65 TYPE 2 DIABETES MELLITUS WITH HYPERGLYCEMIA, WITH LONG-TERM CURRENT USE OF INSULIN (H): ICD-10-CM

## 2023-09-01 PROCEDURE — G0108 DIAB MANAGE TRN  PER INDIV: HCPCS | Mod: AE | Performed by: DIETITIAN, REGISTERED

## 2023-09-01 NOTE — PATIENT INSTRUCTIONS
When your blood sugar goes below 80 and you feel shaky, sweaty or dizzy, have a small glass of juice.   Take your insulin injection 30 minutes before you have your meals.   Keep testing your blood sugar in the morning before eating and before your evening meal.   Limit rice to one small bowl per meal.  Have a 1/2 banana or a small amount of tad between your meals as a snack.     Bring you glucose meter, all of your medications and the [facility] blood glucose testing supplies.       Follow up with pharmacist, Noel Elmore on 9/15 at 12:30pm.

## 2023-09-01 NOTE — LETTER
9/1/2023         RE: Ramona Wilder  [address]  [facility] [address]        Dear Colleague,    Thank you for referring your patient, Ramona Wilder, to the [facility]. Please see a copy of my visit note below.    Diabetes Self-Management Education & Support/ 69 minutes through professional  # [id number]    Presents for: Individual review    Type of Service: In Person Visit    Assessment Type:   ASSESSMENT:  Ramona was accompanied by her daughter, Carlos Root for today's visit. Ramona was diagnosed with Diabetes when she came to the  in 2006. She reports or polyuria, numbness in her fingers, blurry vision and fatigue. She is currently taking Metformin 2000 mg, Invokana 300 mg and insulin aspart prot & aspart 70/30 insulin: 25 units before the morning meal, 15 units before the evening meal. She has 2-3 episodes of < 70 BG  per month. She treats low blood sugar with 8 oz of juice. She checks her BG once daily in the morning. We discussed using the digna 2 CGM system and she is interested. Ramona consumes two meals/day: rice, vegetables and fish or meat. She notices the impact on her BG when she has sweets. Ramona is scheduled for cataract surgery in the near future.      Patient's most recent   Lab Results   Component Value Date    A1C 9.1 08/04/2023     is not meeting goal of <8.0    Diabetes knowledge and skills assessment:   Patient is knowledgeable in diabetes management concepts related to: topics reviewed    Continue education with the following diabetes management concepts: Being Active, Monitoring, and Taking Medication    Based on learning assessment above, most appropriate setting for further diabetes education would be: Individual setting.      PLAN  When  blood sugar goes below 80 and you feel shaky, sweaty or dizzy, have a small glass of juice.   Take your insulin injection 30 minutes before you have your meals.   Keep testing your blood sugar in the morning before eating and before your  "evening meal.   Limit rice to one small bowl per meal.  Have a 1/2 banana or a small amount of tad between your meals as a snack.          Topics to cover at upcoming visits: Monitoring, Taking Medication, and Reducing Risks    Follow-up: MTM on 9/15/23    See Care Plan for co-developed, patient-state behavior change goals.  AVS provided for patient today.    Education Materials Provided:  No new materials provided today      SUBJECTIVE/OBJECTIVE:  Presents for: Individual review  Accompanied by: Self, Daughter  Focus of Visit: Monitoring, Reducing Risks, Taking Medication, Healthy Eating  Diabetes type: Type 2  Date of diagnosis: 2006  Disease course: Improving  Transportation concerns: Yes  Other concerns:: Language barrier  Cultural Influences/Ethnic Background:  Not  or       Diabetes Symptoms & Complications:  Fatigue: Yes  Neuropathy: Yes  Polydipsia: Yes  Polyuria: Yes  Visual change: Yes (needs cataract surgery)  Weight trend: Stable       Patient Problem List and Family Medical History reviewed for relevant medical history, current medical status, and diabetes risk factors.    Vitals:  Wt 65.8 kg (145 lb 1.9 oz)   LMP  (LMP Unknown)   BMI 29.65 kg/m    Estimated body mass index is 29.65 kg/m  as calculated from the following:    Height as of 8/4/23: 1.49 m (4' 10.66\").    Weight as of this encounter: 65.8 kg (145 lb 1.9 oz).   Last 3 BP:   BP Readings from Last 3 Encounters:   08/04/23 114/73   07/07/23 (!) 148/72   01/27/23 130/70       History   Smoking Status     Never   Smokeless Tobacco     Former     Types: Chew       Labs:  Lab Results   Component Value Date    A1C 9.1 08/04/2023     Lab Results   Component Value Date     08/04/2023     03/18/2022     Lab Results   Component Value Date    LDL 94 01/27/2023    LDL 64 02/11/2014     Direct Measure HDL   Date Value Ref Range Status   01/27/2023 61 >=50 mg/dL Final   ]  GFR Estimate   Date Value Ref Range Status "   08/04/2023 64 >60 mL/min/1.73m2 Final   06/04/2021 >60 >60 mL/min/1.73m2 Final     GFR Estimate If Black   Date Value Ref Range Status   06/04/2021 >60 >60 mL/min/1.73m2 Final     Lab Results   Component Value Date    CR 0.99 08/04/2023     No results found for: MICROALBUMIN    Healthy Eating:  Healthy Eating Assessed Today: Yes  Meals include: Breakfast, Dinner  Breakfast: rice, vegetables/fish or meat,  Dinner: rice, vegetables, fish or meat  Snacks: 3:1 birdee  coffee/sweetened with milk and a biscuit( 4-5 pieces)  Other: does not like fruit  Beverages: Other, Tea, Water (vegetable broth,)    Being Active:  Being Active Assessed Today: No    Monitoring:  Monitoring Assessed Today: Yes  Did patient bring glucose meter to appointment? : Yes  Blood Glucose Meter: ContourNext  Times checking blood sugar at home (number): 1  Times checking blood sugar at home (per): Day  Blood glucose trend: Fluctuating    Blood glucose record:  FBS:  108,76,171,173,104,158,97,135,96,126,177,134,103    Taking Medications:  Diabetes Medication(s)       Biguanides       metFORMIN (GLUCOPHAGE XR) 500 MG 24 hr tablet    TAKE 2 TABLETS BY MOUTH (1,000 MG TOTAL) 2 TIMES DAILY      Insulin       Insulin Aspart Prot & Aspart (INSULIN ASP PROT & ASP FLEXPEN) (70-30) 100 UNIT/ML SUPN    INJECT 20 UNITS BEFORE BREAKFAST AND 10 UNITS BEFORE DINNER      Sodium-Glucose Co-Transporter 2 (SGLT2) Inhibitors       INVOKANA 300 MG tablet    TAKE 1 TABLET BY MOUTH DAILY.            Taking Medication Assessed Today: Yes  Current Treatments: Oral Medication (taken by mouth), Insulin Injections  Dose schedule: Pre-breakfast, Pre-dinner  Given by: Patient  Problems taking diabetes medications regularly?: Yes (she sometimes forgets to take her pills,)    Problem Solving:  Problem Solving Assessed Today: Yes  Is the patient at risk for hypoglycemia?: Yes  Hypoglycemia Frequency: Other (2-3x/month)  Hypoglycemia Treatment: Juice    Hypoglycemia  symptoms  Feeling shaky: Yes         Reducing Risks:  Diabetes Risks: Age over 45 years, Sedentary Lifestyle, Ethnicity  Has dilated eye exam at least once a year?: Yes    Healthy Coping:  Emotional response to diabetes: Uncertain  Informal Support system:: Children  Stage of change: ACTION (Actively working towards change)  Patient Activation Measure Survey Score:       No data to display                  Care Plan and Education Provided:  Care Plan: Diabetes   Updates made by Olya Celis RD since 9/1/2023 12:00 AM        Problem: HbA1C Not In Goal         Goal: Establish Regular Follow-Ups with PCP         Task: Discuss with PCP the recommended timing for patient's next follow up visit(s)    Responsible User: Olya Celis RD        Task: Discuss schedule for PCP visits with patient    Responsible User: Olya Celis RD        Goal: Get HbA1C Level in Goal         Task: Educate patient on diabetes education self-management topics    Responsible User: Olya Celis RD        Task: Educate patient on benefits of regular glucose monitoring    Responsible User: Olya Celis RD        Task: Refer patient to appropriate extended care team member, as needed (Medication Therapy Management, Behavioral Health, Physical Therapy, etc.)    Responsible User: Olya Celis RD        Task: Discuss diabetes treatment plan with patient    Responsible User: Olya Celis RD        Problem: Diabetes Self-Management Education Needed to Optimize Self-Care Behaviors         Goal: Understand diabetes pathophysiology and disease progression         Task: Provide education on diabetes pathophysiology and disease progression specfic to patient's diabetes type    Responsible User: Olya Celis RD        Goal: Healthy Eating - follow a healthy eating pattern for diabetes    This Visit's Progress: 0%   Note:    Limit rice to one small bowl per meal.        Task: Provide education on portion control and consistency in  amount, composition and timing of food intake    Responsible User: Olya Celis RD        Task: Provide education on managing carbohydrate intake (carbohydrate counting, plate planning method, etc.)    Responsible User: Olya Celis RD        Task: Provide education on weight management    Responsible User: Olya Celis RD        Task: Provide education on heart healthy eating    Responsible User: Olya Celis RD        Task: Provide education on eating out    Responsible User: Olya Celis RD        Task: Develop individualized healthy eating plan with patient    Responsible User: Olya Celis RD        Goal: Being Active - get regular physical activity, working up to at least 150 minutes per week         Task: Provide education on relationship of activity to glucose and precautions to take if at risk for low glucose    Responsible User: Olya Celis RD        Task: Discuss barriers to physical activity with patient    Responsible User: Olya Celis RD        Task: Develop physical activity plan with patient    Responsible User: Olya Celis RD        Task: Explore community resources including walking groups, assistance programs, and home videos    Responsible User: Olya Celis RD        Goal: Monitoring - monitor glucose and ketones as directed    This Visit's Progress: 50%   Note:    Test blood sugar twice daily: before morning and evening meal.        Task: Provide education on blood glucose monitoring (purpose, proper technique, frequency, glucose targets, interpreting results, when to use glucose control solution, sharps disposal)    Responsible User: Olya Celis RD        Task: Provide education on continuous glucose monitoring (sensor placement, use of lydia or /reader, understanding glucose trends, alerts and alarms, differences between sensor glucose and blood glucose)    Responsible User: Olya Celis RD        Task: Provide education on ketone monitoring (when  to monitor, frequency, etc.)    Responsible User: Olya Celis RD        Goal: Taking Medication - patient is consistently taking medications as directed         Task: Provide education on action of prescribed medication, including when to take and possible side effects    Responsible User: Olya Celis RD        Task: Provide education on insulin and injectable diabetes medications, including administration, storage, site selection and rotation for injection sites    Responsible User: Olya Celis RD        Task: Discuss barriers to medication adherence with patient and provide management technique ideas as appropriate    Responsible User: Olya Celis RD        Task: Provide education on frequency and refill details of medications    Responsible User: Olya Celis RD        Goal: Problem Solving - know how to prevent and manage short-term diabetes complications         Task: Provide education on high blood glucose - causes, signs/symptoms, prevention and treatment    Responsible User: Olya Celis RD        Task: Provide education on low blood glucose - causes, signs/symptoms, prevention, treatment, carrying a carbohydrate source at all times, and medical identification    Responsible User: Olya Celis RD        Task: Provide education on safe travel with diabetes    Responsible User: Olya Celis RD        Task: Provide education on how to care for diabetes on sick days    Responsible User: Olya Celis RD        Task: Provide education on when to call a health care provider    Responsible User: Olya Celis RD        Goal: Reducing Risks - know how to prevent and treat long-term diabetes complications         Task: Provide education on major complications of diabetes, prevention, early diagnostic measures and treatment of complications    Responsible User: Olya Celis RD        Task: Provide education on recommended care for dental, eye and foot health    Responsible User:  Olya Celis RD        Task: Provide education on Hemoglobin A1c - goals and relationship to blood glucose levels    Responsible User: Olya Celis RD        Task: Provide education on recommendations for heart health - lipid levels and goals, blood pressure and goals, and aspirin therapy, if indicated    Responsible User: Olya Celis RD        Task: Provide education on tobacco cessation    Responsible User: Olya Celis RD        Goal: Healthy Coping - use available resources to cope with the challenges of managing diabetes         Task: Discuss recognizing feelings about having diabetes    Responsible User: Olya Celis RD        Task: Provide education on the benefits of making appropriate lifestyle changes    Responsible User: Olya Celis RD        Task: Provide education on benefits of utilizing support systems    Responsible User: Olya Celis RD        Task: Discuss methods for coping with stress    Responsible User: Olya Celis RD        Task: Provide education on when to seek professional counseling    Responsible User: Olya Celis RD              Time Spent: 60 minutes  Encounter Type: Individual    Any diabetes medication dose changes were made via the CDE Protocol per the patient's primary care provider. A copy of this encounter was shared with the provider.

## 2023-09-01 NOTE — Clinical Note
I met with Ramona  today, new patient to me. She is on metformin 2000, invokana 300 and 70/30 insulin: 25/15. She is checking FBS only: varies . She wants a more simple regimen, less meds( if possible). I am thinking a switch to just a basal and maybe adding a weekly GLP1? She will see you on 9/15 to set up a Rosalinda 2. She has quite a bit of discomfort from hemorrhoids and is requesting a different type of medication, current prescription from [facility] is not helping. You can send her back my way or keep her to evaluate CGM data and med adjustments.   Olya

## 2023-09-01 NOTE — PROGRESS NOTES
Diabetes Self-Management Education & Support/ 69 minutes through professional  # [id number]    Presents for: Individual review    Type of Service: In Person Visit    Assessment Type:   ASSESSMENT:  Ramona was accompanied by her daughter, Carlos Root for today's visit. Ramona was diagnosed with Diabetes when she came to the  in 2006. She reports or polyuria, numbness in her fingers, blurry vision and fatigue. She is currently taking Metformin 2000 mg, Invokana 300 mg and insulin aspart prot & aspart 70/30 insulin: 25 units before the morning meal, 15 units before the evening meal. She has 2-3 episodes of < 70 BG  per month. She treats low blood sugar with 8 oz of juice. She checks her BG once daily in the morning. We discussed using the [employer] 2 CGM system and she is interested. Ramona consumes two meals/day: rice, vegetables and fish or meat. She notices the impact on her BG when she has sweets. Ramona is scheduled for cataract surgery in the near future.      Patient's most recent   Lab Results   Component Value Date    A1C 9.1 08/04/2023     is not meeting goal of <8.0    Diabetes knowledge and skills assessment:   Patient is knowledgeable in diabetes management concepts related to: topics reviewed    Continue education with the following diabetes management concepts: Being Active, Monitoring, and Taking Medication    Based on learning assessment above, most appropriate setting for further diabetes education would be: Individual setting.      PLAN  When  blood sugar goes below 80 and you feel shaky, sweaty or dizzy, have a small glass of juice.   Take your insulin injection 30 minutes before you have your meals.   Keep testing your blood sugar in the morning before eating and before your evening meal.   Limit rice to one small bowl per meal.  Have a 1/2 banana or a small amount of tad between your meals as a snack.          Topics to cover at upcoming visits: Monitoring, Taking Medication, and Reducing  "Risks    Follow-up: MTM on 9/15/23    See Care Plan for co-developed, patient-state behavior change goals.  AVS provided for patient today.    Education Materials Provided:  No new materials provided today      SUBJECTIVE/OBJECTIVE:  Presents for: Individual review  Accompanied by: Self, Daughter  Focus of Visit: Monitoring, Reducing Risks, Taking Medication, Healthy Eating  Diabetes type: Type 2  Date of diagnosis: 2006  Disease course: Improving  Transportation concerns: Yes  Other concerns:: Language barrier  Cultural Influences/Ethnic Background:  Not  or       Diabetes Symptoms & Complications:  Fatigue: Yes  Neuropathy: Yes  Polydipsia: Yes  Polyuria: Yes  Visual change: Yes (needs cataract surgery)  Weight trend: Stable       Patient Problem List and Family Medical History reviewed for relevant medical history, current medical status, and diabetes risk factors.    Vitals:  Wt 65.8 kg (145 lb 1.9 oz)   LMP  (LMP Unknown)   BMI 29.65 kg/m    Estimated body mass index is 29.65 kg/m  as calculated from the following:    Height as of 8/4/23: 1.49 m (4' 10.66\").    Weight as of this encounter: 65.8 kg (145 lb 1.9 oz).   Last 3 BP:   BP Readings from Last 3 Encounters:   08/04/23 114/73   07/07/23 (!) 148/72   01/27/23 130/70       History   Smoking Status    Never   Smokeless Tobacco    Former    Types: Chew       Labs:  Lab Results   Component Value Date    A1C 9.1 08/04/2023     Lab Results   Component Value Date     08/04/2023     03/18/2022     Lab Results   Component Value Date    LDL 94 01/27/2023    LDL 64 02/11/2014     Direct Measure HDL   Date Value Ref Range Status   01/27/2023 61 >=50 mg/dL Final   ]  GFR Estimate   Date Value Ref Range Status   08/04/2023 64 >60 mL/min/1.73m2 Final   06/04/2021 >60 >60 mL/min/1.73m2 Final     GFR Estimate If Black   Date Value Ref Range Status   06/04/2021 >60 >60 mL/min/1.73m2 Final     Lab Results   Component Value Date    CR 0.99 " 08/04/2023     No results found for: MICROALBUMIN    Healthy Eating:  Healthy Eating Assessed Today: Yes  Meals include: Breakfast, Dinner  Breakfast: rice, vegetables/fish or meat,  Dinner: rice, vegetables, fish or meat  Snacks: 3:1 birdee  coffee/sweetened with milk and a biscuit( 4-5 pieces)  Other: does not like fruit  Beverages: Other, Tea, Water (vegetable broth,)    Being Active:  Being Active Assessed Today: No    Monitoring:  Monitoring Assessed Today: Yes  Did patient bring glucose meter to appointment? : Yes  Blood Glucose Meter: [employer]  Times checking blood sugar at home (number): 1  Times checking blood sugar at home (per): Day  Blood glucose trend: Fluctuating    Blood glucose record:  FBS:  108,76,171,173,104,158,97,135,96,126,177,134,103    Taking Medications:  Diabetes Medication(s)       Biguanides       metFORMIN (GLUCOPHAGE XR) 500 MG 24 hr tablet    TAKE 2 TABLETS BY MOUTH (1,000 MG TOTAL) 2 TIMES DAILY      Insulin       Insulin Aspart Prot & Aspart (INSULIN ASP PROT & ASP FLEXPEN) (70-30) 100 UNIT/ML SUPN    INJECT 20 UNITS BEFORE BREAKFAST AND 10 UNITS BEFORE DINNER      Sodium-Glucose Co-Transporter 2 (SGLT2) Inhibitors       INVOKANA 300 MG tablet    TAKE 1 TABLET BY MOUTH DAILY.            Taking Medication Assessed Today: Yes  Current Treatments: Oral Medication (taken by mouth), Insulin Injections  Dose schedule: Pre-breakfast, Pre-dinner  Given by: Patient  Problems taking diabetes medications regularly?: Yes (she sometimes forgets to take her pills,)    Problem Solving:  Problem Solving Assessed Today: Yes  Is the patient at risk for hypoglycemia?: Yes  Hypoglycemia Frequency: Other (2-3x/month)  Hypoglycemia Treatment: Juice    Hypoglycemia symptoms  Feeling shaky: Yes         Reducing Risks:  Diabetes Risks: Age over 45 years, Sedentary Lifestyle, Ethnicity  Has dilated eye exam at least once a year?: Yes    Healthy Coping:  Emotional response to diabetes: Uncertain  Informal  Support system:: Children  Stage of change: ACTION (Actively working towards change)  Patient Activation Measure Survey Score:       No data to display                  Care Plan and Education Provided:  Care Plan: Diabetes   Updates made by Olya Celis RD since 9/1/2023 12:00 AM        Problem: HbA1C Not In Goal         Goal: Establish Regular Follow-Ups with PCP         Task: Discuss with PCP the recommended timing for patient's next follow up visit(s)    Responsible User: Olya Celis RD        Task: Discuss schedule for PCP visits with patient    Responsible User: Olya Celis RD        Goal: Get HbA1C Level in Goal         Task: Educate patient on diabetes education self-management topics    Responsible User: Olya Celis RD        Task: Educate patient on benefits of regular glucose monitoring    Responsible User: Olya Celis RD        Task: Refer patient to appropriate extended care team member, as needed (Medication Therapy Management, Behavioral Health, Physical Therapy, etc.)    Responsible User: Olya Celis RD        Task: Discuss diabetes treatment plan with patient    Responsible User: Olya Celis RD        Problem: Diabetes Self-Management Education Needed to Optimize Self-Care Behaviors         Goal: Understand diabetes pathophysiology and disease progression         Task: Provide education on diabetes pathophysiology and disease progression specfic to patient's diabetes type    Responsible User: Olya Celis RD        Goal: Healthy Eating - follow a healthy eating pattern for diabetes    This Visit's Progress: 0%   Note:    Limit rice to one small bowl per meal.        Task: Provide education on portion control and consistency in amount, composition and timing of food intake    Responsible User: Olya Celis RD        Task: Provide education on managing carbohydrate intake (carbohydrate counting, plate planning method, etc.)    Responsible User: Olya Celis RD         Task: Provide education on weight management    Responsible User: Olya Celis RD        Task: Provide education on heart healthy eating    Responsible User: Olya Celis RD        Task: Provide education on eating out    Responsible User: Olya Celis RD        Task: Develop individualized healthy eating plan with patient    Responsible User: Olya Celis RD        Goal: Being Active - get regular physical activity, working up to at least 150 minutes per week         Task: Provide education on relationship of activity to glucose and precautions to take if at risk for low glucose    Responsible User: Olya Celis RD        Task: Discuss barriers to physical activity with patient    Responsible User: Olya Celis RD        Task: Develop physical activity plan with patient    Responsible User: Olya Celis RD        Task: Explore community resources including walking groups, assistance programs, and home videos    Responsible User: Olya Celis RD        Goal: Monitoring - monitor glucose and ketones as directed    This Visit's Progress: 50%   Note:    Test blood sugar twice daily: before morning and evening meal.        Task: Provide education on blood glucose monitoring (purpose, proper technique, frequency, glucose targets, interpreting results, when to use glucose control solution, sharps disposal)    Responsible User: Olya Celis RD        Task: Provide education on continuous glucose monitoring (sensor placement, use of lydia or /reader, understanding glucose trends, alerts and alarms, differences between sensor glucose and blood glucose)    Responsible User: Olya Celis RD        Task: Provide education on ketone monitoring (when to monitor, frequency, etc.)    Responsible User: Olya Celis RD        Goal: Taking Medication - patient is consistently taking medications as directed         Task: Provide education on action of prescribed medication, including when to  take and possible side effects    Responsible User: Olya Celis RD        Task: Provide education on insulin and injectable diabetes medications, including administration, storage, site selection and rotation for injection sites    Responsible User: Olya Celis RD        Task: Discuss barriers to medication adherence with patient and provide management technique ideas as appropriate    Responsible User: Olya Celis RD        Task: Provide education on frequency and refill details of medications    Responsible User: Olya Celis RD        Goal: Problem Solving - know how to prevent and manage short-term diabetes complications         Task: Provide education on high blood glucose - causes, signs/symptoms, prevention and treatment    Responsible User: Olya Celis RD        Task: Provide education on low blood glucose - causes, signs/symptoms, prevention, treatment, carrying a carbohydrate source at all times, and medical identification    Responsible User: Olya Celis RD        Task: Provide education on safe travel with diabetes    Responsible User: Olya Celis RD        Task: Provide education on how to care for diabetes on sick days    Responsible User: Olya Celis RD        Task: Provide education on when to call a health care provider    Responsible User: Olya Celis RD        Goal: Reducing Risks - know how to prevent and treat long-term diabetes complications         Task: Provide education on major complications of diabetes, prevention, early diagnostic measures and treatment of complications    Responsible User: Olya Celis RD        Task: Provide education on recommended care for dental, eye and foot health    Responsible User: Olya Celis RD        Task: Provide education on Hemoglobin A1c - goals and relationship to blood glucose levels    Responsible User: Olya Celis RD        Task: Provide education on recommendations for heart health - lipid levels and  goals, blood pressure and goals, and aspirin therapy, if indicated    Responsible User: Olya Celis RD        Task: Provide education on tobacco cessation    Responsible User: Olya Celis RD        Goal: Healthy Coping - use available resources to cope with the challenges of managing diabetes         Task: Discuss recognizing feelings about having diabetes    Responsible User: Olya Celis RD        Task: Provide education on the benefits of making appropriate lifestyle changes    Responsible User: Olya Celis RD        Task: Provide education on benefits of utilizing support systems    Responsible User: Olya Celis RD        Task: Discuss methods for coping with stress    Responsible User: Olya Celis RD        Task: Provide education on when to seek professional counseling    Responsible User: Olya Celis RD              Time Spent: 60 minutes  Encounter Type: Individual    Any diabetes medication dose changes were made via the CDE Protocol per the patient's primary care provider. A copy of this encounter was shared with the provider.

## 2023-09-11 ENCOUNTER — OFFICE VISIT (OUTPATIENT)
Dept: FAMILY MEDICINE | Facility: CLINIC | Age: 63
End: 2023-09-11
Payer: COMMERCIAL

## 2023-09-11 VITALS
DIASTOLIC BLOOD PRESSURE: 72 MMHG | HEIGHT: 59 IN | WEIGHT: 149 LBS | SYSTOLIC BLOOD PRESSURE: 132 MMHG | TEMPERATURE: 98.3 F | HEART RATE: 72 BPM | OXYGEN SATURATION: 99 % | BODY MASS INDEX: 30.04 KG/M2 | RESPIRATION RATE: 16 BRPM

## 2023-09-11 DIAGNOSIS — Z79.4 TYPE 2 DIABETES MELLITUS WITH HYPERGLYCEMIA, WITH LONG-TERM CURRENT USE OF INSULIN (H): ICD-10-CM

## 2023-09-11 DIAGNOSIS — H26.9 CATARACT, UNSPECIFIED CATARACT TYPE, UNSPECIFIED LATERALITY: ICD-10-CM

## 2023-09-11 DIAGNOSIS — I10 PRIMARY HYPERTENSION: ICD-10-CM

## 2023-09-11 DIAGNOSIS — E11.65 TYPE 2 DIABETES MELLITUS WITH HYPERGLYCEMIA, WITH LONG-TERM CURRENT USE OF INSULIN (H): ICD-10-CM

## 2023-09-11 DIAGNOSIS — Z01.818 PREOP GENERAL PHYSICAL EXAM: Primary | ICD-10-CM

## 2023-09-11 LAB
ANION GAP SERPL CALCULATED.3IONS-SCNC: 11 MMOL/L (ref 7–15)
BUN SERPL-MCNC: 17 MG/DL (ref 8–23)
CALCIUM SERPL-MCNC: 10.1 MG/DL (ref 8.8–10.2)
CHLORIDE SERPL-SCNC: 103 MMOL/L (ref 98–107)
CREAT SERPL-MCNC: 0.85 MG/DL (ref 0.51–0.95)
DEPRECATED HCO3 PLAS-SCNC: 26 MMOL/L (ref 22–29)
EGFRCR SERPLBLD CKD-EPI 2021: 77 ML/MIN/1.73M2
GLUCOSE SERPL-MCNC: 124 MG/DL (ref 70–99)
HBA1C MFR BLD: 8.9 % (ref 0–5.6)
POTASSIUM SERPL-SCNC: 5.3 MMOL/L (ref 3.4–5.3)
SODIUM SERPL-SCNC: 140 MMOL/L (ref 136–145)

## 2023-09-11 PROCEDURE — 80048 BASIC METABOLIC PNL TOTAL CA: CPT | Performed by: FAMILY MEDICINE

## 2023-09-11 PROCEDURE — 36415 COLL VENOUS BLD VENIPUNCTURE: CPT | Performed by: FAMILY MEDICINE

## 2023-09-11 PROCEDURE — 99215 OFFICE O/P EST HI 40 MIN: CPT | Performed by: FAMILY MEDICINE

## 2023-09-11 PROCEDURE — 83036 HEMOGLOBIN GLYCOSYLATED A1C: CPT | Performed by: FAMILY MEDICINE

## 2023-09-11 ASSESSMENT — PATIENT HEALTH QUESTIONNAIRE - PHQ9
SUM OF ALL RESPONSES TO PHQ QUESTIONS 1-9: 9
10. IF YOU CHECKED OFF ANY PROBLEMS, HOW DIFFICULT HAVE THESE PROBLEMS MADE IT FOR YOU TO DO YOUR WORK, TAKE CARE OF THINGS AT HOME, OR GET ALONG WITH OTHER PEOPLE: SOMEWHAT DIFFICULT
SUM OF ALL RESPONSES TO PHQ QUESTIONS 1-9: 9

## 2023-09-11 NOTE — PATIENT INSTRUCTIONS
Check with insurance to see if SHINGLES vaccine called SHINGRIX is covered at the pharmacy or at the clinic, and schedule an appointment to get it if covered

## 2023-09-11 NOTE — PROGRESS NOTES
[address]  [place] [address]  Phone: [phone]  Fax: [phone]  Primary Provider: Coreen Kendall  Pre-op Performing Provider:    MARILYN GOMEZ  PHONE,       PREOPERATIVE EVALUATION:  Today's date: 9/11/2023    Ramona Wilder is a 63 year old female who presents for a preoperative evaluation.      9/11/2023     8:50 AM   Additional Questions   Roomed by Kasey PERALTA       Surgical Information:  Surgery/Procedure: Phacoemlsification/Intraocular Lens,Left    Surgery Location: [facility]   Surgeon: Kj Chiang MD   Surgery Date: 9/25/2023 and 10/09/24951  Time of Surgery: TDB   Where patient plans to recover: At home with family  Fax number for surgical facility: [phone]    Assessment & Plan     The proposed surgical procedure is considered LOW risk.    Type 2 diabetes mellitus with hyperglycemia, with long-term current use of insulin (H)  Unclear compliance. Patient, son (by phone), and daughter all report that she is taking her oral DM medications and her insulin (70/30 25 units in a.m. and 15 units pm), but she has not filled insulin pen needles since Spet 2022 (100 needles per wilbur). She filled her insulin pen in July when prescribed for 50 day supply. A1c today 8.9%. MTM visit later this week to start CGM - message sent to notify [facility] of question of compliance. Wilbur will send refill request for needles (initiated by me, not patient).  - Hemoglobin A1c    Primary hypertension  - Basic metabolic panel  (Ca, Cl, CO2, Creat, Gluc, K, Na, BUN)    Preop general physical exam  Cleared for upcoming cataract procedure    Cataracts    Anorectal pain  Recent GI or colorectal appt, unclear where she was seen, and needs to return since the topical rxs given do not help. Message sent to specialty .         Depression Screening Follow Up        9/11/2023     8:44 AM   PHQ   PHQ-9 Total Score 9   Q9:  Thoughts of better off dead/self-harm past 2 weeks Several days   F/U: Thoughts of suicide or self-harm No   F/U: Safety concerns No                 Follow Up    Follow Up Actions Taken - mood stable, no changes, no active SI. She will continue to work with PCP.      Risks and Recommendations:  The patient has the following additional risks and recommendations for perioperative complications:  Diabetes:  - Patient is on insulin therapy; diabetic NPO guidelines provided and discussed.    Antiplatelet or Anticoagulation Medication Instructions:   - Patient is on no antiplatelet or anticoagulation medications.    Additional Medication Instructions:  Patient is to take all scheduled medications on the day of surgery EXCEPT for modifications listed below:   - mixed insulin (70/30m 75/25, 50/50): HOLD day of surgery   - metformin: HOLD day of surgery.   - SGLT2 Inhibitor (canagliflozin, dapagliflozin, or empagliflozin): HOLD 3 days before surgery.     These recommendations were discussed with son by phone after visit. I also asked MTM to review recommendations at upcoming visit.     RECOMMENDATION:  APPROVAL GIVEN to proceed with proposed procedure, without further diagnostic evaluation.    Over 40 minutes spent on day of encounter taking history, reviewing chart, completing exam, discussing recommendations, and charting            Subjective     HPI related to upcoming procedure: cataracts        9/11/2023     8:47 AM   Preop Questions   1. Have you ever had a heart attack or stroke? No   2. Have you ever had surgery on your heart or blood vessels, such as a stent placement, a coronary artery bypass, or surgery on an artery in your head, neck, heart, or legs? No   3. Do you have chest pain with activity? No   4. Do you have a history of  heart failure? No   5. Do you currently have a cold, bronchitis or symptoms of other infection? No   6. Do you have a cough, shortness of breath, or wheezing? No   7. Do you or anyone in  your family have previous history of blood clots? No   8. Do you or does anyone in your family have a serious bleeding problem such as prolonged bleeding following surgeries or cuts? No   9. Have you ever had problems with anemia or been told to take iron pills? No   10. Have you had any abnormal blood loss such as black, tarry or bloody stools, or abnormal vaginal bleeding? No   11. Have you ever had a blood transfusion? No   12. Are you willing to have a blood transfusion if it is medically needed before, during, or after your surgery? Yes   13. Have you or any of your relatives ever had problems with anesthesia? No   14. Do you have sleep apnea, excessive snoring or daytime drowsiness? No   15. Do you have any artifical heart valves or other implanted medical devices like a pacemaker, defibrillator, or continuous glucose monitor? No   16. Do you have artificial joints? No   17. Are you allergic to latex? No       Health Care Directive:  Patient does not have a Health Care Directive or Living Will: Discussed advance care planning with patient; information given to patient to review.    Preoperative Review of :   reviewed - no record of controlled substances prescribed.          Review of Systems  CONSTITUTIONAL: NEGATIVE for fever, chills, change in weight  ENT/MOUTH: NEGATIVE for ear, mouth and throat problems  RESP: NEGATIVE for significant cough or SOB  CV: NEGATIVE for chest pain, palpitations or peripheral edema    Rectal pain, suppositories recently from GI, not helping, burns so could not take. This is her biggest concern today. She cannot sit for very long, so is very uncomfortable. Wants this addressed, really affecting QOL    Right knee pain    DM2- not well controlled based on last A1c one month ago, but fasting blood sugars are typically good. Can only report to me BG 80 this morning fasting, checking once/twice daily, not able to give me other values and does not have meter with her today.   reports med compliance    No active SI or plan    Patient Active Problem List    Diagnosis Date Noted    Diabetic retinopathy associated with type 2 diabetes mellitus (H) 12/17/2021     Priority: Medium    Lichen sclerosus et atrophicus 09/17/2021     Priority: Medium    Proteinuria due to type 2 diabetes mellitus (H) 01/15/2021     Priority: Medium    Hypertension      Priority: Medium     Unable to take ACE Inhibitor due to angioedema from lisinopril        Gastropathy      Priority: Medium     EGD 2/28/12:  Reactive gastropathy. H. Pylori neg.  Was seen for several visits at [facility] and when she didn't improve with   standard therapy (several PPI's, H2 blocker, carafate)was felt to have   hypersensitivity syndrome.  Treated with nortriptyline and Carafate.  EGD 1/15/15:  Normal        Type 2 diabetes mellitus with hyperglycemia, with long-term current use of insulin (H)      Priority: Medium    Hyperlipidemia      Priority: Medium    Headache      Priority: Medium    Right lumbar radiculitis 08/02/2019     Priority: Medium     MRI of the lumbar spine on 07/26/2013 revealed a right paracentral disk   protrusion and annular tear at L4-5 traversing the right L5 nerve root and   resulting in moderate spinal canal and mild bilateral neural foraminal  narrowing. There is also a left (asymptomatic side) midline foraminal disk   herniation L5-S1.  Had epidural steroid injection 9/13/13.        Urinary, incontinence, stress female 05/17/2019     Priority: Medium    Left knee pain 01/28/2018     Priority: Medium     X-ray on 1/10/2018:nthesophyte at the quadriceps tendon insertion onto the   patella. Otherwise negative left knee. No fracture or dislocation.        Moderate Recurrent Major Depression      Priority: Medium    Vitamin D deficiency      Priority: Medium     Created by [facility] Annotation: Aug 13 2009 11:52AM - Coreen Kendall: Had been   replaced in 2009 with normal recheck.  Check 4/10 low again;  replacement   re-initiated.  Replacement Utility updated for latest IMO load        Anxiety      Priority: Medium     Created by Conversion  Replacement Utility updated for latest IMO load        Pain in finger of right hand 03/03/2015     Priority: Medium    Dermatitis      Priority: Medium     Created by Conversion        Metabolic Tests Nonspecific Elevation Of Transaminase Levels      Priority: Medium     Created by Conversion        Back Strain      Priority: Medium     Created by Conversion        Carotid Artery Stenosis      Priority: Medium     Created by Conversion        Insomnia      Priority: Medium     Created by Conversion        Chronic Pain      Priority: Medium     Created by Conversion  [facility] Annotation: May 23 2007  5:01PM - Siomara Barrera: Patient has   long   history of burning body pain, more on right, with unkown etiology.          No past medical history on file.  Past Surgical History:   Procedure Laterality Date    MD ESOPHAGOGASTRODUODENOSCOPY TRANSORAL DIAGNOSTIC      Description: Esophagogastroduodenoscopy;  Proc Date: 02/28/2012;  Comments: Reactive Gastropathy. Neg H. Pylori.    [facility] REPAIR OF ANAL SPHINCTER,ADULT  02/21/2011    Sphincteroplasty and levetaroplasty at [facility] by Dr Leonardo Gibbs and Allen Jones; Repair of childbirth-related large cloacal defect.  Complicated by post-op wound infection requiring readmission.     Current Outpatient Medications   Medication Sig Dispense Refill    ACETAMINOPHEN EXTRA STRENGTH 500 MG tablet TAKE 1 TABLET BY MOUTH EVERY 6 HOURS AS NEEDED FOR PAIN. 100 tablet 5    alcohol swab prep pads Use to swab area of injection/josesito as directed. 100 each 3    ASPIRIN NOT PRESCRIBED (INTENTIONAL) Please choose reason not prescribed from choices below.      blood glucose (NO BRAND SPECIFIED) lancets standard Use to test blood sugar 2 times daily or as directed. 60 each 11    blood pressure monitor Kit [BLOOD PRESSURE MONITOR  KIT] Blood pressure machine and cuff for home use. 1 each 0    calcium carbonate (ANTACID REGULAR STRENGTH) 500 MG chewable tablet Take 1 tablet (500 mg) by mouth daily 30 tablet 5    clobetasol (TEMOVATE) 0.05 % external ointment Apply topically 2 times daily You must take 1 week off out of every 4 weeks 60 g 4    Continuous Blood Gluc Sensor (FREESTYLE AILYN 2 SENSOR) [facility] 1 each every 14 days Use 1 sensor every 14 days. Use to read blood sugars per 's instructions. 2 each 5    CONTOUR NEXT TEST test strip USE TO TEST BLOOD SUGAR 1 TIMES DAILY OR AS DIRECTED. 50 strip 6    escitalopram oxalate (LEXAPRO) 10 MG tablet [ESCITALOPRAM OXALATE (LEXAPRO) 10 MG TABLET] Take 1 tablet (10 mg total) by mouth daily. 90 tablet 4    hydrochlorothiazide (HYDRODIURIL) 25 MG tablet TAKE 1 TABLET (25 MG TOTAL) BY MOUTH DAILY. 30 tablet 3    hydrocortisone (ANUSOL-HC) 25 MG suppository Place 1 suppository (25 mg) rectally daily 24 suppository 3    Insulin Aspart Prot & Aspart (INSULIN ASP PROT & ASP FLEXPEN) (70-30) 100 UNIT/ML SUPN INJECT 20 UNITS BEFORE BREAKFAST AND 10 UNITS BEFORE DINNER 15 mL 3    INVOKANA 300 MG tablet TAKE 1 TABLET BY MOUTH DAILY. 30 tablet 3    ketoconazole (NIZORAL) 2 % external cream Apply to rash on left leg twice daily for 2 week or until rash goes away. 60 g 0    lidocaine (XYLOCAINE) 5 % external ointment Apply topically 2 times daily as needed for moderate pain 50 g 4    losartan (COZAAR) 100 MG tablet Take 1 tablet (100 mg) by mouth daily 30 tablet 11    metFORMIN (GLUCOPHAGE XR) 500 MG 24 hr tablet TAKE 2 TABLETS BY MOUTH (1,000 MG TOTAL) 2 TIMES DAILY 120 tablet 3    omeprazole (PRILOSEC) 20 MG DR capsule TAKE ONE CAPSULE BY MOUTH DAILY BEFORE BREAKFAST Strength: 20 mg 90 capsule 3    rosuvastatin (CRESTOR) 40 MG tablet TAKE 1 TABLET (40 MG TOTAL) BY MOUTH AT BEDTIME. 90 tablet 3    TRUEPLUS PEN NEEDLES 32G X 4 MM miscellaneous USE TO INJECT INSULIN TWO TIMES DAILY OR AS DIRECTED 100  "each 9    vitamin D2 (ERGOCALCIFEROL) 42457 units (1250 mcg) capsule TAKE 1 CAPSULE (50,000 UNITS) BY MOUTH ONCE A WEEK 4 capsule 3       Allergies   Allergen Reactions    Aspirin (Tartrazine Only) [Tartrazine] Unknown     Abdominal pain      Lisinopril Angioedema        Social History     Tobacco Use    Smoking status: Never     Passive exposure: Current    Smokeless tobacco: Former     Types: Chew    Tobacco comments:      smokes outside, pt chews betel nut once in  a while, not tobacco   Substance Use Topics    Alcohol use: No       History   Drug Use No         Objective     /72   Pulse 72   Temp 98.3  F (36.8  C) (Oral)   Resp 16   Ht 1.49 m (4' 10.66\")   Wt 67.6 kg (149 lb)   LMP  (LMP Unknown)   SpO2 99%   BMI 30.44 kg/m      Physical Exam  GENERAL APPEARANCE: healthy, alert and no distress  HENT: mouth without ulcers or lesions  RESP: lungs clear to auscultation - no rales, rhonchi or wheezes  CV: regular rate and rhythm, normal S1 S2, no S3 or S4 and no murmur, click or rub   ABDOMEN: soft, nontender  NEURO: Alert and oriented, grossly nonfocal. Normal gait.    Recent Labs   Lab Test 08/04/23  0812 01/27/23  1449    139   POTASSIUM 4.4 4.6   CR 0.99* 0.81   A1C 9.1* 10.2*        Diagnostics:  Labs pending at this time.  Results will be reviewed when available.   No EKG required for low risk surgery (cataract, skin procedure, breast biopsy, etc).    Revised Cardiac Risk Index (RCRI):  The patient has the following serious cardiovascular risks for perioperative complications:   - Diabetes Mellitus (on Insulin) = 1 point     RCRI Interpretation: 1 point: Class II (low risk - 0.9% complication rate)         Signed Electronically by: Sima Bowman MD  Copy of this evaluation report is provided to requesting physician.    Answers submitted by the patient for this visit:  Patient Health Questionnaire (Submitted on 9/11/2023)  If you checked off any problems, how difficult have these " problems made it for you to do your work, take care of things at home, or get along with other people?: Somewhat difficult  PHQ9 TOTAL SCORE: 9

## 2023-09-13 DIAGNOSIS — Z76.0 ENCOUNTER FOR MEDICATION REFILL: ICD-10-CM

## 2023-09-13 RX ORDER — PEN NEEDLE, DIABETIC 32GX 5/32"
NEEDLE, DISPOSABLE MISCELLANEOUS
Qty: 100 EACH | Refills: 1 | Status: SHIPPED | OUTPATIENT
Start: 2023-09-13 | End: 2024-01-15

## 2023-09-14 ENCOUNTER — TRANSFERRED RECORDS (OUTPATIENT)
Dept: HEALTH INFORMATION MANAGEMENT | Facility: CLINIC | Age: 63
End: 2023-09-14
Payer: COMMERCIAL

## 2023-09-14 NOTE — PROGRESS NOTES
Medication Therapy Management (MTM) Encounter    ASSESSMENT:                            Medication Adherence/Access: Patient missing medications and will run out of other medicines early because she left a supply in [place].. Patient does note that she has some difficulty with knowing which medicines she should have. Discussed [facility] blister packs to help with medicine adherence. Will place community paramedic referral to help with the transition       Type 2 Diabetes: A1C above goal <8%, but patient is having significant lows from insulin use and highs likely from high carb intake. Also having stomach pain from metformin - pt wants to stop completely. Discussed using a lower dose for now to maintain some control - new Rx likely fillable now so we can avoid that concern. Will also use lower doses of insulin to address lows. After surgeries, consider transition to weekly GLP1 to help with appetite suppression and blood glucose control.     Will follow-up in 1 week to ensure blood glucose are stable before upcoming procedure. Will provide final perioperative med instructions at that time.        Hyperlipidemia: Last LDL at goal <100. Based on fill history, should have some medicine at home. Unclear why she doesn't have. Adherence plan as above. If unable to find and home and get refill right now, likely okay to be off for 1 month.        Hypertension:  blood pressure  at goal <140/90. Pulse at goal 76. Hasn't had Losartan filled in a year. Will discontinue today       Depression: PHQ9 above goal <5. No Lexapro in the last year. Patient declines refilling this today. Will reassess at follow-up.     Low Vitamin D: Last levels low, despite Vit D supplements. Will plan to recheck after transitioning to [facility].     Heartburn: May be related to metformin use. Continue to monitor as we try lower dose.     Anal Pain: Declines continued use of Anusol or Lidocaine. Plans to schedule with specialist.          PLAN:                             Change from Metformin 500 mg ER to Metformin 750 mg ER but use just 1 tab twice daily.   Decrease Novolog 70-30 to 10 units twice daily.   Stop Losartan   Community Paramedic referral       Follow-up: Return in about 1 week (around 9/22/2023) for Medication Management Pharmacist, in person.    SUBJECTIVE/OBJECTIVE:                          Ramona Wilder is a 63 year old female coming in for an initial visit. She was referred to me from [facility]. Patient was accompanied by daughter, Tanner. Professional Dee  by phone (ID# [phone]).      Previously worked with Ajay Morales, AMARILIS PharmD as [facility].     Reason for visit: Diabetes Follow-Up. Patient wants to know how to place CGM.     Allergies/ADRs: Reviewed in chart  Past Medical History: Reviewed in chart  Tobacco: She reports that she has never smoked. She has been exposed to tobacco smoke. She has quit using smokeless tobacco.  Her smokeless tobacco use included chew.  Alcohol:  reports no history of alcohol use.      Medication Adherence/Access:     Brings bottles and Novolog mix pen. Manages her own medicines. Tanner does not live with mom    Metformin 8/29 #120 - 4 tabs remain   Acetaminophen 8/29 #100   Antacid 8/29 #30 - 2 tabs remain   Omeprazole 8/29 #30 - 1 cap remains   Invokana 8/29 #30 - 1 tab remains   Hydrochlorothiazide 8/29 #30 - 1 tab remains   Vit D 8/29 #4 - 1 cap remains     Missing:  Lexapro - no dispenses in outside history for the past year   Anusol-HC - 8/25 #12   Losartan - last filled 09/22   Rosuvastatin - 7/27 #90       Type 2 Diabetes:  Prescribed Novolog mix 70-30 2- units AM and 10 unit PM, Invokana 300 mg daily, Metformin 500 mg ER 2 tabs twice daily     States she's using 2 tabs metformin twice daily, Invokana 1 tab daily, and Insulin 25 am and 15 pm.     Doesn't want to take metformin because it hurts her stomach. Having burning discomfort with her medicine.     When asked why she's out early -  states she took some out of the bottles and put in a sheet of paper to take on her trip to [place] and she forgot her medicines in [place].     Was seen by Dr. Bowman on 9/11 for Pre-op - surgery date on 9/25 and 10/9, but there was concerns both for hyperglycemia with holding and med adherence.       14 day average: 115  AM: 92, 57,  68, 62, 102, 83, 71, 87, 100,   PM: 47*, 63, 137, 218, 192, 231    *Had rectal pain. Ate some rice meal, and still gave insulin.   Sometimes craves for sweets, and when she does, sugar goes up really high.     States when she eats, blood glucose goes to high, when not eating, too low.             Hemoglobin A1C   Date Value Ref Range Status   09/11/2023 8.9 (H) 0.0 - 5.6 % Final     Comment:     Normal <5.7%   Prediabetes 5.7-6.4%    Diabetes 6.5% or higher     Note: Adopted from ADA consensus guidelines.   08/04/2023 9.1 (H) 0.0 - 5.6 % Final     Comment:     Normal <5.7%   Prediabetes 5.7-6.4%    Diabetes 6.5% or higher     Note: Adopted from ADA consensus guidelines.   01/27/2023 10.2 (H) 0.0 - 5.6 % Final     Comment:     Normal <5.7%   Prediabetes 5.7-6.4%    Diabetes 6.5% or higher     Note: Adopted from ADA consensus guidelines.      Microalbumin Urine mg/dL   Date Value Ref Range Status   12/17/2021 <0.50 0.00 - 1.99 mg/dL Final      Creatinine Urine mg/dL   Date Value Ref Range Status   01/27/2023 145.0 mg/dL Final     Comment:     The reference ranges have not been established in urine creatinine. The results should be integrated into the clinical context for interpretation.   12/17/2021 11 mg/dL Final        Creatinine   Date Value Ref Range Status   09/11/2023 0.85 0.51 - 0.95 mg/dL Final         Hyperlipidemia: Prescribed Rosuvastatin 40 mg daily     Recent Labs   Lab Test 01/27/23  1449 12/17/21  1133   CHOL 180 224*   HDL 61 58   LDL 94 147*   TRIG 124 94         Hypertension: Prescribed hydrochlorothiazide 25 mg daily, Losartan 100 mg daily     BP Readings from Last 3  Encounters:   09/15/23 139/84   09/11/23 132/72   08/04/23 114/73      Pulse Readings from Last 3 Encounters:   09/15/23 76   09/11/23 72   08/04/23 99     Wt Readings from Last 3 Encounters:   09/11/23 149 lb (67.6 kg)   09/01/23 145 lb 1.9 oz (65.8 kg)   08/04/23 146 lb 12 oz (66.6 kg)              Depression: Prescribed Escitalopram 10 mg daily.         7/7/2023    10:26 AM 8/4/2023     7:54 AM 9/11/2023     8:44 AM   PHQ   PHQ-9 Total Score 0 11 9   Q9: Thoughts of better off dead/self-harm past 2 weeks Not at all More than half the days Several days   F/U: Thoughts of suicide or self-harm  No No   F/U: Safety concerns  No No         Low Vitamin D: Prescribed Vitamin D2 50,000 units once weekly.     Vitamin D Deficiency Screening Results:  Lab Results   Component Value Date    VITDT 26 01/27/2023    VITDT 30 12/17/2021          Heartburn: Prescribed Tums 500 mg daily, Omeprazole 20 mg daily,     Having a lot of burning pain that she relates to Metformin.       Anal Pain: Prescribed Hydrocortisone 25 mg suppositories daily, lidocaine 5% ointment twice daily as needed.     Doesn't use suppositories every night because it's painful. Doesn't have lidocaine ointment anymore - states it's causing a lot of burning for her and doesn't want to use any more. Doesn't feel suppository isn't helping.     Status post complicated sphincteroplasty + levetaroplasty in 2011   Ongoing pain. Unable to sit through the entire visit. Wants to see the specialist - no appointment scheduled. She will have children call the office today.         Today's Vitals: /84   Pulse 76   LMP  (LMP Unknown)   ----------------      I spent 60 minutes with this patient today. All changes were made via collaborative practice agreement with Coreen Kendall MD. A copy of the visit note was provided to the patient's provider(s).    A summary of these recommendations was given to the patient.    Noel Elmore, BradD  Medication Therapy Management  (MTM) Pharmacist  [facility]          Medication Therapy Recommendations  Essential hypertension    Current Medication: losartan (COZAAR) 100 MG tablet   Rationale: No medical indication at this time - Unnecessary medication therapy - Indication   Recommendation: Discontinue Medication   Status: Accepted per CPA         Hyperlipidemia    Current Medication: rosuvastatin (CRESTOR) 40 MG tablet   Rationale: Medication product not available - Adherence - Adherence   Recommendation: Provide Adherence Intervention   Status: Accepted per CPA         Type 2 diabetes mellitus with hyperglycemia, with long-term current use of insulin (H)    Current Medication: Insulin Aspart Prot & Aspart (INSULIN ASP PROT & ASP FLEXPEN) (70-30) 100 UNIT/ML SUPN   Rationale: Dose too high - Dosage too high - Safety   Recommendation: Decrease Dose   Status: Accepted per CPA          Current Medication: metFORMIN (GLUCOPHAGE XR) 500 MG 24 hr tablet (Discontinued)   Rationale: Undesirable effect - Adverse medication event - Safety   Recommendation: Decrease Dose - metFORMIN 750 MG 24 hr tablet   Status: Accepted per CPA

## 2023-09-15 ENCOUNTER — OFFICE VISIT (OUTPATIENT)
Dept: PHARMACY | Facility: CLINIC | Age: 63
End: 2023-09-15
Payer: COMMERCIAL

## 2023-09-15 VITALS — HEART RATE: 76 BPM | SYSTOLIC BLOOD PRESSURE: 139 MMHG | DIASTOLIC BLOOD PRESSURE: 84 MMHG

## 2023-09-15 DIAGNOSIS — I10 ESSENTIAL HYPERTENSION: ICD-10-CM

## 2023-09-15 DIAGNOSIS — F41.1 ANXIETY STATE: ICD-10-CM

## 2023-09-15 DIAGNOSIS — Z76.0 ENCOUNTER FOR MEDICATION REFILL: ICD-10-CM

## 2023-09-15 DIAGNOSIS — Z79.4 TYPE 2 DIABETES MELLITUS WITH HYPERGLYCEMIA, WITH LONG-TERM CURRENT USE OF INSULIN (H): Primary | ICD-10-CM

## 2023-09-15 DIAGNOSIS — K31.9 DISEASE OF STOMACH AND DUODENUM: ICD-10-CM

## 2023-09-15 DIAGNOSIS — E78.5 HYPERLIPIDEMIA, UNSPECIFIED HYPERLIPIDEMIA TYPE: ICD-10-CM

## 2023-09-15 DIAGNOSIS — E11.65 TYPE 2 DIABETES MELLITUS WITH HYPERGLYCEMIA, WITH LONG-TERM CURRENT USE OF INSULIN (H): Primary | ICD-10-CM

## 2023-09-15 DIAGNOSIS — K62.89 RECTAL PAIN: ICD-10-CM

## 2023-09-15 PROCEDURE — 99605 MTMS BY PHARM NP 15 MIN: CPT | Performed by: PHARMACIST

## 2023-09-15 PROCEDURE — 99607 MTMS BY PHARM ADDL 15 MIN: CPT | Performed by: PHARMACIST

## 2023-09-15 RX ORDER — METFORMIN HYDROCHLORIDE 750 MG/1
750 TABLET, EXTENDED RELEASE ORAL 2 TIMES DAILY WITH MEALS
Qty: 60 TABLET | Refills: 3 | Status: SHIPPED | OUTPATIENT
Start: 2023-09-15 | End: 2023-12-04

## 2023-09-15 RX ORDER — INSULIN ASPART 100 [IU]/ML
10 INJECTION, SUSPENSION SUBCUTANEOUS 2 TIMES DAILY
Qty: 15 ML | Refills: 3 | Status: SHIPPED | OUTPATIENT
Start: 2023-09-15 | End: 2023-10-24

## 2023-09-15 NOTE — Clinical Note
Stephan Kendall,   Our CDE at [facility] schedule patient with me for an MTM visit. She's having quite a few adherence concerns. I'm placing a referral for community paramedics to help with transitioning her to [facility] to get blister packs. We also made some adjustments to her diabetes meds to address severe lows (down to the 40s). I'm going to see her again in 1 week to make sure she doesn't have extreme highs before her procedure on 9/25. Let me know if you have questions/concerns.   Thanks!  Noel Elmore, PharmD Medication Therapy Management (MTM) Pharmacist [facility]

## 2023-09-15 NOTE — PATIENT INSTRUCTIONS
"Recommendations from today's MTM visit:                                                           Change from Metformin 500 mg ER to Metformin 750 mg ER but use just 1 tab twice daily.   Decrease Novolog 70-30 to 10 units twice daily.   Stop Losartan   Community Paramedic referral     Follow-up: Return in about 1 week (around 9/22/2023) for Medication Management Pharmacist, in person.    It was great speaking with you today.  I value your experience and would be very thankful for your time in providing feedback in our clinic survey. In the next few days, you may receive an email or text message from [employer] with a link to a survey related to your  clinical pharmacist.\"     To schedule another MTM appointment, please call the clinic directly or you may call the MTM scheduling line at [phone] or toll-free at [phone].     My Clinical Pharmacist's contact information:                                                      Please feel free to contact me with any questions or concerns you have.      Noel Elmore, PharmD  Medication Therapy Management (MTM) Pharmacist  [facility]      "

## 2023-09-26 ENCOUNTER — TRANSFERRED RECORDS (OUTPATIENT)
Dept: HEALTH INFORMATION MANAGEMENT | Facility: CLINIC | Age: 63
End: 2023-09-26
Payer: COMMERCIAL

## 2023-09-26 LAB — RETINOPATHY: POSITIVE

## 2023-09-29 ENCOUNTER — HOSPITAL ENCOUNTER (EMERGENCY)
Facility: HOSPITAL | Age: 63
Discharge: HOME | End: 2023-09-29
Attending: EMERGENCY MEDICINE | Admitting: EMERGENCY MEDICINE
Payer: COMMERCIAL

## 2023-09-29 ENCOUNTER — APPOINTMENT (OUTPATIENT)
Dept: CT IMAGING | Facility: HOSPITAL | Age: 63
End: 2023-09-29
Attending: EMERGENCY MEDICINE
Payer: COMMERCIAL

## 2023-09-29 VITALS
HEART RATE: 79 BPM | SYSTOLIC BLOOD PRESSURE: 160 MMHG | OXYGEN SATURATION: 97 % | DIASTOLIC BLOOD PRESSURE: 74 MMHG | RESPIRATION RATE: 16 BRPM | TEMPERATURE: 97.6 F

## 2023-09-29 DIAGNOSIS — Z76.0 ENCOUNTER FOR MEDICATION REFILL: ICD-10-CM

## 2023-09-29 DIAGNOSIS — K62.89 RECTAL PAIN: ICD-10-CM

## 2023-09-29 DIAGNOSIS — E55.9 VITAMIN D DEFICIENCY: ICD-10-CM

## 2023-09-29 DIAGNOSIS — I15.8 OTHER SECONDARY HYPERTENSION: ICD-10-CM

## 2023-09-29 LAB
ALBUMIN SERPL BCG-MCNC: 4.4 G/DL (ref 3.5–5.2)
ALP SERPL-CCNC: 78 U/L (ref 35–104)
ALT SERPL W P-5'-P-CCNC: 49 U/L (ref 0–50)
ANION GAP SERPL CALCULATED.3IONS-SCNC: 9 MMOL/L (ref 7–15)
AST SERPL W P-5'-P-CCNC: 77 U/L (ref 0–45)
BASOPHILS # BLD AUTO: 0 10E3/UL (ref 0–0.2)
BASOPHILS NFR BLD AUTO: 1 %
BILIRUB SERPL-MCNC: 0.3 MG/DL
BUN SERPL-MCNC: 17.8 MG/DL (ref 8–23)
CALCIUM SERPL-MCNC: 9.8 MG/DL (ref 8.8–10.2)
CHLORIDE SERPL-SCNC: 103 MMOL/L (ref 98–107)
CREAT SERPL-MCNC: 0.81 MG/DL (ref 0.51–0.95)
DEPRECATED HCO3 PLAS-SCNC: 28 MMOL/L (ref 22–29)
EGFRCR SERPLBLD CKD-EPI 2021: 82 ML/MIN/1.73M2
EOSINOPHIL # BLD AUTO: 0.2 10E3/UL (ref 0–0.7)
EOSINOPHIL NFR BLD AUTO: 3 %
ERYTHROCYTE [DISTWIDTH] IN BLOOD BY AUTOMATED COUNT: 12.5 % (ref 10–15)
GLUCOSE SERPL-MCNC: 111 MG/DL (ref 70–99)
HCT VFR BLD AUTO: 37 % (ref 35–47)
HGB BLD-MCNC: 12.1 G/DL (ref 11.7–15.7)
IMM GRANULOCYTES # BLD: 0 10E3/UL
IMM GRANULOCYTES NFR BLD: 0 %
LYMPHOCYTES # BLD AUTO: 2.2 10E3/UL (ref 0.8–5.3)
LYMPHOCYTES NFR BLD AUTO: 36 %
MCH RBC QN AUTO: 30.4 PG (ref 26.5–33)
MCHC RBC AUTO-ENTMCNC: 32.7 G/DL (ref 31.5–36.5)
MCV RBC AUTO: 93 FL (ref 78–100)
MONOCYTES # BLD AUTO: 0.3 10E3/UL (ref 0–1.3)
MONOCYTES NFR BLD AUTO: 5 %
NEUTROPHILS # BLD AUTO: 3.4 10E3/UL (ref 1.6–8.3)
NEUTROPHILS NFR BLD AUTO: 55 %
NRBC # BLD AUTO: 0 10E3/UL
NRBC BLD AUTO-RTO: 0 /100
PLATELET # BLD AUTO: 289 10E3/UL (ref 150–450)
POTASSIUM SERPL-SCNC: 4 MMOL/L (ref 3.4–5.3)
PROT SERPL-MCNC: 7.8 G/DL (ref 6.4–8.3)
RBC # BLD AUTO: 3.98 10E6/UL (ref 3.8–5.2)
SODIUM SERPL-SCNC: 140 MMOL/L (ref 135–145)
WBC # BLD AUTO: 6.1 10E3/UL (ref 4–11)

## 2023-09-29 PROCEDURE — 96376 TX/PRO/DX INJ SAME DRUG ADON: CPT | Mod: XU

## 2023-09-29 PROCEDURE — 99285 EMERGENCY DEPT VISIT HI MDM: CPT | Mod: 25

## 2023-09-29 PROCEDURE — 80053 COMPREHEN METABOLIC PANEL: CPT | Performed by: EMERGENCY MEDICINE

## 2023-09-29 PROCEDURE — 96374 THER/PROPH/DIAG INJ IV PUSH: CPT | Mod: 59

## 2023-09-29 PROCEDURE — 36415 COLL VENOUS BLD VENIPUNCTURE: CPT | Performed by: EMERGENCY MEDICINE

## 2023-09-29 PROCEDURE — 72193 CT PELVIS W/DYE: CPT

## 2023-09-29 PROCEDURE — 250N000009 HC RX 250: Performed by: EMERGENCY MEDICINE

## 2023-09-29 PROCEDURE — 85025 COMPLETE CBC W/AUTO DIFF WBC: CPT | Performed by: EMERGENCY MEDICINE

## 2023-09-29 PROCEDURE — 250N000011 HC RX IP 250 OP 636: Performed by: EMERGENCY MEDICINE

## 2023-09-29 PROCEDURE — 96375 TX/PRO/DX INJ NEW DRUG ADDON: CPT | Mod: XU

## 2023-09-29 PROCEDURE — 250N000011 HC RX IP 250 OP 636: Mod: JZ | Performed by: EMERGENCY MEDICINE

## 2023-09-29 RX ORDER — OXYCODONE HYDROCHLORIDE 5 MG/1
5 TABLET ORAL EVERY 6 HOURS PRN
Qty: 12 TABLET | Refills: 0 | Status: SHIPPED | OUTPATIENT
Start: 2023-09-29 | End: 2023-10-02

## 2023-09-29 RX ORDER — MORPHINE SULFATE 4 MG/ML
4 INJECTION, SOLUTION INTRAMUSCULAR; INTRAVENOUS
Status: DISCONTINUED | OUTPATIENT
Start: 2023-09-29 | End: 2023-09-30 | Stop reason: HOSPADM

## 2023-09-29 RX ORDER — IOPAMIDOL 755 MG/ML
75 INJECTION, SOLUTION INTRAVASCULAR ONCE
Status: COMPLETED | OUTPATIENT
Start: 2023-09-29 | End: 2023-09-29

## 2023-09-29 RX ORDER — LOSARTAN POTASSIUM 50 MG/1
50 TABLET ORAL DAILY
Qty: 30 TABLET | Refills: 0 | Status: SHIPPED | OUTPATIENT
Start: 2023-09-29 | End: 2023-10-24

## 2023-09-29 RX ORDER — ERGOCALCIFEROL 1.25 MG/1
CAPSULE, LIQUID FILLED ORAL
Qty: 4 CAPSULE | Refills: 3 | Status: SHIPPED | OUTPATIENT
Start: 2023-09-29 | End: 2024-03-04

## 2023-09-29 RX ORDER — HYDRALAZINE HYDROCHLORIDE 20 MG/ML
10 INJECTION INTRAMUSCULAR; INTRAVENOUS ONCE
Status: COMPLETED | OUTPATIENT
Start: 2023-09-29 | End: 2023-09-29

## 2023-09-29 RX ORDER — LIDOCAINE HYDROCHLORIDE 20 MG/ML
10 JELLY TOPICAL ONCE
Status: COMPLETED | OUTPATIENT
Start: 2023-09-29 | End: 2023-09-29

## 2023-09-29 RX ORDER — PSEUDOEPHED/ACETAMINOPH/DIPHEN 30MG-500MG
TABLET ORAL
Qty: 100 TABLET | Refills: 5 | Status: SHIPPED | OUTPATIENT
Start: 2023-09-29 | End: 2024-03-22

## 2023-09-29 RX ADMIN — HYDRALAZINE HYDROCHLORIDE 10 MG: 20 INJECTION, SOLUTION INTRAMUSCULAR; INTRAVENOUS at 22:48

## 2023-09-29 RX ADMIN — IOPAMIDOL 75 ML: 755 INJECTION, SOLUTION INTRAVENOUS at 22:22

## 2023-09-29 RX ADMIN — MORPHINE SULFATE 4 MG: 4 INJECTION, SOLUTION INTRAMUSCULAR; INTRAVENOUS at 20:51

## 2023-09-29 RX ADMIN — LIDOCAINE HYDROCHLORIDE 10 ML: 20 JELLY TOPICAL at 20:43

## 2023-09-29 RX ADMIN — MORPHINE SULFATE 4 MG: 4 INJECTION, SOLUTION INTRAMUSCULAR; INTRAVENOUS at 21:34

## 2023-09-29 ASSESSMENT — ACTIVITIES OF DAILY LIVING (ADL)
ADLS_ACUITY_SCORE: 35
ADLS_ACUITY_SCORE: 35

## 2023-09-30 NOTE — ED TRIAGE NOTES
Pt presents to ED with severe hemorrhoids pain.Pt has had issues for years had surgery on them 10 years ago. Pt unable to sit for longer than a few minutes. Home medication does not help.

## 2023-09-30 NOTE — ED NOTES
EMERGENCY DEPARTMENT SIGN OUT NOTE        ED COURSE AND MEDICAL DECISION MAKING  Patient was signed out to me by Dr Michael Pepe at shift change    In brief, Ramona Wilder is a 63 year old female who initially presented worsening rectal pain    Reportedly has had chronic rectal pain and has been told she is got a hemorrhoid.  Reportedly had a pelvic MRI yesterday but no results.  Rectal exam today did not show any external hemorrhoids.  Patient was given morphine and labs and Uro-Jet    Patient is pending CT  pelvis    At time of sign out, disposition was pending CT pelvis    ED Course as of 09/29/23 2258   Fri Sep 29, 2023   2255 Patient's blood pressure is quite elevated here likely secondary to pain and anxiety as well as having her losartan recently stopped.  Given dose of hydralazine here.   2256 Patient signed out pending completion of CT pelvis ordered by Dr. Pepe.  Patient son reports that she was seen by colorectal surgery last week and they ordered a CT scan that was completed yesterday at [facility] and they did not hear the results.   2256 CT abdomen pelvis ordered by Dr. Pepe does not show any acute process.  Dr. Pepe performed a rectal exam and did not see any thrombosed external hemorrhoids.  Has had chronic rectal pain requiring sphincterotomy in the past   2256 Sitz bath's reportedly help   2256 Plan for discharge home with refill of losartan we will start at 50 mg daily as well as oxycodone for breakthrough pain.  Discussed need to use stool softeners with oxycodone.  Discussed follow-up with colorectal surgery next week.  Return to the ER for fever vomiting worsening pain or other concerns         FINAL IMPRESSION    1. Rectal pain    2. Other secondary hypertension        ED MEDS  Medications   morphine (PF) injection 4 mg (4 mg Intravenous $Given 9/29/23 2134)   lidocaine (XYLOCAINE) 2 % external gel 10 mL (10 mLs Urethral $Given 9/29/23 2043)   iopamidol (ISOVUE-370) solution 75 mL (75 mLs  Intravenous $Given 9/29/23 2222)   hydrALAZINE (APRESOLINE) injection 10 mg (10 mg Intravenous $Given 9/29/23 2248)       LAB  Labs Ordered and Resulted from Time of ED Arrival to Time of ED Departure   COMPREHENSIVE METABOLIC PANEL - Abnormal       Result Value    Sodium 140      Potassium 4.0      Carbon Dioxide (CO2) 28      Anion Gap 9      Urea Nitrogen 17.8      Creatinine 0.81      GFR Estimate 82      Calcium 9.8      Chloride 103      Glucose 111 (*)     Alkaline Phosphatase 78      AST 77 (*)     ALT 49      Protein Total 7.8      Albumin 4.4      Bilirubin Total 0.3     CBC WITH PLATELETS AND DIFFERENTIAL    WBC Count 6.1      RBC Count 3.98      Hemoglobin 12.1      Hematocrit 37.0      MCV 93      MCH 30.4      MCHC 32.7      RDW 12.5      Platelet Count 289      % Neutrophils 55      % Lymphocytes 36      % Monocytes 5      % Eosinophils 3      % Basophils 1      % Immature Granulocytes 0      NRBCs per 100 WBC 0      Absolute Neutrophils 3.4      Absolute Lymphocytes 2.2      Absolute Monocytes 0.3      Absolute Eosinophils 0.2      Absolute Basophils 0.0      Absolute Immature Granulocytes 0.0      Absolute NRBCs 0.0             RADIOLOGY    CT Pelvis Soft Tissue w Contrast   Final Result   IMPRESSION:   No acute process identified in the pelvis.             DISCHARGE MEDS  New Prescriptions    LOSARTAN (COZAAR) 50 MG TABLET    Take 1 tablet (50 mg) by mouth daily    OXYCODONE (ROXICODONE) 5 MG TABLET    Take 1 tablet (5 mg) by mouth every 6 hours as needed for pain         Gutierrez Wolfe MD  Emergency Medicine  [facility] EMERGENCY DEPARTMENT  [address] [place] [id number]  [phone]      Gutierrez Wolfe MD  09/29/23 1968

## 2023-09-30 NOTE — DISCHARGE INSTRUCTIONS
You should take ibuprofen, 600mg, three times per day as needed for pain.  You can also use acetaminophen, 650 mg, up to four times per day as needed for pain.  You can use these medications together, there are noconcerning interactions.  If this does not adequately control your pain, you can use 1-2 tabs of the prescribed oxycodone every 6 hours as needed for uncontrolled pain.  If you use this medication, you should not drive orperform other activities that require full attention as this medication may make you drowsy.  Oxycodone, like all opiate pain medications, is potentially habit forming and you should only use the minimum amount necessary tocontrol your pain.  Recommend using MiraLAX over-the-counter    Since her blood pressure is elevated tonight recommend starting losartan 50 mg since you are on this previously and recheck with your primary care doctor next week    Return to the ER for fever, vomiting, worsening pain or other concerns

## 2023-09-30 NOTE — ED PROVIDER NOTES
EMERGENCY DEPARTMENT ENCOUNTER            Provisional diagnosis  Rectal pain nonspecific  Accelerated hypertension      MEDICAL DECISION MAKING:  It was my pleasure to provide care for Rmaona Wilder who presented for evaluation of rectal pain.  Patient initially ported pain from hemorrhoids.  She has been seen recently on several occasions as an outpatient.  Apparently she had advanced imaging today but no results are available.    On my exam patient is pleasant and cooperative.   Vital signs show severe hypertension.  Physical exam notable for normal anal inspection.  No hemorrhoids are visible.     Pain medication administered for symptom relief.  Patient's symptoms improved.     Laboratory investigation has been ordered and results are pending    CT of the pelvis ordered and results are pending    Patient will be signed out to the overnight provider    =================================================================  CHIEF COMPLAINT:  Chief Complaint   Patient presents with    Hemorrhoids         HPI  Ramona Wilder is a 63 year old female with a history of anxiety, hypertension, diabetes, and anal sphincter repair surgery who presents to the ED by walking for evaluation of hemorrhoids.    Patient reports that she went to a clinic yesterday for her hemorrhoids. They did an MRI or a CT and the results are not in yet. Today the pain in her rectum has been unbearable so she came to the ED.      REVIEW OF SYSTEMS  Constitutional: Does not report chills, unintentional weight loss or fatigue   Eyes: Does not report visual changes or discharge    HENT: Does not report sore throat, ear pain or neck pain  Respiratory: Does not report cough or shortness of breath    Cardiovascular: Does not report chest pain, palpitations or leg swelling  GI: Does not report abdominal pain, nausea, vomiting, or dark, bloody stools.  Positive for rectal pain  : Does not report hematuria, dysuria, or flank pain  Musculoskeletal: Does not report any  new musculoskeletal pain or new muscle/joint pains  Skin: Does not report rash or wound  Neurologic: Does not report current headache, new weakness, focal weakness, or sensory changes        Remainder of systems reviewed, unless noted in HPI all others negative.      PAST MEDICAL HISTORY:  No past medical history on file.    PAST SURGICAL HISTORY:  Past Surgical History:   Procedure Laterality Date    DE ESOPHAGOGASTRODUODENOSCOPY TRANSORAL DIAGNOSTIC      Description: Esophagogastroduodenoscopy;  Proc Date: 02/28/2012;  Comments: Reactive Gastropathy. Neg H. Pylori.    [facility] REPAIR OF ANAL SPHINCTER,ADULT  02/21/2011    Sphincteroplasty and levetaroplasty at [facility] by Dr Leonardo Gibbs and Allen Jones; Repair of childbirth-related large cloacal defect.  Complicated by post-op wound infection requiring readmission.         CURRENT MEDICATIONS:    acetaminophen (TYLENOL) 500 MG tablet  alcohol swab prep pads  ASPIRIN NOT PRESCRIBED (INTENTIONAL)  blood glucose (NO BRAND SPECIFIED) lancets standard  blood pressure monitor Kit  calcium carbonate (ANTACID REGULAR STRENGTH) 500 MG chewable tablet  clobetasol (TEMOVATE) 0.05 % external ointment  Continuous Blood Gluc Sensor (FREESTYLE AILYN 2 SENSOR) [facility]  CONTOUR NEXT TEST test strip  escitalopram oxalate (LEXAPRO) 10 MG tablet  hydrochlorothiazide (HYDRODIURIL) 25 MG tablet  hydrocortisone (ANUSOL-HC) 25 MG suppository  Insulin Aspart Prot & Aspart (INSULIN ASP PROT & ASP FLEXPEN) (70-30) 100 UNIT/ML SUPN  insulin pen needle (TRUEPLUS PEN NEEDLES) 32G X 4 MM miscellaneous  INVOKANA 300 MG tablet  ketoconazole (NIZORAL) 2 % external cream  lidocaine (XYLOCAINE) 5 % external ointment  losartan (COZAAR) 100 MG tablet  metFORMIN (GLUCOPHAGE-XR) 750 MG 24 hr tablet  omeprazole (PRILOSEC) 20 MG DR capsule  rosuvastatin (CRESTOR) 40 MG tablet  vitamin D2 (ERGOCALCIFEROL) 02713 units (1250 mcg) capsule        ALLERGIES:  Allergies   Allergen Reactions     Aspirin (Tartrazine Only) [Tartrazine] Unknown     Abdominal pain      Lisinopril Angioedema       FAMILY HISTORY:  Family History   Problem Relation Age of Onset    Cancer Sister         Maybe lung cancer?  Lung problem.  in [place].    Asthma Sister     Breast Cancer No family hx of     Ovarian Cancer No family hx of        SOCIAL HISTORY:   Social History     Socioeconomic History    Marital status:    Tobacco Use    Smoking status: Never     Passive exposure: Current    Smokeless tobacco: Former     Types: Chew    Tobacco comments:      smokes outside, pt chews betel nut once in  a while, not tobacco   Vaping Use    Vaping Use: Never used   Substance and Sexual Activity    Alcohol use: No    Drug use: No    Sexual activity: Yes     Partners: Male       PHYSICAL EXAM:    BP (!) 193/79   Pulse 78   Temp 97.6  F (36.4  C)   Resp 16   LMP  (LMP Unknown)   SpO2 100%       Constitutional: Awake, alert, uncomfortable  Head: Normocephalic, atraumatic.  ENT: Mucous membranes are moist.  No pallor.   Eyes: Pupils are reactive.  No discoloration.  Neck: No lymphadenopathy, no stridor, supple, no soft tissue swelling  Chest: No tenderness   Respiratory: Respirations even, unlabored. Lungs clear to ascultation bilaterally, in no acute respiratory distress.  Cardiovascular: Regular rate and rhythm.  Good overall perfusion.  Upper and lower extremity pulses are equal.  GI: Abdomen soft, non-tender to palpation.  No guarding or rebound. Bowel sounds present throughout.  Rectal exam is normal to inspection.  There is no anal fluctuance or perianal abscess.  Tender rectal exam.   Back: No CVA tenderness.    Musculoskeletal: Moves all 4 extremities equally, full function and capacity no peripheral edema.   Integument: Warm, dry. No rash. No bruising or petechiae.  Neurologic: Alert & oriented x 3. Normal speech.   Psychiatric: Normal mood and affect.  Appropriate judgement.    ED COURSE:  8:11 PM I met with  the patient, obtained history, performed an initial exam, and discussed options and plan for diagnostics and treatment here in the ED.        Medical Decision Making    History:  Supplemental history from: Family,   External Record(s) reviewed: External medical records including care everywhere reviewed 9/15/23 office visit at [facility]    Work Up:  EKG, laboratory and imaging studies as ordered were independently interpreted by myself.   Broad differential diagnosis considered for nonspecific rectal pain  The patient's presentation was of high complexity.     Complicating factors:  Patient has a complicated past medical history including anxiety, hypertension, diabetes, and anal sphincter repair surgery  Care affected by social determinants of health: Access to primary care    Disposition involved shared decision-making with the patient.    LAB:  Laboratory results were independently reviewed and interpreted         RADIOLOGY:  Radiology reports were independently reviewed and interpreted  No orders to display        MEDICATIONS GIVEN IN THE EMERGENCY:  Medications   morphine (PF) injection 4 mg (has no administration in time range)   lidocaine (XYLOCAINE) 2 % external gel 10 mL (has no administration in time range)           NEW PRESCRIPTIONS STARTED AT TODAY'S ER VISIT:  New Prescriptions    No medications on file                FINAL DIAGNOSIS:    ICD-10-CM    1. Rectal pain  K62.89       2. Other secondary hypertension  I15.8                  NAME: Ramona Wilder  AGE: 63 year old female  YOB: 1960  MRN: [id number]  EVALUATION DATE & TIME: 9/29/2023  7:48 PM    PCP: Coreen Kendall    ED PROVIDER: DONTE Husain Kellen Carlson, am serving as a scribe to document services personally performed by Dr. Michael Pepe based on my observation and the provider's statements to me. Michael PANDEY MD attest that Nablia Cronin is acting in a scribe capacity, has observed my performance of the services  and has documented them in accordance with my direction.    Michael Pepe M.D.  Emergency Medicine  [facility] EMERGENCY DEPARTMENT  [facility] [facility] [id number] [phone]  Dept: [phone]  9/29/2023         Michael Pepe MD  09/29/23 2044

## 2023-10-05 ENCOUNTER — TELEPHONE (OUTPATIENT)
Dept: FAMILY MEDICINE | Facility: CLINIC | Age: 63
End: 2023-10-05
Payer: COMMERCIAL

## 2023-10-05 NOTE — TELEPHONE ENCOUNTER
----- Message from Heather Morales, PharmD sent at 10/4/2023  2:06 PM CDT -----  Patient last seen by MTM pharmacist at [facility], though may benefit from switching to MTM at [facility] - can we reach out to schedule a follow up visit with me (prefer 60 min to re-establish at [facility]).     Thank you  Ajay

## 2023-10-09 ENCOUNTER — TRANSFERRED RECORDS (OUTPATIENT)
Dept: HEALTH INFORMATION MANAGEMENT | Facility: CLINIC | Age: 63
End: 2023-10-09
Payer: COMMERCIAL

## 2023-10-10 ENCOUNTER — TRANSFERRED RECORDS (OUTPATIENT)
Dept: HEALTH INFORMATION MANAGEMENT | Facility: CLINIC | Age: 63
End: 2023-10-10
Payer: COMMERCIAL

## 2023-10-10 LAB — RETINOPATHY: POSITIVE

## 2023-10-24 ENCOUNTER — OFFICE VISIT (OUTPATIENT)
Dept: PHARMACY | Facility: CLINIC | Age: 63
End: 2023-10-24
Payer: COMMERCIAL

## 2023-10-24 VITALS
HEART RATE: 74 BPM | OXYGEN SATURATION: 99 % | DIASTOLIC BLOOD PRESSURE: 78 MMHG | BODY MASS INDEX: 31.36 KG/M2 | WEIGHT: 153.5 LBS | SYSTOLIC BLOOD PRESSURE: 152 MMHG

## 2023-10-24 DIAGNOSIS — I10 ESSENTIAL HYPERTENSION: Primary | ICD-10-CM

## 2023-10-24 DIAGNOSIS — R12 HEARTBURN: ICD-10-CM

## 2023-10-24 DIAGNOSIS — K62.89 RECTAL PAIN: ICD-10-CM

## 2023-10-24 DIAGNOSIS — E55.9 VITAMIN D DEFICIENCY: ICD-10-CM

## 2023-10-24 DIAGNOSIS — F32.A DEPRESSION, UNSPECIFIED DEPRESSION TYPE: ICD-10-CM

## 2023-10-24 DIAGNOSIS — E78.5 HYPERLIPIDEMIA, UNSPECIFIED HYPERLIPIDEMIA TYPE: ICD-10-CM

## 2023-10-24 DIAGNOSIS — Z79.4 TYPE 2 DIABETES MELLITUS WITH HYPERGLYCEMIA, WITH LONG-TERM CURRENT USE OF INSULIN (H): ICD-10-CM

## 2023-10-24 DIAGNOSIS — E11.65 TYPE 2 DIABETES MELLITUS WITH HYPERGLYCEMIA, WITH LONG-TERM CURRENT USE OF INSULIN (H): ICD-10-CM

## 2023-10-24 PROCEDURE — 99607 MTMS BY PHARM ADDL 15 MIN: CPT | Performed by: PHARMACIST

## 2023-10-24 PROCEDURE — 99606 MTMS BY PHARM EST 15 MIN: CPT | Performed by: PHARMACIST

## 2023-10-24 RX ORDER — LOSARTAN POTASSIUM 50 MG/1
50 TABLET ORAL DAILY
Qty: 30 TABLET | Refills: 11 | Status: SHIPPED | OUTPATIENT
Start: 2023-10-24 | End: 2024-03-22

## 2023-10-24 RX ORDER — CYCLOBENZAPRINE HCL 5 MG
5-10 TABLET ORAL 3 TIMES DAILY PRN
COMMUNITY
Start: 2023-10-19 | End: 2023-11-10

## 2023-10-24 RX ORDER — ROSUVASTATIN CALCIUM 40 MG/1
40 TABLET, COATED ORAL AT BEDTIME
Qty: 30 TABLET | Refills: 11 | Status: SHIPPED | OUTPATIENT
Start: 2023-10-24

## 2023-10-24 RX ORDER — INSULIN ASPART 100 [IU]/ML
10 INJECTION, SUSPENSION SUBCUTANEOUS 2 TIMES DAILY
Qty: 15 ML | Refills: 3 | Status: SHIPPED | OUTPATIENT
Start: 2023-10-24 | End: 2023-10-24

## 2023-10-24 RX ORDER — ESCITALOPRAM OXALATE 5 MG/1
5 TABLET ORAL DAILY
Qty: 30 TABLET | Refills: 3 | Status: SHIPPED | OUTPATIENT
Start: 2023-10-24 | End: 2023-12-04

## 2023-10-24 NOTE — PATIENT INSTRUCTIONS
"Recommendations from today's MTM visit:                                                      Patient to  refills from pharmacy and start taking  Invokana 300 mg daily in the morning  Losartan 50 mg daily  Rosuvastatin 40 mg daily  Switch hydrochlorothiazide from PM to AM administration  Start Escitalopram 5 mg daily    Follow-up: Return in about 6 weeks (around 12/4/2023) for With PharmD.    It was great speaking with you today.  I value your experience and would be very thankful for your time in providing feedback in our clinic survey. In the next few days, you may receive an email or text message from [email or web address] with a link to a survey related to your  clinical pharmacist.\"     To schedule another MTM appointment, please call the clinic directly or you may call the MTM scheduling line at [phone] or toll-free at [phone].     My Clinical Pharmacist's contact information:                                                      Please feel free to contact me with any questions or concerns you have.      Heather Morales, PharmD, BCACP  Medication Therapy Management Pharmacist  "

## 2023-10-24 NOTE — Clinical Note
LIZZIE - just started seeing patient again for first time in a while.   Adherence is biggest concern today - did my best to resolve and will f/up on this in Dec.   Also started her on escitalopram today - had not been taking for some time and she was agreeable to restart today.   Thanks Ajay

## 2023-10-24 NOTE — PROGRESS NOTES
Medication Therapy Management (MTM) Encounter    ASSESSMENT:                            Medication Adherence/Access: MTM's primary concern today, patient does not bring with several medication vials today.  Patient agreeable to start twice daily pillbox, provided at visit today though unable to review set up 1 week, education provided on use.  Pending tolerability of this at next visit, could consider referral to general for blister packing of medications.    1. Type 2 diabetes mellitus with hyperglycemia, with long-term current use of insulin (H)  A1c elevated, not meeting goal <7%.  Patient currently missing Invokana, will send refills today for patient to resume once daily use every morning.  Also updated insulin prescription today to reflect current dose.  Recommended adding GLP-1 agonist, though patient prefers to wait to add this medication until future visit.    2. Essential hypertension  BP elevated, not at goal <140/90 mmHg.  Patient recently restarted on but appears that she never received losartan 50 mg daily, will route to pharmacy today for patient to initiate once daily.  Also recommended switching hydrochlorothiazide from evening to morning to avoid nighttime urination.    3. Hyperlipidemia, unspecified hyperlipidemia type  Patient not currently taking high intensity statin as prescribed, appears that patient misplaced medication, therefore recommend getting refill from the pharmacy and resuming once daily use.    4. Vitamin D deficiency  Stable, continue current ergocalciferol.  Recommend rechecking again in 3 to 6 months.    5. Heartburn  Patient prefers to continue current omeprazole once daily without Tums, removed from medication list today.    6. Rectal pain  For now, reasonable to continue cyclobenzaprine as prescribed by outside provider, patient has upcoming follow-up with this provider and PCP within the next couple weeks.    7. Depression, unspecified depression type  Patient not currently  taking anything for her depression, though agreeable to start at this time, recommend resuming escitalopram at lower than prescribed dose, 5 mg daily and could consider dose titration in the future.    PLAN:                            Patient to  refills from pharmacy and start taking  Invokana 300 mg daily in the morning  Losartan 50 mg daily  Rosuvastatin 40 mg daily  Switch hydrochlorothiazide from PM to AM administration  Start Escitalopram 5 mg daily  Provided twice daily pillbox    Medication issues to be addressed at a future visit:   [place] blister packs?  GLP-1    Follow-up: Return in about 6 weeks (around 12/4/2023) for With PharmD.  PCP 11/10; Endo 1/23  SUBJECTIVE/OBJECTIVE:                          Ramona Wilder is a 63 year old female coming in for a follow-up visit from 9/15/23. Patient seen with Dee lindsey.    Previously worked with Noel OLMOS at [facility].     Reason for visit: Diabetes Follow-Up.    Allergies/ADRs: Reviewed in chart  Past Medical History: Reviewed in chart  Tobacco: She reports that she has never smoked. She has been exposed to tobacco smoke. She has quit using smokeless tobacco.  Her smokeless tobacco use included chew.  Alcohol:  reports no history of alcohol use.    Medication Adherence/Access: Patient manges her own medications, takes directly from medication vials.   Brings with her medication vials today: metformin, hydrochlorothiazide, omeprazole, ergocalciferol, cyclobenzaprine, and insulin pen today.   Does NOT bring with losartan, rosuvastatin, invokana, escitalopram, tums.     Diabetes   Novolog 70-30 Mix insulin 25 units AM and 15 unit PM 5-10 minutes before meals (prescribed 10 units twice daily)  Metformin  mg twice daily   Invokana 300 mg daily  (does not bring this with today)last filled 8/29/23 #30 - states that she tried to get this but couldn't - no refills on file)    Patient is not experiencing side effects.  MedHx: metformin 2000 mg  daily (GI sx)  Blood sugar monitoring: Continuous Glucose Monitor Rosalinda 2; see below  Current diabetes symptoms: hypoglycemia reports blood sugar goes down at nighttime, corrects with apple juice  Diet/Exercise: Eating twice daily States when she eats, blood glucose goes to high, when not eating, too low.      Eye exam is up to date - recently had surgery on her eyes.   Foot exam: due  Urine Albumin:   Lab Results   Component Value Date    UMALCR 53.59 (H) 01/27/2023      Lab Results   Component Value Date    A1C 8.9 (H) 09/11/2023         Hypertension   Hydrochlorothiazide 25 mg daily at bedtime  Losartan 50 mg daily  (does not bring this with today)  Per chart review, losartan last filled 9/30/23  Patient reports no current medication side effects  Patient does not self-monitor blood pressure.       BP Readings from Last 3 Encounters:   10/24/23 (!) 152/78   09/29/23 (!) 160/74   09/15/23 139/84     Pulse Readings from Last 3 Encounters:   10/24/23 74   09/29/23 79   09/15/23 76     Hyperlipidemia   Rosuvastatin 40 mg daily  (does not bring this with today)  Last filled 7/27 #90 - of note she left some medications on vacation which may be why she ran out early.   Patient reports no significant myalgias or other side effects.  The 10-year ASCVD risk score (Rishi GOFF, et al., 2019) is: 12.6%    Values used to calculate the score:      Age: 63 years      Sex: Female      Is Non- : No      Diabetic: Yes      Tobacco smoker: No      Systolic Blood Pressure: 152 mmHg      Is BP treated: Yes      HDL Cholesterol: 61 mg/dL      Total Cholesterol: 180 mg/dL     Recent Labs   Lab Test 01/27/23  1449 12/17/21  1133   CHOL 180 224*   HDL 61 58   LDL 94 147*   TRIG 124 94     Low Vitamin D:   Vitamin D2 50,000 units once weekly on Saturdays  Lab Results   Component Value Date    VITDT 26 01/27/2023    VITDT 30 12/17/2021      Heartburn:   Omeprazole 20 mg daily in the evening  Tums 500 mg daily (does  not bring this with today)  Reports symptoms getting better because she is avoid spicy foods. Prefers to stop Tums, doesn't need it.     Anal Pain:   Cyclobenzaprine 5 mg 1-2 tablets three times daily as needed  Acetaminophen 500 mg 1 tablet every 6 hours as needed - only taking sometimes for headaches  Patient reports no longer using:   Hydrocortisone 25 mg suppositories daily (reports it didn't really help)  Lidocaine 5% ointment twice daily as needed (reports it doesn't help)    Patient reports she was seen by [facility] in [place]  Thinks the new medication has been working for the pain. She is trying to use it sparingly.   Status post complicated sphincteroplasty + levetaroplasty in 2011. Ongoing pain.     Depression:   Escitalopram 10 mg daily  (does not bring this with today)  No recent fill history. Patient interested in resuming medication, states that she has been sad and wants to restart.       7/7/2023    10:26 AM 8/4/2023     7:54 AM 9/11/2023     8:44 AM   PHQ   PHQ-9 Total Score 0 11 9   Q9: Thoughts of better off dead/self-harm past 2 weeks Not at all More than half the days Several days   F/U: Thoughts of suicide or self-harm  No No   F/U: Safety concerns  No No     Today's Vitals: BP (!) 152/78   Pulse 74   Wt 153 lb 8 oz (69.6 kg)   LMP  (LMP Unknown)   SpO2 99%   BMI 31.36 kg/m    ----------------      I spent 60 minutes with this patient today. All changes were made via collaborative practice agreement with Coreen Kendall MD. A copy of the visit note was provided to the patient's provider(s).    A summary of these recommendations was given to the patient.    Heather Morales, PharmD, BCACP  Medication Therapy Management Pharmacist     Medication Therapy Recommendations  Depression, unspecified depression type    Current Medication: escitalopram (LEXAPRO) 5 MG tablet   Rationale: Untreated condition - Needs additional medication therapy -  Indication   Recommendation: Start Medication   Status: Accepted per CPA         Essential hypertension    Current Medication: hydrochlorothiazide (HYDRODIURIL) 25 MG tablet   Rationale: Undesirable effect - Adverse medication event - Safety   Recommendation: Change Administration Time   Status: Patient Agreed - Adherence/Education          Current Medication: losartan (COZAAR) 50 MG tablet   Rationale: Medication product not available - Adherence - Adherence   Recommendation: Provide Adherence Intervention   Status: Resolved Med Access Issue

## 2023-11-10 ENCOUNTER — OFFICE VISIT (OUTPATIENT)
Dept: FAMILY MEDICINE | Facility: CLINIC | Age: 63
End: 2023-11-10
Payer: COMMERCIAL

## 2023-11-10 VITALS
RESPIRATION RATE: 16 BRPM | OXYGEN SATURATION: 99 % | BODY MASS INDEX: 30.89 KG/M2 | TEMPERATURE: 97.7 F | WEIGHT: 153.25 LBS | HEIGHT: 59 IN | DIASTOLIC BLOOD PRESSURE: 81 MMHG | HEART RATE: 80 BPM | SYSTOLIC BLOOD PRESSURE: 130 MMHG

## 2023-11-10 DIAGNOSIS — Z12.4 CERVICAL CANCER SCREENING: Primary | ICD-10-CM

## 2023-11-10 DIAGNOSIS — K62.89 ANAL OR RECTAL PAIN: ICD-10-CM

## 2023-11-10 DIAGNOSIS — Z23 NEED FOR VACCINATION: ICD-10-CM

## 2023-11-10 DIAGNOSIS — I10 ESSENTIAL HYPERTENSION: ICD-10-CM

## 2023-11-10 DIAGNOSIS — Z71.84 TRAVEL ADVICE ENCOUNTER: ICD-10-CM

## 2023-11-10 DIAGNOSIS — E11.65 TYPE 2 DIABETES MELLITUS WITH HYPERGLYCEMIA, WITH LONG-TERM CURRENT USE OF INSULIN (H): ICD-10-CM

## 2023-11-10 DIAGNOSIS — Z79.4 TYPE 2 DIABETES MELLITUS WITH HYPERGLYCEMIA, WITH LONG-TERM CURRENT USE OF INSULIN (H): ICD-10-CM

## 2023-11-10 PROCEDURE — 90686 IIV4 VACC NO PRSV 0.5 ML IM: CPT | Performed by: FAMILY MEDICINE

## 2023-11-10 PROCEDURE — 90471 IMMUNIZATION ADMIN: CPT | Performed by: FAMILY MEDICINE

## 2023-11-10 PROCEDURE — 90472 IMMUNIZATION ADMIN EACH ADD: CPT | Performed by: FAMILY MEDICINE

## 2023-11-10 PROCEDURE — 87624 HPV HI-RISK TYP POOLED RSLT: CPT | Performed by: FAMILY MEDICINE

## 2023-11-10 PROCEDURE — 90691 TYPHOID VACCINE IM: CPT | Performed by: FAMILY MEDICINE

## 2023-11-10 PROCEDURE — 99214 OFFICE O/P EST MOD 30 MIN: CPT | Mod: 25 | Performed by: FAMILY MEDICINE

## 2023-11-10 PROCEDURE — G0145 SCR C/V CYTO,THINLAYER,RESCR: HCPCS | Performed by: FAMILY MEDICINE

## 2023-11-10 RX ORDER — AZITHROMYCIN 500 MG/1
TABLET, FILM COATED ORAL
Qty: 3 TABLET | Refills: 0 | Status: SHIPPED | OUTPATIENT
Start: 2023-11-10 | End: 2023-12-04

## 2023-11-10 RX ORDER — ATOVAQUONE AND PROGUANIL HYDROCHLORIDE 250; 100 MG/1; MG/1
1 TABLET, FILM COATED ORAL DAILY
Qty: 24 TABLET | Refills: 0 | Status: SHIPPED | OUTPATIENT
Start: 2023-11-10 | End: 2023-12-04

## 2023-11-10 RX ORDER — CYCLOBENZAPRINE HCL 5 MG
5-10 TABLET ORAL 3 TIMES DAILY PRN
Qty: 60 TABLET | Refills: 3 | Status: SHIPPED | OUTPATIENT
Start: 2023-11-10 | End: 2024-03-22

## 2023-11-10 ASSESSMENT — PATIENT HEALTH QUESTIONNAIRE - PHQ9
10. IF YOU CHECKED OFF ANY PROBLEMS, HOW DIFFICULT HAVE THESE PROBLEMS MADE IT FOR YOU TO DO YOUR WORK, TAKE CARE OF THINGS AT HOME, OR GET ALONG WITH OTHER PEOPLE: NOT DIFFICULT AT ALL
SUM OF ALL RESPONSES TO PHQ QUESTIONS 1-9: 7
SUM OF ALL RESPONSES TO PHQ QUESTIONS 1-9: 7

## 2023-11-10 NOTE — PROGRESS NOTES
Assessment & Plan     Cervical cancer screening  Pap Smear obtained today  - Pap Screen with HPV - recommended age 30 - 65 years    Hypertension  Blood pressure follow-up: Patient had elevated blood pressure last time she was here but had not been taking her losartan.  She confirms taking it today and blood pressure is adequately although borderline controlled today.  Continue to follow.    Travel advice encounter  Patient traveling unexpectedly to [place] because her son .  Will initiate malaria prophylaxis, updated typhoid vaccine, and given azithromycin in case of traveler's diarrhea.  -     azithromycin (ZITHROMAX) 500 MG tablet; Take one tablet daily for 3 days if having severe traveler's diarrhea.  -     atovaquone-proguanil (MALARONE) 250-100 MG tablet; Take 1 tablet by mouth daily Start 2 days before travel and continue 7 days after return.    Type 2 diabetes mellitus with hyperglycemia, with long-term current use of insulin (H)  Diabetes not well controlled in spite of multiple interventions in the past.  She is seeing endocrinology in a few months; was referred long ago but could not get an appointment sooner.    Anal or rectal pain  Patient has history of repair of anal sphincter in , repair was due to injury during vaginal delivery in the distant past.  Had been doing well until last year or so when she had terrible pain which is led to multiple visits including the ER.  She is seeing colorectal surgeon with no particular care.  She has found relief with cyclobenzaprine and is okay to go ahead and continue that.  -     cyclobenzaprine (FLEXERIL) 5 MG tablet; Take 1-2 tablets (5-10 mg) by mouth 3 times daily as needed for other (rectal pain)    Need for vaccination  -     INFLUENZA VACCINE >6 MONTHS (AFLURIA/FLUZONE)  Declined COVID booster today.                         Coreen Kendall MD  [facility] SANDY Greene is a 63 year old, presenting for the following  "health issues:  Gyn Exam (Pap only), Diabetes, Travel Clinic (Traveling to [place] leaving 11/17/23-12/1/23. Would like travel shot for her and her daughter today if possible.), and Medication Refill (She states that she will run out of her cyclobenzaprine during her trip, can you give her more to bring with her? )        11/10/2023    10:44 AM   Additional Questions   Roomed by Antwan HERNANDEZ   Accompanied by daughter       History of Present Illness       Diabetes:   She presents for follow up of diabetes.   She is checking home blood glucose with a continuous glucose monitor.   She checks blood glucose before and after meals.  Blood glucose is sometimes over 200 and sometimes under 70. She is aware of hypoglycemia symptoms including blurred vision and other.    She has no concerns regarding her diabetes at this time.  She is having numbness in feet and weight gain.            Reason for visit:  Pap smear and travel shots    She eats 2-3 servings of fruits and vegetables daily.She consumes 0 sweetened beverage(s) daily.She exercises with enough effort to increase her heart rate 9 or less minutes per day.  She exercises with enough effort to increase her heart rate 3 or less days per week. She is missing 1 dose(s) of medications per week.                 Review of Systems         Objective    /81   Pulse 80   Temp 97.7  F (36.5  C) (Oral)   Resp 16   Ht 1.486 m (4' 10.5\")   Wt 69.5 kg (153 lb 4 oz)   LMP  (LMP Unknown)   SpO2 99%   Breastfeeding No   BMI 31.48 kg/m    Body mass index is 31.48 kg/m .  Physical Exam   Gen:  A&A, NAD  : Normal external genitalia except for surgical repair of perineum/sphincter.  Smooth shiny pale pink appearing cervix.                      "

## 2023-11-14 ENCOUNTER — TRANSFERRED RECORDS (OUTPATIENT)
Dept: HEALTH INFORMATION MANAGEMENT | Facility: CLINIC | Age: 63
End: 2023-11-14
Payer: COMMERCIAL

## 2023-11-14 LAB
BKR LAB AP GYN ADEQUACY: NORMAL
BKR LAB AP GYN INTERPRETATION: NORMAL
BKR LAB AP HPV REFLEX: NORMAL
BKR LAB AP PREVIOUS ABNORMAL: NORMAL
PATH REPORT.COMMENTS IMP SPEC: NORMAL
PATH REPORT.COMMENTS IMP SPEC: NORMAL
PATH REPORT.RELEVANT HX SPEC: NORMAL
RETINOPATHY: POSITIVE

## 2023-11-15 LAB
HUMAN PAPILLOMA VIRUS 16 DNA: NEGATIVE
HUMAN PAPILLOMA VIRUS 18 DNA: NEGATIVE
HUMAN PAPILLOMA VIRUS FINAL DIAGNOSIS: NORMAL
HUMAN PAPILLOMA VIRUS OTHER HR: NEGATIVE

## 2023-12-02 DIAGNOSIS — I10 ESSENTIAL HYPERTENSION: ICD-10-CM

## 2023-12-04 ENCOUNTER — OFFICE VISIT (OUTPATIENT)
Dept: PHARMACY | Facility: CLINIC | Age: 63
End: 2023-12-04
Payer: COMMERCIAL

## 2023-12-04 VITALS
BODY MASS INDEX: 30.59 KG/M2 | HEART RATE: 109 BPM | DIASTOLIC BLOOD PRESSURE: 80 MMHG | WEIGHT: 148.9 LBS | OXYGEN SATURATION: 93 % | SYSTOLIC BLOOD PRESSURE: 120 MMHG

## 2023-12-04 DIAGNOSIS — E78.5 HYPERLIPIDEMIA, UNSPECIFIED HYPERLIPIDEMIA TYPE: ICD-10-CM

## 2023-12-04 DIAGNOSIS — I10 ESSENTIAL HYPERTENSION: ICD-10-CM

## 2023-12-04 DIAGNOSIS — F32.A DEPRESSION, UNSPECIFIED DEPRESSION TYPE: ICD-10-CM

## 2023-12-04 DIAGNOSIS — K62.89 RECTAL PAIN: ICD-10-CM

## 2023-12-04 DIAGNOSIS — Z79.4 TYPE 2 DIABETES MELLITUS WITH HYPERGLYCEMIA, WITH LONG-TERM CURRENT USE OF INSULIN (H): Primary | ICD-10-CM

## 2023-12-04 DIAGNOSIS — R12 HEARTBURN: ICD-10-CM

## 2023-12-04 DIAGNOSIS — E55.9 VITAMIN D DEFICIENCY: ICD-10-CM

## 2023-12-04 DIAGNOSIS — E11.65 TYPE 2 DIABETES MELLITUS WITH HYPERGLYCEMIA, WITH LONG-TERM CURRENT USE OF INSULIN (H): Primary | ICD-10-CM

## 2023-12-04 PROCEDURE — 99606 MTMS BY PHARM EST 15 MIN: CPT | Performed by: PHARMACIST

## 2023-12-04 PROCEDURE — 99607 MTMS BY PHARM ADDL 15 MIN: CPT | Performed by: PHARMACIST

## 2023-12-04 RX ORDER — METFORMIN HCL 500 MG
500 TABLET, EXTENDED RELEASE 24 HR ORAL 2 TIMES DAILY WITH MEALS
Qty: 60 TABLET | Refills: 11 | Status: SHIPPED | OUTPATIENT
Start: 2023-12-04 | End: 2024-07-10

## 2023-12-04 RX ORDER — HYDROCHLOROTHIAZIDE 25 MG/1
25 TABLET ORAL DAILY
Qty: 30 TABLET | Refills: 11 | Status: SHIPPED | OUTPATIENT
Start: 2023-12-04 | End: 2024-07-10

## 2023-12-04 RX ORDER — ESCITALOPRAM OXALATE 5 MG/1
5 TABLET ORAL DAILY
Qty: 30 TABLET | Refills: 11 | Status: SHIPPED | OUTPATIENT
Start: 2023-12-04

## 2023-12-04 RX ORDER — HYDROCHLOROTHIAZIDE 25 MG/1
TABLET ORAL
Qty: 30 TABLET | Refills: 3 | OUTPATIENT
Start: 2023-12-04

## 2023-12-04 NOTE — PROGRESS NOTES
Medication Therapy Management (MTM) Encounter    ASSESSMENT:                            Medication Adherence/Access: To help with medication adherence concerns today, provided a.m. and p.m. stickers on patient's pillbox and medication vials indicating which medications to take, how many, and at what time of day.  Patient verbalizes understanding and awareness of how to set up going forward.  Could consider referral to general for blister packs in the future if needed.    1. Type 2 diabetes mellitus with hyperglycemia, with long-term current use of insulin (H)  A1c elevated, not meeting goal <7%.  Patient currently missing Invokana, will send refills today for patient to resume once daily use every morning.  Patient has concerns about swallowing current metformin tablets due to their size, therefore will decrease the dose to  mg twice daily.  Encouraged patient to continue eating brown rice now that she is back home and resume her metformin and Invokana as prescribed.  If blood sugars remain elevated at next visit would recommend adding GLP-1 agonist.  Also recommended patient schedule follow-up visit for further workup if urinary pain symptoms do not improve or if they worsen.    2. Essential hypertension  BP at goal <140/90 mmHg.  Recommend patient continue current medications, sending refill for hydrochlorothiazide today.    3. Hyperlipidemia, unspecified hyperlipidemia type  Patient to continue high intensity statin.     4. Vitamin D deficiency  Continue current ergocalciferol, could consider rechecking vitamin D level at next blood draw.    5. Heartburn  Stable on current therapy.  Offered for patient to resume Tums, but prefers to continue with current therapy only.    6. Rectal pain  Stable on current therapy.     7. Depression, unspecified depression type  Patient finds escitalopram 5 mg daily effective, therefore we will continue current medication without change.  Could consider dose increase in the  future.    PLAN:                            Stop metformin  mg, START Metformin  mg twice daily   Sent refills today for patient to  and continue: invokana, escitalopram and  hydrochlorothiazide  Provided stickers with AM And PM instruction for medication use on vials and pillbox today (sent patient home with stickers for bottles at the pharmacy)    Medication issues to be addressed at a future visit:   [place] blister packs?  GLP-1  Increase metformin dose?  A1c at next visit (& vit D)    Follow-up: Return in about 6 weeks (around 1/15/2024) for With PharmD.  Endo 1/23  SUBJECTIVE/OBJECTIVE:                          Ramona Wilder is a 63 year old female coming in for a follow-up visit from 10/24/23. Patient was accompanied by daughter. Patient seen with Dee .  ID# [id number] - Of note, daughter mentions that  did not translate all that her mother was saying.     Reason for visit: Diabetes Follow-Up.    Allergies/ADRs: Reviewed in chart  Past Medical History: Reviewed in chart  Tobacco: She reports that she has never smoked. She has been exposed to tobacco smoke. She quit smokeless tobacco use about 3 months ago.  Her smokeless tobacco use included chew.  Alcohol:  reports no history of alcohol use.    Medication Adherence/Access: Patient manges her own medications, takes directly from medication vials.   Patient states that she doesn't know how to set up her medications in a twice daily pillbox, brings with today (provided at last visit). States that she wasn't able to get refills of metformin, invokana, and hydrochlorothiazide.     Diabetes   Novolog 70/30 Mix insulin 25 units AM and 15 unit PM 5-10 minutes before meals, reports no missed doses.   Metformin  mg twice daily (last filled 10/16, 30 day supply), states she has only been taking 1 tablet per day otherwise causes GI distress. States that the tablet is very large and would prefer a smaller tablet.   Invokana 300 mg  daily  (does not bring this with today) ready at pharmacy - planning to pick this up today. Prior to that last filled 10/24, been out of medication for a while.     Patient is not experiencing side effects. Though states that she does have burning with urination, been like this for a few days, has had symptoms of this in the past.   Patient reports that she was recently in [place] and she was eating a different kind of rice that has been increasing her blood sugars.  Blood sugar monitoring: Continuous Glucose Monitor Rosalinda 2; see below  Current diabetes symptoms: hypoglycemia none recently, drinks juice when needed.   Diet/Exercise: Eating twice daily. States that she is only eating a little bit but not sure why her blood sugar is higher. States that the different rice in [place] that increased her blood sugar. Now back to eating brown rice now that she is back home.   MedHx: metformin 2000 mg daily (GI sx)     Eye exam is up to date.   Foot exam: due  Urine Albumin:   Lab Results   Component Value Date    UMALCR 53.59 (H) 01/27/2023      Lab Results   Component Value Date    A1C 8.9 (H) 09/11/2023         Hypertension   Hydrochlorothiazide 25 mg daily at bedtime (does not bring this with today, LF 10/30 for 1 month)  Losartan 50 mg daily AM  Patient reports no current medication side effects  Patient does not self-monitor blood pressure.       BP Readings from Last 3 Encounters:   12/04/23 120/80   11/10/23 130/81   10/24/23 (!) 152/78     Pulse Readings from Last 3 Encounters:   12/04/23 109   11/10/23 80   10/24/23 74     Hyperlipidemia   Rosuvastatin 40 mg daily PM  Patient reports no significant myalgias or other side effects.  The 10-year ASCVD risk score (Rishi GOFF, et al., 2019) is: 8%    Values used to calculate the score:      Age: 63 years      Sex: Female      Is Non- : No      Diabetic: Yes      Tobacco smoker: No      Systolic Blood Pressure: 120 mmHg      Is BP treated: Yes       HDL Cholesterol: 61 mg/dL      Total Cholesterol: 180 mg/dL     Recent Labs   Lab Test 01/27/23  1449 12/17/21  1133   CHOL 180 224*   HDL 61 58   LDL 94 147*   TRIG 124 94     Low Vitamin D:   Vitamin D2 50,000 units once weekly on Saturdays  Lab Results   Component Value Date    VITDT 26 01/27/2023    VITDT 30 12/17/2021      Heartburn:   Omeprazole 20 mg daily in the evening  Reports symptoms getting better because she is avoid spicy foods. Though states that she is still having burning all over her body.     Anal Pain:   Cyclobenzaprine 5 mg 1-2 tablets three times daily as needed  Acetaminophen 500 mg 1 tablet every 6 hours as needed - only taking sometimes for headaches  Patient reports she was seen by [facility] in [place].   Thinks the new medication has been working for the pain and helping her sleep.    Depression:   Escitalopram 10 mg daily AM  Recently resumed, and reports current medication is working well for her.     Today's Vitals: /80   Pulse 109   Wt 148 lb 14.4 oz (67.5 kg)   LMP  (LMP Unknown)   SpO2 93%   BMI 30.59 kg/m    ----------------      I spent 60 minutes with this patient today. All changes were made via collaborative practice agreement with Coreen Kendall MD. A copy of the visit note was provided to the patient's provider(s).    A summary of these recommendations was given to the patient.    Heather Morales, PharmD, BCACP  Medication Therapy Management Pharmacist     Medication Therapy Recommendations  Essential hypertension    Current Medication: hydrochlorothiazide (HYDRODIURIL) 25 MG tablet   Rationale: Does not understand instructions - Adherence - Adherence   Recommendation: Provide Adherence Intervention   Status: Patient Agreed - Adherence/Education   Note: stickers for pillbox today         Type 2 diabetes mellitus with hyperglycemia, with long-term current use of insulin (H)    Current Medication: metFORMIN (GLUCOPHAGE XR) 500 MG 24 hr tablet    Rationale: Patient prefers not to take - Adherence - Adherence   Recommendation: Decrease Dose   Status: Accepted per CPA   Note: due to size of tablet

## 2023-12-04 NOTE — Clinical Note
LIZZIE only. Decreased dose of metformin due to issue swallowing large tablet, could increase dose in future.  Also working with patient to resolve med adherence concerns, will continue to monitor this   Ajay

## 2023-12-04 NOTE — PATIENT INSTRUCTIONS
"Recommendations from today's MTM visit:                                                    Stop metformin  mg, start Metformin ER 50 mg twice daily   Sent refills today for patient to  and continue: escitalopram and  hydrochlorothiazide  Use the AM and PM stickers to help with setting up your twice daily pillbox    Follow-up: Return in about 6 weeks (around 1/15/2024) for With PharmD.    It was great speaking with you today.  I value your experience and would be very thankful for your time in providing feedback in our clinic survey. In the next few days, you may receive an email or text message from [email or web address] with a link to a survey related to your  clinical pharmacist.\"     To schedule another MTM appointment, please call the clinic directly or you may call the MTM scheduling line at [phone] or toll-free at [phone].     My Clinical Pharmacist's contact information:                                                      Please feel free to contact me with any questions or concerns you have.      Heather Morales, PharmD, BCACP  Medication Therapy Management Pharmacist    "

## 2023-12-07 DIAGNOSIS — Z79.4 TYPE 2 DIABETES MELLITUS WITH HYPERGLYCEMIA, WITH LONG-TERM CURRENT USE OF INSULIN (H): ICD-10-CM

## 2023-12-07 DIAGNOSIS — E11.65 TYPE 2 DIABETES MELLITUS WITH HYPERGLYCEMIA, WITH LONG-TERM CURRENT USE OF INSULIN (H): ICD-10-CM

## 2024-01-15 ENCOUNTER — OFFICE VISIT (OUTPATIENT)
Dept: PHARMACY | Facility: CLINIC | Age: 64
End: 2024-01-15
Payer: COMMERCIAL

## 2024-01-15 ENCOUNTER — LAB (OUTPATIENT)
Dept: LAB | Facility: CLINIC | Age: 64
End: 2024-01-15
Payer: COMMERCIAL

## 2024-01-15 VITALS
HEART RATE: 94 BPM | SYSTOLIC BLOOD PRESSURE: 130 MMHG | DIASTOLIC BLOOD PRESSURE: 84 MMHG | WEIGHT: 150.3 LBS | OXYGEN SATURATION: 97 % | BODY MASS INDEX: 30.88 KG/M2

## 2024-01-15 DIAGNOSIS — E11.65 TYPE 2 DIABETES MELLITUS WITH HYPERGLYCEMIA, WITH LONG-TERM CURRENT USE OF INSULIN (H): Primary | ICD-10-CM

## 2024-01-15 DIAGNOSIS — R12 HEARTBURN: ICD-10-CM

## 2024-01-15 DIAGNOSIS — Z79.4 TYPE 2 DIABETES MELLITUS WITH HYPERGLYCEMIA, WITH LONG-TERM CURRENT USE OF INSULIN (H): ICD-10-CM

## 2024-01-15 DIAGNOSIS — E11.319 DIABETIC RETINOPATHY ASSOCIATED WITH TYPE 2 DIABETES MELLITUS (H): Primary | ICD-10-CM

## 2024-01-15 DIAGNOSIS — Z79.4 TYPE 2 DIABETES MELLITUS WITH HYPERGLYCEMIA, WITH LONG-TERM CURRENT USE OF INSULIN (H): Primary | ICD-10-CM

## 2024-01-15 DIAGNOSIS — Z76.0 ENCOUNTER FOR MEDICATION REFILL: ICD-10-CM

## 2024-01-15 DIAGNOSIS — I10 ESSENTIAL HYPERTENSION: ICD-10-CM

## 2024-01-15 DIAGNOSIS — E11.65 TYPE 2 DIABETES MELLITUS WITH HYPERGLYCEMIA, WITH LONG-TERM CURRENT USE OF INSULIN (H): ICD-10-CM

## 2024-01-15 DIAGNOSIS — E78.5 HYPERLIPIDEMIA, UNSPECIFIED HYPERLIPIDEMIA TYPE: ICD-10-CM

## 2024-01-15 DIAGNOSIS — E55.9 VITAMIN D DEFICIENCY: ICD-10-CM

## 2024-01-15 LAB
CHOLEST SERPL-MCNC: 225 MG/DL
CREAT UR-MCNC: 44.5 MG/DL
FASTING STATUS PATIENT QL REPORTED: YES
HBA1C MFR BLD: 9.1 % (ref 0–5.6)
HDLC SERPL-MCNC: 63 MG/DL
LDLC SERPL CALC-MCNC: 144 MG/DL
MICROALBUMIN UR-MCNC: 33.3 MG/L
MICROALBUMIN/CREAT UR: 74.83 MG/G CR (ref 0–25)
NONHDLC SERPL-MCNC: 162 MG/DL
TRIGL SERPL-MCNC: 92 MG/DL

## 2024-01-15 PROCEDURE — 83036 HEMOGLOBIN GLYCOSYLATED A1C: CPT

## 2024-01-15 PROCEDURE — 82043 UR ALBUMIN QUANTITATIVE: CPT

## 2024-01-15 PROCEDURE — 80061 LIPID PANEL: CPT

## 2024-01-15 PROCEDURE — 99607 MTMS BY PHARM ADDL 15 MIN: CPT | Performed by: PHARMACIST

## 2024-01-15 PROCEDURE — 99605 MTMS BY PHARM NP 15 MIN: CPT | Performed by: PHARMACIST

## 2024-01-15 PROCEDURE — 36415 COLL VENOUS BLD VENIPUNCTURE: CPT

## 2024-01-15 PROCEDURE — 82570 ASSAY OF URINE CREATININE: CPT

## 2024-01-15 RX ORDER — CALCIUM CARBONATE 500 MG/1
1 TABLET, CHEWABLE ORAL DAILY PRN
Qty: 30 TABLET | Refills: 1 | Status: SHIPPED | OUTPATIENT
Start: 2024-01-15 | End: 2024-07-10

## 2024-01-15 RX ORDER — LIRAGLUTIDE 6 MG/ML
0.6 INJECTION SUBCUTANEOUS DAILY
Qty: 6 ML | Refills: 3 | Status: SHIPPED | OUTPATIENT
Start: 2024-01-15 | End: 2024-03-04 | Stop reason: SINTOL

## 2024-01-15 RX ORDER — PEN NEEDLE, DIABETIC 32GX 5/32"
NEEDLE, DISPOSABLE MISCELLANEOUS
Qty: 300 EACH | Refills: 4 | Status: SHIPPED | OUTPATIENT
Start: 2024-01-15

## 2024-01-15 NOTE — PROGRESS NOTES
Medication Therapy Management (MTM) Encounter    ASSESSMENT:                            Medication Adherence/Access: No issues identified, though see below as recommended switching administration time of certain medications.    1. Type 2 diabetes mellitus with hyperglycemia, with long-term current use of insulin (H)  A1c elevated, not meeting goal <7%.  Reviewed with patient recommendation to continue eating 2 smaller meals per day rather than 1 large meal.  Also recommended avoiding skipping meals due to concern for hypoglycemic events.  Patient tolerating lower dose of metformin, she refuses dose increase today due to side effects on previously higher doses.  Alternatively, recommended initiating GLP-1 agonist, patient agreeable to plan and education provided today. Spoke with retail pharmacy today that states Ozempic requires PA, therefore will switch to Victoza today. Tried calling patient with change but unable to reach x2.     2. Heartburn  Patient currently taking PPI in the evening, recommended switching to AM administration for best efficacy from the medication. Also sent prescription for Tums to be taken as needed for heartburn.     3. Essential hypertension  BP at goal <140/90 mmHg.  Recommend patient continue current medications. Recommended taking hydrochlorothiazide in the morning.     4. Hyperlipidemia, unspecified hyperlipidemia type  Patient to continue high intensity statin.     5. Vitamin D deficiency  Continue current ergocalciferol, could consider rechecking vitamin D level at next blood draw.    PLAN:                            Start Ozempic 0.25 mg weekly for 4 weeks, then increase to 0.5 mg weekly  Per pharmacy, requires PA, therefore sending alternative Victoza 0.6 mg daily for 2 weeks, then increase to 1.2 mg daily. Tried calling patient unable to reach, LVM.  Switch administration time of medications (stickers on vials today):  Take Jardiance in the morning  Take omeprazole in the  morning  Take hydrochlorothiazide in the morning  Start Tums as needed for heartburn  Labs today: A1c    Medication issues to be addressed at a future visit:   Recheck Vitamin D at next visit    Follow-up: Return in about 6 weeks (around 2/26/2024) for With Lilli Coe 1/23    SUBJECTIVE/OBJECTIVE:                          Ramona Wilder is a 63 year old female coming in for a follow-up visit from 12/4/23. Patient was accompanied by daughter. Patient seen with LORE Price .      Reason for visit: Diabetes Follow-Up.    Allergies/ADRs: Reviewed in chart  Past Medical History: Reviewed in chart  Tobacco: She reports that she has never smoked. She has been exposed to tobacco smoke. She quit smokeless tobacco use about 4 months ago.  Her smokeless tobacco use included chew.  Alcohol:  reports no history of alcohol use.    Medication Adherence/Access: Patient manges her own medications. Now setting up her own twice daily pillbox. Brings medication vials and pilbox today, set up correctly.   States that she knows how to get refills from pharmacy.  Only rarely will forget medications, about once monthly.     Diabetes   Novolog 70/30 Mix insulin 25 units AM and 15 unit PM 5-10 minutes before meals, reports no missed doses  Metformin  mg twice daily  Invokana 300 mg daily in the evening    Patient is not experiencing side effects.   Patient hesitant to increase metformin dose due to side effects in the past.   Blood sugar monitoring: Continuous Glucose Monitor Rosalinda 2; see below. States that when she got her MRI scan the sensor was removed and they weren't able to get a refill right away, therefore went a bit without checking blood sugar.   Current diabetes symptoms: hypoglycemia on 1/13, reports didn't eat that day, drinks juice when needed.   Diet/Exercise: Trying to eat only 1 meal per day and fruit other times of day. States that sometimes fruits will increase her blood sugar a lot. Eating brown rice.   MedHx:  metformin 2000 mg daily (GI sx)     Eye exam is up to date.   Foot exam: due  Urine Albumin:   Lab Results   Component Value Date    UMALCR 53.59 (H) 01/27/2023      Lab Results   Component Value Date    A1C 9.1 (H) 01/15/2024         Hypertension   Hydrochlorothiazide 25 mg daily at bedtime   Losartan 50 mg daily in the evening  Patient reports no current medication side effects  Patient does not self-monitor blood pressure.       BP Readings from Last 3 Encounters:   01/15/24 130/84   12/04/23 120/80   11/10/23 130/81     Pulse Readings from Last 3 Encounters:   01/15/24 94   12/04/23 109   11/10/23 80     Hyperlipidemia   Rosuvastatin 40 mg daily  AM  Patient reports no significant myalgias or other side effects.  The 10-year ASCVD risk score (Rishi GOFF, et al., 2019) is: 9.3%    Values used to calculate the score:      Age: 63 years      Sex: Female      Is Non- : No      Diabetic: Yes      Tobacco smoker: No      Systolic Blood Pressure: 130 mmHg      Is BP treated: Yes      HDL Cholesterol: 61 mg/dL      Total Cholesterol: 180 mg/dL     Recent Labs   Lab Test 01/27/23  1449 12/17/21  1133   CHOL 180 224*   HDL 61 58   LDL 94 147*   TRIG 124 94     Low Vitamin D:   Vitamin D2 50,000 units once weekly on Saturdays  Lab Results   Component Value Date    VITDT 26 01/27/2023    VITDT 30 12/17/2021      Heartburn:   Omeprazole 20 mg daily in the evening  States that she still has symptoms of heartburn about twice weekly. States that she doesn't have Tums anymore. Interested in getting refill of tums.     Today's Vitals: /84   Pulse 94   Wt 150 lb 4.8 oz (68.2 kg)   LMP  (LMP Unknown)   SpO2 97%   BMI 30.88 kg/m    ----------------      I spent 60 minutes with this patient today. All changes were made via collaborative practice agreement with Coreen Kendall MD. A copy of the visit note was provided to the patient's provider(s).    A summary of these recommendations was given to the  patient.    Heather Morales, PharmD, BCACP  Medication Therapy Management Pharmacist     Medication Therapy Recommendations  Type 2 diabetes mellitus with hyperglycemia, with long-term current use of insulin (H)    Current Medication: liraglutide (VICTOZA) 18 MG/3ML solution   Rationale: Synergistic therapy - Needs additional medication therapy - Indication   Recommendation: Start Medication   Status: Accepted per CPA

## 2024-01-15 NOTE — PATIENT INSTRUCTIONS
"Recommendations from today's MTM visit:                                                    MTM (medication therapy management) is a service provided by a clinical pharmacist designed to help you get the most of out of your medicines.      Start Ozempic 0.25 mg weekly for 4 weeks, then increase to 0.5 mg weekly    Follow-up: Return in about 6 weeks (around 2/26/2024) for With PharmD.    It was great speaking with you today.  I value your experience and would be very thankful for your time in providing feedback in our clinic survey. In the next few days, you may receive an email or text message from [employer] with a link to a survey related to your  clinical pharmacist.\"     To schedule another MTM appointment, please call the clinic directly or you may call the MTM scheduling line at [phone] or toll-free at [phone].     My Clinical Pharmacist's contact information:                                                      Please feel free to contact me with any questions or concerns you have.      Heather Morales, PharmD, [facility]  Medication Therapy Management Pharmacist    "

## 2024-01-15 NOTE — Clinical Note
Initially I had sent prescription for Ozempic but then pharmacy said requires PA, therefore switched to Victoza today. Will see her next month to assess efficacy.   Heather Morales, PharmD, [facility] Medication Therapy Management Pharmacist

## 2024-01-22 ENCOUNTER — TELEPHONE (OUTPATIENT)
Dept: FAMILY MEDICINE | Facility: CLINIC | Age: 64
End: 2024-01-22
Payer: COMMERCIAL

## 2024-01-22 NOTE — TELEPHONE ENCOUNTER
Prior Authorization Not Needed per Insurance    Medication: Victoza 18MG/3ML pen-injectors  Insurance Company: [employer] - Phone [phone] Fax [phone]  Expected CoPay:      Pharmacy Filling the Rx: [facility] - [place], [place] - [address] [place] [address]  Pharmacy Notified:  Yes  Patient Notified:  No

## 2024-01-22 NOTE — TELEPHONE ENCOUNTER
Central Prior Authorization Team   Phone: [phone]    PA Initiation    Medication: Victoza 18MG/3ML pen-injectors  Insurance Company: [facility] - Phone [phone] Fax [phone]  Pharmacy Filling the Rx: [facility] - [place], [place] - [address]  Filling Pharmacy Phone: [phone]  Filling Pharmacy Fax:    Start Date: 1/22/2024

## 2024-01-22 NOTE — TELEPHONE ENCOUNTER
Prior Authorization Retail Medication Request    Medication/Dose: liraglutide (VICTOZA) 18 MG/3ML solution  Inject 0.6 mg Subcutaneous daily For 2 weeks, then increase to 1.2 mg daily - Subcutaneous   Diagnosis and ICD code (if different than what is on RX):  Pharmacy faxed requested.   New/renewal/insurance change PA/secondary ins. PA: Pharmacy requested.   Previously Tried and Failed: Unknown   Rationale:  Unknown     Insurance   Primary:     [facility]     Insurance ID: [id number]

## 2024-01-23 ENCOUNTER — OFFICE VISIT (OUTPATIENT)
Dept: ENDOCRINOLOGY | Facility: CLINIC | Age: 64
End: 2024-01-23
Attending: FAMILY MEDICINE
Payer: COMMERCIAL

## 2024-01-23 VITALS
WEIGHT: 151.8 LBS | BODY MASS INDEX: 31.19 KG/M2 | HEART RATE: 88 BPM | DIASTOLIC BLOOD PRESSURE: 70 MMHG | SYSTOLIC BLOOD PRESSURE: 122 MMHG

## 2024-01-23 DIAGNOSIS — E11.65 TYPE 2 DIABETES MELLITUS WITH HYPERGLYCEMIA, WITH LONG-TERM CURRENT USE OF INSULIN (H): ICD-10-CM

## 2024-01-23 DIAGNOSIS — Z79.4 TYPE 2 DIABETES MELLITUS WITH HYPERGLYCEMIA, WITH LONG-TERM CURRENT USE OF INSULIN (H): ICD-10-CM

## 2024-01-23 PROCEDURE — 99203 OFFICE O/P NEW LOW 30 MIN: CPT | Performed by: NURSE PRACTITIONER

## 2024-01-23 NOTE — PROGRESS NOTES
[facility] ENDOCRINOLOGY    Diabetes Note 1/23/2024    Ramona Wilder, 1960, [id number]          Reason for visit      1. Type 2 diabetes mellitus with hyperglycemia, with long-term current use of insulin (H)        HPI     Ramona Wilder is a very pleasant 63 year old old female who presents for follow up.  SUMMARY:    Ramona is seen today in referral for DM 2 with hyperglycemia. Ramona was diagnosed with Diabetes when she came to the  in 2006. Her last A1c was 9.1.     She was referred to me back in August.  Since then, she was able to get a GOkey 2 up and running and brought her [place] with her today, which was downloaded data reviewed.     Her sensor was active 77% of the time over the last two weeks. GMI was 7.7 and TIR was 51% of the time. She was low 1% and high or very high 48%.     She is currently taking Novolog 70/30 with a dose of 25 units in the morning and 15 units with dinner. She is taking Invokana, 300 mg daily, and takes Metformin XR, 500 mg BID. She does NOT like that they are so large, but states that she has been doing better with the XR. She does complain of some reflux.    She has been working with both CDE and MTM at her local Clinic. She has just now gotten a PA for Victoza, and will be picking it up today.         Blood glucose data:      Past Medical History     Patient Active Problem List   Diagnosis    Carotid Artery Stenosis    Moderate Recurrent Major Depression    Anxiety    Insomnia    Hypertension    Vitamin D deficiency    Gastropathy    Type 2 diabetes mellitus with hyperglycemia, with long-term current use of insulin (H)    Hyperlipidemia    Headache    Chronic Pain    Back Strain    Dermatitis    Metabolic Tests Nonspecific Elevation Of Transaminase Levels    Pain in finger of right hand    Left knee pain    Urinary, incontinence, stress female    Right lumbar radiculitis    Proteinuria due to type 2 diabetes mellitus (H)    Lichen sclerosus et atrophicus    Diabetic retinopathy  associated with type 2 diabetes mellitus (H)        Family History       family history includes Asthma in her sister; Cancer in her sister.    Social History      reports that she has never smoked. She has been exposed to tobacco smoke. She quit smokeless tobacco use about 4 months ago.  Her smokeless tobacco use included chew. She reports that she does not drink alcohol and does not use drugs.      Review of Systems     Patient has no polyuria or polydipsia, no chest pain, dyspnea or TIA's, no numbness, tingling or pain in extremities  Remainder negative except as noted in HPI.    Vital Signs     /70   Pulse 88   Wt 68.9 kg (151 lb 12.8 oz)   LMP  (LMP Unknown)   BMI 31.19 kg/m    Wt Readings from Last 3 Encounters:   01/23/24 68.9 kg (151 lb 12.8 oz)   01/15/24 68.2 kg (150 lb 4.8 oz)   12/04/23 67.5 kg (148 lb 14.4 oz)       Physical Exam     Constitutional:  Well developed, Well nourished  HENT:  Normocephalic,   Neck: Thyroid normal, No lymph nodes, Supple  Eyes:  PERRL, Conjunctiva pink  Respiratory:  Normal breath sounds, No respiratory distress  Cardiovascular:  Normal heart rate, Normal rhythm, No murmurs  GI:  Bowel sounds normal, Soft, No tenderness  Musculoskeletal:  No gross deformity or lesions, normal dorsalis pedis pulses  Skin: No acanthosis nigricans, lipoatrophy or lipodystrophy  Neurologic:  Alert & oriented x 3, nonfocal  Psychiatric:  Affect, Mood, Insight appropriate        Assessment     1. Type 2 diabetes mellitus with hyperglycemia, with long-term current use of insulin (H)        Plan     The timing of the Victoza procurement is unfortunate, in that if it works, she will likely find herself with a much lower A1c. Given her current GMI of 7.7, shows that she is doing fairly well with her current regimen. She has another appointment with MTM at the end of next month, and if so, this will further serve her well in addition.  No further recommendations at this time.              Claudia Winter NP   Endocrinology  1/23/2024  12:06 PM      Lab Results     Microalbumin Urine mg/dL   Date Value Ref Range Status   12/17/2021 <0.50 0.00 - 1.99 mg/dL Final       Cholesterol   Date Value Ref Range Status   01/15/2024 225 (H) <200 mg/dL Final     Direct Measure HDL   Date Value Ref Range Status   01/15/2024 63 >=50 mg/dL Final     Triglycerides   Date Value Ref Range Status   01/15/2024 92 <150 mg/dL Final       [unfilled]      Current Medications     Outpatient Medications Prior to Visit   Medication Sig Dispense Refill    acetaminophen (TYLENOL) 500 MG tablet TAKE 1 TABLET BY MOUTH EVERY 6 HOURS AS NEEDED FOR PAIN. 100 tablet 5    calcium carbonate (TUMS) 500 MG chewable tablet Take 1 tablet (500 mg) by mouth daily as needed for heartburn 30 tablet 1    canagliflozin (INVOKANA) 300 MG tablet Take 1 tablet (300 mg) by mouth daily 30 tablet 11    Continuous Blood Gluc Sensor (FREESTYLE AILYN 2 SENSOR) MISC 1 each every 14 days Use 1 sensor every 14 days. Use to read blood sugars per 's instructions. 2 each 5    CONTOUR NEXT TEST test strip USE TO TEST BLOOD SUGAR 1 TIMES DAILY OR AS DIRECTED. 50 strip 6    cyclobenzaprine (FLEXERIL) 5 MG tablet Take 1-2 tablets (5-10 mg) by mouth 3 times daily as needed for other (rectal pain) 60 tablet 3    escitalopram (LEXAPRO) 5 MG tablet Take 1 tablet (5 mg) by mouth daily 30 tablet 11    hydrochlorothiazide (HYDRODIURIL) 25 MG tablet Take 1 tablet (25 mg) by mouth daily In the morning 30 tablet 11    insulin aspart prot & aspart (NOVOLOG MIX 70/30 PEN) (70-30) 100 UNIT/ML pen 25 units in the morning and 15 units in the evening 45 mL 3    insulin pen needle (TRUEPLUS PEN NEEDLES) 32G X 4 MM miscellaneous Use 3 pen needles daily or as directed. 300 each 4    losartan (COZAAR) 50 MG tablet Take 1 tablet (50 mg) by mouth daily 30 tablet 11    metFORMIN (GLUCOPHAGE XR) 500 MG 24 hr tablet Take 1 tablet (500 mg) by mouth 2 times daily (with  meals) 60 tablet 11    omeprazole (PRILOSEC) 20 MG DR capsule TAKE ONE CAPSULE BY MOUTH DAILY BEFORE BREAKFAST Strength: 20 mg 90 capsule 3    rosuvastatin (CRESTOR) 40 MG tablet Take 1 tablet (40 mg) by mouth at bedtime 30 tablet 11    vitamin D2 (ERGOCALCIFEROL) 65647 units (1250 mcg) capsule TAKE 1 CAPSULE (50,000 UNITS) BY MOUTH ONCE A WEEK 4 capsule 3    blood glucose (NO BRAND SPECIFIED) lancets standard Use to test blood sugar 2 times daily or as directed. 60 each 11    clobetasol (TEMOVATE) 0.05 % external ointment Apply topically 2 times daily You must take 1 week off out of every 4 weeks (Patient not taking: Reported on 1/23/2024) 60 g 4    lidocaine (XYLOCAINE) 5 % external ointment Apply topically 2 times daily as needed for moderate pain (Patient not taking: Reported on 1/23/2024) 50 g 4    liraglutide (VICTOZA) 18 MG/3ML solution Inject 0.6 mg Subcutaneous daily For 2 weeks, then increase to 1.2 mg daily 6 mL 3     No facility-administered medications prior to visit.

## 2024-01-23 NOTE — LETTER
1/23/2024         RE: Ramona Wilder  [address]  [facility] [address]        Dear Colleague,    Thank you for referring your patient, Ramona Wilder, to the [facility]. Please see a copy of my visit note below.    [facility] ENDOCRINOLOGY    Diabetes Note 1/23/2024    Ramona Wilder, 1960, [id number]          Reason for visit      1. Type 2 diabetes mellitus with hyperglycemia, with long-term current use of insulin (H)        HPI     Ramona Wilder is a very pleasant 63 year old old female who presents for follow up.  SUMMARY:    Ramona is seen today in referral for DM 2 with hyperglycemia. Ramona was diagnosed with Diabetes when she came to the  in 2006. Her last A1c was 9.1.     She was referred to me back in August.  Since then, she was able to get a Zapcoder 2 up and running and brought her [place] with her today, which was downloaded data reviewed.     Her sensor was active 77% of the time over the last two weeks. GMI was 7.7 and TIR was 51% of the time. She was low 1% and high or very high 48%.     She is currently taking Novolog 70/30 with a dose of 25 units in the morning and 15 units with dinner. She is taking Invokana, 300 mg daily, and takes Metformin XR, 500 mg BID. She does NOT like that they are so large, but states that she has been doing better with the XR. She does complain of some reflux.    She has been working with both CDE and MTM at her local Clinic. She has just now gotten a PA for Victoza, and will be picking it up today.         Blood glucose data:      Past Medical History     Patient Active Problem List   Diagnosis     Carotid Artery Stenosis     Moderate Recurrent Major Depression     Anxiety     Insomnia     Hypertension     Vitamin D deficiency     Gastropathy     Type 2 diabetes mellitus with hyperglycemia, with long-term current use of insulin (H)     Hyperlipidemia     Headache     Chronic Pain     Back Strain     Dermatitis     Metabolic Tests Nonspecific Elevation Of  Transaminase Levels     Pain in finger of right hand     Left knee pain     Urinary, incontinence, stress female     Right lumbar radiculitis     Proteinuria due to type 2 diabetes mellitus (H)     Lichen sclerosus et atrophicus     Diabetic retinopathy associated with type 2 diabetes mellitus (H)        Family History       family history includes Asthma in her sister; Cancer in her sister.    Social History      reports that she has never smoked. She has been exposed to tobacco smoke. She quit smokeless tobacco use about 4 months ago.  Her smokeless tobacco use included chew. She reports that she does not drink alcohol and does not use drugs.      Review of Systems     Patient has no polyuria or polydipsia, no chest pain, dyspnea or TIA's, no numbness, tingling or pain in extremities  Remainder negative except as noted in HPI.    Vital Signs     /70   Pulse 88   Wt 68.9 kg (151 lb 12.8 oz)   LMP  (LMP Unknown)   BMI 31.19 kg/m    Wt Readings from Last 3 Encounters:   01/23/24 68.9 kg (151 lb 12.8 oz)   01/15/24 68.2 kg (150 lb 4.8 oz)   12/04/23 67.5 kg (148 lb 14.4 oz)       Physical Exam     Constitutional:  Well developed, Well nourished  HENT:  Normocephalic,   Neck: Thyroid normal, No lymph nodes, Supple  Eyes:  PERRL, Conjunctiva pink  Respiratory:  Normal breath sounds, No respiratory distress  Cardiovascular:  Normal heart rate, Normal rhythm, No murmurs  GI:  Bowel sounds normal, Soft, No tenderness  Musculoskeletal:  No gross deformity or lesions, normal dorsalis pedis pulses  Skin: No acanthosis nigricans, lipoatrophy or lipodystrophy  Neurologic:  Alert & oriented x 3, nonfocal  Psychiatric:  Affect, Mood, Insight appropriate        Assessment     1. Type 2 diabetes mellitus with hyperglycemia, with long-term current use of insulin (H)        Plan     The timing of the Victoza procurement is unfortunate, in that if it works, she will likely find herself with a much lower A1c. Given her  current GMI of 7.7, shows that she is doing fairly well with her current regimen. She has another appointment with MTM at the end of next month, and if so, this will further serve her well in addition.  No further recommendations at this time.             Claudia Winter NP  [facility] Endocrinology  1/23/2024  12:06 PM      Lab Results     Microalbumin Urine mg/dL   Date Value Ref Range Status   12/17/2021 <0.50 0.00 - 1.99 mg/dL Final       Cholesterol   Date Value Ref Range Status   01/15/2024 225 (H) <200 mg/dL Final     Direct Measure HDL   Date Value Ref Range Status   01/15/2024 63 >=50 mg/dL Final     Triglycerides   Date Value Ref Range Status   01/15/2024 92 <150 mg/dL Final       [unfilled]      Current Medications     Outpatient Medications Prior to Visit   Medication Sig Dispense Refill     acetaminophen (TYLENOL) 500 MG tablet TAKE 1 TABLET BY MOUTH EVERY 6 HOURS AS NEEDED FOR PAIN. 100 tablet 5     calcium carbonate (TUMS) 500 MG chewable tablet Take 1 tablet (500 mg) by mouth daily as needed for heartburn 30 tablet 1     canagliflozin (INVOKANA) 300 MG tablet Take 1 tablet (300 mg) by mouth daily 30 tablet 11     Continuous Blood Gluc Sensor (FREESTYLE AILYN 2 SENSOR) MISC 1 each every 14 days Use 1 sensor every 14 days. Use to read blood sugars per 's instructions. 2 each 5     CONTOUR NEXT TEST test strip USE TO TEST BLOOD SUGAR 1 TIMES DAILY OR AS DIRECTED. 50 strip 6     cyclobenzaprine (FLEXERIL) 5 MG tablet Take 1-2 tablets (5-10 mg) by mouth 3 times daily as needed for other (rectal pain) 60 tablet 3     escitalopram (LEXAPRO) 5 MG tablet Take 1 tablet (5 mg) by mouth daily 30 tablet 11     hydrochlorothiazide (HYDRODIURIL) 25 MG tablet Take 1 tablet (25 mg) by mouth daily In the morning 30 tablet 11     insulin aspart prot & aspart (NOVOLOG MIX 70/30 PEN) (70-30) 100 UNIT/ML pen 25 units in the morning and 15 units in the evening 45 mL 3     insulin pen needle (TRUEPLUS PEN NEEDLES) 32G X 4  MM miscellaneous Use 3 pen needles daily or as directed. 300 each 4     losartan (COZAAR) 50 MG tablet Take 1 tablet (50 mg) by mouth daily 30 tablet 11     metFORMIN (GLUCOPHAGE XR) 500 MG 24 hr tablet Take 1 tablet (500 mg) by mouth 2 times daily (with meals) 60 tablet 11     omeprazole (PRILOSEC) 20 MG DR capsule TAKE ONE CAPSULE BY MOUTH DAILY BEFORE BREAKFAST Strength: 20 mg 90 capsule 3     rosuvastatin (CRESTOR) 40 MG tablet Take 1 tablet (40 mg) by mouth at bedtime 30 tablet 11     vitamin D2 (ERGOCALCIFEROL) 70826 units (1250 mcg) capsule TAKE 1 CAPSULE (50,000 UNITS) BY MOUTH ONCE A WEEK 4 capsule 3     blood glucose (NO BRAND SPECIFIED) lancets standard Use to test blood sugar 2 times daily or as directed. 60 each 11     clobetasol (TEMOVATE) 0.05 % external ointment Apply topically 2 times daily You must take 1 week off out of every 4 weeks (Patient not taking: Reported on 1/23/2024) 60 g 4     lidocaine (XYLOCAINE) 5 % external ointment Apply topically 2 times daily as needed for moderate pain (Patient not taking: Reported on 1/23/2024) 50 g 4     liraglutide (VICTOZA) 18 MG/3ML solution Inject 0.6 mg Subcutaneous daily For 2 weeks, then increase to 1.2 mg daily 6 mL 3     No facility-administered medications prior to visit.               Again, thank you for allowing me to participate in the care of your patient.        Sincerely,        Claudia Winter NP

## 2024-02-07 DIAGNOSIS — K31.9 DISEASE OF STOMACH AND DUODENUM: ICD-10-CM

## 2024-02-19 ENCOUNTER — TRANSFERRED RECORDS (OUTPATIENT)
Dept: HEALTH INFORMATION MANAGEMENT | Facility: CLINIC | Age: 64
End: 2024-02-19

## 2024-03-01 ENCOUNTER — TRANSFERRED RECORDS (OUTPATIENT)
Dept: HEALTH INFORMATION MANAGEMENT | Facility: CLINIC | Age: 64
End: 2024-03-01
Payer: COMMERCIAL

## 2024-03-04 ENCOUNTER — OFFICE VISIT (OUTPATIENT)
Dept: PHARMACY | Facility: CLINIC | Age: 64
End: 2024-03-04
Payer: COMMERCIAL

## 2024-03-04 VITALS — BODY MASS INDEX: 31.21 KG/M2 | WEIGHT: 151.9 LBS

## 2024-03-04 DIAGNOSIS — R12 HEARTBURN: ICD-10-CM

## 2024-03-04 DIAGNOSIS — E11.65 TYPE 2 DIABETES MELLITUS WITH HYPERGLYCEMIA, WITH LONG-TERM CURRENT USE OF INSULIN (H): Primary | ICD-10-CM

## 2024-03-04 DIAGNOSIS — E78.5 HYPERLIPIDEMIA, UNSPECIFIED HYPERLIPIDEMIA TYPE: ICD-10-CM

## 2024-03-04 DIAGNOSIS — E55.9 VITAMIN D DEFICIENCY: ICD-10-CM

## 2024-03-04 DIAGNOSIS — I10 ESSENTIAL HYPERTENSION: ICD-10-CM

## 2024-03-04 DIAGNOSIS — K62.89 RECTAL PAIN: ICD-10-CM

## 2024-03-04 DIAGNOSIS — Z79.4 TYPE 2 DIABETES MELLITUS WITH HYPERGLYCEMIA, WITH LONG-TERM CURRENT USE OF INSULIN (H): Primary | ICD-10-CM

## 2024-03-04 DIAGNOSIS — F32.A DEPRESSION, UNSPECIFIED DEPRESSION TYPE: ICD-10-CM

## 2024-03-04 PROCEDURE — 99606 MTMS BY PHARM EST 15 MIN: CPT | Performed by: PHARMACIST

## 2024-03-04 PROCEDURE — 99607 MTMS BY PHARM ADDL 15 MIN: CPT | Performed by: PHARMACIST

## 2024-03-04 RX ORDER — ERGOCALCIFEROL 1.25 MG/1
50000 CAPSULE, LIQUID FILLED ORAL WEEKLY
Qty: 4 CAPSULE | Refills: 3 | Status: SHIPPED | OUTPATIENT
Start: 2024-03-04 | End: 2024-05-17

## 2024-03-04 NOTE — PROGRESS NOTES
Medication Therapy Management (MTM) Encounter    ASSESSMENT:                            Medication Adherence/Access: Reasonable for patient to continue without use of pillbox, as she has a system that currently works well for her.  See below as recommended switching administration time of certain medications.    1. Type 2 diabetes mellitus with hyperglycemia, with long-term current use of insulin (H)  A1c not at goal <7%.  Per CGM, blood sugars are close to meeting goal >70% within target range.  Patient would benefit from slight dose increase of insulin today.  Patient did not tolerate Victoza, removed from medication list today.  Once covered by insurance, could consider trial with Ozempic in the future.  Recommended taking Invokana in the morning, rather than in the evening.    2. Essential hypertension  BP at goal <140/90 mmHg.  Recommend patient continue current medications. Recommended taking hydrochlorothiazide in the morning.     3. Hyperlipidemia, unspecified hyperlipidemia type  Patient to continue high intensity statin.     4. Vitamin D deficiency  Continue current ergocalciferol, could consider rechecking vitamin D level at next blood draw.    5. Heartburn  Patient to check for refill of PPI from the pharmacy.    6. Depression, unspecified depression type  Continue current SSRI.    7. Rectal pain  Patient to follow up with PCP.     PLAN:                            Dose increase: Novolog 70/30 mix insulin 28 units before breakfast and 15 units before dinner  Refill and resume use of Ergocalciferol once weekly (vitamin D)  Switch administration time of medications (labeled vial today)  Take Invokana in the morning  Take hydrochlorothiazide in the morning    Medication issues to be addressed at a future visit:   Recheck A1c and Vitamin D at next visit    Follow-up: Return in about 2 months (around 5/7/2024) for PCP.  Forgot to schedule MTM f/up on 3/4/24, recommend MTM after next  pcp    SUBJECTIVE/OBJECTIVE:                          Ramona Wilder is a 63 year old female coming in for a follow-up visit from 1/15/24. Patient was accompanied by daughter. Patient seen with LORE Price .      Reason for visit: Diabetes Follow-Up.    Allergies/ADRs: Reviewed in chart  Past Medical History: Reviewed in chart  Tobacco: She reports that she has never smoked. She has been exposed to tobacco smoke. She quit smokeless tobacco use about 6 months ago.  Her smokeless tobacco use included chew.  Alcohol:  reports no history of alcohol use.    Medication Adherence/Access: Patient manges her own medications, taking directly from medication vials. Brings medication vials and pilbox today (provided at last MTM visit), empty - states this was confusing for her, and no longer using.   States that she knows how to get refills from pharmacy.  Only rarely will forget medications, about once monthly.     Diabetes   Metformin  mg twice daily  Invokana 300 mg daily in the evening - forgot to switch to AM admin  Victoza stopped using it after 2 days - States that she was been feeling gassy/bloated and not hungry - not willing to retrial this medication  Novolog 70/30 Mix insulin 25 units AM and 15 unit PM 5-10 minutes before meals, reports no missed doses    Patient is not experiencing side effects.   Patient hesitant to increase metformin dose due to side effects in the past.   Blood sugar monitoring: Continuous Glucose Monitor Rosalinda 2; see below.   Current diabetes symptoms: hypoglycemia - aware of how to correct  if needed  Diet/Exercise: Trying to eat only 1 meal per day and fruit other times of day. States that sometimes fruits will increase her blood sugar a lot. Eating brown rice.   MedHx: metformin 2000 mg daily (GI sx)     Eye exam is up to date.   Foot exam: due  Urine Albumin:   Lab Results   Component Value Date    UMALCR 74.83 (H) 01/15/2024      Lab Results   Component Value Date    A1C 9.1 (H)  01/15/2024         Hypertension   Hydrochlorothiazide 25 mg daily at bedtime - states that sometimes she will take in AM other times in PM  Losartan 50 mg daily in the evening  Patient reports no current medication side effects - endorsing nighttime urination  Patient does not self-monitor blood pressure.       BP Readings from Last 3 Encounters:   01/23/24 122/70   01/15/24 130/84   12/04/23 120/80     Pulse Readings from Last 3 Encounters:   01/23/24 88   01/15/24 94   12/04/23 109     Hyperlipidemia   Rosuvastatin 40 mg daily  AM  Patient reports no significant myalgias or other side effects.  The 10-year ASCVD risk score (Rishi GOFF, et al., 2019) is: 9.4%    Values used to calculate the score:      Age: 63 years      Sex: Female      Is Non- : No      Diabetic: Yes      Tobacco smoker: No      Systolic Blood Pressure: 122 mmHg      Is BP treated: Yes      HDL Cholesterol: 63 mg/dL      Total Cholesterol: 225 mg/dL     Recent Labs   Lab Test 01/27/23  1449 12/17/21  1133   CHOL 180 224*   HDL 61 58   LDL 94 147*   TRIG 124 94     Low Vitamin D:   Vitamin D2 50,000 units once weekly on Saturdays - ran out and not able to get refills from pharmacy  Lab Results   Component Value Date    VITDT 26 01/27/2023    VITDT 30 12/17/2021      Heartburn:   Omeprazole 20 mg daily in the evening - does NOT bring this with today  Tums 500 mg daily as needed for pain - finds this helpful   She isn't sure how long she has been without the omeprazole.     Depression:   Escitalopram 5 mg daily   Still feeling frustrated and irritated, been like this for a long time.     Rectal Pain:   Cyclobenzaprine 5 mg 1-2 tab three times daily as needed  States that she has not been sleeping very well with recent rectal pain. Had surgery in the past for perineal rip. Thinks current medication has been helping only a little bit. Also taking tylenol.     Today's Vitals: Wt 151 lb 14.4 oz (68.9 kg)   LMP  (LMP Unknown)    BMI 31.21 kg/m    ----------------      I spent 30 minutes with this patient today. All changes were made via collaborative practice agreement with Coreen Kendall MD. A copy of the visit note was provided to the patient's provider(s).    A summary of these recommendations was declined by the patient.    Heather Morales, PharmD, [facility]  Medication Therapy Management Pharmacist     Medication Therapy Recommendations  Type 2 diabetes mellitus with hyperglycemia, with long-term current use of insulin (H)    Current Medication: insulin aspart prot & aspart (NOVOLOG MIX 70/30 PEN) (70-30) 100 UNIT/ML pen   Rationale: Dose too low - Dosage too low - Effectiveness   Recommendation: Increase Dose   Status: Accepted per CPA         Vitamin D deficiency    Current Medication: vitamin D2 (ERGOCALCIFEROL) 38165 units (1250 mcg) capsule   Rationale: Preventive therapy - Needs additional medication therapy - Indication   Recommendation: Start Medication   Status: Accepted per CPA

## 2024-03-22 ENCOUNTER — OFFICE VISIT (OUTPATIENT)
Dept: FAMILY MEDICINE | Facility: CLINIC | Age: 64
End: 2024-03-22
Payer: COMMERCIAL

## 2024-03-22 VITALS
BODY MASS INDEX: 31.51 KG/M2 | RESPIRATION RATE: 28 BRPM | HEART RATE: 97 BPM | WEIGHT: 150.08 LBS | SYSTOLIC BLOOD PRESSURE: 142 MMHG | DIASTOLIC BLOOD PRESSURE: 88 MMHG | TEMPERATURE: 98.3 F | HEIGHT: 58 IN | OXYGEN SATURATION: 99 %

## 2024-03-22 DIAGNOSIS — E11.3413 SEVERE NONPROLIFERATIVE DIABETIC RETINOPATHY OF BOTH EYES WITH MACULAR EDEMA ASSOCIATED WITH TYPE 2 DIABETES MELLITUS (H): ICD-10-CM

## 2024-03-22 DIAGNOSIS — K62.89 ANAL OR RECTAL PAIN: ICD-10-CM

## 2024-03-22 DIAGNOSIS — E11.65 TYPE 2 DIABETES MELLITUS WITH HYPERGLYCEMIA, WITH LONG-TERM CURRENT USE OF INSULIN (H): Primary | ICD-10-CM

## 2024-03-22 DIAGNOSIS — I10 ESSENTIAL HYPERTENSION: ICD-10-CM

## 2024-03-22 DIAGNOSIS — M54.16 LUMBAR RADICULOPATHY: ICD-10-CM

## 2024-03-22 DIAGNOSIS — R52 PAIN: ICD-10-CM

## 2024-03-22 DIAGNOSIS — Z79.4 TYPE 2 DIABETES MELLITUS WITH HYPERGLYCEMIA, WITH LONG-TERM CURRENT USE OF INSULIN (H): Primary | ICD-10-CM

## 2024-03-22 PROBLEM — E11.3513 PROLIFERATIVE DIABETIC RETINOPATHY OF BOTH EYES WITH MACULAR EDEMA ASSOCIATED WITH TYPE 2 DIABETES MELLITUS (H): Status: ACTIVE | Noted: 2021-12-17

## 2024-03-22 PROBLEM — E11.311: Status: ACTIVE | Noted: 2021-12-17

## 2024-03-22 PROCEDURE — 99214 OFFICE O/P EST MOD 30 MIN: CPT | Performed by: FAMILY MEDICINE

## 2024-03-22 RX ORDER — CYCLOBENZAPRINE HCL 5 MG
5-10 TABLET ORAL 3 TIMES DAILY PRN
Qty: 60 TABLET | Refills: 5 | Status: SHIPPED | OUTPATIENT
Start: 2024-03-22 | End: 2024-08-23

## 2024-03-22 RX ORDER — PSEUDOEPHED/ACETAMINOPH/DIPHEN 30MG-500MG
500 TABLET ORAL EVERY 6 HOURS PRN
Qty: 100 TABLET | Refills: 5 | Status: SHIPPED | OUTPATIENT
Start: 2024-03-22

## 2024-03-22 RX ORDER — LOSARTAN POTASSIUM 100 MG/1
100 TABLET ORAL DAILY
Qty: 30 TABLET | Refills: 11 | Status: SHIPPED | OUTPATIENT
Start: 2024-03-22

## 2024-03-22 ASSESSMENT — PATIENT HEALTH QUESTIONNAIRE - PHQ9
SUM OF ALL RESPONSES TO PHQ QUESTIONS 1-9: 12
SUM OF ALL RESPONSES TO PHQ QUESTIONS 1-9: 12
10. IF YOU CHECKED OFF ANY PROBLEMS, HOW DIFFICULT HAVE THESE PROBLEMS MADE IT FOR YOU TO DO YOUR WORK, TAKE CARE OF THINGS AT HOME, OR GET ALONG WITH OTHER PEOPLE: VERY DIFFICULT

## 2024-03-22 NOTE — PROGRESS NOTES
"  Assessment & Plan     Anal or rectal pain  Patient has had anorectal pain since early 2022.  Has complicated history of anal sphincter repair of large defect through [facility] in 2011.  We've had her go back to see the colorectal surgeon 7/11/22 and that exam normal.  She has continued to experience pain, often severe.  Given no apparent ano-rectal pathology, no mass on CT abd-pelvis 9/28/23, improvement with cyclobenzaprine, I am now wondering if this could be a lumbo-sacral radicular symptom, as nothing else explains the pain and she has other radicular symptoms. Will check MRI and go from there.    - cyclobenzaprine (FLEXERIL) 5 MG tablet; Take 1-2 tablets (5-10 mg) by mouth 3 times daily as needed for other (rectal pain)  - MR Lumbar Spine w/o Contrast; Future    Type 2 diabetes mellitus with hyperglycemia, with long-term current use of insulin (H)  Followed by endo; overdue for follow-up. Referral entered to get her back in.  - Adult Endocrinology  Referral; Future    Severe nonproliferative diabetic retinopathy of both eyes with macular edema associated with type 2 diabetes mellitus (H)  Last optometry eye exam ([facility]) 2/19/24.  Last ophtho exam ([facility]) on file was 11/14/23 with 3 month follow-up advised; I can see ophtho claims data from 2/9/24, so I think she is up to date.    Hypertension  Will increase losartan from 50 to 100mg daily.  - losartan (COZAAR) 100 MG tablet; Take 1 tablet (100 mg) by mouth daily    Lumbar radiculopathy  As above.  - MR Lumbar Spine w/o Contrast; Future    Pain  - acetaminophen (TYLENOL) 500 MG tablet; Take 1 tablet (500 mg) by mouth every 6 hours as needed for pain            BMI  Estimated body mass index is 31.37 kg/m  as calculated from the following:    Height as of this encounter: 1.473 m (4' 10\").    Weight as of this encounter: 68.1 kg (150 lb 1.3 oz).             Remedios Greene is a 63 year old, presenting " "for the following health issues:  Rectal Problem (Rectal pain, worsen when sitting and at night time) and Recheck Medication (Need meds refill)        3/22/2024    12:30 PM   Additional Questions   Roomed by vidal hollis MA   Accompanied by daughter in law     History of Present Illness       Reason for visit:  Rectal pain      Rectal pain for about 2 years.  Also back pain.  Also tingling in right leg.  Distant history of 4th degree vaginal laceration, the repair in 2011.  Was fine until about 2 years ago.  Worse with sitting and at nighttime.  Stool has to be soft or it will hurt.    Never having blood in stools.    Took cyclobenzaprine in past for this and it helped. Some.                    Objective    BP (!) 142/88   Pulse 97   Temp 98.3  F (36.8  C) (Oral)   Resp 28   Ht 1.473 m (4' 10\")   Wt 68.1 kg (150 lb 1.3 oz)   LMP  (LMP Unknown)   SpO2 99%   BMI 31.37 kg/m    Body mass index is 31.37 kg/m .  Physical Exam     Gen:  A&A, NAD  GI:  Normal external and internal rectal exam. No clear tenderness on exam.          Signed Electronically by: Coreen Kendall MD    "

## 2024-03-25 DIAGNOSIS — E11.65 TYPE 2 DIABETES MELLITUS WITH HYPERGLYCEMIA, WITH LONG-TERM CURRENT USE OF INSULIN (H): ICD-10-CM

## 2024-03-25 DIAGNOSIS — Z79.4 TYPE 2 DIABETES MELLITUS WITH HYPERGLYCEMIA, WITH LONG-TERM CURRENT USE OF INSULIN (H): ICD-10-CM

## 2024-03-26 ENCOUNTER — TELEPHONE (OUTPATIENT)
Dept: FAMILY MEDICINE | Facility: CLINIC | Age: 64
End: 2024-03-26
Payer: COMMERCIAL

## 2024-03-26 NOTE — TELEPHONE ENCOUNTER
Forms/Letter Request    Type of form/letter: OTHER: prescription Request: Bath seat w/back & arms 275lb cap       Do we have the form/letter: Yes: TS    Who is the form from? [employer] (if other please explain)    Where did/will the form come from? form was faxed in    When is form/letter needed by: asap    How would you like the form/letter returned: Fax : [phone]

## 2024-03-27 ENCOUNTER — MEDICAL CORRESPONDENCE (OUTPATIENT)
Dept: HEALTH INFORMATION MANAGEMENT | Facility: CLINIC | Age: 64
End: 2024-03-27
Payer: COMMERCIAL

## 2024-04-06 ENCOUNTER — HOSPITAL ENCOUNTER (OUTPATIENT)
Dept: MRI IMAGING | Facility: HOSPITAL | Age: 64
Discharge: HOME | End: 2024-04-06
Attending: FAMILY MEDICINE | Admitting: FAMILY MEDICINE
Payer: COMMERCIAL

## 2024-04-06 DIAGNOSIS — K62.89 ANAL OR RECTAL PAIN: ICD-10-CM

## 2024-04-06 DIAGNOSIS — M54.16 LUMBAR RADICULOPATHY: ICD-10-CM

## 2024-04-06 PROCEDURE — 72148 MRI LUMBAR SPINE W/O DYE: CPT

## 2024-04-16 DIAGNOSIS — M48.07 NEURAL FORAMINAL STENOSIS OF LUMBOSACRAL SPINE: Primary | ICD-10-CM

## 2024-04-17 ENCOUNTER — TELEPHONE (OUTPATIENT)
Dept: FAMILY MEDICINE | Facility: CLINIC | Age: 64
End: 2024-04-17
Payer: COMMERCIAL

## 2024-04-17 NOTE — TELEPHONE ENCOUNTER
----- Message from Coreen Kendall MD sent at 4/16/2024  8:56 PM CDT -----  Please let pt know:  The MRI of her low spine showed significant pinched nerves in her very low spine.  I would like to have her see a spine specialist to see if they think this might be causing her rectal pain and, if so, what can be done about it.  I've placed a referral.

## 2024-04-17 NOTE — TELEPHONE ENCOUNTER
----- Message from Coreen Kendall MD sent at 4/16/2024  8:56 PM CDT -----  Please let pt know:  The MRI of her low spine showed significant pinched nerves in her very low spine.  I would like to have her see a spine specialist to see if they think this might be causing her rectal pain and, if so, what can be done about it.  I've placed a referral.             Spoke to sonJeniffer on the phone with message above  relay to.  Referral contact information also provided in case missed the referral call.  Son verbalized understood.    Jae Dickson RN  [facility]

## 2024-05-13 ENCOUNTER — OFFICE VISIT (OUTPATIENT)
Dept: PHYSICAL MEDICINE AND REHAB | Facility: CLINIC | Age: 64
End: 2024-05-13
Attending: FAMILY MEDICINE
Payer: COMMERCIAL

## 2024-05-13 VITALS
BODY MASS INDEX: 32.56 KG/M2 | HEART RATE: 85 BPM | DIASTOLIC BLOOD PRESSURE: 74 MMHG | SYSTOLIC BLOOD PRESSURE: 153 MMHG | WEIGHT: 155.8 LBS | OXYGEN SATURATION: 98 %

## 2024-05-13 DIAGNOSIS — M48.07 NEURAL FORAMINAL STENOSIS OF LUMBOSACRAL SPINE: ICD-10-CM

## 2024-05-13 DIAGNOSIS — G89.29 CHRONIC BILATERAL LOW BACK PAIN WITHOUT SCIATICA: ICD-10-CM

## 2024-05-13 DIAGNOSIS — R20.0 RIGHT LEG NUMBNESS: ICD-10-CM

## 2024-05-13 DIAGNOSIS — K62.89 RECTAL PAIN: Primary | ICD-10-CM

## 2024-05-13 DIAGNOSIS — M54.50 CHRONIC BILATERAL LOW BACK PAIN WITHOUT SCIATICA: ICD-10-CM

## 2024-05-13 PROCEDURE — 99204 OFFICE O/P NEW MOD 45 MIN: CPT | Performed by: NURSE PRACTITIONER

## 2024-05-13 ASSESSMENT — PAIN SCALES - GENERAL: PAINLEVEL: EXTREME PAIN (8)

## 2024-05-13 NOTE — LETTER
5/13/2024         RE: Ramona Wilder  [address]  [facility] [address]        Dear Colleague,    Thank you for referring your patient, Ramona Wilder, to the [facility]. Please see a copy of my visit note below.    ASSESSMENT: Ramona Wilder is a 63 year old female who presents for consultation at the request of PCP Coreen Kendall, who presents today for new patient evaluation of:    -rectal pain    Patient has sensory loss R leg on exam. Severe rectal pain x 1 yr. Seems very delayed onset if related to surgery. Has had GI opinion and given muscle relaxer for rectal spasm which has helped. We reviewed lumbar MRI. While she does have wear and tear of L3-4, L4-5 and L5-S1 discs with severe left, mild right L5-S1 foraminal stenosis, moderate bilateral L4-5 foraminal stenosis, mild to moderate left L3-4 foraminal stenosis, her focal rectal pain would not be consistent with an associated radiculopathy. There is no severe central canal narrowing. The foraminal narrowing could be contributing to  right leg numbness.     We reviewed a dermatome map, discussed sacral MRI to look for any sacral nerve compression or tailbone concerns which would be more likely to cause rectal specific pain. CT pelvis soft tissue did not show any fluid, mass of rectum or anus. Tailbone does appear to have some chronic appearing distal angulation. Will evaluate these areas in more detail with spine specific imaging. For now recommended adding PT and a donut pillow. Continue flexeril. Discussed role of rx NSAIDs, she would like to defer today. She would wish to avoid any injections if possible. She will follow up in person to review her MRI           No data to display                     Diagnoses and all orders for this visit:  Rectal pain  -     Physical Therapy  Referral; Future  -     MR Sacrum and Coccyx w/o Contrast; Future  Neural foraminal stenosis of lumbosacral spine  -     Spine   Referral  Chronic bilateral low back pain without sciatica  -     Physical Therapy  Referral; Future  Right leg numbness  -     MR Sacrum and Coccyx w/o Contrast; Future      PLAN:  Reviewed spine anatomy and disease process. Discussed diagnosis and treatment options with the patient today. A shared decision making model was used.  The patient's values and choices were respected. The following represents what was discussed and decided upon by the provider and the patient.      -DIAGNOSTIC TESTS:  Images were personally reviewed and interpreted and explained to patient today using a spine model.   --sacral MRI without contrast ordered    -PHYSICAL THERAPY:    --Referral to PT placed  Discussed the importance of core strengthening, ROM, stretching exercises with the patient and how each of these entities is important in decreasing pain.  Explained to the patient that the purpose of physical therapy is to teach the patient a home exercise program.  These exercises need to be performed every day in order to decrease pain and prevent future occurrences of pain.        -MEDICATIONS:    --continue flexeril    -recommended otc ibuprofen or aleve. Wished to defer a prescription NSAID today  Discussed multiple medication options today with patient. Discussed risks, side effects, and proper use of medications. Patient verbalized understanding.    -INTERVENTIONS:    -Discussed the role of injections with patient today. Patient would be a good candidate in the future for either epidural steroid injections or medial branch blocks if indicated based on symptoms and supported by imaging results.    -PATIENT EDUCATION:  Total time of 40 minutes, on the day of service, spent with the patient, reviewing the chart, placing orders, and documenting.   -Today we also discussed the pros and cons of the current treatment plan.    -FOLLOW-UP:   in person to review imaging results    Advised patient to call the Spine Center if  symptoms worsen, new numbness or weakness develops in the legs, or if they develop new or worsening problems controlling bladder or bowel function.   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 Sharp, throbbing pain. Open wound type of feeling.  8/10 today, 10/10 at worst, 6/10 at best. Worse with sitting, lying down, and at night time, also after eating. Gets much better with standing, walking. Did not have any pain following her anal surgery until 1 yrs ago. Her bowel movements are normal. No rectal bleeding. She was seen by rectal associates since onset, and they felt it was related to rectal sphincter spasm, possibly coccyx related. She was given rx flexeril which was refilled by her PCP at her appt for routine physical on 3/22. The medication does help    She did fall down and hurt her tailbone 20 years ago. She did not have any pain following this fall however    She also has chronic lower back pain and tingling in the right leg. The lower back pain is much different than the rectal pain.  This feels dull. It is constant. She has no leg pain, numbness, weakness, saddle anesthesia, or changes in bowel or bladder control.    Seen by PCP 3/22/24 MRI lumbar spine ordered, flexeril sent. She has been taking flexeril and ibuprofen, tylenol for the rectal pain which helps.   She has also tried tylenol which does not help much    She has not had any PT, injections or previous spine surgeries      -Treatment to Date:     -Medications:  Ibuprofen   Aleve      Current Outpatient Medications   Medication Sig Dispense  Refill     acetaminophen (TYLENOL) 500 MG tablet Take 1 tablet (500 mg) by mouth every 6 hours as needed for pain 100 tablet 5     calcium carbonate (TUMS) 500 MG chewable tablet Take 1 tablet (500 mg) by mouth daily as needed for heartburn 30 tablet 1     canagliflozin (INVOKANA) 300 MG tablet Take 1 tablet (300 mg) by mouth daily 30 tablet 11     Continuous Blood Gluc Sensor (FREESTYLE AILYN 2 SENSOR) MISC 1 each every 14 days Use 1 sensor every 14 days. Use to read blood sugars per 's instructions. 2 each 5     cyclobenzaprine (FLEXERIL) 5 MG tablet Take 1-2 tablets (5-10 mg) by mouth 3 times daily as needed for other (rectal pain) 60 tablet 5     escitalopram (LEXAPRO) 5 MG tablet Take 1 tablet (5 mg) by mouth daily 30 tablet 11     hydrochlorothiazide (HYDRODIURIL) 25 MG tablet Take 1 tablet (25 mg) by mouth daily In the morning 30 tablet 11     insulin aspart prot & aspart (NOVOLOG MIX 70/30 PEN) (70-30) 100 UNIT/ML pen 28 units in the morning and 15 units in the evening 45 mL 3     insulin pen needle (TRUEPLUS PEN NEEDLES) 32G X 4 MM miscellaneous Use 3 pen needles daily or as directed. 300 each 4     losartan (COZAAR) 100 MG tablet Take 1 tablet (100 mg) by mouth daily 30 tablet 11     metFORMIN (GLUCOPHAGE XR) 500 MG 24 hr tablet Take 1 tablet (500 mg) by mouth 2 times daily (with meals) 60 tablet 11     omeprazole (PRILOSEC) 20 MG DR capsule TAKE ONE CAPSULE BY MOUTH DAILY BEFORE BREAKFAST 30 capsule 3     rosuvastatin (CRESTOR) 40 MG tablet Take 1 tablet (40 mg) by mouth at bedtime 30 tablet 11     vitamin D2 (ERGOCALCIFEROL) 93130 units (1250 mcg) capsule Take 1 capsule (50,000 Units) by mouth once a week 4 capsule 3     No current facility-administered medications for this visit.       Allergies   Allergen Reactions     Aspirin GI Disturbance     Abdominal pain       Lisinopril Angioedema       No past medical history on file.     Patient Active Problem List   Diagnosis     Carotid Artery  Stenosis     Moderate Recurrent Major Depression     Anxiety     Insomnia     Hypertension     Vitamin D deficiency     Gastropathy     Type 2 diabetes mellitus with hyperglycemia, with long-term current use of insulin (H)     Hyperlipidemia     Headache     Chronic Pain     Back Strain     Dermatitis     Metabolic Tests Nonspecific Elevation Of Transaminase Levels     Pain in finger of right hand     Left knee pain     Urinary, incontinence, stress female     Right lumbar radiculitis     Proteinuria due to type 2 diabetes mellitus (H)     Lichen sclerosus et atrophicus     Severe nonproliferative diabetic retinopathy of both eyes with macular edema associated with type 2 diabetes mellitus (H)       Past Surgical History:   Procedure Laterality Date     PHACOEMULSIFICATION WITH STANDARD INTRAOCULAR LENS IMPLANT Right 10/09/2023     [place] ESOPHAGOGASTRODUODENOSCOPY TRANSORAL DIAGNOSTIC      Description: Esophagogastroduodenoscopy;  Proc Date: 2012;  Comments: Reactive Gastropathy. Neg H. Pylori.     RUST REPAIR OF ANAL SPHINCTER,ADULT  2011    Sphincteroplasty and levetaroplasty at [facility] by Dr Leonardo Gibbs and Allen Jones; Repair of childbirth-related large cloacal defect.  Complicated by post-op wound infection requiring readmission.       Family History   Problem Relation Age of Onset     Cancer Sister         Maybe lung cancer?  Lung problem.  in [place].     Asthma Sister      Breast Cancer No family hx of      Ovarian Cancer No family hx of        Reviewed past medical, surgical, and family history with patient found on new patient intake packet located in EMR Media tab.     SOCIAL HX: nonsmoker, no alcohol use, no heavy drinking, no rec drug use    ROS: positive for weight gain, eye pain, reflux, joint pain, muscle pain, itching, easy bruising, anxiety, depression. Specifically negative for bowel/bladder dysfunction, balance changes, headache, dizziness, foot drop,  fevers, chills, appetite changes, nausea/vomiting, unexplained weight loss. Otherwise 13 systems reviewed are negative. Please see the patient's intake questionnaire from today for details.    OBJECTIVE:  BP (!) 153/74   Pulse 85   Wt 155 lb 12.8 oz (70.7 kg)   LMP  (LMP Unknown)   SpO2 98%   BMI 32.56 kg/m      PHYSICAL EXAMINATION:    --CONSTITUTIONAL:  Vital signs as above.  No acute distress.  The patient is well nourished and well groomed. Appears in discomfort  --PSYCHIATRIC:  Appropriate mood and affect. The patient is awake, alert, oriented to person, place, time and answering questions appropriately with clear speech.    --SKIN:  Skin over the face, bilateral lower extremities, and posterior torso is clean, dry, intact without rashes.    --RESPIRATORY: Normal rhythm and effort. No abnormal accessory muscle breathing patterns noted.   --ABDOMINAL:  Non-distended.    --GROSS MOTOR: Gait is non-antalgic. Arises with pain from a seated position. Able to stand on heels and toes without significant difficulty.      --LOWER EXTREMITY MOTOR TESTING:  Hip flexion: right 5/5, left 5/5  Quads: right 5/5, left 5/5  Hamstrings: right 5/5, left 5/5  Dorsiflexion: right 5/5, left 5/5  Plantar flexion: right 5/5, left 5/5    Great toe MTP extension/EHL: right 5/5, left 5/5    --NEUROLOGICAL:  2/4 patellar and achilles reflexes bilaterally. Sensation to light touch is decreased throughout the right leg, otherwise intact throughout the left lower extremity. Babinski is negative. No clonus.    --MUSCULOSKELETAL: Lumbar spine inspection reveals no evidence of deformity. Range of motion is not limited but does appear painful in all planes including lumbar flexion, extension, lateral rotation. No point tenderness to palpation lumbar spine. Some isabelle lower lumbar paraspinal musculature tenderness.     Straight leg raising is negative.    --SACROILIAC JOINT:  One finger point test was negative. Negative SI joint tenderness to  palpation bilaterally.    --VASCULAR:  Bilateral lower extremities are warm without any discoloration.  There is no pitting edema of the bilateral lower extremities.    RESULTS:   Prior medical records from [facility] and Care Everywhere were reviewed today.    Imaging: Spine imaging was personally reviewed and interpreted today. The images were shown to the patient and the findings were explained using a spine model.    EXAM: MR LUMBAR SPINE W/O CONTRAST  LOCATION: [facility]  DATE: 4/6/2024     INDICATION: Right lumbar radicular symptoms plus otherwise unexplained rectal pain.  COMPARISON: MRI lumbar spine 07/26/2013.  TECHNIQUE: Routine Lumbar Spine MRI without IV contrast.     FINDINGS:   Nomenclature is based on 5 lumbar type vertebral bodies. Lateral alignment is normal. Normal lumbar lordosis. Mild stable chronic developmental or degenerative height loss at L5. Vertebral body heights are otherwise preserved. Mild ventral spurring L1-L2   and more caudal levels. Minor Modic type II changes anterior superior corners of L4 on L5. No pars defects.     Dorsal paraspinal musculature and psoas muscles are normal in appearance. Visualized aorta is normal in caliber.     Distal spinal cord is normal in appearance. Conus terminates at the L1 level. No filar abnormality.     T12-L1: Normal disc height. Loss normal disc T2 prolongation. Trace posterior disc bulge. Mild facet arthropathy. No spinal canal or neural foraminal stenosis.     L1-L2: Normal disc height and T2 prolongation. No disc herniation. Mild facet arthropathy. No spinal canal or neural foraminal stenosis.     L2-L3: Slight posterior interspace narrowing. Mild loss of normal disc T2 prolongation. No disc herniation. No facet arthropathy. No spinal canal or neural foraminal stenosis.     L3-L4: Mild interspace narrowing. Loss of normal disc T2 prolongation. Shallow broad-based posterior disc herniation. Mild to moderate facet  arthropathy. No spinal canal stenosis. Moderate right and mild-to-moderate left neural foraminal stenosis.     L4-L5: Normal disc height. Loss of normal disc T2 prolongation. Broad-based posterior disc bulge effaces the caudal foramina. Small right paracentral annular fissure. At least mild facet arthropathy. Ligamentum flavum thickening. Mild canal narrowing   without spinal canal stenosis. Moderate bilateral foraminal stenosis.     L5-S1: Slight posterior interspace narrowing. Loss of normal disc T2 prolongation. Broad-based posterior disc herniation partially effaces the caudal foramina. Mild facet arthropathy. Moderate left greater than right lateral recess stenosis and mild   spinal canal narrowing. Mild right and severe left neural foraminal stenosis.                                                                       IMPRESSION:  1.  Lower lumbar spondylosis with broad-based disc herniations at the L3-L4 and more caudal levels. In conjunction with facet degenerative changes, these efface the caudal foramina and contribute to severe left and mild right L5-S1, moderate bilateral   L4-L5, and moderate right and mild-to-moderate left L3-L4 neural foraminal stenosis.     2.  There is lateral recess and mild canal narrowing at these levels, most evident at L5-S1 where there is mild associated spinal canal narrowing.      This note was dictated using voice recognition software. Any grammatical or context distortions are unintentional and inherent to the software.       Sindhu STEINER-C  [facility]  O. [phone]             Again, thank you for allowing me to participate in the care of your patient.        Sincerely,        MORAIMA Burns CNP

## 2024-05-13 NOTE — PATIENT INSTRUCTIONS
~You have been referred for Physical Therapy to [facility]. They will call you to schedule an appointment.      Scheduling phone number is [phone] for [facility], or [place] location.  If you have not heard from the scheduling office within 2 business days, please call [phone] for ALL other locations.    Discussed the importance of core strengthening, ROM, stretching exercises and how each of these entities is important in decreasing pain and improving long term spine health.  The purpose of physical therapy is to teach you an individualized home exercise program.  These exercises need to be performed every day in order to decrease pain and prevent future occurrences of pain.         Imaging (Xray, CT, or MRI) has been ordered today.   Radiology will call you to schedule. Please call below if you do not hear from them in the next couple of days.     [facility]iology Scheduling:  Please call [phone] to schedule your image(s) (select option #1).    There are 3 different locations:    [address] [facility] [place]  [address] [facility] [address]   [place] [address]     Continue your muscle relaxer as needed  Over the counter ibuprofen or aleve as directed as needed. Contact me if you would like to pursue a prescription strength anti-inflammatory    Donut pillow otc    ~Please call our [facility] Nurse Navigation line [phone] with any questions or concerns about your treatment plan, if symptoms worsen and you would like to be seen urgently, or if you have any new or worsening numbness, weakness, or problems controlling bladder and bowel function.  ~You are also welcome to contact Sindhu Berrios via [employer], but please be aware that responses to [employer] message may take 2-3 days due to  the high volume of patients seen in clinic.

## 2024-05-17 ENCOUNTER — OFFICE VISIT (OUTPATIENT)
Dept: FAMILY MEDICINE | Facility: CLINIC | Age: 64
End: 2024-05-17
Payer: COMMERCIAL

## 2024-05-17 ENCOUNTER — PATIENT OUTREACH (OUTPATIENT)
Dept: CARE COORDINATION | Facility: CLINIC | Age: 64
End: 2024-05-17

## 2024-05-17 VITALS
OXYGEN SATURATION: 98 % | HEART RATE: 60 BPM | TEMPERATURE: 98 F | HEIGHT: 59 IN | WEIGHT: 153.9 LBS | BODY MASS INDEX: 31.03 KG/M2 | DIASTOLIC BLOOD PRESSURE: 84 MMHG | RESPIRATION RATE: 16 BRPM | SYSTOLIC BLOOD PRESSURE: 152 MMHG

## 2024-05-17 DIAGNOSIS — E11.65 TYPE 2 DIABETES MELLITUS WITH HYPERGLYCEMIA, WITH LONG-TERM CURRENT USE OF INSULIN (H): ICD-10-CM

## 2024-05-17 DIAGNOSIS — Z71.89 COUNSELING REGARDING ADVANCED DIRECTIVES: ICD-10-CM

## 2024-05-17 DIAGNOSIS — R10.9 RIGHT SIDED ABDOMINAL PAIN: ICD-10-CM

## 2024-05-17 DIAGNOSIS — I10 ESSENTIAL HYPERTENSION: Primary | ICD-10-CM

## 2024-05-17 DIAGNOSIS — R82.90 CLOUDY URINE: ICD-10-CM

## 2024-05-17 DIAGNOSIS — Z79.4 TYPE 2 DIABETES MELLITUS WITH HYPERGLYCEMIA, WITH LONG-TERM CURRENT USE OF INSULIN (H): ICD-10-CM

## 2024-05-17 DIAGNOSIS — E55.9 VITAMIN D DEFICIENCY: ICD-10-CM

## 2024-05-17 LAB
ALBUMIN UR-MCNC: NEGATIVE MG/DL
APPEARANCE UR: ABNORMAL
BACTERIA #/AREA URNS HPF: ABNORMAL /HPF
BILIRUB UR QL STRIP: NEGATIVE
COLOR UR AUTO: YELLOW
GLUCOSE UR STRIP-MCNC: NEGATIVE MG/DL
HGB UR QL STRIP: NEGATIVE
KETONES UR STRIP-MCNC: NEGATIVE MG/DL
LEUKOCYTE ESTERASE UR QL STRIP: ABNORMAL
NITRATE UR QL: NEGATIVE
PH UR STRIP: 6.5 [PH] (ref 5–7)
RBC #/AREA URNS AUTO: ABNORMAL /HPF
SP GR UR STRIP: 1.01 (ref 1–1.03)
UROBILINOGEN UR STRIP-ACNC: 0.2 E.U./DL
WBC #/AREA URNS AUTO: ABNORMAL /HPF
WBC CLUMPS #/AREA URNS HPF: PRESENT /HPF

## 2024-05-17 PROCEDURE — 99214 OFFICE O/P EST MOD 30 MIN: CPT | Performed by: FAMILY MEDICINE

## 2024-05-17 PROCEDURE — 81001 URINALYSIS AUTO W/SCOPE: CPT | Performed by: FAMILY MEDICINE

## 2024-05-17 PROCEDURE — G2211 COMPLEX E/M VISIT ADD ON: HCPCS | Performed by: FAMILY MEDICINE

## 2024-05-17 RX ORDER — AMLODIPINE BESYLATE 5 MG/1
5 TABLET ORAL DAILY
Qty: 30 TABLET | Refills: 11 | Status: SHIPPED | OUTPATIENT
Start: 2024-05-17

## 2024-05-17 RX ORDER — ERGOCALCIFEROL 1.25 MG/1
50000 CAPSULE, LIQUID FILLED ORAL WEEKLY
Qty: 4 CAPSULE | Refills: 3 | Status: SHIPPED | OUTPATIENT
Start: 2024-05-17 | End: 2024-08-23

## 2024-05-17 ASSESSMENT — PATIENT HEALTH QUESTIONNAIRE - PHQ9
SUM OF ALL RESPONSES TO PHQ QUESTIONS 1-9: 7
SUM OF ALL RESPONSES TO PHQ QUESTIONS 1-9: 7
10. IF YOU CHECKED OFF ANY PROBLEMS, HOW DIFFICULT HAVE THESE PROBLEMS MADE IT FOR YOU TO DO YOUR WORK, TAKE CARE OF THINGS AT HOME, OR GET ALONG WITH OTHER PEOPLE: SOMEWHAT DIFFICULT

## 2024-05-17 NOTE — PATIENT INSTRUCTIONS
Stop hydrochloriothiaze.  Start amlodop;ine instead.  Check home blood pressure readings and bring to nurse visit.        Be sure to schedule your MRI: [phone].

## 2024-05-17 NOTE — PROGRESS NOTES
Assessment & Plan     Ramona was seen today for follow up .    Diagnoses and all orders for this visit:    Hypertension  Not well controlled on losartan and hydrochlorothiazide; will add amlodipine, recheck with RN in 2 weeks.Will also try to get her a home bp monitor.  \The longitudinal plan of care for the diagnosis(es)/condition(s) as documented were addressed during this visit. Due to the added complexity in care, I will continue to support Ramona in the subsequent management and with ongoing continuity of care.  -     Home Blood Pressure Monitor Order for DME - ONLY FOR DME  -     amLODIPine (NORVASC) 5 MG tablet; Take 1 tablet (5 mg) by mouth daily  -     PRIMARY CARE FOLLOW-UP SCHEDULING; Future    Type 2 diabetes mellitus with hyperglycemia, with long-term current use of insulin (H)  Managed by endocrinology because we were unable to to get her diabetes adequately controlled with our resources here.  I refilled insulin for her convenience, but she needs to get back in to endo. Missed last appt.  -     insulin aspart prot & aspart (NOVOLOG MIX 70/30 PEN) (70-30) 100 UNIT/ML pen; 28 units in the morning and 15 units in the evening    Vitamin D deficiency  -     vitamin D2 (ERGOCALCIFEROL) 22635 units (1250 mcg) capsule; Take 1 capsule (50,000 Units) by mouth once a week    Counseling regarding advanced directives  She brought in a HealthCare Directive. We discussed that I do recommend doing this and if she needs help, our clinic can help.  She initially agreed, but she ultimately declined after discussing with Dee-speaking staff who offered assistance.  -     Primary Care - Care Coordination Referral; Future    Right sided abdominal pain  Chronic prob. Not RUQ and not present while here.  Has had recurrent UTI, so will check this.  -     UA Macroscopic with reflex to Microscopic and Culture - Lab Collect; Future  -     UA Microscopic with Reflex to Culture    Cloudy urine  -     UA Macroscopic with reflex to  "Microscopic and Culture - Lab Collect; Future  -     UA Microscopic with Reflex to Culture    Other orders  -     PRIMARY CARE FOLLOW-UP SCHEDULING; Future             FUTURE APPOINTMENTS:       - Follow-up visit in 3 months (BP)    Remedios Greene is a 64 year old, presenting for the following health issues:  Follow up         5/17/2024     7:35 AM   Additional Questions   Roomed by boogie hollis   Accompanied by Daughter      History of Present Illness       Reason for visit:  Follow up    She eats 0-1 servings of fruits and vegetables daily.She consumes 0 sweetened beverage(s) daily.She exercises with enough effort to increase her heart rate 9 or less minutes per day.  She exercises with enough effort to increase her heart rate 3 or less days per week.   She is taking medications regularly.                   Here for follow-up.  She thought she was for diabetes follow-up, but her diabetes is now being followed by endocrinology rather than here because we were unable to fit her diabetes under control.      BP follow-up:  confirms taking  losaratan and hydrochlorothiazide.  Doesn't have home bp monitor    Brings in healthacare directive (blank)      Objective    BP (!) 152/84   Pulse 60   Temp 98  F (36.7  C) (Oral)   Resp 16   Ht 1.495 m (4' 10.86\")   Wt 69.8 kg (153 lb 14.4 oz)   LMP  (LMP Unknown)   SpO2 98%   BMI 31.23 kg/m    Body mass index is 31.23 kg/m .  Physical Exam     Gen:  A&A, NAD  CV:  HRRR, no M/R/G  Resp:  CTAB  Ext:  W&D, no edema            Signed Electronically by: Coreen Kendall MD    "

## 2024-05-17 NOTE — PROGRESS NOTES
"Clinic Care Coordination Contact     Assessment: CC received a referral from PCP for \" help filling out advamced directive \".     CCRN spoke with patient via phone today. Introduced self and the reason for the call. Patient states she consulted with her children when she got home care the clinic today. They all decided that she doesn't want to complete advanced directive form at this time. CCRN educated patient on the benefits of having of health care directive but patient still declined. Encouraged patient to talk to her PCP again when she's ready to fill out the form. Patient verbalized understanding.     Plan: Patient is not a care coordination candidate.       "

## 2024-05-30 ENCOUNTER — ALLIED HEALTH/NURSE VISIT (OUTPATIENT)
Dept: FAMILY MEDICINE | Facility: CLINIC | Age: 64
End: 2024-05-30
Attending: FAMILY MEDICINE
Payer: COMMERCIAL

## 2024-05-30 VITALS — DIASTOLIC BLOOD PRESSURE: 80 MMHG | SYSTOLIC BLOOD PRESSURE: 116 MMHG | OXYGEN SATURATION: 97 % | HEART RATE: 81 BPM

## 2024-05-30 DIAGNOSIS — I10 ESSENTIAL HYPERTENSION: ICD-10-CM

## 2024-05-30 PROCEDURE — 99207 PR NO CHARGE NURSE ONLY: CPT

## 2024-05-30 NOTE — NURSING NOTE
I met with Ramona Wilder at the request of  Dr. Kendall to recheck her blood pressure.  Blood pressure medications on the med list were reviewed with patient.    Patient has taken all medications as per usual regimen: Yes  Patient reports tolerating them without any issues or concerns: Yes    Vitals:    05/30/24 1024   BP: 116/80   Pulse: 81   SpO2: 97%       Blood pressure was taken, previous encounter was reviewed, recorded blood pressure below 140/90.  Patient was discharged and the note will be sent to the provider for final review.

## 2024-06-14 ENCOUNTER — TRANSFERRED RECORDS (OUTPATIENT)
Dept: HEALTH INFORMATION MANAGEMENT | Facility: CLINIC | Age: 64
End: 2024-06-14
Payer: COMMERCIAL

## 2024-06-14 LAB — RETINOPATHY: POSITIVE

## 2024-06-21 ENCOUNTER — HOSPITAL ENCOUNTER (OUTPATIENT)
Dept: MRI IMAGING | Facility: HOSPITAL | Age: 64
Discharge: HOME | End: 2024-06-21
Attending: NURSE PRACTITIONER | Admitting: NURSE PRACTITIONER
Payer: COMMERCIAL

## 2024-06-21 DIAGNOSIS — R20.0 RIGHT LEG NUMBNESS: ICD-10-CM

## 2024-06-21 DIAGNOSIS — K62.89 RECTAL PAIN: ICD-10-CM

## 2024-06-21 PROCEDURE — 72195 MRI PELVIS W/O DYE: CPT

## 2024-06-25 ENCOUNTER — TELEPHONE (OUTPATIENT)
Dept: PHYSICAL MEDICINE AND REHAB | Facility: CLINIC | Age: 64
End: 2024-06-25

## 2024-06-25 DIAGNOSIS — E11.65 TYPE 2 DIABETES MELLITUS WITH HYPERGLYCEMIA, WITH LONG-TERM CURRENT USE OF INSULIN (H): Primary | ICD-10-CM

## 2024-06-25 DIAGNOSIS — Z79.4 TYPE 2 DIABETES MELLITUS WITH HYPERGLYCEMIA, WITH LONG-TERM CURRENT USE OF INSULIN (H): Primary | ICD-10-CM

## 2024-06-25 NOTE — TELEPHONE ENCOUNTER
Call placed to patient with provider's results and recommendations with assistance of language line solutions Dee  #[phone]. Left message to return call.

## 2024-06-25 NOTE — TELEPHONE ENCOUNTER
----- Message from Sindhu Berrios sent at 6/24/2024  1:01 PM CDT -----  Please let Kyashley know that her sacrum/coccyx MRI shows mild degenerative changes in the isabelle SI joints with sclerosis and spurring. I would recommend follow up appt with me in clinic to discuss results and options

## 2024-06-28 ENCOUNTER — LAB (OUTPATIENT)
Dept: LAB | Facility: CLINIC | Age: 64
End: 2024-06-28
Payer: COMMERCIAL

## 2024-06-28 DIAGNOSIS — E11.65 TYPE 2 DIABETES MELLITUS WITH HYPERGLYCEMIA, WITH LONG-TERM CURRENT USE OF INSULIN (H): ICD-10-CM

## 2024-06-28 DIAGNOSIS — Z79.4 TYPE 2 DIABETES MELLITUS WITH HYPERGLYCEMIA, WITH LONG-TERM CURRENT USE OF INSULIN (H): ICD-10-CM

## 2024-06-28 LAB
ANION GAP SERPL CALCULATED.3IONS-SCNC: 11 MMOL/L (ref 7–15)
BUN SERPL-MCNC: 15.6 MG/DL (ref 8–23)
CALCIUM SERPL-MCNC: 9.6 MG/DL (ref 8.8–10.2)
CHLORIDE SERPL-SCNC: 108 MMOL/L (ref 98–107)
CREAT SERPL-MCNC: 0.7 MG/DL (ref 0.51–0.95)
DEPRECATED HCO3 PLAS-SCNC: 24 MMOL/L (ref 22–29)
EGFRCR SERPLBLD CKD-EPI 2021: >90 ML/MIN/1.73M2
GLUCOSE SERPL-MCNC: 123 MG/DL (ref 70–99)
HBA1C MFR BLD: 7.4 % (ref 0–5.6)
POTASSIUM SERPL-SCNC: 4.1 MMOL/L (ref 3.4–5.3)
SODIUM SERPL-SCNC: 143 MMOL/L (ref 135–145)

## 2024-06-28 PROCEDURE — 83036 HEMOGLOBIN GLYCOSYLATED A1C: CPT

## 2024-06-28 PROCEDURE — 80048 BASIC METABOLIC PNL TOTAL CA: CPT

## 2024-06-28 PROCEDURE — 36415 COLL VENOUS BLD VENIPUNCTURE: CPT

## 2024-07-03 ENCOUNTER — OFFICE VISIT (OUTPATIENT)
Dept: ENDOCRINOLOGY | Facility: CLINIC | Age: 64
End: 2024-07-03
Attending: FAMILY MEDICINE
Payer: COMMERCIAL

## 2024-07-03 VITALS
WEIGHT: 150.8 LBS | DIASTOLIC BLOOD PRESSURE: 76 MMHG | OXYGEN SATURATION: 100 % | HEART RATE: 87 BPM | SYSTOLIC BLOOD PRESSURE: 140 MMHG | BODY MASS INDEX: 30.61 KG/M2

## 2024-07-03 DIAGNOSIS — I10 ESSENTIAL HYPERTENSION: ICD-10-CM

## 2024-07-03 DIAGNOSIS — Z79.4 TYPE 2 DIABETES MELLITUS WITH HYPERGLYCEMIA, WITH LONG-TERM CURRENT USE OF INSULIN (H): Primary | ICD-10-CM

## 2024-07-03 DIAGNOSIS — E11.65 TYPE 2 DIABETES MELLITUS WITH HYPERGLYCEMIA, WITH LONG-TERM CURRENT USE OF INSULIN (H): Primary | ICD-10-CM

## 2024-07-03 PROCEDURE — 99213 OFFICE O/P EST LOW 20 MIN: CPT | Performed by: NURSE PRACTITIONER

## 2024-07-03 RX ORDER — SEMAGLUTIDE 0.68 MG/ML
INJECTION, SOLUTION SUBCUTANEOUS
COMMUNITY
Start: 2024-01-15 | End: 2024-07-10

## 2024-07-03 NOTE — Clinical Note
7/3/2024      Rmaona Wilder  [address]  [facility] [address]      Dear Colleague,    Thank you for referring your patient, Ramona Wilder, to the [facility]. Please see a copy of my visit note below.                                                              Again, thank you for allowing me to participate in the care of your patient.        Sincerely,        Claudia Winter NP

## 2024-07-03 NOTE — PROGRESS NOTES
"[facility] ENDOCRINOLOGY    Diabetes Note 7/5/2024    Ramona Wilder, 1960, [id number]          Reason for visit      1. Type 2 diabetes mellitus with hyperglycemia, with long-term current use of insulin (H)    2. Hypertension        HPI     Ramona Wilder is a very pleasant 64 year old old female who presents for follow up.  SUMMARY:    Ramona is seen in follow-up for DM 2. She is here with her daughter and we are assisted by a Professional . Ramona's current A1c is 7.4 and improved significantly from her previous of 9.1. She is using the Rosalinda 2 CGM, which was downloaded and data was reviewed.     TIR was 54%, Above, 46% and no hypoglycemia was recorded. GMI of 7.6. She has a couple of days with poor transmission, likely due to [place] not being close enough.     She remains on Novolog 70/30 mixed insulin, 28 units in the morning and 15 units in the evening. She is also taking Invokana, 300 mg daily and Metformin XR, 500 mg BID. She has had Ozempic prescribed for her in January by PCP. She was not tolerating Victoza. Visit with MTM in March stated \"Once covered by insurance, could consider trial with Ozempic in the future.\" Pt unclear.       Renal function is within normal range.     Pt is hypertensive today, with mildly elevated systolic reading. She is currently taking a CCB, ARB and a Diuretic.     Pt is currently working on significant lumbar/rectal pain, which is distracting for her today.     Blood glucose data:      Past Medical History     Patient Active Problem List   Diagnosis    Carotid Artery Stenosis    Moderate Recurrent Major Depression    Anxiety    Insomnia    Hypertension    Vitamin D deficiency    Gastropathy    Type 2 diabetes mellitus with hyperglycemia, with long-term current use of insulin (H)    Hyperlipidemia    Headache    Chronic Pain    Back Strain    Dermatitis    Metabolic Tests Nonspecific Elevation Of Transaminase Levels    Pain in finger of right hand    Left knee pain    " "Urinary, incontinence, stress female    Right lumbar radiculitis    Proteinuria due to type 2 diabetes mellitus (H)    Lichen sclerosus et atrophicus    Severe nonproliferative diabetic retinopathy of both eyes with macular edema associated with type 2 diabetes mellitus (H)        Family History       family history includes Asthma in her sister; Cancer in her sister.    Social History      reports that she has never smoked. She has been exposed to tobacco smoke. She quit smokeless tobacco use about 10 months ago.  Her smokeless tobacco use included chew. She reports that she does not drink alcohol and does not use drugs.      Review of Systems     Constitutional:  Well developed, Well nourished  HENT:  Normocephalic,   Neck: normal in appearance  Eyes:  PERRL, Conjunctiva pink  Respiratory:  No respiratory distress  Skin: No acanthosis nigricans, lipoatrophy or lipodystrophy  Neurologic:  Alert & oriented x 3, nonfocal  Psychiatric:  Affect, Mood, Insight appropriate    Vital Signs     BP (!) 140/76 (BP Location: Right arm)   Pulse 87   Wt 68.4 kg (150 lb 12.8 oz)   LMP  (LMP Unknown)   SpO2 100%   BMI 30.61 kg/m    Wt Readings from Last 3 Encounters:   07/03/24 68.4 kg (150 lb 12.8 oz)   05/17/24 69.8 kg (153 lb 14.4 oz)   05/13/24 70.7 kg (155 lb 12.8 oz)       Physical Exam     Constitutional:  Well developed, Well nourished  HENT:  Normocephalic, Betel nut stains on teeth  Neck: normal in appearance  Eyes:  PERRL, Conjunctiva pink  Respiratory:  No respiratory distress  Skin: No acanthosis nigricans, lipoatrophy or lipodystrophy  Neurologic:  Alert & oriented x 3, nonfocal  Psychiatric:  Affect, Mood, Insight appropriate        Assessment     1. Type 2 diabetes mellitus with hyperglycemia, with long-term current use of insulin (H)    2. Hypertension        Plan     Pt's control has improved. Will \"graduate\" her back to PCP and MTM. I recommend working consistently with MTM especially. Follow-up " MIGEL Winter NP   Endocrinology  7/5/2024  6:21 AM      Lab Results     Microalbumin Urine mg/dL   Date Value Ref Range Status   12/17/2021 <0.50 0.00 - 1.99 mg/dL Final       Cholesterol   Date Value Ref Range Status   01/15/2024 225 (H) <200 mg/dL Final     Direct Measure HDL   Date Value Ref Range Status   01/15/2024 63 >=50 mg/dL Final     Triglycerides   Date Value Ref Range Status   01/15/2024 92 <150 mg/dL Final       [unfilled]      Current Medications     Outpatient Medications Prior to Visit   Medication Sig Dispense Refill    acetaminophen (TYLENOL) 500 MG tablet Take 1 tablet (500 mg) by mouth every 6 hours as needed for pain 100 tablet 5    amLODIPine (NORVASC) 5 MG tablet Take 1 tablet (5 mg) by mouth daily 30 tablet 11    calcium carbonate (TUMS) 500 MG chewable tablet Take 1 tablet (500 mg) by mouth daily as needed for heartburn 30 tablet 1    canagliflozin (INVOKANA) 300 MG tablet Take 1 tablet (300 mg) by mouth daily 30 tablet 11    Continuous Blood Gluc Sensor (FREESTYLE AILYN 2 SENSOR) [facility] 1 each every 14 days Use 1 sensor every 14 days. Use to read blood sugars per 's instructions. 2 each 5    cyclobenzaprine (FLEXERIL) 5 MG tablet Take 1-2 tablets (5-10 mg) by mouth 3 times daily as needed for other (rectal pain) 60 tablet 5    escitalopram (LEXAPRO) 5 MG tablet Take 1 tablet (5 mg) by mouth daily 30 tablet 11    hydrochlorothiazide (HYDRODIURIL) 25 MG tablet Take 1 tablet (25 mg) by mouth daily In the morning 30 tablet 11    insulin aspart prot & aspart (NOVOLOG MIX 70/30 PEN) (70-30) 100 UNIT/ML pen 28 units in the morning and 15 units in the evening 45 mL 3    insulin pen needle (TRUEPLUS PEN NEEDLES) 32G X 4 MM miscellaneous Use 3 pen needles daily or as directed. 300 each 4    losartan (COZAAR) 100 MG tablet Take 1 tablet (100 mg) by mouth daily 30 tablet 11    metFORMIN (GLUCOPHAGE XR) 500 MG 24 hr tablet Take 1 tablet (500 mg) by mouth 2 times daily (with  meals) 60 tablet 11    omeprazole (PRILOSEC) 20 MG DR capsule TAKE ONE CAPSULE BY MOUTH DAILY BEFORE BREAKFAST 30 capsule 3    OZEMPIC, 0.25 OR 0.5 MG/DOSE, 2 MG/3ML pen       rosuvastatin (CRESTOR) 40 MG tablet Take 1 tablet (40 mg) by mouth at bedtime 30 tablet 11    vitamin D2 (ERGOCALCIFEROL) 49487 units (1250 mcg) capsule Take 1 capsule (50,000 Units) by mouth once a week 4 capsule 3     No facility-administered medications prior to visit.

## 2024-07-05 ENCOUNTER — PATIENT OUTREACH (OUTPATIENT)
Dept: CARE COORDINATION | Facility: CLINIC | Age: 64
End: 2024-07-05
Payer: COMMERCIAL

## 2024-07-09 NOTE — PROGRESS NOTES
Medication Therapy Management (MTM) Encounter    ASSESSMENT:                            Medication Adherence/Access: Provided patient's daughter with pillbox today since she requested one to help with med administration.      1. Type 2 diabetes mellitus with hyperglycemia, with long-term current use of insulin (H)  A1c not at goal <7% and is not meeting goal >70% within target range on CGM. Could consider trying slight dose increase in metformin since patient is agreeable to this. Would recommend reducing evening dose of Novolog mix today as well since pt is having nocturnal hypoglycemia. Future considerations: Add Ozempic? Patient did not tolerate Victoza. It seems Ozempic was started in January but never refilled. Unclear what occurred and patient couldn't recall.      2. Essential hypertension  BP above goal <140/90 mmHg when last checked, though this is likely due to patient stopping hydrochlorothiazide. Reiterated instructions from PCP to stay on hydrochlorothiazide and pt will refill it again today.     3. Hyperlipidemia, unspecified hyperlipidemia type  Patient to continue high intensity statin.      4. Vitamin D deficiency  Continue current ergocalciferol, could consider rechecking vitamin D level at next blood draw.     5. Heartburn  Controlled with as needed use of Tums.     6. Depression, unspecified depression type  Continue current SSRI. Could consider increasing dose in the future if patient desires.     7. Rectal pain  Patient to follow up with PCP.     PLAN:                            1. Increase metformin to 3 tablets daily - take 2 in the morning and 1 in the evening    2. Reduce Novolog Mix - continue 28 units each morning but decrease to 10 units in the evening. Monitor for nocturnal hypoglycemia    3. Restart hydrochlorothiazide      4. Provided new pill box today - patient's daughter will set it up and bring with to next MTM appointment to make sure it is set up right    Future  considerations:  Restart Ozempic?  Recheck vitD levels    Follow-up: Return in 2 months (on 9/24/2024) for Follow up, in person.    SUBJECTIVE/OBJECTIVE:                          Ramona Wilder is a 64 year old female coming in for a follow-up visit from 1/15/24. Patient was accompanied by daughter. Patient seen with LORE Price .                 Reason for visit: follow-up on increased Novolog dose     Allergies/ADRs: Reviewed in chart  Past Medical History: Reviewed in chart  Tobacco: She reports that she has never smoked. She has been exposed to tobacco smoke. She quit smokeless tobacco use about 6 months ago.  Her smokeless tobacco use included chew.  Alcohol:  reports no history of alcohol use.     Medication Adherence/Access: Patient manges her own medications, taking directly from medication vials. Brings medication vials. States that she knows how to get refills from pharmacy.  Says she never forgets to take insulin but does often forget her pills. Her daughter thinks a pill box will be helpful - she will set it up for her mom and also remind her to take her medications.   Diabetes   Metformin  mg twice daily  Invokana 300 mg daily in the morning  Novolog 70/30 Mix insulin 28 units AM and 15 unit PM 5-10 minutes before meals    Patient is not experiencing side effects.   Patient hesitant to increase metformin dose due to side effects with 2000 mg dose in the past.  We discussed trying something in between, ie 1500 mg daily  Pt agreeable to try this  She will decrease back to previous dose if 3 tablets daily is not tolerated  Pt is concerned about low blood sugars  She had a few episodes overnight  They also happen in the middle of the day so she eats sugar/fruit to prevent a low  Current diabetes symptoms: hypoglycemia - Corrects with 1/2 cup juice when low    Diet/Exercise: Trying to eat only 1 meal per day but also eats a lot of fruit (tad, bananas, watermelon). States that fruit will increase her  blood sugar a lot but she still likes to eat it. Eating brown rice instead of white rice.   MedHx: metformin 2000 mg daily (GI sx), Victoza (gassy/bloating), Ozempic (unclear if this was ever tried?)    Blood sugar monitoring: Continuous Glucose Monitor Rosalinda 2; see below:              Eye exam is up to date  Foot exam is up to date  Hypertension   Hydrochlorothiazide 25 mg daily at bedtime - does not have bottle today  Losartan 100 mg daily in the evening  Amlodipine 5 mg daily - started in May    Patient reports no current medication side effects  Patient does not self-monitor blood pressure.  We did not have time to check blood pressure in clinic today  However she has stopped hydrochlorothiazide   She thought she was supposed to stop it after starting amlodipine in May  We went over Dr. Olmos note in May which says to continue hydrochlorothiazide   Pt verbalized understanding and will restart it    Hyperlipidemia   Rosuvastatin 40 mg daily  AM  Patient reports no significant myalgias or other side effects.  The 10-year ASCVD risk score (Rishi DK, et al., 2019) is: 13.5%    Values used to calculate the score:      Age: 64 years      Sex: Female      Is Non- : No      Diabetic: Yes      Tobacco smoker: No      Systolic Blood Pressure: 140 mmHg      Is BP treated: Yes      HDL Cholesterol: 63 mg/dL      Total Cholesterol: 225 mg/dL   Low Vitamin D:   Vitamin D2 50,000 units once weekly on Saturdays    Heartburn:   Omeprazole 20 mg daily in the evening - not filled lately  Tums 500 mg daily as needed for pain - finds this helpful     Pt feels heartburn is well controlled  Doesn't think she needs omeprazole daily     Depression:   Escitalopram 5 mg daily     Still feeling frustrated and irritated, been like this for a long time.   Is not interested in changing medications today     Rectal Pain:   Cyclobenzaprine 5 mg - takes 2 tabs daily    States that she has not been sleeping very well  due to rectal pain.   Had surgery in the past for perineal rip.   Thinks current medication has been helping only a little bit. Also taking tylenol.     Today's Vitals:   Wt 146 lb (66.2 kg)   LMP  (LMP Unknown)   BMI 29.63 kg/m      ----------------  I spent 45 minutes with this patient today. All changes were made via collaborative practice agreement with Coreen Kendall MD. A copy of the visit note was provided to the patient's provider(s).    A summary of these recommendations was given to the patient.    Misael Sears, PharmD, [facility]  Medication Therapy Management Provider  [phone]     Medication Therapy Recommendations  Essential hypertension    Current Medication: hydrochlorothiazide (HYDRODIURIL) 25 MG tablet (Discontinued)   Rationale: Does not understand instructions - Adherence - Adherence   Recommendation: Provide Education - hydrochlorothiazide 25 MG tablet   Status: Accepted - no CPA Needed         Type 2 diabetes mellitus with hyperglycemia, with long-term current use of insulin (H)    Current Medication: insulin aspart prot & aspart (NOVOLOG MIX 70/30 PEN) (70-30) 100 UNIT/ML pen   Rationale: Undesirable effect - Adverse medication event - Safety   Recommendation: Decrease Dose - NovoLOG MIX 70/30 VIAL (70-30) 100 UNIT/ML susp   Status: Accepted per CPA          Current Medication: metFORMIN (GLUCOPHAGE XR) 500 MG 24 hr tablet (Discontinued)   Rationale: Dose too low - Dosage too low - Effectiveness   Recommendation: Increase Dose - metFORMIN 500 MG 24 hr tablet   Status: Accepted per CPA          Current Medication: metFORMIN (GLUCOPHAGE XR) 500 MG 24 hr tablet (Discontinued)   Rationale: Patient forgets to take - Adherence - Adherence   Recommendation: Provide Adherence Intervention   Status: Accepted - no CPA Needed

## 2024-07-10 ENCOUNTER — OFFICE VISIT (OUTPATIENT)
Dept: PHARMACY | Facility: CLINIC | Age: 64
End: 2024-07-10
Payer: COMMERCIAL

## 2024-07-10 VITALS — WEIGHT: 146 LBS | BODY MASS INDEX: 29.63 KG/M2

## 2024-07-10 DIAGNOSIS — I10 ESSENTIAL HYPERTENSION: ICD-10-CM

## 2024-07-10 DIAGNOSIS — E11.65 TYPE 2 DIABETES MELLITUS WITH HYPERGLYCEMIA, WITH LONG-TERM CURRENT USE OF INSULIN (H): Primary | ICD-10-CM

## 2024-07-10 DIAGNOSIS — E78.5 HYPERLIPIDEMIA, UNSPECIFIED HYPERLIPIDEMIA TYPE: ICD-10-CM

## 2024-07-10 DIAGNOSIS — F32.A DEPRESSION, UNSPECIFIED DEPRESSION TYPE: ICD-10-CM

## 2024-07-10 DIAGNOSIS — K62.89 RECTAL PAIN: ICD-10-CM

## 2024-07-10 DIAGNOSIS — Z79.4 TYPE 2 DIABETES MELLITUS WITH HYPERGLYCEMIA, WITH LONG-TERM CURRENT USE OF INSULIN (H): Primary | ICD-10-CM

## 2024-07-10 DIAGNOSIS — R12 HEARTBURN: ICD-10-CM

## 2024-07-10 DIAGNOSIS — E55.9 VITAMIN D DEFICIENCY: ICD-10-CM

## 2024-07-10 PROCEDURE — 99606 MTMS BY PHARM EST 15 MIN: CPT | Performed by: PHARMACIST

## 2024-07-10 PROCEDURE — 99607 MTMS BY PHARM ADDL 15 MIN: CPT | Performed by: PHARMACIST

## 2024-07-10 RX ORDER — HYDROCHLOROTHIAZIDE 25 MG/1
25 TABLET ORAL DAILY
Qty: 30 TABLET | Refills: 11 | Status: SHIPPED | OUTPATIENT
Start: 2024-07-10

## 2024-07-10 RX ORDER — CALCIUM CARBONATE 500 MG/1
1 TABLET, CHEWABLE ORAL DAILY PRN
Qty: 30 TABLET | Refills: 5 | Status: SHIPPED | OUTPATIENT
Start: 2024-07-10

## 2024-07-10 RX ORDER — METFORMIN HCL 500 MG
TABLET, EXTENDED RELEASE 24 HR ORAL
Qty: 90 TABLET | Refills: 11 | Status: SHIPPED | OUTPATIENT
Start: 2024-07-10

## 2024-07-10 NOTE — Clinical Note
FYI - pt agreed to retry a slightly higher dose of metformin today (1500 mg instead of 2000 mg that she didn't tolerate previously). Recommend slight dose decrease in evening Novolog since she is having some nocturnal hypoglycemia. She also stopped hydrochlorothiazide due to misunderstanding so I helped her reorder that today. Provided pill box to her daughter who will start setting up medications for her. Have follow-up again in September.

## 2024-07-10 NOTE — PATIENT INSTRUCTIONS
Stephan Greene, it was great talking with you today!     Recommendations from today's MTM visit:                                                      1. Increase metformin to 3 tablets daily (2 tablets in the morning slot and 1 tablet in the evening)    2. Reduce Novolog Mix - give 28 units each morning but decrease to 10 units in the evening    3. Request hydrochlorothiazide from [facility] - you ran out of this and it is important for blood pressure. Take this in the morning.     I value your experience and would be very grateful for your time with providing feedback on our clinic survey. You may receive a survey via email or text message in the next few days.     Next MTM visit: 9/24/24    To schedule another MTM appointment, please call the clinic directly or you may call the MTM scheduling line at [phone] or toll-free at [phone].     My Clinical Pharmacist's contact information:                                                      Please feel free to contact me with any questions or concerns you have.      Misael Sears, [place], [facility]  Phone: [phone]

## 2024-07-17 NOTE — TELEPHONE ENCOUNTER
No response from pt. Second attempt placed with assistance of language line solutions Dee . Left message to return call.

## 2024-07-19 ENCOUNTER — PATIENT OUTREACH (OUTPATIENT)
Dept: CARE COORDINATION | Facility: CLINIC | Age: 64
End: 2024-07-19
Payer: COMMERCIAL

## 2024-08-07 ENCOUNTER — TELEPHONE (OUTPATIENT)
Dept: FAMILY MEDICINE | Facility: CLINIC | Age: 64
End: 2024-08-07
Payer: COMMERCIAL

## 2024-08-23 ENCOUNTER — OFFICE VISIT (OUTPATIENT)
Dept: FAMILY MEDICINE | Facility: CLINIC | Age: 64
End: 2024-08-23
Attending: FAMILY MEDICINE
Payer: COMMERCIAL

## 2024-08-23 VITALS
HEIGHT: 59 IN | TEMPERATURE: 98.1 F | OXYGEN SATURATION: 99 % | SYSTOLIC BLOOD PRESSURE: 104 MMHG | WEIGHT: 146.12 LBS | RESPIRATION RATE: 20 BRPM | BODY MASS INDEX: 29.46 KG/M2 | DIASTOLIC BLOOD PRESSURE: 69 MMHG | HEART RATE: 77 BPM

## 2024-08-23 DIAGNOSIS — R39.11 URINARY HESITANCY: ICD-10-CM

## 2024-08-23 DIAGNOSIS — Z79.4 TYPE 2 DIABETES MELLITUS WITH HYPERGLYCEMIA, WITH LONG-TERM CURRENT USE OF INSULIN (H): Primary | ICD-10-CM

## 2024-08-23 DIAGNOSIS — M79.644 FINGER PAIN, RIGHT: ICD-10-CM

## 2024-08-23 DIAGNOSIS — E11.65 TYPE 2 DIABETES MELLITUS WITH HYPERGLYCEMIA, WITH LONG-TERM CURRENT USE OF INSULIN (H): Primary | ICD-10-CM

## 2024-08-23 DIAGNOSIS — E55.9 VITAMIN D DEFICIENCY: ICD-10-CM

## 2024-08-23 DIAGNOSIS — K62.89 ANAL OR RECTAL PAIN: ICD-10-CM

## 2024-08-23 DIAGNOSIS — I10 ESSENTIAL HYPERTENSION: ICD-10-CM

## 2024-08-23 LAB
ALBUMIN UR-MCNC: NEGATIVE MG/DL
APPEARANCE UR: CLEAR
BILIRUB UR QL STRIP: NEGATIVE
COLOR UR AUTO: YELLOW
GLUCOSE UR STRIP-MCNC: 500 MG/DL
HGB UR QL STRIP: NEGATIVE
KETONES UR STRIP-MCNC: NEGATIVE MG/DL
LEUKOCYTE ESTERASE UR QL STRIP: NEGATIVE
NITRATE UR QL: NEGATIVE
PH UR STRIP: 6.5 [PH] (ref 5–7)
SP GR UR STRIP: 1.01 (ref 1–1.03)
UROBILINOGEN UR STRIP-ACNC: 0.2 E.U./DL

## 2024-08-23 PROCEDURE — 81003 URINALYSIS AUTO W/O SCOPE: CPT | Performed by: FAMILY MEDICINE

## 2024-08-23 PROCEDURE — 99214 OFFICE O/P EST MOD 30 MIN: CPT | Performed by: FAMILY MEDICINE

## 2024-08-23 PROCEDURE — G2211 COMPLEX E/M VISIT ADD ON: HCPCS | Performed by: FAMILY MEDICINE

## 2024-08-23 RX ORDER — ERGOCALCIFEROL 1.25 MG/1
50000 CAPSULE, LIQUID FILLED ORAL WEEKLY
Qty: 4 CAPSULE | Refills: 3 | Status: SHIPPED | OUTPATIENT
Start: 2024-08-23

## 2024-08-23 RX ORDER — CYCLOBENZAPRINE HCL 5 MG
5-10 TABLET ORAL 3 TIMES DAILY PRN
Qty: 60 TABLET | Refills: 5 | Status: SHIPPED | OUTPATIENT
Start: 2024-08-23

## 2024-08-23 ASSESSMENT — PATIENT HEALTH QUESTIONNAIRE - PHQ9
10. IF YOU CHECKED OFF ANY PROBLEMS, HOW DIFFICULT HAVE THESE PROBLEMS MADE IT FOR YOU TO DO YOUR WORK, TAKE CARE OF THINGS AT HOME, OR GET ALONG WITH OTHER PEOPLE: NOT DIFFICULT AT ALL
SUM OF ALL RESPONSES TO PHQ QUESTIONS 1-9: 3
SUM OF ALL RESPONSES TO PHQ QUESTIONS 1-9: 3

## 2024-08-23 NOTE — PROGRESS NOTES
Assessment & Plan     Type 2 diabetes mellitus with hyperglycemia, with long-term current use of insulin (H)  Borderline control on current regimen, with last A1c of 7.4.  I had her see endocrinology because it seems like no matter what I do, her blood sugars are never very well-controlled.  I had her see them a couple times and each time she briefly makes a change to have better A1c and then usually bounces back.  Hopefully this time will be more enduring.  She is reluctant to change medications because of medications historically bothering her stomach.  We specifically discussed the possibility of adding a GLP-1 (she has tried Victoza and Ozempic in the past), but she recalls that they both bother her stomach and so she does not want to do that.  Will continue current medications and follow-up in 3 months.  - canagliflozin (INVOKANA) 300 MG tablet; Take 1 tablet (300 mg) by mouth daily.    Anal or rectal pain  This is been worked up extensively with no certain cause found except for some mild age-appropriate degenerative changes in the bilateral SI joints On MRI 6/21/2024.  She was seen by colorectal surgery without findings (history of the sphincteroplasty and levetaroplasty in 2011 was uncompromised).  She has had a little relief with cyclobenzaprine so that was refilled.  I did not repeat a pelvic exam, as one done by me in the last few months was normal.  Given that nothing else has been found, I think that she may be having some pelvic floor dysfunction causing her symptoms and would likely benefit from pelvic physical therapy.  We discussed this a bit in the past before, but had wanted to try to make a definitive diagnosis first.  However, she is now open to pelvic PT and I am hopeful that this could be really helpful to her.  - cyclobenzaprine (FLEXERIL) 5 MG tablet; Take 1-2 tablets (5-10 mg) by mouth 3 times daily as needed for other (rectal pain).  - Physical Therapy  Referral;  Future    Hypertension  Blood pressure is well-controlled on current regimen of amlodipine 5 mg and losartan 100 mg daily, with blood pressure today of 104/69.  Would continue without change.    Finger pain, right  Suspect arthritis.  Has been having some pink pain in this finger off-and-on for years, was even x-rayed back in 2015.  It is bothering her more now so we will have her see hand surgery.  - Orthopedic  Referral; Future    Urinary hesitancy  Patient has a history of urinary symptoms with some positive and some negative urine cultures in the past.  Did check urinalysis today and was positive only for glucosuria but no signs of infection.  I wonder if some of her urinary symptoms are again caused by pelvic floor dysfunction and we discussed this today.  - UA Macroscopic with reflex to Microscopic and Culture - Lab Collect; Future  - Physical Therapy  Referral; Future  - UA Macroscopic with reflex to Microscopic and Culture - Lab Collect    Vitamin D deficiency  Refilled prescription strength vitamin D.  Will add for this lab to be checked the next time she has blood work done (did not need any blood labs done today.  - vitamin D2 (ERGOCALCIFEROL) 65009 units (1250 mcg) capsule; Take 1 capsule (50,000 Units) by mouth once a week.    The longitudinal plan of care for the diagnosis(es)/condition(s) as documented were addressed during this visit. Due to the added complexity in care, I will continue to support Ramona in the subsequent management and with ongoing continuity of care.    Follow-up for yearly preventative exam plus diabetes follow-up in 3 to 4 months/next available    Subjective   Ramona is a 64 year old, presenting for the following health issues:  Follow Up ( HTN/BP, IMM), Wound Check (Wound on the butt and it's burning when sit down ), Arthritis (Hand(fingers) pain on the right side ), and Recheck Medication        8/23/2024     9:54 AM   Additional Questions   Roomed by karrie marr  "  Accompanied by daughter in law     History of Present Illness       Diabetes:   She presents for follow up of diabetes.  She is checking home blood glucose three times daily.   She checks blood glucose before and after meals and at bedtime.  Blood glucose is sometimes over 200 and sometimes under 70. She is aware of hypoglycemia symptoms including shakiness, dizziness, weakness and blurred vision.   She is concerned about blood sugar frequently over 200.   She is having numbness in feet, burning in feet, blurry vision and weight gain.            Hypertension: She presents for follow up of hypertension.  She does check blood pressure  regularly outside of the clinic. Outpatient blood pressures have not been over 140/90. She follows a low salt diet.     She eats 2-3 servings of fruits and vegetables daily.She consumes 0 sweetened beverage(s) daily.She exercises with enough effort to increase her heart rate 10 to 19 minutes per day.  She exercises with enough effort to increase her heart rate 4 days per week. She is missing 1 dose(s) of medications per week.  She is not taking prescribed medications regularly due to remembering to take.         Hard to pass urine; has to wait a while.                      Objective    /69   Pulse 77   Temp 98.1  F (36.7  C) (Oral)   Resp 20   Ht 1.49 m (4' 10.66\")   Wt 66.3 kg (146 lb 1.9 oz)   LMP  (LMP Unknown)   SpO2 99%   BMI 29.85 kg/m    Body mass index is 29.85 kg/m .  Physical Exam       Results for orders placed or performed in visit on 08/23/24   UA Macroscopic with reflex to Microscopic and Culture - Lab Collect     Status: Abnormal    Specimen: Urine, NOS   Result Value Ref Range    Color Urine Yellow Colorless, Straw, Light Yellow, Yellow    Appearance Urine Clear Clear    Glucose Urine 500 (A) Negative mg/dL    Bilirubin Urine Negative Negative    Ketones Urine Negative Negative mg/dL    Specific Gravity Urine 1.010 1.005 - 1.030    Blood Urine Negative " Negative    pH Urine 6.5 5.0 - 7.0    Protein Albumin Urine Negative Negative mg/dL    Urobilinogen Urine 0.2 0.2, 1.0 E.U./dL    Nitrite Urine Negative Negative    Leukocyte Esterase Urine Negative Negative    Narrative    Microscopic not indicated           Signed Electronically by: Coreen Kendall MD

## 2024-09-05 ENCOUNTER — APPOINTMENT (OUTPATIENT)
Dept: INTERPRETER SERVICES | Facility: CLINIC | Age: 64
End: 2024-09-05

## 2024-09-12 DIAGNOSIS — Z79.4 TYPE 2 DIABETES MELLITUS WITH HYPERGLYCEMIA, WITH LONG-TERM CURRENT USE OF INSULIN (H): ICD-10-CM

## 2024-09-12 DIAGNOSIS — E11.65 TYPE 2 DIABETES MELLITUS WITH HYPERGLYCEMIA, WITH LONG-TERM CURRENT USE OF INSULIN (H): ICD-10-CM

## 2024-09-18 ENCOUNTER — TELEPHONE (OUTPATIENT)
Dept: FAMILY MEDICINE | Facility: CLINIC | Age: 64
End: 2024-09-18
Payer: MEDICAID

## 2024-09-18 NOTE — TELEPHONE ENCOUNTER
Need help with referrals, placed 8/23/24 and it looks like nothing is scheduled:  Please call daughter Erick to schedule: [phone]      Ortho/hand specialist.   Pelvic PT

## 2024-09-19 NOTE — TELEPHONE ENCOUNTER
SASHA spoke to patient daughter Erick at [phone], per Erick patient does not want to schedule appt yet due to patient insurance is still pending. Erick will call clinic to schedule when patient has health insurance. Please send call to Viraj BAEZ when patient returns call.

## 2024-09-20 ENCOUNTER — TELEPHONE (OUTPATIENT)
Dept: PHARMACY | Facility: CLINIC | Age: 64
End: 2024-09-20

## 2024-09-20 NOTE — TELEPHONE ENCOUNTER
Private pay for MTM as of now. I spoke to son, he is aware no active insurance. Aware will be billed for MTM appointment. I will keep appointment for now. Son is working on insurance and will update clinic with insurance or may cancel appointment for MTM.

## 2024-10-01 ENCOUNTER — TELEPHONE (OUTPATIENT)
Dept: FAMILY MEDICINE | Facility: CLINIC | Age: 64
End: 2024-10-01
Payer: MEDICAID

## 2024-10-01 NOTE — Clinical Note
Looks like she might have insurance now but they have not been able to reach her. I can check back to see if this goes through her insurance later this week and if so our clinic can try reaching her.  Ajay

## 2024-10-07 ENCOUNTER — TELEPHONE (OUTPATIENT)
Dept: FAMILY MEDICINE | Facility: CLINIC | Age: 64
End: 2024-10-07
Payer: MEDICAID

## 2024-10-07 NOTE — TELEPHONE ENCOUNTER
General Call    Contacts       Contact Date/Time Type Contact Phone/Fax    10/07/2024 12:34 PM CDT Phone (Incoming) Erick Urbina (Emergency Contact) [phone] (M)     [facility] ONLY NOT ON C2C.          Reason for Call:  request help from [facility]  with health insurance and medication.    What are your questions or concerns:  Patient daughter in law Erick Urbina called on behalf of patient requesting for help with insurance and medication. Patient is completely out of medication and health insurance is still pending with [facility]. Patient is seeking for help to get medication fill. Can CCC team help assist patient?    Date of last appointment with provider: 08/23/2024    Okay to leave a detailed message?: Yes at Other phone number:  [phone].

## 2024-10-08 ENCOUNTER — PATIENT OUTREACH (OUTPATIENT)
Dept: NURSING | Facility: CLINIC | Age: 64
End: 2024-10-08
Payer: COMMERCIAL

## 2024-10-08 NOTE — PROGRESS NOTES
Clinic Care Coordination Contact  [facility]/Voicemail    Clinical Data: Care Coordinator Outreach    Outreach Documentation Number of Outreach Attempt   10/8/2024   1:09 PM 1       Left message on patient's voicemail with call back information and requested return call.  Called [phone] and [phone] but had to leave message on [phone] requesting a call back.     Plan: Care Coordinator will try to reach patient again in 10 business days.

## 2024-10-15 ENCOUNTER — PATIENT OUTREACH (OUTPATIENT)
Dept: CARE COORDINATION | Facility: CLINIC | Age: 64
End: 2024-10-15
Payer: MEDICAID

## 2024-10-15 DIAGNOSIS — Z71.89 OTHER SPECIFIED COUNSELING: Primary | Chronic | ICD-10-CM

## 2024-10-15 NOTE — PROGRESS NOTES
Clinic Care Coordination Contact  Clinic Care Coordination Contact  OUTREACH    Referral Information:  Referral Source: Self-patient/Caregiver    Primary Diagnosis: Psychosocial    Chief Complaint   Patient presents with    Clinic Care Coordination - Initial     Clinic Utilization  Difficulty keeping appointments:: No  No-Show Concerns: No  No PCP office visit in Past Year: No  Utilization      No Show Count (past year)  8             ED Visits  0             Hospital Admissions  0                    Current as of: 10/20/2024  3:16 PM            Clinical Concerns:  CCRN completed initial assessment with patient today via phone. Patient states her insurance inactive and she already reapplied but still pending, not sure why taking it so long. Would like support from FRW to assist patient check on status. FRW referral placed today. CCRN referred patient to [place] prescription assistance program. Patient states she only would like to refill her insulin while lapsed in insurance. Declined additional resources for low cost prescription program but will wait for [facility] prescription assistance program because of financial difficulty.       Pain  Pain (GOAL):: No  Health Maintenance Reviewed: Due/Overdue   Clinical Pathway: None    Medication Management:  Medication review status: Medications reviewed and no changes reported per patient.           Functional Status:  Dependent ADLs:: Bathing, Dressing, Grooming  Dependent IADLs:: Cleaning, Cooking, Laundry, Shopping, Meal Preparation, Medication Management, Money Management, Transportation, Incontinence  Bed or wheelchair confined:: No  Mobility Status: Independent  Fallen 2 or more times in the past year?: No  Any fall with injury in the past year?: No    Living Situation:  Current living arrangement:: I live in a private home  Type of residence:: Private home - stairs    Lifestyle & Psychosocial Needs:    Social Determinants of Health     Food Insecurity: Low Risk   (11/10/2023)    Food Insecurity     Within the past 12 months, did you worry that your food would run out before you got money to buy more?: No     Within the past 12 months, did the food you bought just not last and you didn t have money to get more?: No   Depression: Not at risk (8/23/2024)    PHQ-2     PHQ-2 Score: 0   Housing Stability: Low Risk  (11/10/2023)    Housing Stability     Do you have housing? : Yes     Are you worried about losing your housing?: No   Tobacco Use: Medium Risk (8/23/2024)    Patient History     Smoking Tobacco Use: Never     Smokeless Tobacco Use: Former     Passive Exposure: Current   Financial Resource Strain: Low Risk  (11/10/2023)    Financial Resource Strain     Within the past 12 months, have you or your family members you live with been unable to get utilities (heat, electricity) when it was really needed?: No   Alcohol Use: Not on file   Transportation Needs: Low Risk  (11/10/2023)    Transportation Needs     Within the past 12 months, has lack of transportation kept you from medical appointments, getting your medicines, non-medical meetings or appointments, work, or from getting things that you need?: No   Physical Activity: Not on file   Interpersonal Safety: Not on file   Stress: Not on file   Social Connections: Not on file   Health Literacy: Not on file     Diet:: Diabetic diet  Inadequate nutrition (GOAL):: No  Tube Feeding: No  Inadequate activity/exercise (GOAL):: Yes  Significant changes in sleep pattern (GOAL): No  Transportation means:: Medical transport, Family, Regular car     Zoroastrianism or spiritual beliefs that impact treatment:: No  Mental health DX:: Yes  Mental health DX how managed:: Medication  Mental health management concern (GOAL):: No  Chemical Dependency Status: No Current Concerns  Informal Support system:: Children, Zoraida based, Family, Friends      Resources and Interventions:  Current Resources:      Community Resources: [facility], [facility] Programs, [facility]  Worker  Supplies Currently Used at Home: Diabetic Supplies  Equipment Currently Used at Home: cane, quad  Employment Status: disabled     Advance Care Plan/Directive  Advanced Care Plans/Directives on file:: No  Discussed with patient/caregiver:: Declined Further Information    Referrals Placed: None     Care Plan:  Care Plan: Health insurance       Problem: lapsed in health insurance       Goal: Natalie would like re-apply for health insurance in the next 90 days.       Start Date: 10/15/2024 Expected End Date: 12/31/2024    This Visit's Progress: 10%    Note:     Barriers: language   Strengths: Accepting of support,   Patient expressed understanding of goal: Yes     Action steps to achieve this goal:   1.  I will answer my phone when I am contacted by the [facility] FRW to schedule an initial appointment.   2.  I will provide all necessary documentation and information to complete a [place] application.   3.  I will call my FRW if I have questions or concerns regarding my county benefits application.                                 Outreach Frequency: 6 weeks, more frequently as needed  Future Appointments                Tomorrow Makeda Ambriz MA; PHONE,  [facility] Care Coordination, [facility]    In 2 months Coreen Kendall MD [facility] JOHN Cali SPRO            Plan:   1) Referred patient to [place] prescription assistance program

## 2024-10-15 NOTE — LETTER
[facility]  Patient Centered Plan of Care  About Me:        Patient Name:  Ramona Wilder    YOB: 1960  Age:         64 year old   Jose Carlos MRN:    [id number] Telephone Information:  Home Phone [phone]   Mobile [phone]   Mobile Not on file.   Mobile Not on file.   Home Phone Not on file.       Address:   [address] [facility] [address] Email address:  No e-mail address on record      Emergency Contact(s)    Name Relationship Lgl Grd Work Phone Home Phone Mobile Phone   1. MOO,LIGHTER Son    [phone]   2. LER MOO Daughter    [phone]   3. DAY,EH Son       4. PAW [facility]     [phone]           Primary language:  Dee     needed? Yes   [place] [facility]:  [phone] op. 1  Other communication barriers:Language barrier; Physical impairment; Lack of coping    Preferred Method of Communication:     Current living arrangement: I live in a private home    Mobility Status/ Medical Equipment: Independent        Health Maintenance  Health Maintenance Reviewed: Due/Overdue       My Access Plan  Medical Emergency 911   Primary Clinic Line [facility] [phone]   24 Hour Appointment Line [phone] or  [phone]) (toll-free)   24 Hour Nurse Line [phone] (toll-free)   Preferred Urgent Care [facility] [phone]     [facility]  [phone]     Preferred Pharmacy [facility] - [address]     Behavioral Health Crisis Line The National Suicide Prevention Lifeline at 1-491.695.7317 or Text/Call 508           My Care Team Members  Patient Care Team         Relationship Specialty Notifications Start End    Coreen Kendall MD PCP - General Family Medicine  7/7/23     Phone: [phone] Fax: [phone]         [address] [place] [address]    Heather Morales, PharmD Pharmacist Pharmacist  5/17/19     Phone:  [phone]          [address] [place] [address]    Coreen Kendall MD Assigned PCP   6/16/21     Phone: [phone] Fax: [phone]         [address] [place] [address] [address] [place] [address]    Claudia Winter NP Nurse Practitioner   8/7/23     Phone: [phone] Fax: [phone]         [address] [facility] [facility] [address] [facility] [address]    Heather Morales, PharmD Assigned [facility] Pharmacist   10/28/23     Phone: [phone]          [address] [place] [address]    Kj Chiang MD Physician Ophthalmology  3/22/24     Phone: [phone] Fax: [phone]         [facility] [address] [facility] [address]    Damien Turner MD MD Retina Ophthalmology  3/22/24     Phone: [phone] Fax: [phone]         [facility] [address] [facility] [address]    Sindhu Berrios APRN CNP Assigned Neuroscience Provider   5/23/24     Phone: [phone] Fax: [phone]         [address] [facility] [address]    Ellie Leal, RN Lead Care Coordinator Primary Care - CC Admissions 10/8/24     Phone: [phone]         Makeda Ambriz MA Financial Resource Worker   10/16/24     Phone: [phone]         Greg Mistry ARUNA Community Health Worker Primary Care - CC Admissions 10/17/24     Phone: [phone] Fax: [phone]         [address] [place] [address]                My Care Plans  Self Management and Treatment Plan    Care Plan  Care Plan: Health insurance       Problem: lapsed in health insurance       Goal: Natalie would like re-apply for health insurance in the next 90 days.       Start Date: 10/15/2024 Expected End Date: 12/31/2024    This Visit's Progress: 10%    Note:     Barriers: language   Strengths: Accepting of support,   Patient expressed understanding of goal: Yes     Action steps to achieve this goal:   1.  I will answer my phone when I am contacted by the [facility] AIDE  to schedule an initial appointment.   2.  I will provide all necessary documentation and information to complete a [place]ure application.   3.  I will call my FRW if I have questions or concerns regarding my county benefits application.                                 Action Plans on File:                       Advance Care Plans/Directives:   Advanced Care Plan/Directives on file:   No    Discussed with patient/caregiver(s):   Declined Further Information             My Medical and Care Information  Problem List   Patient Active Problem List   Diagnosis    Carotid Artery Stenosis    Moderate Recurrent Major Depression    Anxiety    Insomnia    Hypertension    Vitamin D deficiency    Gastropathy    Type 2 diabetes mellitus with hyperglycemia, with long-term current use of insulin (H)    Hyperlipidemia    Headache    Chronic Pain    Back Strain    Dermatitis    Metabolic Tests Nonspecific Elevation Of Transaminase Levels    Pain in finger of right hand    Left knee pain    Urinary, incontinence, stress female    Right lumbar radiculitis    Proteinuria due to type 2 diabetes mellitus (H)    Lichen sclerosus et atrophicus    Severe nonproliferative diabetic retinopathy of both eyes with macular edema associated with type 2 diabetes mellitus (H)      Current Medications and Allergies:  See printed Medication Report.    Care Coordination Start Date: 10/8/2024   Frequency of Care Coordination: 6 weeks, more frequently as needed     Form Last Updated: 10/21/2024

## 2024-10-18 ENCOUNTER — PATIENT OUTREACH (OUTPATIENT)
Dept: CARE COORDINATION | Facility: CLINIC | Age: 64
End: 2024-10-18
Payer: MEDICAID

## 2024-10-21 ASSESSMENT — ACTIVITIES OF DAILY LIVING (ADL)
DEPENDENT_IADLS:: CLEANING;COOKING;LAUNDRY;SHOPPING;MEAL PREPARATION;MEDICATION MANAGEMENT;MONEY MANAGEMENT;TRANSPORTATION;INCONTINENCE

## 2024-10-22 ENCOUNTER — PATIENT OUTREACH (OUTPATIENT)
Dept: CARE COORDINATION | Facility: CLINIC | Age: 64
End: 2024-10-22
Payer: COMMERCIAL

## 2024-10-26 DIAGNOSIS — E78.5 HYPERLIPIDEMIA, UNSPECIFIED HYPERLIPIDEMIA TYPE: ICD-10-CM

## 2024-10-28 RX ORDER — ROSUVASTATIN CALCIUM 40 MG/1
40 TABLET, COATED ORAL AT BEDTIME
Qty: 30 TABLET | Refills: 8 | Status: SHIPPED | OUTPATIENT
Start: 2024-10-28

## 2024-10-29 NOTE — TELEPHONE ENCOUNTER
Called [facility] today. Pharmacy states that they have not heard from the patient. Notes they have reached out to patient and have not been able to get ahold of her. They did not have insurance info.     Per [place] today: (provided to pharmacy)      Pharmacy unable to say if this is able to process. They need to send to the financial processor and this will take 24 hours to process.     Heather Morales, PharmD, [facility]  Medication Therapy Management Pharmacist

## 2024-10-30 ENCOUNTER — PATIENT OUTREACH (OUTPATIENT)
Dept: CARE COORDINATION | Facility: CLINIC | Age: 64
End: 2024-10-30
Payer: MEDICAID

## 2024-11-04 ENCOUNTER — PATIENT OUTREACH (OUTPATIENT)
Dept: CARE COORDINATION | Facility: CLINIC | Age: 64
End: 2024-11-04

## 2024-11-04 NOTE — PROGRESS NOTES
Clinic Care Coordination Contact  Patient has completed all goals with Clinic Care Coordination.  Please review the chart and confirm if maintenance/graduate is approved.    -Patient's health insurance is reactivated.  -Patient's son is helping with all medications refills.  -No additional coordination assistance needed at this time.  -Patient was informed to call with questions or concerns.   -Chart review routed to CCC RN to further review and advise.     [employer]: [place] [facility] Programs Eligibility Response (271)    SUBSCRIBER INFORMATION   Date of Service Subscriber ID Subscriber Name Birthdate Age Gender   11/04/2024 [id number] [facility] 1960 64 [place]          Address   [address] , [place]  [address]          PROVIDER INFORMATION   Provider ID Submitter Transaction ID Provider Name Taxonomy Code Qualifier Taxonomy Code   [id number] xx [facility]   This subscriber has eligibility for MA: Medical Assistance.  Elig Type DX: Disabled  Eligibility Begin Date: 09/01/2024  Eligibility End Date: --/--/----  This subscriber is eligible for the following service types: Medical Care ,  Chiropractic ,  Dental Care ,  Hospital ,  Hospital - Inpatient ,  Hospital - Outpatient ,  Emergency Services ,  Pharmacy ,  Professional (Physician) Visit - Office ,  Vision (Optometry) ,  Mental Health ,  Urgent Care   Prepaid Health Plan   This subscriber receives MA37 - Special Needs BasicCare (SNBC) delivered through [facility]. The phone numbers are: [phone] (metro) or [phone] (toll free).  SNBC does not cover personal care assistant (PCA) services, private duty nursing (PDN), or home and community based waiver services. SNBC recipients can obtain these services through fee-for-service [facility].  If the subscriber has Medicare, Medicare services must be submitted to original Medicare FFS or the selected Medicare Part D  plan prior to submitting to the health plan.   Other Eligibility Information   No Special Transportation.  This subscribers eligibility is not determined for Long term Care and waiver services.  No Hospice.  County of residence is unknown.  Refer to Health Care Programs and Services Overview of the [facility] Provider Manual for a list of covered services.   Waivers   None     Subscriber Responsibility Information   None     Restricted Recipient Program   None       Medicare   None

## 2024-11-06 ENCOUNTER — OFFICE VISIT (OUTPATIENT)
Dept: FAMILY MEDICINE | Facility: CLINIC | Age: 64
End: 2024-11-06
Payer: COMMERCIAL

## 2024-11-06 ENCOUNTER — VIRTUAL VISIT (OUTPATIENT)
Dept: INTERPRETER SERVICES | Facility: CLINIC | Age: 64
End: 2024-11-06

## 2024-11-06 VITALS
SYSTOLIC BLOOD PRESSURE: 158 MMHG | DIASTOLIC BLOOD PRESSURE: 94 MMHG | TEMPERATURE: 97.5 F | BODY MASS INDEX: 29.84 KG/M2 | RESPIRATION RATE: 18 BRPM | HEIGHT: 59 IN | OXYGEN SATURATION: 100 % | HEART RATE: 90 BPM | WEIGHT: 148 LBS

## 2024-11-06 DIAGNOSIS — I10 ESSENTIAL HYPERTENSION: ICD-10-CM

## 2024-11-06 DIAGNOSIS — Z23 NEED FOR VACCINATION: ICD-10-CM

## 2024-11-06 DIAGNOSIS — J35.8 TONSILLAR MASS: Primary | ICD-10-CM

## 2024-11-06 DIAGNOSIS — E11.65 TYPE 2 DIABETES MELLITUS WITH HYPERGLYCEMIA, WITH LONG-TERM CURRENT USE OF INSULIN (H): ICD-10-CM

## 2024-11-06 DIAGNOSIS — Z79.4 TYPE 2 DIABETES MELLITUS WITH HYPERGLYCEMIA, WITH LONG-TERM CURRENT USE OF INSULIN (H): ICD-10-CM

## 2024-11-06 PROCEDURE — 99214 OFFICE O/P EST MOD 30 MIN: CPT | Mod: 25 | Performed by: FAMILY MEDICINE

## 2024-11-06 PROCEDURE — T1013 SIGN LANG/ORAL INTERPRETER: HCPCS | Mod: U4

## 2024-11-06 PROCEDURE — 90656 IIV3 VACC NO PRSV 0.5 ML IM: CPT | Performed by: FAMILY MEDICINE

## 2024-11-06 PROCEDURE — 90471 IMMUNIZATION ADMIN: CPT | Performed by: FAMILY MEDICINE

## 2024-11-06 RX ORDER — AMLODIPINE BESYLATE 10 MG/1
10 TABLET ORAL DAILY
Qty: 90 TABLET | Refills: 3 | Status: SHIPPED | OUTPATIENT
Start: 2024-11-06

## 2024-11-06 RX ORDER — PEN NEEDLE, DIABETIC 32GX 5/32"
NEEDLE, DISPOSABLE MISCELLANEOUS
Qty: 300 EACH | Refills: 4 | Status: CANCELLED | OUTPATIENT
Start: 2024-11-06

## 2024-11-06 NOTE — PROGRESS NOTES
ASSESMENT AND PLAN:  Diagnoses and all orders for this visit:  Tonsillar mass  Reviewed records from care everywhere, detailed counseling done with the patient and her family with the help of a professional  sharing the good news on her biopsy results-negative for malignancy.  However, she still has a visible abnormality on her physical exam as detailed below and I do want her to follow-up with ENT has had been directed at her discharge, our specialty scheduling team is assisting the patient in scheduling the follow-up with [facility].  Type 2 diabetes mellitus with hyperglycemia, with long-term current use of insulin (H)  -     blood glucose (NO BRAND SPECIFIED) lancets standard; by In Vitro route 2 times daily.  Hypertension  Not under ideal control.  Will increase amlodipine dose:  -     amLODIPine (NORVASC) 10 MG tablet; Take 1 tablet (10 mg) by mouth daily.  Need for vaccination  -     INFLUENZA VACCINE, SPLIT VIRUS, TRIVALENT,PF (FLUZONE\FLUARIX)      Reviewed the risks and benefits of the treatment plan with the patient and/or caregiver and we discussed indications for routine and emergent follow-up.        SUBJECTIVE: Patient had been in at the hospital with a tonsillar mass and throat pain.  It was actually biopsied in the ER-see notes in care everywhere.  Patient had not heard results of her biopsy yet.  The pain has improved and the swelling has improved.  She still just has some mild residual discomfort in the left throat.  Only when she swallows food.  Patient has a history of type 2 diabetes but has not been checking her blood sugars recently because she ran out of lancets.  Also history of hypertension, blood pressure is not under good control today, she reports that she is taking all of her medications as prescribed.    No past medical history on file.  Patient Active Problem List   Diagnosis    Carotid Artery Stenosis    Moderate Recurrent Major Depression    Anxiety    Insomnia     Hypertension    Vitamin D deficiency    Gastropathy    Type 2 diabetes mellitus with hyperglycemia, with long-term current use of insulin (H)    Hyperlipidemia    Headache    Chronic Pain    Back Strain    Dermatitis    Metabolic Tests Nonspecific Elevation Of Transaminase Levels    Pain in finger of right hand    Left knee pain    Urinary, incontinence, stress female    Right lumbar radiculitis    Proteinuria due to type 2 diabetes mellitus (H)    Lichen sclerosus et atrophicus    Severe nonproliferative diabetic retinopathy of both eyes with macular edema associated with type 2 diabetes mellitus (H)     Current Outpatient Medications   Medication Sig Dispense Refill    acetaminophen (TYLENOL) 500 MG tablet Take 1 tablet (500 mg) by mouth every 6 hours as needed for pain 100 tablet 5    amLODIPine (NORVASC) 10 MG tablet Take 1 tablet (10 mg) by mouth daily. 90 tablet 3    blood glucose (NO BRAND SPECIFIED) lancets standard by In Vitro route 2 times daily. 100 Lancet 11    canagliflozin (INVOKANA) 300 MG tablet Take 1 tablet (300 mg) by mouth daily. 30 tablet 11    Continuous Glucose Sensor (FREESTYLE AILYN 2 SENSOR) MISC CHANGE EVERY 14 DAYS 2 each 5    cyclobenzaprine (FLEXERIL) 5 MG tablet Take 1-2 tablets (5-10 mg) by mouth 3 times daily as needed for other (rectal pain). 60 tablet 5    escitalopram (LEXAPRO) 5 MG tablet Take 1 tablet (5 mg) by mouth daily 30 tablet 11    hydrochlorothiazide (HYDRODIURIL) 25 MG tablet Take 1 tablet (25 mg) by mouth daily In the morning 30 tablet 11    insulin aspart prot & aspart (NOVOLOG MIX 70/30 PEN) (70-30) 100 UNIT/ML pen 28 units in the morning and 15 units in the evening 45 mL 3    insulin pen needle (TRUEPLUS PEN NEEDLES) 32G X 4 MM miscellaneous Use 3 pen needles daily or as directed. 300 each 4    losartan (COZAAR) 100 MG tablet Take 1 tablet (100 mg) by mouth daily 30 tablet 11    metFORMIN (GLUCOPHAGE XR) 500 MG 24 hr tablet Take 2 tablets each morning and one  "tablet at night 90 tablet 11    rosuvastatin (CRESTOR) 40 MG tablet TAKE 1 TABLET (40 MG) BY MOUTH AT BEDTIME 30 tablet 8    vitamin D2 (ERGOCALCIFEROL) 94281 units (1250 mcg) capsule Take 1 capsule (50,000 Units) by mouth once a week. 4 capsule 3    calcium carbonate (TUMS) 500 MG chewable tablet Take 1 tablet (500 mg) by mouth daily as needed for heartburn (Patient not taking: Reported on 11/6/2024) 30 tablet 5    omeprazole (PRILOSEC) 20 MG DR capsule TAKE ONE CAPSULE BY MOUTH DAILY BEFORE BREAKFAST (Patient not taking: Reported on 11/6/2024) 30 capsule 3     History   Smoking Status    Never   Smokeless Tobacco    Former    Types: Chew    Quit date: 09/2023       OBJECTICE: BP (!) 158/94   Pulse 90   Temp 97.5  F (36.4  C) (Temporal)   Resp 18   Ht 1.49 m (4' 10.66\")   Wt 67.1 kg (148 lb)   LMP  (LMP Unknown)   SpO2 100%   BMI 30.24 kg/m       No results found for this or any previous visit (from the past 24 hours).     GEN-alert, appropriate, in no apparent distress   HEENT-left tonsillar mass, no palpable cervical lymphadenopathy.   CV-regular rate and rhythm   RESP-lungs clear to auscultation      Signed Electronically by: Yvon Ortega MD    "

## 2024-11-07 DIAGNOSIS — Z79.4 TYPE 2 DIABETES MELLITUS WITH HYPERGLYCEMIA, WITH LONG-TERM CURRENT USE OF INSULIN (H): ICD-10-CM

## 2024-11-07 DIAGNOSIS — E11.65 TYPE 2 DIABETES MELLITUS WITH HYPERGLYCEMIA, WITH LONG-TERM CURRENT USE OF INSULIN (H): ICD-10-CM

## 2024-11-07 NOTE — TELEPHONE ENCOUNTER
Reason for Call:  Medication refill    Pt states that she wants to go back to using the CONTOUR NEXT TEST test strip [Pharmacy Med Name: CONTOUR NEXT TEST STRP Strip]. She states that the Freestyle Rosalinda CGM was inconvenient and prefers the needles and test strips.     Pt states she would like them as soon as possible since she has not been changing her blood glucose for a while now.

## 2024-11-07 NOTE — TELEPHONE ENCOUNTER
LIZZIE: I wasn't able to reach patient today but spoke with son. He plans to  the test strips tomorrow. I added patient on my schedule next week to follow up on ENT appointment at [facility].     Scheduling - please assist patient schedule MTM appointment. Thanks.

## 2024-11-07 NOTE — TELEPHONE ENCOUNTER
Call patient to verify request. This order was discontinued 3/4/24 by MTM, patient switched to Freestyle Rosalinda CGM.    Roxy Gaitan RN  [facility]

## 2024-11-07 NOTE — TELEPHONE ENCOUNTER
CCC:    Can you please let pt know that diabetes test strips refilled AND address the following:      Patient had insurance lapse, I believe, and missed some appointments.  Can you help make sure she gets back in to see MTM pharmacist within the next few weeks?    Also, I see that she is supposed to have one of her tonsils removed. Does she have that scheduled?  If not, please help scheduled.  It's through [facility].  Will likely need pre-op appointment within 30 days of surgery.

## 2024-11-12 ENCOUNTER — PATIENT OUTREACH (OUTPATIENT)
Dept: NURSING | Facility: CLINIC | Age: 64
End: 2024-11-12
Payer: COMMERCIAL

## 2024-11-12 NOTE — PROGRESS NOTES
Clinic Care Coordination Contact    Direct care provided in primary language, no  needed.     Follow Up Progress Note      Assessment: CCRN spoke with patient but patient would like CCRN speak to her son Katie Root. Per Katie Root, he would patient to see ENT at [place] instead of [facility]. Educated patient and son patient can be seen at  for ENT and possible patient can be seen sooner. Patient was seen at [facility] ED on 10/21/24 and ENT referral urgent appt was requested. Son states no one calls them yet but son was adamant that patient should be seen by  ENT. Will sent massage to Dr Ortega to place ENT referral.     Can you please let pt know that diabetes test strips refilled AND address the following:    Son picked up test strips.      Patient had insurance lapse, I believe, and missed some appointments.  Can you help make sure she gets back in to see MTM pharmacist within the next few weeks?  Patient is scheduled with MTM on 11/20/24 at 11:30 with Ajay. Patient and son are aware.      Also, I see that she is supposed to have one of her tonsils removed. Does she have that scheduled?  If not, please help scheduled.  It's through [facility].  Will likely need pre-op appointment within 30 days of surgery.    Date Type Department Care Team (Latest Contact Info) Description   12/09/2024 1:00 PM CST Hospital Encounter  Operating Room   [address] [place] [address]   [phone]  Killian Wall MD   [address] [place] [address]   [phone] (Work)   [phone] (Fax)      12/09/2024 1:00 PM CST - 12/09/2024 2:20 PM CST Surgery  Operating Room   [address] [place] [address]   [phone]  Killian Wall MD   [address] [place] [address]   [phone] (Work)   [phone] (Fax)  TONSILLECTOMY   01/16/2025 2:20 PM CST Appointment  [facility] Otolaryngology   [address] [place] [address]   [phone]  Killian Wall,   [place] [address]   [phone] (Work)   [phone] (Fax)       Care Plans  Care Plan: Tonsil removal surgery       Problem: pain with swallowing       Goal: Patient will attend her surgery in the next 90 days.       Start Date: 11/12/2024 Expected End Date: 2/28/2025    This Visit's Progress: 10%    Note:     Barriers: language barrier, low literacy, noncompliance, and lack of knowledge how to navigate complex health care system  Strengths: motivated to attend appt  Patient expressed understanding of goal: Yes    Action steps to achieve this goal:  1. I will attend my pre-op appointment on 11/22/24 at 10:10am with Dr Swartz. [facility]  2. I will attend my surgery on 12/9/24 at 1:00pm. Need to confirm arrival time.   3. I will attend my post-op appointment on 1/16/25 at 2:20pm.   4. I will schedule a follow up appointment with my PCP if it is recommended to do so while I am at the clinic.  5. I will follow up with CCC regarding this goal at each outreach until it is completed.                               Outreach Frequency: 6 weeks, more frequently as needed  Plan:   1) CHW to make sure patient attends her pre-op on 11/22 and surgery arrival time.

## 2024-11-20 ENCOUNTER — LAB (OUTPATIENT)
Dept: LAB | Facility: CLINIC | Age: 64
End: 2024-11-20
Payer: COMMERCIAL

## 2024-11-20 ENCOUNTER — OFFICE VISIT (OUTPATIENT)
Dept: PHARMACY | Facility: CLINIC | Age: 64
End: 2024-11-20
Payer: COMMERCIAL

## 2024-11-20 VITALS
WEIGHT: 149.8 LBS | BODY MASS INDEX: 30.61 KG/M2 | SYSTOLIC BLOOD PRESSURE: 132 MMHG | OXYGEN SATURATION: 99 % | DIASTOLIC BLOOD PRESSURE: 78 MMHG | HEART RATE: 87 BPM

## 2024-11-20 DIAGNOSIS — I10 ESSENTIAL HYPERTENSION: ICD-10-CM

## 2024-11-20 DIAGNOSIS — K31.9 DISEASE OF STOMACH AND DUODENUM: ICD-10-CM

## 2024-11-20 DIAGNOSIS — Z76.0 ENCOUNTER FOR MEDICATION REFILL: ICD-10-CM

## 2024-11-20 DIAGNOSIS — Z79.4 TYPE 2 DIABETES MELLITUS WITH HYPERGLYCEMIA, WITH LONG-TERM CURRENT USE OF INSULIN (H): ICD-10-CM

## 2024-11-20 DIAGNOSIS — E55.9 VITAMIN D DEFICIENCY: ICD-10-CM

## 2024-11-20 DIAGNOSIS — E11.65 TYPE 2 DIABETES MELLITUS WITH HYPERGLYCEMIA, WITH LONG-TERM CURRENT USE OF INSULIN (H): ICD-10-CM

## 2024-11-20 DIAGNOSIS — E11.65 TYPE 2 DIABETES MELLITUS WITH HYPERGLYCEMIA, WITH LONG-TERM CURRENT USE OF INSULIN (H): Primary | ICD-10-CM

## 2024-11-20 DIAGNOSIS — E78.5 HYPERLIPIDEMIA, UNSPECIFIED HYPERLIPIDEMIA TYPE: ICD-10-CM

## 2024-11-20 DIAGNOSIS — Z79.4 TYPE 2 DIABETES MELLITUS WITH HYPERGLYCEMIA, WITH LONG-TERM CURRENT USE OF INSULIN (H): Primary | ICD-10-CM

## 2024-11-20 LAB
CHOLEST SERPL-MCNC: 188 MG/DL
EST. AVERAGE GLUCOSE BLD GHB EST-MCNC: 183 MG/DL
FASTING STATUS PATIENT QL REPORTED: ABNORMAL
HBA1C MFR BLD: 8 % (ref 0–5.6)
HDLC SERPL-MCNC: 64 MG/DL
LDLC SERPL CALC-MCNC: 86 MG/DL
NONHDLC SERPL-MCNC: 124 MG/DL
TRIGL SERPL-MCNC: 188 MG/DL
VIT D+METAB SERPL-MCNC: 53 NG/ML (ref 20–50)
VIT D+METAB SERPL-MCNC: 57 NG/ML (ref 20–50)

## 2024-11-20 RX ORDER — PSEUDOEPHED/ACETAMINOPH/DIPHEN 30MG-500MG
500 TABLET ORAL EVERY 6 HOURS PRN
Qty: 100 TABLET | Refills: 5 | Status: SHIPPED | OUTPATIENT
Start: 2024-11-20

## 2024-11-20 RX ORDER — AMLODIPINE BESYLATE 5 MG/1
5 TABLET ORAL DAILY
Qty: 90 TABLET | Refills: 3 | Status: SHIPPED | OUTPATIENT
Start: 2024-11-20

## 2024-11-20 NOTE — PROGRESS NOTES
Medication Therapy Management (MTM) Encounter    ASSESSMENT:                            Medication Adherence/Access: No issues identified.    1. Type 2 diabetes mellitus with hyperglycemia, with long-term current use of insulin (H)  A1c not at goal <7%. Patient was using novolog 70/30 insulin after dinner, counseled her to use 5-15 minutes BEFORE dinner. Patient would benefit from a decreased dose of novolog mix insulin at dinner to avoid overnight hypoglycemia. Patient declined increasing metformin to 2000mg/day, retrial of Ozepimic, or CGM use.      2. Essential hypertension  BP meeting goal <140/90 mmHg today, however patient's blood pressure varies greatly. Reasonable for patient to continue using amlodipine 5mg daily and her other blood pressure medications with no changes as blood pressure at goal today, though will confirm with Dr. Kendall preferred BP goal, could consider medication adjustment if aiming for a more strict goal such as <130/80 mmHg. Could consider increasing hydrochlorothiazide, adding spironolactone, or beta blocker.     3. Hyperlipidemia, unspecified hyperlipidemia type  Patient to continue high intensity statin. Fasting lipids checked today, but not yet resulted. If LDL still >100, will recommend starting zetia.      4. Heartburn  Controlled with as needed use of Tums. Removed PPI from medication list today.    5. Vitamin D Deficiency  Vitamin D above goal. Contacted Dr. Kendall to recommend lowering dose vitamin D to 1000 units daily. No change made today.      Unable to assess depression and rectal pain today due to time contraints.     PLAN:                            Use Novolog 70/30 mix insulin BEFORE dinner   Use 28u of Novolog 70/30 mix before breakfast and 12 units before dinner   Continue taking amlodipine 5mg daily, prescription updated today  Routed to PCP regarding goal BP and Vitamin D result    Follow-up: Return in 1 month (on 12/23/2024) for with  PharmD.    SUBJECTIVE/OBJECTIVE:                          Ramona Wilder is a 64 year old female seen for a follow-up visit. Patient was accompanied by daughter-in-law.  (ID# [id number]) was used during today's visit.       Reason for visit: MTM follow-up.    Allergies/ADRs: Reviewed in chart  Past Medical History: Reviewed in chart  Tobacco: She reports that she has never smoked. She has been exposed to tobacco smoke. She quit smokeless tobacco use about 14 months ago.  Her smokeless tobacco use included chew.  Alcohol: none    Medication Adherence/Access: Patient taking medications out of pillbox, that daughter-in-law is setting up her for. Reports she rarely misses doses, maybe once every 2 weeks.     Diabetes   Metformin  mg twice daily - 2 in the am, 1 in the pm - increased at 7/10 visit  Invokana 300 mg daily in the morning  Novolog 70/30 Mix insulin 28 units AM and 10 unit PM 5-10 minutes before meals - patient increased evening dose back to 15 units, using before breakfast but AFTER dinner - said she uses after dinner because she thinks her blood sugars are already too low before eating  Patient is not experiencing side effects.   Current diabetes symptoms: hypoglycemia - Corrects with 1/2 cup juice when low  Diet/Exercise: Eating 2 meals per day but also eats a lot of fruit (tad, bananas, watermelon). States that fruit will increase her blood sugar a lot but she still likes to eat it. Eating brown rice instead of white rice.   MedHx: metformin 2000 mg daily (GI sx), Victoza (gassy/bloating), Ozempic (unclear if this was ever tried?)     Spoke to patient about ozempic, said she has tried and it made her not want to eat and very tired - she does not want to try it again and would prefer to stay on insulin long-term     Blood sugar monitoring: Fingerstick - tried Libre2, but prefers traditional monitoring and stopped using. Offered CGM again, patient does not want to use as every time she has to get  imaging done she has to take it off and she feels like it is a waste.  Reported she has symptoms of hypoglycemia about once per month. Did not have symptoms this morning - took 15 units of novolog last night.   14 day av - n25  Date FBG/ 2hours post Before dinner    72     151 127    109 93    133 138    126    11/15 132 142    102     Eye exam is up to date  Foot exam: due    Hypertension   Hydrochlorothiazide 25 mg daily at bedtime   Losartan 100 mg daily in the evening  Amlodipine 10 mg daily - increased  - patient stopped taking after 2 days because she said it made her blood pressure too low and she would start shaking and had dizziness, nausea, vomiting, & weakness - went back to 5mg daily   Patient reports no current medication side effects  Patient does self-monitor blood pressure. Typically blood pressure in the morning is 120/77. Highest she has seen recently is over 150 mmHg systolic.     Hyperlipidemia   Rosuvastatin 40 mg daily  AM  Patient reports no significant myalgias or other side effects.  The 10-year ASCVD risk score (Rishi DK, et al., 2019) is: 10.8%    Values used to calculate the score:      Age: 64 years      Sex: Female      Is Non- : No      Diabetic: Yes      Tobacco smoker: No      Systolic Blood Pressure: 132 mmHg      Is BP treated: Yes      HDL Cholesterol: 64 mg/dL      Total Cholesterol: 188 mg/dL     GERD    Omeprazole 20 mg daily in the evening - not filled lately - no longer taking, wants taken off medication list   Tums 500 mg daily as needed for pain - finds this helpful    Pt feels heartburn is well controlled without omeprazole.       Supplements   Vitamin D2 50,000u weekly      Today's Vitals: /78   Pulse 87   Wt 149 lb 12.8 oz (67.9 kg)   LMP  (LMP Unknown)   SpO2 99%   BMI 30.61 kg/m    ----------------      I spent 60 minutes with this patient today. All changes were made via collaborative practice  agreement with Coreen Kendall MD. A copy of the visit note was provided to the patient's provider(s).    A summary of these recommendations was given to the patient.    Lily Navas, PharmD   Medication Therapy Management Pharmacy Resident    Preceptor cosignature: Patient was seen independently by Dr. Navas. I have reviewed the assessment and plan. Heather Morales, PharmD, [facility]     Medication Therapy Recommendations  Type 2 diabetes mellitus with hyperglycemia, with long-term current use of insulin (H)   1 Current Medication: insulin aspart prot & aspart (NOVOLOG MIX 70/30 PEN) (70-30) 100 UNIT/ML pen (Discontinued)   Current Medication Si units in the morning and 15 units in the evening   Rationale: Dose too high - Dosage too high - Safety   Recommendation: Decrease Dose   Status: Accepted per CPA   Identified Date: 2024 Completed Date: 2024

## 2024-11-20 NOTE — PATIENT INSTRUCTIONS
"  Recommendations from today's MTM visit:                                                      Use novolog 5-10 minutes BEFORE meals   Use 28u of novolog before breakfast and 12 units before dinner   Continue taking amlodipine 5mg daily    Follow-up: Return in 1 month (on 12/23/2024) for with PharmD.      It was great speaking with you today.  I value your experience and would be very thankful for your time in providing feedback in our clinic survey. In the next few days, you may receive an email or text message from [employer] with a link to a survey related to your  clinical pharmacist.\"      To schedule another MTM appointment, please call the clinic directly or you may call the MTM scheduling line at [phone].      My Clinical Pharmacist's contact information:                                                       Please feel free to contact me with any questions or concerns you have.      Lily Navas, PharmD   Medication Therapy Management Pharmacy Resident      "

## 2024-11-22 ENCOUNTER — OFFICE VISIT (OUTPATIENT)
Dept: FAMILY MEDICINE | Facility: CLINIC | Age: 64
End: 2024-11-22
Payer: COMMERCIAL

## 2024-11-22 VITALS
DIASTOLIC BLOOD PRESSURE: 91 MMHG | OXYGEN SATURATION: 100 % | HEIGHT: 59 IN | SYSTOLIC BLOOD PRESSURE: 141 MMHG | RESPIRATION RATE: 15 BRPM | HEART RATE: 85 BPM | BODY MASS INDEX: 29.64 KG/M2 | WEIGHT: 147 LBS | TEMPERATURE: 98.2 F

## 2024-11-22 DIAGNOSIS — J35.8 TONSILLAR MASS: Primary | ICD-10-CM

## 2024-11-22 DIAGNOSIS — Z79.4 TYPE 2 DIABETES MELLITUS WITH HYPERGLYCEMIA, WITH LONG-TERM CURRENT USE OF INSULIN (H): ICD-10-CM

## 2024-11-22 DIAGNOSIS — E11.65 TYPE 2 DIABETES MELLITUS WITH HYPERGLYCEMIA, WITH LONG-TERM CURRENT USE OF INSULIN (H): ICD-10-CM

## 2024-11-22 PROCEDURE — 99214 OFFICE O/P EST MOD 30 MIN: CPT | Performed by: FAMILY MEDICINE

## 2024-11-22 RX ORDER — OXYCODONE HYDROCHLORIDE 5 MG/1
1 TABLET ORAL EVERY 6 HOURS PRN
COMMUNITY
Start: 2024-10-21

## 2024-11-22 RX ORDER — LANCETS
1 EACH MISCELLANEOUS DAILY
COMMUNITY
Start: 2024-11-06

## 2024-11-22 NOTE — PATIENT INSTRUCTIONS
How to Take Your Medication Before Surgery  Preoperative Medication Instructions   Antiplatelet or Anticoagulation Medication Instructions   - Patient is on no antiplatelet or anticoagulation medications.    Additional Medication Instructions  Invokana: Do not take for 3 days before the surgery  Hydrochlorothiazide: do not take the day of surgery  Monitor your blood sugar every 4 hours, <100 4 oz juice or soda  Do not take 70/30 the morning of the surgery    Preparing for Your Surgery  For Adults  Getting started  In most cases, a nurse will call to review your health history and instructions. They will give you an arrival time based on your scheduled surgery time. Please be ready to share:  Your doctor's clinic name and phone number  Your medical, surgical, and anesthesia history  A list of allergies and sensitivities  A list of medicines, including herbal treatments and over-the-counter drugs  Whether the patient has a legal guardian (ask how to send us the papers in advance)  Note: You may not receive a call if you were seen at our PAC (Preoperative Assessment Center).  Please tell us if you're pregnant--or if there's any chance you might be pregnant. Some surgeries may injure a fetus (unborn baby), so they require a pregnancy test. Surgeries that are safe for a fetus don't always need a test, and you can choose whether to have one.   Preparing for surgery  Within 10 to 30 days of surgery: Have a pre-op exam (sometimes called an H&P, or History and Physical). This can be done at a clinic or pre-operative center.  If you're having a , you may not need this exam. Talk to your care team.  At your pre-op exam, talk to your care team about all medicines you take. (This includes CBD oil and any drugs, such as THC, marijuana, and other forms of cannabis.) If you need to stop any medicine before surgery, ask when to start taking it again.  This is for your safety. Many medicines and drugs can make you bleed too  much during surgery. Some change how well surgery (anesthesia) drugs work.  Call your insurance company to let them know you're having surgery. (If you don't have insurance, call [phone].)  Call your clinic if there's any change in your health. This includes a scrape or scratch near the surgery site, or any signs of a cold (sore throat, runny nose, cough, rash, fever).  Eating and drinking guidelines  For your safety: Unless your surgeon tells you otherwise, follow the guidelines below.  Eat and drink as normal until 8 hours before you arrive for surgery. After that, no food or milk. You can spit out gum when you arrive.  Drink clear liquids until 2 hours before you arrive. These are liquids you can see through, like water, Gatorade, and Propel Water. They also include plain black coffee and tea (no cream or milk).  No alcohol for 24 hours before you arrive. The night before surgery, stop any drinks that contain THC.  If your care team tells you to take medicine on the morning of surgery, it's okay to take it with a sip of water. No other medicines or drugs are allowed (including CBD oil)--follow your care team's instructions.  If you have questions the day of surgery, call your hospital or surgery center.   Preventing infection  Shower or bathe the night before and the morning of surgery. Follow the instructions your clinic gave you. (If no instructions, use regular soap.)  Don't shave or clip hair near your surgery site. We'll remove the hair if needed.  Don't smoke or vape the morning of surgery. No chewing tobacco for 6 hours before you arrive. A nicotine patch is okay. You may spit out nicotine gum when you arrive.  For some surgeries, the surgeon will tell you to fully quit smoking and nicotine.  We will make every effort to keep you safe from infection. We will:  Clean our hands often with soap and water (or an alcohol-based hand rub).  Clean the skin at your surgery site with a special soap that kills  germs.  Give you a special gown to keep you warm. (Cold raises the risk of infection.)  Wear hair covers, masks, gowns, and gloves during surgery.  Give antibiotic medicine, if prescribed. Not all surgeries need this medicine.  What to bring on the day of surgery  Photo ID and insurance card  Copy of your health care directive, if you have one  Glasses and hearing aids (bring cases)  You can't wear contacts during surgery  Inhaler and eye drops, if you use them (tell us about these when you arrive)  CPAP machine or breathing device, if you use them  A few personal items, if spending the night  If you have . . .  A pacemaker, ICD (cardiac defibrillator), or other implant: Bring the ID card.  An implanted stimulator: Bring the remote control.  A legal guardian: Bring a copy of the certified (court-stamped) guardianship papers.  Please remove any jewelry, including body piercings. Leave jewelry and other valuables at home.  If you're going home the day of surgery  You must have a responsible adult drive you home. They should stay with you overnight as well.  If you don't have someone to stay with you, and you aren't safe to go home alone, we may keep you overnight. Insurance often won't pay for this.  After surgery  If it's hard to control your pain or you need more pain medicine, please call your surgeon's office.  Questions?   If you have any questions for your care team, list them here:   ____________________________________________________________________________________________________________________________________________________________________________________________________________________________________________________________  For informational purposes only. Not to replace the advice of your health care provider. Copyright   2003, 2019 [facility]. All rights reserved. Clinically reviewed by Ruddy Spann MD. [employer] [id number] - REV 08/24.

## 2024-11-22 NOTE — PROGRESS NOTES
Preoperative Evaluation  [facility]  [address] [place] [address]  Phone: [phone]  Fax: [phone]  Primary Provider: Coreen Kendall MD  Pre-op Performing Provider: TEJAS LONDONO MD  Nov 22, 2024 11/22/2024   Surgical Information   What procedure is being done? Tonsilectomy    Facility or Hospital where procedure/surgery will be performed: [facility] operatin room    Who is doing the procedure / surgery? Dr. Wall    Date of surgery / procedure: 12/9/24    Time of surgery / procedure: 1300    Where do you plan to recover after surgery? at home with family        Patient-reported     Fax number for surgical facility: Note does not need to be faxed, will be available electronically in Epic.    Assessment & Plan   Tonsillar mass  The patient is extremely reluctant to have the surgery and she notes the tonsils has shrank since the biopsy. She indicated that she did not want it if avoidable and would like to know if there is cancer first. The biopsy did not reveal cancer, but is not definitive.  I explained that tonsillar cancer does need to be addressed quickly, if it is the issue.  Given her surgeon's phone number from the chart. She states she will contact him.  We will let them know as well.    Type 2 diabetes mellitus with hyperglycemia, with long-term current use of insulin (H)  The patient is very concerned that her healing would be delayed due to her diabetes. Her recent HgA1c was 8.0, which is higher than ideal, however, she is working with her primary care doctor and a diabetic instructor.    The patient asked questions that should be answered by her surgeon or his nurse. She wanted to know how long before she could eat and if her healing would be different due to the diabetes and other questions specific to tonsillectomy.  I offered to do the pre-operative evaluation as scheduled. She declined. She asked for her surgeon's phone  number and will contact him.    42 minutes spent by me on the date of the encounter doing chart review and patient visit   Subjective   Ramona is a 64 year old, presenting for the following:  Pre-Op Exam (Pre op 12/9 tonsillectomy-[facility] operating room-Dr. Duke Gerardo.  Patient is concerned with her diabetic status and having surgery.)    HPI related to upcoming procedure: 65 yo was told in the ED that her tonsils were a cancer risk. She had a biopsy which did not show cancer. She is scheduled for a tonsillectomy to definitively determine this.        11/22/2024   Pre-Op Questionnaire   Have you ever had a heart attack or stroke? No    Have you ever had surgery on your heart or blood vessels, such as a stent placement, a coronary artery bypass, or surgery on an artery in your head, neck, heart, or legs? No    Do you have chest pain with activity? No    Do you have a history of heart failure? No    Do you currently have a cold, bronchitis or symptoms of other infection? No    Do you have a cough, shortness of breath, or wheezing? No    Do you or anyone in your family have previous history of blood clots? No    Do you or does anyone in your family have a serious bleeding problem such as prolonged bleeding following surgeries or cuts? No    Have you ever had problems with anemia or been told to take iron pills? No    Have you had any abnormal blood loss such as black, tarry or bloody stools, or abnormal vaginal bleeding? No    Have you ever had a blood transfusion? No    Are you willing to have a blood transfusion if it is medically needed before, during, or after your surgery? Yes    Have you or any of your relatives ever had problems with anesthesia? No    Do you have sleep apnea, excessive snoring or daytime drowsiness? (!) UNKNOWN    Do you have any artifical heart valves or other implanted medical devices like a pacemaker, defibrillator, or continuous glucose monitor? No    Do you have artificial joints?  No    Are you allergic to latex? No        Patient-reported     Preoperative Review of    reviewed - no record of controlled substances prescribed.    Patient Active Problem List    Diagnosis Date Noted    Severe nonproliferative diabetic retinopathy of both eyes with macular edema associated with type 2 diabetes mellitus (H) 12/17/2021     Priority: Medium    Lichen sclerosus et atrophicus 09/17/2021     Priority: Medium    Proteinuria due to type 2 diabetes mellitus (H) 01/15/2021     Priority: Medium    Hypertension      Priority: Medium     Unable to take ACE Inhibitor due to angioedema from lisinopril        Gastropathy      Priority: Medium     EGD 2/28/12:  Reactive gastropathy. H. Pylori neg.  Was seen for several visits at [facility] and when she didn't improve with   standard therapy (several PPI's, H2 blocker, carafate)was felt to have   hypersensitivity syndrome.  Treated with nortriptyline and Carafate.  EGD 1/15/15:  Normal        Type 2 diabetes mellitus with hyperglycemia, with long-term current use of insulin (H)      Priority: Medium    Hyperlipidemia      Priority: Medium    Headache      Priority: Medium    Right lumbar radiculitis 08/02/2019     Priority: Medium     MRI of the lumbar spine on 07/26/2013 revealed a right paracentral disk   protrusion and annular tear at L4-5 traversing the right L5 nerve root and   resulting in moderate spinal canal and mild bilateral neural foraminal  narrowing. There is also a left (asymptomatic side) midline foraminal disk   herniation L5-S1.  Had epidural steroid injection 9/13/13.        Urinary, incontinence, stress female 05/17/2019     Priority: Medium    Left knee pain 01/28/2018     Priority: Medium     X-ray on 1/10/2018:nthesophyte at the quadriceps tendon insertion onto the   patella. Otherwise negative left knee. No fracture or dislocation.        Moderate Recurrent Major Depression      Priority: Medium    Vitamin D deficiency      Priority: Medium      Created by Conversion  Project 2020 [facility] Annotation: Aug 13 2009 11:52AM - Coreen Kendall: Had been   replaced in 2009 with normal recheck.  Check 4/10 low again; replacement   re-initiated.  Replacement Utility updated for latest IMO load        Anxiety      Priority: Medium     Created by Conversion  Replacement Utility updated for latest IMO load        Pain in finger of right hand 03/03/2015     Priority: Medium    Dermatitis      Priority: Medium     Created by Conversion        Metabolic Tests Nonspecific Elevation Of Transaminase Levels      Priority: Medium     Created by Conversion        Back Strain      Priority: Medium     Created by Conversion        Carotid Artery Stenosis      Priority: Medium     Created by Conversion        Insomnia      Priority: Medium     Created by Conversion        Chronic Pain      Priority: Medium     Created by Conversion  Project 2020 [facility] Annotation: May 23 2007  5:01PM - Siomara Barrera: Patient has   long   history of burning body pain, more on right, with unkown etiology.          No past medical history on file.  Past Surgical History:   Procedure Laterality Date    PHACOEMULSIFICATION WITH STANDARD INTRAOCULAR LENS IMPLANT Right 10/09/2023    NV ESOPHAGOGASTRODUODENOSCOPY TRANSORAL DIAGNOSTIC      Description: Esophagogastroduodenoscopy;  Proc Date: 02/28/2012;  Comments: Reactive Gastropathy. Neg H. Pylori.    Z REPAIR OF ANAL SPHINCTER,ADULT  02/21/2011    Sphincteroplasty and levetaroplasty at [facility] by Dr Leonardo Gibbs and Allen Jones; Repair of childbirth-related large cloacal defect.  Complicated by post-op wound infection requiring readmission.     Current Outpatient Medications   Medication Sig Dispense Refill    acetaminophen (TYLENOL) 500 MG tablet Take 1 tablet (500 mg) by mouth every 6 hours as needed for pain. 100 tablet 5    amLODIPine (NORVASC) 5 MG tablet Take 1 tablet (5 mg) by mouth daily. 90 tablet 3    blood glucose (CONTOUR NEXT  TEST) test strip 1 strip by In Vitro route 4 times daily (before meals and nightly). 200 strip 11    blood glucose (NO BRAND SPECIFIED) lancets standard by In Vitro route 2 times daily. 100 Lancet 11    canagliflozin (INVOKANA) 300 MG tablet Take 1 tablet (300 mg) by mouth daily. 30 tablet 11    Continuous Glucose Sensor (FREESTYLE AILYN 2 SENSOR) MISC CHANGE EVERY 14 DAYS 2 each 5    cyclobenzaprine (FLEXERIL) 5 MG tablet Take 1-2 tablets (5-10 mg) by mouth 3 times daily as needed for other (rectal pain). 60 tablet 5    escitalopram (LEXAPRO) 5 MG tablet Take 1 tablet (5 mg) by mouth daily 30 tablet 11    hydrochlorothiazide (HYDRODIURIL) 25 MG tablet Take 1 tablet (25 mg) by mouth daily In the morning 30 tablet 11    insulin aspart prot & aspart (NOVOLOG MIX 70/30 PEN) (70-30) 100 UNIT/ML pen 28 units in the morning and 12 units in the evening 45 mL 3    insulin pen needle (TRUEPLUS PEN NEEDLES) 32G X 4 MM miscellaneous Use 3 pen needles daily or as directed. 300 each 4    losartan (COZAAR) 100 MG tablet Take 1 tablet (100 mg) by mouth daily 30 tablet 11    metFORMIN (GLUCOPHAGE XR) 500 MG 24 hr tablet Take 2 tablets each morning and one tablet at night 90 tablet 11    rosuvastatin (CRESTOR) 40 MG tablet TAKE 1 TABLET (40 MG) BY MOUTH AT BEDTIME 30 tablet 8    vitamin D2 (ERGOCALCIFEROL) 58925 units (1250 mcg) capsule Take 1 capsule (50,000 Units) by mouth once a week. 4 capsule 3    calcium carbonate (TUMS) 500 MG chewable tablet Take 1 tablet (500 mg) by mouth daily as needed for heartburn (Patient not taking: Reported on 8/23/2024) 30 tablet 5     Allergies   Allergen Reactions    Aspirin GI Disturbance     Abdominal pain      Lisinopril Angioedema     Social History     Tobacco Use    Smoking status: Never     Passive exposure: Current    Smokeless tobacco: Former     Types: Chew     Quit date: 09/2023    Tobacco comments:     Son smokes outside, pt use to chew betel nut.    Substance Use Topics    Alcohol  "use: No     History   Drug Use No     Objective    BP (!) 150/81 (BP Location: Left arm, Patient Position: Sitting, Cuff Size: Adult Regular)   Pulse 85   Temp 98.2  F (36.8  C) (Tympanic)   Resp 15   Ht 1.49 m (4' 10.66\")   Wt 66.7 kg (147 lb)   LMP  (LMP Unknown)   SpO2 100%   BMI 30.03 kg/m     Estimated body mass index is 30.03 kg/m  as calculated from the following:    Height as of this encounter: 1.49 m (4' 10.66\").    Weight as of this encounter: 66.7 kg (147 lb).  Physical Exam  GENERAL: alert and no distress  EYES: grossly normal to inspection, and conjunctivae and sclerae normal  ORAL: The tongue rises on the right. The tonsils are not erythematous. The left as an irregular shape. Both are 2+ from what I could see.  RESP: breathing unlabored, no cough or throat clearing.  MS: no gross musculoskeletal defects noted.  SKIN: no suspicious lesions or rashes visible  NEURO: Normal strength and tone, mentation intact and speech normal  PSYCH: mentation appears normal, affect very serious.     Recent Labs   Lab Test 11/20/24  1046 06/28/24  0759   NA  --  143   POTASSIUM  --  4.1   CR  --  0.70   A1C 8.0* 7.4*      Revised Cardiac Risk Index (RCRI)  The patient has the following serious cardiovascular risks for perioperative complications:   Not done due to the patient stating she does not want the procedure and will contact her surgeon  Signed Electronically by: TEJAS LONDONO MD  A copy of this evaluation report is provided to the requesting physician.        "

## 2024-11-25 ENCOUNTER — TELEPHONE (OUTPATIENT)
Dept: FAMILY MEDICINE | Facility: CLINIC | Age: 64
End: 2024-11-25
Payer: COMMERCIAL

## 2024-11-25 NOTE — TELEPHONE ENCOUNTER
November 25, 2024    Patient's pre-operative physical documentation and labs have been completed by Dr. Swartz.  The pre-operative paperwork was faxed to [facility] operatin room at [phone]  per instructions from the surgeon.    Dina Valenzuela

## 2024-11-27 ENCOUNTER — APPOINTMENT (OUTPATIENT)
Dept: INTERPRETER SERVICES | Facility: CLINIC | Age: 64
End: 2024-11-27
Payer: COMMERCIAL

## 2024-12-04 ENCOUNTER — PATIENT OUTREACH (OUTPATIENT)
Dept: CARE COORDINATION | Facility: CLINIC | Age: 64
End: 2024-12-04
Payer: COMMERCIAL

## 2024-12-04 NOTE — PROGRESS NOTES
Clinic Care Coordination Contact  [facility]/Voicemail    Clinical Data: Care Coordinator Outreach    Outreach Documentation Number of Outreach Attempt   12/4/2024   1:07 PM 1       Unable to leave a message due to: Due to patient's phone kept ringing and no one picked up.      Plan: Care Coordinator will try to reach patient again in two weeks.

## 2024-12-13 NOTE — ASSESSMENT & PLAN NOTE
Diabetes control improving.  This is probably due to the change in her insulin.  However, patient absolutely does not want to change to multiple injections per day and wants to switch back to the NovoLog 70/30 mix.  I had thought that I had convinced her to continue on the basaglar plus NovoLog since it wasworking better, the patient declined.  We will ask her to follow up with diabetes education soon.  She commits to exercising more to control her diabetes better.  Follow-up with me in 3.5 months, at which time she will be due for another A1c.   .

## 2024-12-18 ENCOUNTER — PATIENT OUTREACH (OUTPATIENT)
Dept: CARE COORDINATION | Facility: CLINIC | Age: 64
End: 2024-12-18
Payer: COMMERCIAL

## 2024-12-18 NOTE — PROGRESS NOTES
Clinic Care Coordination Contact  [facility]/Voicemail    Clinical Data: Care Coordinator Outreach    Outreach Documentation Number of Outreach Attempt   12/4/2024   1:07 PM 1   12/18/2024  12:06 PM 2     Unable to leave a message due to: patient's phone kept ringing and no one picked up to answer the phone and no ability to leave a voice message.    Plan: Care Coordinator will try to reach patient again in 1 month.

## 2024-12-23 ENCOUNTER — OFFICE VISIT (OUTPATIENT)
Dept: PHARMACY | Facility: CLINIC | Age: 64
End: 2024-12-23
Payer: COMMERCIAL

## 2024-12-23 VITALS
HEART RATE: 96 BPM | BODY MASS INDEX: 31.21 KG/M2 | OXYGEN SATURATION: 98 % | WEIGHT: 152.75 LBS | SYSTOLIC BLOOD PRESSURE: 144 MMHG | DIASTOLIC BLOOD PRESSURE: 78 MMHG

## 2024-12-23 DIAGNOSIS — E78.5 HYPERLIPIDEMIA, UNSPECIFIED HYPERLIPIDEMIA TYPE: ICD-10-CM

## 2024-12-23 DIAGNOSIS — I10 ESSENTIAL HYPERTENSION: ICD-10-CM

## 2024-12-23 DIAGNOSIS — E55.9 VITAMIN D DEFICIENCY: ICD-10-CM

## 2024-12-23 DIAGNOSIS — K31.9 DISEASE OF STOMACH AND DUODENUM: ICD-10-CM

## 2024-12-23 DIAGNOSIS — Z79.4 TYPE 2 DIABETES MELLITUS WITH HYPERGLYCEMIA, WITH LONG-TERM CURRENT USE OF INSULIN (H): Primary | ICD-10-CM

## 2024-12-23 DIAGNOSIS — E11.65 TYPE 2 DIABETES MELLITUS WITH HYPERGLYCEMIA, WITH LONG-TERM CURRENT USE OF INSULIN (H): Primary | ICD-10-CM

## 2024-12-23 PROCEDURE — 99606 MTMS BY PHARM EST 15 MIN: CPT | Performed by: PHARMACIST

## 2024-12-23 PROCEDURE — 99607 MTMS BY PHARM ADDL 15 MIN: CPT | Performed by: PHARMACIST

## 2024-12-23 RX ORDER — AMLODIPINE BESYLATE 5 MG/1
5 TABLET ORAL DAILY
Qty: 90 TABLET | Refills: 3 | Status: SHIPPED | OUTPATIENT
Start: 2024-12-23 | End: 2024-12-23

## 2024-12-23 RX ORDER — AMLODIPINE BESYLATE 10 MG/1
10 TABLET ORAL DAILY
Qty: 90 TABLET | Refills: 3 | Status: SHIPPED | OUTPATIENT
Start: 2024-12-23

## 2024-12-23 RX ORDER — METFORMIN HYDROCHLORIDE 500 MG/1
1000 TABLET, EXTENDED RELEASE ORAL
Qty: 60 TABLET | Refills: 11 | Status: SHIPPED | OUTPATIENT
Start: 2024-12-23 | End: 2024-12-23

## 2024-12-23 RX ORDER — METFORMIN HYDROCHLORIDE 500 MG/1
500 TABLET, EXTENDED RELEASE ORAL 2 TIMES DAILY WITH MEALS
Qty: 60 TABLET | Refills: 11 | Status: SHIPPED | OUTPATIENT
Start: 2024-12-23

## 2024-12-23 NOTE — PROGRESS NOTES
Medication Therapy Management (MTM) Encounter    ASSESSMENT:                            Medication Adherence/Access: No issues identified.    1. Type 2 diabetes mellitus with hyperglycemia, with long-term current use of insulin (H) (Primary)  A1c not at goal <7%.  Patient currently taking less than prescribed dose of metformin, would benefit from slight increase in dose today and patient agreeable.  Patient historically had declined GLP-1 agonist recommendation.    2. Hypertension  BP not meeting goal <140/90 mmHg.  Patient would benefit from increasing dose of amlodipine today, agreeable and prescription sent to pharmacy.    3. Hyperlipidemia, unspecified hyperlipidemia type  LDL at goal <100 mg/dL, continue high intensity statin.    4. Gastropathy  Given continued symptoms, offered switching Tums to H2 blocker, though patient declined and prefers to continue current therapy.    5. Vitamin D deficiency  Patient appropriately taking maintenance dose vitamin D supplement, no changes recommended.    PLAN:                            Dose increase: Metformin  mg twice daily   Dose increase: Amlodipine 10 mg daily  Offered Tums to famotidine; Patient declined    Follow-up: Return in about 9 weeks (around 2/24/2025) for With PharmD.    SUBJECTIVE/OBJECTIVE:                          Ramona Wilder is a 64 year old female seen for a follow-up visit. Patient seen with  .      Reason for visit: MTM follow up.    Allergies/ADRs: Reviewed in chart  Past Medical History: Reviewed in chart  Tobacco: She reports that she has never smoked. She has been exposed to tobacco smoke. She quit smokeless tobacco use about 15 months ago.  Her smokeless tobacco use included chew.  Alcohol: none    Medication Adherence/Access: Patient taking medications out of pillbox, that daughter-in-law is setting up her for. Reports she rarely misses doses, maybe once every 2 weeks.   Brings with medications and twice daily pillbox today (set  up correctly).     Diabetes   Metformin  mg twice daily - 2 in the am, 1 in the pm - only taking 1 tablet in the evening  Invokana 300 mg daily in the morning  Novolog 70/30 Mix insulin 28 units AM and 12 unit PM 5-10 minutes before meals   Patient is not experiencing side effects.   Current diabetes symptoms: hypoglycemia - Corrects with 1/2 cup juice when low  Diet/Exercise: Eating 2 meals per day but also eats a lot of fruit (tad, bananas, watermelon). Eating brown rice instead of white rice.   MedHx: metformin 2000 mg daily (GI sx), Victoza (gassy/bloating), Ozempic (has declined retrial)     Blood sugar monitoring: Fingerstick - tried Libre2, but prefers traditional monitoring.  Date FBG Before dinner   12/23 155    12/22 149 349   12/21 82 205   12/20 152 144   12/19 127 254   12/18 106 281   12/17 111 176     Eye exam is up to date  Foot exam: due    Hypertension   Hydrochlorothiazide 25 mg daily at bedtime   Losartan 100 mg daily in the evening  Amlodipine 5 mg daily   Patient reports no current medication side effects  Patient does self-monitor blood pressure. Typically blood pressure in the morning is 120/77.      Hyperlipidemia   Rosuvastatin 40 mg daily  AM  Patient reports no significant myalgias or other side effects.  The 10-year ASCVD risk score (Rishi GOFF, et al., 2019) is: 12.7%    Values used to calculate the score:      Age: 64 years      Sex: Female      Is Non- : No      Diabetic: Yes      Tobacco smoker: No      Systolic Blood Pressure: 144 mmHg      Is BP treated: Yes      HDL Cholesterol: 64 mg/dL      Total Cholesterol: 188 mg/dL     GERD    Tums 500 mg daily as needed for pain   Pt reports increased episodes of heartburn lately. Doesn't want to take the Tums too often because then she feels like this constipates her.   Patient doesn't want to change anything with her heartburn medications today, wants to continue only Tums as needed.       Supplements    Vitamin D 1000 units once daily     Today's Vitals: BP (!) 144/78   Pulse 96   Wt 152 lb 12 oz (69.3 kg)   LMP  (LMP Unknown)   SpO2 98%   BMI 31.21 kg/m    ----------------      I spent 30 minutes with this patient today. All changes were made via collaborative practice agreement with Coreen Kendall MD.     A summary of these recommendations was given to the patient.    Heather Morales, PharmD, BCACP  Medication Therapy Management Pharmacist     Medication Therapy Recommendations  Essential hypertension   1 Current Medication: amLODIPine (NORVASC) 5 MG tablet (Discontinued)   Current Medication Sig: Take 1 tablet (5 mg) by mouth daily.   Rationale: Dose too low - Dosage too low - Effectiveness   Recommendation: Increase Dose   Status: Accepted per CPA   Identified Date: 12/23/2024 Completed Date: 12/23/2024         Type 2 diabetes mellitus with hyperglycemia, with long-term current use of insulin (H)   1 Current Medication: metFORMIN (GLUCOPHAGE XR) 500 MG 24 hr tablet   Current Medication Sig: Take 1 tablet (500 mg) by mouth 2 times daily (with meals).   Rationale: Dose too low - Dosage too low - Effectiveness   Recommendation: Increase Dose   Status: Accepted per CPA   Identified Date: 12/23/2024 Completed Date: 12/23/2024

## 2024-12-23 NOTE — PATIENT INSTRUCTIONS
"Recommendations from today's MTM visit:                                                         Dose increase: Metformin  mg twice daily   Dose increase: Amlodipine 10 mg daily    Follow-up: Return in about 9 weeks (around 2/24/2025).    It was great speaking with you today.  I value your experience and would be very thankful for your time in providing feedback in our clinic survey. In the next few days, you may receive an email or text message from [employer] with a link to a survey related to your  clinical pharmacist.\"     To schedule another MTM appointment, please call the clinic directly or you may call the MTM scheduling line at [phone].    My Clinical Pharmacist's contact information:                                                      Please feel free to contact me with any questions or concerns you have.      Heather Morales, PharmD, BCACP  Medication Therapy Management Pharmacist    "

## 2024-12-23 NOTE — Clinical Note
I increased her dose of amlodipine and metformin doses today. She sees you on 1/7 and wants to talk with you more about recommendation for tonsil mass surgery.   Thanks Ajay

## 2024-12-24 ENCOUNTER — PATIENT OUTREACH (OUTPATIENT)
Dept: CARE COORDINATION | Facility: CLINIC | Age: 64
End: 2024-12-24
Payer: COMMERCIAL

## 2024-12-24 NOTE — PROGRESS NOTES
Clinic Care Coordination Contact  Follow Up Progress Note      Assessment: CCRN spoke with patient and DIL today to follow up on tonsillectomy procedure status. Per patient, she didn't go to her surgery as scheduled on 12/9/24 because she doesn't think she needs it and she's scared. Patient declined to rescheduled and would like to wait until she sees her PCP on 1/7/25. Patient did attend her pre-op but didn't go through with the surgery. Patient states no additional support needed from CC.    Plan: Patient is graduated from CC today. PCP to place a new CC referral when patient is ready to proceed with tonsillectomy.

## 2024-12-28 DIAGNOSIS — E11.65 TYPE 2 DIABETES MELLITUS WITH HYPERGLYCEMIA, WITH LONG-TERM CURRENT USE OF INSULIN (H): ICD-10-CM

## 2024-12-28 DIAGNOSIS — Z79.4 TYPE 2 DIABETES MELLITUS WITH HYPERGLYCEMIA, WITH LONG-TERM CURRENT USE OF INSULIN (H): ICD-10-CM

## 2024-12-29 RX ORDER — METFORMIN HYDROCHLORIDE 500 MG/1
TABLET, EXTENDED RELEASE ORAL
Qty: 90 TABLET | Refills: 11 | OUTPATIENT
Start: 2024-12-29

## 2025-01-07 ENCOUNTER — ANCILLARY PROCEDURE (OUTPATIENT)
Dept: MAMMOGRAPHY | Facility: CLINIC | Age: 65
End: 2025-01-07
Attending: FAMILY MEDICINE
Payer: COMMERCIAL

## 2025-01-07 ENCOUNTER — OFFICE VISIT (OUTPATIENT)
Dept: FAMILY MEDICINE | Facility: CLINIC | Age: 65
End: 2025-01-07
Attending: FAMILY MEDICINE
Payer: COMMERCIAL

## 2025-01-07 VITALS
OXYGEN SATURATION: 100 % | HEIGHT: 59 IN | SYSTOLIC BLOOD PRESSURE: 129 MMHG | RESPIRATION RATE: 16 BRPM | TEMPERATURE: 97.5 F | HEART RATE: 93 BPM | BODY MASS INDEX: 30.65 KG/M2 | WEIGHT: 152.04 LBS | DIASTOLIC BLOOD PRESSURE: 83 MMHG

## 2025-01-07 DIAGNOSIS — Z12.31 VISIT FOR SCREENING MAMMOGRAM: ICD-10-CM

## 2025-01-07 DIAGNOSIS — I10 ESSENTIAL HYPERTENSION: ICD-10-CM

## 2025-01-07 DIAGNOSIS — J35.8 TONSILLAR MASS: ICD-10-CM

## 2025-01-07 DIAGNOSIS — R30.0 DYSURIA: ICD-10-CM

## 2025-01-07 DIAGNOSIS — M79.644 FINGER PAIN, RIGHT: ICD-10-CM

## 2025-01-07 DIAGNOSIS — E11.65 TYPE 2 DIABETES MELLITUS WITH HYPERGLYCEMIA, WITH LONG-TERM CURRENT USE OF INSULIN (H): ICD-10-CM

## 2025-01-07 DIAGNOSIS — E11.3413 SEVERE NONPROLIFERATIVE DIABETIC RETINOPATHY OF BOTH EYES WITH MACULAR EDEMA ASSOCIATED WITH TYPE 2 DIABETES MELLITUS (H): ICD-10-CM

## 2025-01-07 DIAGNOSIS — E78.5 HYPERLIPIDEMIA, UNSPECIFIED HYPERLIPIDEMIA TYPE: ICD-10-CM

## 2025-01-07 DIAGNOSIS — Z00.00 ROUTINE GENERAL MEDICAL EXAMINATION AT A HEALTH CARE FACILITY: Primary | ICD-10-CM

## 2025-01-07 DIAGNOSIS — F33.1 MAJOR DEPRESSIVE DISORDER, RECURRENT EPISODE, MODERATE (H): ICD-10-CM

## 2025-01-07 DIAGNOSIS — Z79.4 TYPE 2 DIABETES MELLITUS WITH HYPERGLYCEMIA, WITH LONG-TERM CURRENT USE OF INSULIN (H): ICD-10-CM

## 2025-01-07 LAB
ALBUMIN UR-MCNC: NEGATIVE MG/DL
APPEARANCE UR: CLEAR
BILIRUB UR QL STRIP: NEGATIVE
COLOR UR AUTO: YELLOW
CREAT UR-MCNC: 47.6 MG/DL
GLUCOSE UR STRIP-MCNC: >=1000 MG/DL
HGB UR QL STRIP: NEGATIVE
KETONES UR STRIP-MCNC: NEGATIVE MG/DL
LEUKOCYTE ESTERASE UR QL STRIP: NEGATIVE
MICROALBUMIN UR-MCNC: 17 MG/L
MICROALBUMIN/CREAT UR: 35.71 MG/G CR (ref 0–25)
NITRATE UR QL: NEGATIVE
PH UR STRIP: 6.5 [PH] (ref 5–7)
SP GR UR STRIP: 1.01 (ref 1–1.03)
UROBILINOGEN UR STRIP-ACNC: 0.2 E.U./DL

## 2025-01-07 PROCEDURE — 82570 ASSAY OF URINE CREATININE: CPT | Performed by: FAMILY MEDICINE

## 2025-01-07 PROCEDURE — 82043 UR ALBUMIN QUANTITATIVE: CPT | Performed by: FAMILY MEDICINE

## 2025-01-07 PROCEDURE — 99396 PREV VISIT EST AGE 40-64: CPT | Performed by: FAMILY MEDICINE

## 2025-01-07 PROCEDURE — 77067 SCR MAMMO BI INCL CAD: CPT | Mod: TC | Performed by: STUDENT IN AN ORGANIZED HEALTH CARE EDUCATION/TRAINING PROGRAM

## 2025-01-07 PROCEDURE — 81003 URINALYSIS AUTO W/O SCOPE: CPT | Performed by: FAMILY MEDICINE

## 2025-01-07 PROCEDURE — 87086 URINE CULTURE/COLONY COUNT: CPT | Performed by: FAMILY MEDICINE

## 2025-01-07 PROCEDURE — 99214 OFFICE O/P EST MOD 30 MIN: CPT | Mod: 25 | Performed by: FAMILY MEDICINE

## 2025-01-07 RX ORDER — PEN NEEDLE, DIABETIC 32GX 5/32"
NEEDLE, DISPOSABLE MISCELLANEOUS
Qty: 300 EACH | Refills: 4 | Status: SHIPPED | OUTPATIENT
Start: 2025-01-07

## 2025-01-07 RX ORDER — HYDROCHLOROTHIAZIDE 25 MG/1
25 TABLET ORAL DAILY
Qty: 30 TABLET | Refills: 11 | Status: SHIPPED | OUTPATIENT
Start: 2025-01-07

## 2025-01-07 RX ORDER — AMLODIPINE BESYLATE 5 MG/1
5 TABLET ORAL DAILY
Qty: 30 TABLET | Refills: 11 | Status: SHIPPED | OUTPATIENT
Start: 2025-01-07

## 2025-01-07 RX ORDER — METFORMIN HYDROCHLORIDE 500 MG/1
500 TABLET, EXTENDED RELEASE ORAL 2 TIMES DAILY WITH MEALS
Qty: 60 TABLET | Refills: 11 | Status: SHIPPED | OUTPATIENT
Start: 2025-01-07

## 2025-01-07 RX ORDER — LOSARTAN POTASSIUM 100 MG/1
100 TABLET ORAL DAILY
Qty: 30 TABLET | Refills: 11 | Status: SHIPPED | OUTPATIENT
Start: 2025-01-07

## 2025-01-07 RX ORDER — ROSUVASTATIN CALCIUM 40 MG/1
40 TABLET, COATED ORAL AT BEDTIME
Qty: 30 TABLET | Refills: 11 | Status: SHIPPED | OUTPATIENT
Start: 2025-01-07

## 2025-01-07 RX ORDER — ESCITALOPRAM OXALATE 5 MG/1
5 TABLET ORAL DAILY
Qty: 30 TABLET | Refills: 11 | Status: SHIPPED | OUTPATIENT
Start: 2025-01-07

## 2025-01-07 NOTE — PROGRESS NOTES
Assessment & Plan     Routine general medical examination at a health care facility  See below  Up-to-date on colon cancer screening.  Breast cancer screening today with mammogram.  *If patient were to be scheduled for tonsillectomy or other ENT surgery within the next 1 month, today's visits can serve as preop H&P.    Type 2 diabetes mellitus with hyperglycemia, with long-term current use of insulin (H)  Borderline control with recent A1c of 8, too soon to recheck today.  Is on orals +70/30 insulin.  I think she might do better with Lantus plus mealtime insulin, but she does not want to give herself any more than 2 shots per day and so we will continue with the 7030.  She will continue to work with [facility] pharmacist.  I did not otherwise adjust medications today as there were several other matters that needed attention as below.  - Albumin Random Urine Quantitative with Creat Ratio  - metFORMIN (GLUCOPHAGE XR) 500 MG 24 hr tablet  Dispense: 60 tablet; Refill: 11  - canagliflozin (INVOKANA) 300 MG tablet  Dispense: 30 tablet; Refill: 11  - insulin pen needle (TRUEPLUS PEN NEEDLES) 32G X 4 MM miscellaneous  Dispense: 300 each; Refill: 4  - Albumin Random Urine Quantitative with Creat Ratio    Severe nonproliferative diabetic retinopathy of both eyes with macular edema associated with type 2 diabetes mellitus (H)  Follows with ophthalmology      Major depressive disorder, recurrent episode, moderate (H)  Feels like she is doing well on escitalopram 5 mg daily.  Will continue without change  - escitalopram (LEXAPRO) 5 MG tablet  Dispense: 30 tablet; Refill: 11    Hypertension  Patient did not end up increasing amlodipine from 5 to 10 mg last visit.  Her blood pressure is very nearly at goal of less than 130/80 and was only checked once today.  I am going to have her just continue the amlodipine 5 mg for now while there is so much going on with her health.  - losartan (COZAAR) 100 MG tablet  Dispense: 30 tablet; Refill:  "11  - hydrochlorothiazide (HYDRODIURIL) 25 MG tablet  Dispense: 30 tablet; Refill: 11  - amLODIPine (NORVASC) 5 MG tablet  Dispense: 30 tablet; Refill: 11    Hyperlipidemia, unspecified hyperlipidemia type  Continue rosuvastatin.  Last CMP was done at [facility] ER on 10/21/2024 and was normal.  Lipids acceptable in November.  - rosuvastatin (CRESTOR) 40 MG tablet  Dispense: 30 tablet; Refill: 11    Tonsillar mass  Symptomatic.  Initial workup was at [facility] emergency room 10/21/24.  CT showed \"Mixed cystic and solid lesion within the left tonsillar region measuring 0.7 x 1.9 x 1.6 cm (AP x TV x CC), which raises the concern for malignancy\".  Flexible laryngoscopy done by ENT; biopsy obtained. Saw ENT in clinic 10/24; path neg, but recommend tonsillectomy.  Patient was worried that her blood pressure and blood sugars were too high and she would be too risky to have surgery, so ended up canceling it.  She is hoping there can be some medications that could fix this.  I told her that I definitely think she needs to see ENT again for consideration of tonsillectomy.  She prefers to get in the [place] system where all of her other care is rather than [facility] so we will try to get her in for an urgent appointment in this regard.  I did let her know that I think it is unlikely they will be any medications that will cure this and that she probably really will need surgery and that also surgery is recommended for more definitive diagnosis/rule out cancer.  Patient reports she would be open to surgery if there is no medications that could fix this, as it is symptomatic.  *As noted above, today's preventative visit should be sufficient for preop H&P if there is surgery within the next 30 days.  - Adult ENT  Referral    Dysuria  Patient has history of dysuria off-and-on, sometimes showing infection and sometimes not.  Will collect urinalysis and wait for urine culture prior to treating.  - UA Macroscopic with reflex to " "Microscopic and Culture - Lab Collect  - Urine Culture    Visit for screening mammogram  Screening mammogram obtained today  - MA Screening Digital Bilateral    Finger pain, right  Last seen for this in August and had been referred to Ortho/hand, but then lost insurance.  Now that insurance is reestablished will have our team help get her scheduled as she is quite symptomatic.    The longitudinal plan of care for the diagnosis(es)/condition(s) as documented were addressed during this visit. Due to the added complexity in care, I will continue to support Ramona in the subsequent management and with ongoing continuity of care.          BMI  Estimated body mass index is 31.07 kg/m  as calculated from the following:    Height as of this encounter: 1.49 m (4' 10.66\").    Weight as of this encounter: 69 kg (152 lb 0.6 oz).       Counseling  Appropriate preventive services were addressed with this patient via screening, questionnaire, or discussion as appropriate for fall prevention, nutrition, physical activity, Tobacco-use cessation, social engagement, weight loss and cognition.  Checklist reviewing preventive services available has been given to the patient.  Reviewed patient's diet, addressing concerns and/or questions.   The patient was instructed to see the dentist every 6 months.           Tonsillar mass. Had been recommended by [facility] to have tonsillectomy to rule out malignancy, but pt had declined at that time due to elevated BP. Now wants addressed through [place].         Subjective   Ramona is a 64 year old, presenting for the following health issues:  Physical (LYMPH NODE SWOLLEN, JOINTS IN ARMS AND LEGS ARE SWELLING UNSURE WHAT IS HAPPENING) and Medication Request (PHARMACY STATES PATIENT NEEDED TO SEE PCP BEFORE GETTING A REFILL ON METFORMIN 500 MG /NEED REFILL ON INSULIN NEEDLES  )        1/7/2025    10:32 AM   Additional Questions   Roomed by Arianna LOCKWOOD MA   Accompanied by DAUGHTER     HPI                 Here " "for yearly preventative visit, accompanied by her daughter, but has several concerns.  History is from patient, daughter, and review of medical record.    Tonsil:  Had presented to the [facility] emergency room for throat pain and difficulty swallowing 10/21/24.  CT showed \"Mixed cystic and solid lesion within the left tonsillar region measuring 0.7 x 1.9 x 1.6 cm (AP x TV x CC), which raises the concern for malignancy\".  Flexible laryngoscopy done by ENT; biopsy obtained. Saw ENT in clinic 10/24; path neg, but recommend tonsillectomy.  Pt reports today that was going to get it removed, but bp was high so they couldn't do the surgery.  Reports that when whenever she is exposed to cold, it seems to get bigger and has trouble swallowing.  Something seems to be interfering with swallowing.  It is overall smaller than it was at the ER.  Doesn't want surgery.  Wants medicine to fix it if possible.  However, if there is no medicine, she is open to the option of surgery as it is quite bothersome to her.    BP:  on losaratain 10mg and amlodopine 5mg. MTM advised to increase amlopdine from 5 to 10mg last visit, but pt just taking 5mg.  Bp up and down at home. No higher than 140.    Joint pain worsening (hands, knees)  Knees are worse with stairs.  Right index finger.  Previously referred to [facility], but then lost insurance so didn't get scheduled.    Diabetes:  Blood sugars get so high even with a little food.  In addition to orals, is using insluin 28U am, 12 in evening.    Urine burning again.    Moods up and down due to medical conditions  Escitapolram dose good.  Helps her be less irritable      Objective    /83   Pulse 93   Temp 97.5  F (36.4  C) (Temporal)   Resp 16   Ht 1.49 m (4' 10.66\")   Wt 69 kg (152 lb 0.6 oz)   LMP  (LMP Unknown)   SpO2 100%   BMI 31.07 kg/m    Body mass index is 31.07 kg/m .  Physical Exam   GENERAL: alert and no distress  EYES: Eyes grossly normal to inspection, PERRL and conjunctivae " and sclerae normal  HENT: normal cephalic/atraumatic, ear canals and TM's normal, there is a cystic appearing nodule or mass on the left tonsil without any erythema or friability noted.  NECK: Fullness in the left neck  RESP: lungs clear to auscultation - no rales, rhonchi or wheezes  CV: regular rate and rhythm, normal S1 S2, no S3 or S4, no murmur, click or rub, no peripheral edema  ABDOMEN: soft, nontender, no hepatosplenomegaly, no masses and bowel sounds normal  MS: no gross musculoskeletal defects noted, no edema  SKIN: no suspicious lesions or rashes  NEURO: Normal strength and tone, mentation intact and speech normal  PSYCH: mentation appears normal, affect normal/bright  Extremities: No edema.  No sores or significant callus.  Monofilament testing shows intact sensation.           Signed Electronically by: Coreen Kendall MD

## 2025-01-07 NOTE — PROGRESS NOTES
ASSESSMENT & PLAN     Today we discussed the underlying etiology/pathology of patient's   1. Trigger index finger of right hand    2. Gastropathy    3. Type 2 diabetes mellitus with hyperglycemia, with long-term current use of insulin (H)        Today a shared decision making model was used. The patient's values and choices were respected. The following information represents what was discussed and decided upon by the provider and the patient.  -We discussed the patient has longstanding, 4-year history of dominant right hand index finger tenosynovitis/trigger finger with loss of finger flexion  - We reviewed x-rays today which show no significant acute abnormality.  There are scattered degenerative changes noted throughout the hand and wrist not related to current complaint.  - We discussed underlying etiology of tenosynovitis/trigger fingers as well as treatment options  - At this time patient will begin Mobic/meloxicam 7.5 mg tablet to be taken 1 tablet in the morning with her breakfast and her second dose in the evening with supper.  Patient will utilize this medication for 3 weeks.  - We discussed patient's history of gastritis and she will be prescribed omeprazole/Prilosec to be taken 1 tablet in the morning for the next 3 weeks to help minimize GI irritation and anti-inflammatory medication side effect.  - Patient will use topical ice to the palm and index finger 3 times a day with appropriate skin barrier.  Patient should use cool therapy/ice for at least 15 minutes at a time to this area to help decrease inflammation and pain  - Patient may continue with Tylenol and her over-the-counter topical lotion/cream as desired for additional pain control  - We did discuss that patient is not satisfied with current treatment plan or continues to have symptoms after 3 weeks that she should come back for repeat assessment in the office for consideration of corticosteroid injection over the A1 pulley site for trigger  finger or possible referral to occupational therapy.    - Appointment line phone number is [[phone]]     Kaveh Mart PA-C  [facility]    This note was completed in part using a voice recognition software, any grammatical or context distortion are unintentional and inherent to the software.         SUBJECTIVE  Ramona Wilder is a/an 64 year old female who is seen in consultation at the request of  Coreen Kendlal M.D. for evaluation of right index finger pain. The patient is seen with their daughter, Erick Urbina.    Onset: at least 4 years(s) ago. Reports insidious onset without acute precipitating event.  Location of Pain: right hand-2nd digit and palm  Rating of Pain at worst: 6/10  Rating of Pain Currently: 6/10  Worsened by: grabbing  Better with: no activity  Treatments tried: rest/activity avoidance and other medications: hand cream, unsure of the name and tylenol  Quality: sharp  Associated symptoms: No swelling, numbness, and weakness of fingers.  Does have loss of finger motion (flexion)  Orthopedic history related to this area/condition: NO  Relevant surgical history for this area: NO  Social history: social history: retired.  Right handed.  Out doors in the summer.    No past medical history on file.  Social History     Socioeconomic History    Marital status:    Tobacco Use    Smoking status: Never     Passive exposure: Current    Smokeless tobacco: Former     Types: Chew     Quit date: 09/2023    Tobacco comments:     Son smokes outside, pt use to chew betel nut.    Vaping Use    Vaping status: Never Used   Substance and Sexual Activity    Alcohol use: No    Drug use: No    Sexual activity: Yes     Partners: Male     Social Drivers of Health     Financial Resource Strain: Low Risk  (1/7/2025)    Financial Resource Strain     Within the past 12 months, have you or your family members you live with been unable to get utilities (heat, electricity) when it was really needed?: No    Food Insecurity: Low Risk  (1/7/2025)    Food Insecurity     Within the past 12 months, did you worry that your food would run out before you got money to buy more?: No     Within the past 12 months, did the food you bought just not last and you didn t have money to get more?: No   Transportation Needs: Low Risk  (1/7/2025)    Transportation Needs     Within the past 12 months, has lack of transportation kept you from medical appointments, getting your medicines, non-medical meetings or appointments, work, or from getting things that you need?: No   Interpersonal Safety: Unknown (1/7/2025)    Interpersonal Safety     Do you feel physically and emotionally safe where you currently live?: Patient unable to answer     Within the past 12 months, have you been hit, slapped, kicked or otherwise physically hurt by someone?: Patient unable to answer     Within the past 12 months, have you been humiliated or emotionally abused in other ways by your partner or ex-partner?: Patient unable to answer   Housing Stability: Low Risk  (1/7/2025)    Housing Stability     Do you have housing? : Yes     Are you worried about losing your housing?: No         Patient's past medical, surgical, social, and family histories were personally reviewed today and no changes are noted.    REVIEW OF SYSTEMS:  10 point ROS is negative other than symptoms noted above in HPI, Past Medical History or as stated below  Constitutional: NEGATIVE for fever, chills, change in weight  Skin: NEGATIVE for worrisome rashes, moles or lesions  GI/: NEGATIVE for bowel or bladder changes  Neuro: NEGATIVE for weakness, dizziness or paresthesias    OBJECTIVE:  Vital signs as noted in EPIC for 1/7/2025  General: healthy, alert and in no distress  HEENT: no scleral icterus or conjunctival erythema  Skin: no suspicious lesions or rash. No jaundice.  CV: no pedal edema  Resp: normal respiratory effort without conversational dyspnea   Psych: normal mood and  affect  Neuro: Normal light sensory exam of lower extremity      MSK:  Exam today shows a pleasant 64-year-old female who ambulates full weightbearing without assistive device.  She is accompanied by her family member.  Online solutions  utilized for today's visit.  Examination of the right hand shows no obvious bruising, swelling or ecchymosis.  She is neurovascular intact including ulnar, radial and median nerve with +2 radial pulses.  Patient has decreased range of motion of the index finger unable to bring it into the palmar crease.  She is tender to palpation over the A1 pulley with palpable nodule noted of the flexor tendon on the second digit.  Remaining fingers have full active and passive range of motion without pain.  There is no evidence of synovitis or deformity of any of the joints of the hand.  Flexor and extensor tendon strength are maintained of every digit.          RADIOLOGY AND LABORATORY:   Personal Independent visualization of the below images done today:  Three-view x-ray of the patient's right hand are obtained and reviewed today.  There are scattered degenerative changes throughout the hand including the distal pole of the scaphoid has some hypertrophic changes.  There is no evidence of acute fracture or dislocation.    Patient's conditions were thoroughly discussed during today's visit with total time reviewing patient's previous medical records/history/radiology, face-to-face examination and discussion and plan of care with the patient and documentation being 45 minutes for today's clinical visit  Kaveh Mart PA-C  [facility]    This note was completed in part using a voice recognition software, any grammatical or context distortion are unintentional and inherent to the software.

## 2025-01-07 NOTE — PATIENT INSTRUCTIONS
Patient Education   You have been provided the Preventive Care Advice document.    Additional copies can be found here: [email or web address]  Preventive Care Advice   This is general advice given by our system to help you stay healthy. However, your care team may have specific advice just for you. Please talk to your care team about your preventive care needs.  Nutrition  Eat 5 or more servings of fruits and vegetables each day.  Try wheat bread, brown rice and whole grain pasta (instead of white bread, rice, and pasta).  Get enough calcium and vitamin D. Check the label on foods and aim for 100% of the RDA (recommended daily allowance).  Lifestyle  Exercise at least 150 minutes each week  (30 minutes a day, 5 days a week).  Do muscle strengthening activities 2 days a week. These help control your weight and prevent disease.  No smoking.  Wear sunscreen to prevent skin cancer.  Have a dental exam and cleaning every 6 months.  Yearly exams  See your health care team every year to talk about:  Any changes in your health.  Any medicines your care team has prescribed.  Preventive care, family planning, and ways to prevent chronic diseases.  Shots (vaccines)   HPV shots (up to age 26), if you've never had them before.  Hepatitis B shots (up to age 59), if you've never had them before.  COVID-19 shot: Get this shot when it's due.  Flu shot: Get a flu shot every year.  Tetanus shot: Get a tetanus shot every 10 years.  Pneumococcal, hepatitis A, and RSV shots: Ask your care team if you need these based on your risk.  Shingles shot (for age 50 and up)  General health tests  Diabetes screening:  Starting at age 35, Get screened for diabetes at least every 3 years.  If you are younger than age 35, ask your care team if you should be screened for diabetes.  Cholesterol test: At age 39, start having a cholesterol test every 5 years, or more often if advised.  Bone density scan (DEXA): At age 50, ask your care team if you  should have this scan for osteoporosis (brittle bones).  Hepatitis C: Get tested at least once in your life.  STIs (sexually transmitted infections)  Before age 24: Ask your care team if you should be screened for STIs.  After age 24: Get screened for STIs if you're at risk. You are at risk for STIs (including HIV) if:  You are sexually active with more than one person.  You don't use condoms every time.  You or a partner was diagnosed with a sexually transmitted infection.  If you are at risk for HIV, ask about PrEP medicine to prevent HIV.  Get tested for HIV at least once in your life, whether you are at risk for HIV or not.  Cancer screening tests  Cervical cancer screening: If you have a cervix, begin getting regular cervical cancer screening tests starting at age 21.  Breast cancer scan (mammogram): If you've ever had breasts, begin having regular mammograms starting at age 40. This is a scan to check for breast cancer.  Colon cancer screening: It is important to start screening for colon cancer at age 45.  Have a colonoscopy test every 10 years (or more often if you're at risk) Or, ask your provider about stool tests like a FIT test every year or Cologuard test every 3 years.  To learn more about your testing options, visit:   .  For help making a decision, visit:   https://bit.ly/ov92334.  Prostate cancer screening test: If you have a prostate, ask your care team if a prostate cancer screening test (PSA) at age 55 is right for you.  Lung cancer screening: If you are a current or former smoker ages 50 to 80, ask your care team if ongoing lung cancer screenings are right for you.  For informational purposes only. Not to replace the advice of your health care provider. Copyright   2023 [employer]. All rights reserved. Clinically reviewed by the [facility] Transitions Program. [employer] 539264 - REV 01/24.

## 2025-01-08 ENCOUNTER — ANCILLARY PROCEDURE (OUTPATIENT)
Dept: GENERAL RADIOLOGY | Facility: CLINIC | Age: 65
End: 2025-01-08
Attending: PHYSICIAN ASSISTANT
Payer: COMMERCIAL

## 2025-01-08 ENCOUNTER — TELEPHONE (OUTPATIENT)
Dept: OTOLARYNGOLOGY | Facility: CLINIC | Age: 65
End: 2025-01-08

## 2025-01-08 ENCOUNTER — OFFICE VISIT (OUTPATIENT)
Dept: ORTHOPEDICS | Facility: CLINIC | Age: 65
End: 2025-01-08
Attending: FAMILY MEDICINE
Payer: COMMERCIAL

## 2025-01-08 VITALS — WEIGHT: 157 LBS | HEIGHT: 59 IN | BODY MASS INDEX: 31.65 KG/M2

## 2025-01-08 DIAGNOSIS — M79.644 FINGER PAIN, RIGHT: ICD-10-CM

## 2025-01-08 DIAGNOSIS — Z79.4 TYPE 2 DIABETES MELLITUS WITH HYPERGLYCEMIA, WITH LONG-TERM CURRENT USE OF INSULIN (H): ICD-10-CM

## 2025-01-08 DIAGNOSIS — E11.65 TYPE 2 DIABETES MELLITUS WITH HYPERGLYCEMIA, WITH LONG-TERM CURRENT USE OF INSULIN (H): ICD-10-CM

## 2025-01-08 DIAGNOSIS — M65.321 TRIGGER INDEX FINGER OF RIGHT HAND: Primary | ICD-10-CM

## 2025-01-08 DIAGNOSIS — K31.9 DISEASE OF STOMACH AND DUODENUM: ICD-10-CM

## 2025-01-08 LAB — BACTERIA UR CULT: NORMAL

## 2025-01-08 PROCEDURE — 73130 X-RAY EXAM OF HAND: CPT | Mod: TC | Performed by: RADIOLOGY

## 2025-01-08 PROCEDURE — 99204 OFFICE O/P NEW MOD 45 MIN: CPT | Performed by: PHYSICIAN ASSISTANT

## 2025-01-08 RX ORDER — MELOXICAM 7.5 MG/1
7.5 TABLET ORAL 2 TIMES DAILY
Qty: 42 TABLET | Refills: 0 | Status: SHIPPED | OUTPATIENT
Start: 2025-01-08

## 2025-01-08 NOTE — TELEPHONE ENCOUNTER
Action January 13, 2025 1:41 PM AF   Action Taken Slides from [facility] received and taken to 5th floor path lab for review.  [id number] (1 slide)       FUTURE VISIT INFORMATION      FUTURE VISIT INFORMATION:  Date: 1/17/25  Time: 2:40 PM  Location: [facility]   REFERRAL INFORMATION:  Referring provider:  Coreen Kendall MD   Referring providers clinic:  [facility]   Reason for visit/diagnosis:  1-2 week ref for tonsil mass referred by- Coreen Kendall MD in [facility]     RECORDS REQUESTED FROM      Clinic name Comments Records Status Imaging Status   [facility]  1/7/25 referral/ OV -Coreen Kendall MD  Epic     imaging 10/21/24 CT neck CE Req 1/8  PACS    [facility] Otolaryngology   10/24/24 OV- Killian Wall MD   CE    [facility]  [address] [place] [id number]  Tel [phone]  Fax [phone] 10/21/24 Case: [id number]   FINAL DIAGNOSIS    A.  Tonsil, left, biopsy:  Tonsillar tissue with reactive lymphoid hyperplasia  Negative for malignancy     Track: [id number] Epic    Path req 1/9            Action 01/08/2025 11:48 AM  [id number]   Outcome: Request image from  to pushed to  PACS    *image resolved in PACS     Action 01/09/2025 8:23 AM  [id number]   Outcome: Request for path slides at [facility]

## 2025-01-08 NOTE — PATIENT INSTRUCTIONS
Thank you for allowing me to be part of your care team. My personal goal for your visit today is that you felt that I listened to you, you understood your diagnosis and treatment options and our staff/clinic met your expectations. We strive to provide you excellent care.  If you felt like your expectations were not met at your visit today or if you have further questions about your visit or care, please send me a [employer] message and I would be happy answer any questions or listen to your feedback.  If you do not have [employer], you can call [phone] to speak with me.      Today we discussed the underlying etiology/pathology of patient's   1. Trigger index finger of right hand    2. Gastropathy    3. Type 2 diabetes mellitus with hyperglycemia, with long-term current use of insulin (H)        Today a shared decision making model was used. The patient's values and choices were respected. The following information represents what was discussed and decided upon by the provider and the patient.  -We discussed the patient has longstanding, 4-year history of dominant right hand index finger tenosynovitis/trigger finger with loss of finger flexion  - We reviewed x-rays today which show no significant acute abnormality.  There are scattered degenerative changes noted throughout the hand and wrist not related to current complaint.  - We discussed underlying etiology of tenosynovitis/trigger fingers as well as treatment options  - At this time patient will begin Mobic/meloxicam 7.5 mg tablet to be taken 1 tablet in the morning with her breakfast and her second dose in the evening with supper.  Patient will utilize this medication for 3 weeks.  - We discussed patient's history of gastritis and she will be prescribed omeprazole/Prilosec to be taken 1 tablet in the morning for the next 3 weeks to help minimize GI irritation and anti-inflammatory medication side effect.  - Patient will use topical ice to the palm and index  finger 3 times a day with appropriate skin barrier.  Patient should use cool therapy/ice for at least 15 minutes at a time to this area to help decrease inflammation and pain  - Patient may continue with Tylenol and her over-the-counter topical lotion/cream as desired for additional pain control  - We did discuss that patient is not satisfied with current treatment plan or continues to have symptoms after 3 weeks that she should come back for repeat assessment in the office for consideration of corticosteroid injection over the A1 pulley site for trigger finger or possible referral to occupational therapy.    - Appointment line phone number is [[phone]]     Kaveh Mart PA-C  [facility]    This note was completed in part using a voice recognition software, any grammatical or context distortion are unintentional and inherent to the software.

## 2025-01-08 NOTE — TELEPHONE ENCOUNTER
Patient confirmed scheduled appointment:     Date: 1/17/25  Time: 2:40pm  Appointment Type: New ENT  Provider: Dr. Kunal Omalley   [facility]  Testing/imaging:   Additional Notes:

## 2025-01-08 NOTE — LETTER
1/8/2025      Ramona Wilder  [address]  [place] [address]      Dear Colleague,    Thank you for referring your patient, Ramona Wilder, to the [facility]. Please see a copy of my visit note below.    ASSESSMENT & PLAN     Today we discussed the underlying etiology/pathology of patient's   1. Trigger index finger of right hand    2. Gastropathy    3. Type 2 diabetes mellitus with hyperglycemia, with long-term current use of insulin (H)        Today a shared decision making model was used. The patient's values and choices were respected. The following information represents what was discussed and decided upon by the provider and the patient.  -We discussed the patient has longstanding, 4-year history of dominant right hand index finger tenosynovitis/trigger finger with loss of finger flexion  - We reviewed x-rays today which show no significant acute abnormality.  There are scattered degenerative changes noted throughout the hand and wrist not related to current complaint.  - We discussed underlying etiology of tenosynovitis/trigger fingers as well as treatment options  - At this time patient will begin Mobic/meloxicam 7.5 mg tablet to be taken 1 tablet in the morning with her breakfast and her second dose in the evening with supper.  Patient will utilize this medication for 3 weeks.  - We discussed patient's history of gastritis and she will be prescribed omeprazole/Prilosec to be taken 1 tablet in the morning for the next 3 weeks to help minimize GI irritation and anti-inflammatory medication side effect.  - Patient will use topical ice to the palm and index finger 3 times a day with appropriate skin barrier.  Patient should use cool therapy/ice for at least 15 minutes at a time to this area to help decrease inflammation and pain  - Patient may continue with Tylenol and her over-the-counter topical lotion/cream as desired for additional pain control  - We did discuss  that patient is not satisfied with current treatment plan or continues to have symptoms after 3 weeks that she should come back for repeat assessment in the office for consideration of corticosteroid injection over the A1 pulley site for trigger finger or possible referral to occupational therapy.    - Appointment line phone number is [[phone]]     Kaveh Mart PA-C  [facility]    This note was completed in part using a voice recognition software, any grammatical or context distortion are unintentional and inherent to the software.         SUBJECTIVE  Ramona Wilder is a/an 64 year old female who is seen in consultation at the request of  Coreen Kendall M.D. for evaluation of right index finger pain. The patient is seen with their daughter, Erick Urbina.    Onset: at least 4 years(s) ago. Reports insidious onset without acute precipitating event.  Location of Pain: right hand-2nd digit and palm  Rating of Pain at worst: 6/10  Rating of Pain Currently: 6/10  Worsened by: grabbing  Better with: no activity  Treatments tried: rest/activity avoidance and other medications: hand cream, unsure of the name and tylenol  Quality: sharp  Associated symptoms: No swelling, numbness, and weakness of fingers.  Does have loss of finger motion (flexion)  Orthopedic history related to this area/condition: NO  Relevant surgical history for this area: NO  Social history: social history: retired.  Right handed.  Out doors in the summer.    No past medical history on file.  Social History     Socioeconomic History     Marital status:    Tobacco Use     Smoking status: Never     Passive exposure: Current     Smokeless tobacco: Former     Types: Chew     Quit date: 09/2023     Tobacco comments:     Son smokes outside, pt use to chew betel nut.    Vaping Use     Vaping status: Never Used   Substance and Sexual Activity     Alcohol use: No     Drug use: No     Sexual activity: Yes     Partners: Male     Social  Drivers of Health     Financial Resource Strain: Low Risk  (1/7/2025)    Financial Resource Strain      Within the past 12 months, have you or your family members you live with been unable to get utilities (heat, electricity) when it was really needed?: No   Food Insecurity: Low Risk  (1/7/2025)    Food Insecurity      Within the past 12 months, did you worry that your food would run out before you got money to buy more?: No      Within the past 12 months, did the food you bought just not last and you didn t have money to get more?: No   Transportation Needs: Low Risk  (1/7/2025)    Transportation Needs      Within the past 12 months, has lack of transportation kept you from medical appointments, getting your medicines, non-medical meetings or appointments, work, or from getting things that you need?: No   Interpersonal Safety: Unknown (1/7/2025)    Interpersonal Safety      Do you feel physically and emotionally safe where you currently live?: Patient unable to answer      Within the past 12 months, have you been hit, slapped, kicked or otherwise physically hurt by someone?: Patient unable to answer      Within the past 12 months, have you been humiliated or emotionally abused in other ways by your partner or ex-partner?: Patient unable to answer   Housing Stability: Low Risk  (1/7/2025)    Housing Stability      Do you have housing? : Yes      Are you worried about losing your housing?: No         Patient's past medical, surgical, social, and family histories were personally reviewed today and no changes are noted.    REVIEW OF SYSTEMS:  10 point ROS is negative other than symptoms noted above in HPI, Past Medical History or as stated below  Constitutional: NEGATIVE for fever, chills, change in weight  Skin: NEGATIVE for worrisome rashes, moles or lesions  GI/: NEGATIVE for bowel or bladder changes  Neuro: NEGATIVE for weakness, dizziness or paresthesias    OBJECTIVE:  Vital signs as noted in EPIC for  1/7/2025  General: healthy, alert and in no distress  HEENT: no scleral icterus or conjunctival erythema  Skin: no suspicious lesions or rash. No jaundice.  CV: no pedal edema  Resp: normal respiratory effort without conversational dyspnea   Psych: normal mood and affect  Neuro: Normal light sensory exam of lower extremity      MSK:  Exam today shows a pleasant 64-year-old female who ambulates full weightbearing without assistive device.  She is accompanied by her family member.  Online solutions  utilized for today's visit.  Examination of the right hand shows no obvious bruising, swelling or ecchymosis.  She is neurovascular intact including ulnar, radial and median nerve with +2 radial pulses.  Patient has decreased range of motion of the index finger unable to bring it into the palmar crease.  She is tender to palpation over the A1 pulley with palpable nodule noted of the flexor tendon on the second digit.  Remaining fingers have full active and passive range of motion without pain.  There is no evidence of synovitis or deformity of any of the joints of the hand.  Flexor and extensor tendon strength are maintained of every digit.          RADIOLOGY AND LABORATORY:   Personal Independent visualization of the below images done today:  Three-view x-ray of the patient's right hand are obtained and reviewed today.  There are scattered degenerative changes throughout the hand including the distal pole of the scaphoid has some hypertrophic changes.  There is no evidence of acute fracture or dislocation.    Patient's conditions were thoroughly discussed during today's visit with total time reviewing patient's previous medical records/history/radiology, face-to-face examination and discussion and plan of care with the patient and documentation being 45 minutes for today's clinical visit  Kaveh Mart PA-C  [facility]    This note was completed in part using a voice recognition software,  any grammatical or context distortion are unintentional and inherent to the software.       Again, thank you for allowing me to participate in the care of your patient.        Sincerely,        Kaveh Mart PA-C    Electronically signed

## 2025-01-10 ENCOUNTER — TELEPHONE (OUTPATIENT)
Dept: FAMILY MEDICINE | Facility: CLINIC | Age: 65
End: 2025-01-10
Payer: COMMERCIAL

## 2025-01-10 NOTE — TELEPHONE ENCOUNTER
----- Message from Coreen Germánkoko sent at 1/9/2025  8:08 PM CST -----  Please let pt know that urine test was negative (no bladder infection)    Urine test result called and report to sonJeniffer ([facility] on file)    Jae Dickson RN  [facility]

## 2025-01-10 NOTE — TELEPHONE ENCOUNTER
Called patient in an attempt to relay message and someone picked up but stated they were unavailable and to call back another time.      Mich Greco Cem Say, BSN RN  [facility]

## 2025-01-15 ENCOUNTER — LAB REQUISITION (OUTPATIENT)
Dept: LAB | Facility: CLINIC | Age: 65
End: 2025-01-15
Payer: COMMERCIAL

## 2025-01-15 PROCEDURE — 88321 CONSLTJ&REPRT SLD PREP ELSWR: CPT | Performed by: PATHOLOGY

## 2025-01-16 LAB
PATH REPORT.COMMENTS IMP SPEC: NORMAL
PATH REPORT.COMMENTS IMP SPEC: NORMAL
PATH REPORT.FINAL DX SPEC: NORMAL
PATH REPORT.GROSS SPEC: NORMAL
PATH REPORT.MICROSCOPIC SPEC OTHER STN: NORMAL
PATH REPORT.RELEVANT HX SPEC: NORMAL
PATH REPORT.SITE OF ORIGIN SPEC: NORMAL

## 2025-01-17 ENCOUNTER — PRE VISIT (OUTPATIENT)
Dept: OTOLARYNGOLOGY | Facility: CLINIC | Age: 65
End: 2025-01-17

## 2025-01-26 DIAGNOSIS — Z79.4 TYPE 2 DIABETES MELLITUS WITH HYPERGLYCEMIA, WITH LONG-TERM CURRENT USE OF INSULIN (H): ICD-10-CM

## 2025-01-26 DIAGNOSIS — E11.65 TYPE 2 DIABETES MELLITUS WITH HYPERGLYCEMIA, WITH LONG-TERM CURRENT USE OF INSULIN (H): ICD-10-CM

## 2025-01-26 DIAGNOSIS — E78.5 HYPERLIPIDEMIA, UNSPECIFIED HYPERLIPIDEMIA TYPE: ICD-10-CM

## 2025-01-26 DIAGNOSIS — F33.1 MAJOR DEPRESSIVE DISORDER, RECURRENT EPISODE, MODERATE (H): ICD-10-CM

## 2025-01-27 RX ORDER — CANAGLIFLOZIN 300 MG/1
300 TABLET, FILM COATED ORAL DAILY
Qty: 30 TABLET | Refills: 11 | OUTPATIENT
Start: 2025-01-27

## 2025-01-27 RX ORDER — ROSUVASTATIN CALCIUM 40 MG/1
40 TABLET, COATED ORAL AT BEDTIME
Qty: 30 TABLET | Refills: 8 | OUTPATIENT
Start: 2025-01-27

## 2025-01-27 RX ORDER — ESCITALOPRAM OXALATE 5 MG/1
5 TABLET ORAL DAILY
Qty: 30 TABLET | Refills: 5 | OUTPATIENT
Start: 2025-01-27

## 2025-02-04 ENCOUNTER — TELEPHONE (OUTPATIENT)
Dept: OTOLARYNGOLOGY | Facility: CLINIC | Age: 65
End: 2025-02-04
Payer: COMMERCIAL

## 2025-02-04 NOTE — TELEPHONE ENCOUNTER
[facility] Call Center    Phone Message    May a detailed message be left on voicemail: yes     Reason for Call: Other: The pt's son Lighter is asking if the pathology is done and can the pt schedule the tonsil removal? Please call him after 2 pm as he is going to school. Thanks.     Action Taken: Message routed to:  [facility] ENT    Travel Screening: Not Applicable     Date of Service:

## 2025-02-05 NOTE — TELEPHONE ENCOUNTER
Writer attempted to call pts son to discuss call center message. Pts son did not answer.    Precious Acevedo RN

## 2025-02-06 ENCOUNTER — TELEPHONE (OUTPATIENT)
Dept: OTOLARYNGOLOGY | Facility: CLINIC | Age: 65
End: 2025-02-06
Payer: COMMERCIAL

## 2025-02-06 NOTE — TELEPHONE ENCOUNTER
Attempted to call son to schedule surgery with Dr. Omalley. Phone just rings and rings    [facility]  2/6/2025 at 8:22 AM

## 2025-02-17 ENCOUNTER — OFFICE VISIT (OUTPATIENT)
Dept: FAMILY MEDICINE | Facility: CLINIC | Age: 65
End: 2025-02-17
Payer: COMMERCIAL

## 2025-02-17 VITALS
HEART RATE: 80 BPM | SYSTOLIC BLOOD PRESSURE: 102 MMHG | BODY MASS INDEX: 30.46 KG/M2 | DIASTOLIC BLOOD PRESSURE: 66 MMHG | TEMPERATURE: 98.1 F | HEIGHT: 59 IN | RESPIRATION RATE: 16 BRPM | WEIGHT: 151.1 LBS

## 2025-02-17 DIAGNOSIS — Z01.818 PREOP GENERAL PHYSICAL EXAM: Primary | ICD-10-CM

## 2025-02-17 DIAGNOSIS — Z79.4 TYPE 2 DIABETES MELLITUS WITH HYPERGLYCEMIA, WITH LONG-TERM CURRENT USE OF INSULIN (H): ICD-10-CM

## 2025-02-17 DIAGNOSIS — I10 ESSENTIAL HYPERTENSION: ICD-10-CM

## 2025-02-17 DIAGNOSIS — E11.65 TYPE 2 DIABETES MELLITUS WITH HYPERGLYCEMIA, WITH LONG-TERM CURRENT USE OF INSULIN (H): ICD-10-CM

## 2025-02-17 DIAGNOSIS — J35.8 TONSILLAR MASS: ICD-10-CM

## 2025-02-17 LAB
ANION GAP SERPL CALCULATED.3IONS-SCNC: 15 MMOL/L (ref 7–15)
ATRIAL RATE - MUSE: 91 BPM
BUN SERPL-MCNC: 21.3 MG/DL (ref 8–23)
CALCIUM SERPL-MCNC: 9.8 MG/DL (ref 8.8–10.4)
CHLORIDE SERPL-SCNC: 104 MMOL/L (ref 98–107)
CREAT SERPL-MCNC: 0.92 MG/DL (ref 0.51–0.95)
DIASTOLIC BLOOD PRESSURE - MUSE: NORMAL MMHG
EGFRCR SERPLBLD CKD-EPI 2021: 70 ML/MIN/1.73M2
ERYTHROCYTE [DISTWIDTH] IN BLOOD BY AUTOMATED COUNT: 12.5 % (ref 10–15)
GLUCOSE SERPL-MCNC: 182 MG/DL (ref 70–99)
HCO3 SERPL-SCNC: 21 MMOL/L (ref 22–29)
HCT VFR BLD AUTO: 36.5 % (ref 35–47)
HGB BLD-MCNC: 12 G/DL (ref 11.7–15.7)
INTERPRETATION ECG - MUSE: NORMAL
MCH RBC QN AUTO: 30.2 PG (ref 26.5–33)
MCHC RBC AUTO-ENTMCNC: 32.9 G/DL (ref 31.5–36.5)
MCV RBC AUTO: 92 FL (ref 78–100)
P AXIS - MUSE: 60 DEGREES
PLATELET # BLD AUTO: 280 10E3/UL (ref 150–450)
POTASSIUM SERPL-SCNC: 4.2 MMOL/L (ref 3.4–5.3)
PR INTERVAL - MUSE: 150 MS
QRS DURATION - MUSE: 78 MS
QT - MUSE: 332 MS
QTC - MUSE: 408 MS
R AXIS - MUSE: 36 DEGREES
RBC # BLD AUTO: 3.97 10E6/UL (ref 3.8–5.2)
SODIUM SERPL-SCNC: 140 MMOL/L (ref 135–145)
SYSTOLIC BLOOD PRESSURE - MUSE: NORMAL MMHG
T AXIS - MUSE: 118 DEGREES
VENTRICULAR RATE- MUSE: 91 BPM
WBC # BLD AUTO: 5.9 10E3/UL (ref 4–11)

## 2025-02-17 PROCEDURE — 93005 ELECTROCARDIOGRAM TRACING: CPT

## 2025-02-17 PROCEDURE — 85027 COMPLETE CBC AUTOMATED: CPT

## 2025-02-17 PROCEDURE — 93010 ELECTROCARDIOGRAM REPORT: CPT | Performed by: INTERNAL MEDICINE

## 2025-02-17 PROCEDURE — 99214 OFFICE O/P EST MOD 30 MIN: CPT

## 2025-02-17 PROCEDURE — 80048 BASIC METABOLIC PNL TOTAL CA: CPT

## 2025-02-17 PROCEDURE — 36415 COLL VENOUS BLD VENIPUNCTURE: CPT

## 2025-02-17 ASSESSMENT — PATIENT HEALTH QUESTIONNAIRE - PHQ9
SUM OF ALL RESPONSES TO PHQ QUESTIONS 1-9: 4
10. IF YOU CHECKED OFF ANY PROBLEMS, HOW DIFFICULT HAVE THESE PROBLEMS MADE IT FOR YOU TO DO YOUR WORK, TAKE CARE OF THINGS AT HOME, OR GET ALONG WITH OTHER PEOPLE: SOMEWHAT DIFFICULT
SUM OF ALL RESPONSES TO PHQ QUESTIONS 1-9: 4

## 2025-02-17 ASSESSMENT — PAIN SCALES - GENERAL: PAINLEVEL_OUTOF10: MODERATE PAIN (5)

## 2025-02-17 NOTE — PATIENT INSTRUCTIONS
How to Take Your Medication Before Surgery  Preoperative Medication Instructions   Antiplatelet or Anticoagulation Medication Instructions   - We reviewed the medication list and the patient is not on an antiplatelet or anticoagulation medications.    Additional Medication Instructions  -Canagliflozin (Invokana) stop THREE days prior to surgery     -Losartan DO NOT TAKE on day of surgery   -  hydrochlorothiazide DO NOT TAKE on the day of surgery.   - Insulin: DO NOT TAKE on the morning of surgery.    - Metformin: DO NOT TAKE day of surgery.       - Escitalopram Continue without modification.     -Rosuvastatin Continue taking on the day of surgery.   - Amlodipine May be continued on the day of surgery.    Tylenol is safe prior to surgery. Do not take any ibuprofen prior to surgery    Patient Education   Preparing for Your Surgery  For Adults  Getting started  In most cases, a nurse will call to review your health history and instructions. They will give you an arrival time based on your scheduled surgery time. Please be ready to share:  Your doctor's clinic name and phone number  Your medical, surgical, and anesthesia history  A list of allergies and sensitivities  A list of medicines, including herbal treatments and over-the-counter drugs  Whether the patient has a legal guardian (ask how to send us the papers in advance)  Note: You may not receive a call if you were seen at our PAC ([facility]).  Please tell us if you're pregnant--or if there's any chance you might be pregnant. Some surgeries may injure a fetus (unborn baby), so they require a pregnancy test. Surgeries that are safe for a fetus don't always need a test, and you can choose whether to have one.   Preparing for surgery  Within 10 to 30 days of surgery: Have a pre-op exam (sometimes called an H&P, or History and Physical). This can be done at a clinic or pre-operative center.  If you're having a , you may not need this  exam. Talk to your care team.  At your pre-op exam, talk to your care team about all medicines you take. (This includes CBD oil and any drugs, such as THC, marijuana, and other forms of cannabis.) If you need to stop any medicine before surgery, ask when to start taking it again.  This is for your safety. Many medicines and drugs can make you bleed too much during surgery. Some change how well surgery (anesthesia) drugs work.  Call your insurance company to let them know you're having surgery. (If you don't have insurance, call [phone].)  Call your clinic if there's any change in your health. This includes a scrape or scratch near the surgery site, or any signs of a cold (sore throat, runny nose, cough, rash, fever).  Eating and drinking guidelines  For your safety: Unless your surgeon tells you otherwise, follow the guidelines below.  Eat and drink as normal until 8 hours before you arrive for surgery. After that, no food or milk. You can spit out gum when you arrive.  Drink clear liquids until 2 hours before you arrive. These are liquids you can see through, like water, Gatorade, and Propel Water. They also include plain black coffee and tea (no cream or milk).  No alcohol for 24 hours before you arrive. The night before surgery, stop any drinks that contain THC.  If your care team tells you to take medicine on the morning of surgery, it's okay to take it with a sip of water. No other medicines or drugs are allowed (including CBD oil)--follow your care team's instructions.  If you have questions the day of surgery, call your hospital or surgery center.   Preventing infection  Shower or bathe the night before and the morning of surgery. Follow the instructions your clinic gave you. (If no instructions, use regular soap.)  Don't shave or clip hair near your surgery site. We'll remove the hair if needed.  Don't smoke or vape the morning of surgery. No chewing tobacco for 6 hours before you arrive. A nicotine  patch is okay. You may spit out nicotine gum when you arrive.  For some surgeries, the surgeon will tell you to fully quit smoking and nicotine.  We will make every effort to keep you safe from infection. We will:  Clean our hands often with soap and water (or an alcohol-based hand rub).  Clean the skin at your surgery site with a special soap that kills germs.  Give you a special gown to keep you warm. (Cold raises the risk of infection.)  Wear hair covers, masks, gowns, and gloves during surgery.  Give antibiotic medicine, if prescribed. Not all surgeries need this medicine.  What to bring on the day of surgery  Photo ID and insurance card  Copy of your health care directive, if you have one  Glasses and hearing aids (bring cases)  You can't wear contacts during surgery  Inhaler and eye drops, if you use them (tell us about these when you arrive)  CPAP machine or breathing device, if you use them  A few personal items, if spending the night  If you have . . .  A pacemaker, ICD (cardiac defibrillator), or other implant: Bring the ID card.  An implanted stimulator: Bring the remote control.  A legal guardian: Bring a copy of the certified (court-stamped) guardianship papers.  Please remove any jewelry, including body piercings. Leave jewelry and other valuables at home.  If you're going home the day of surgery  You must have a responsible adult drive you home. They should stay with you overnight as well.  If you don't have someone to stay with you, and you aren't safe to go home alone, we may keep you overnight. Insurance often won't pay for this.  After surgery  If it's hard to control your pain or you need more pain medicine, please call your surgeon's office.  Questions?   If you have any questions for your care team, list them here:    ____________________________________________________________________________________________________________________________________________________________________________________________________________________________________________________________  For informational purposes only. Not to replace the advice of your health care provider. Copyright   2019 [facility]. All rights reserved. Clinically reviewed by Ruddy Spann MD. [employer] [id number] - REV .     Patient Education   Preparing for Your Surgery  For Adults  Getting started  In most cases, a nurse will call to review your health history and instructions. They will give you an arrival time based on your scheduled surgery time. Please be ready to share:  Your doctor's clinic name and phone number  Your medical, surgical, and anesthesia history  A list of allergies and sensitivities  A list of medicines, including herbal treatments and over-the-counter drugs  Whether the patient has a legal guardian (ask how to send us the papers in advance)  Note: You may not receive a call if you were seen at our PAC ([facility] Center).  Please tell us if you're pregnant--or if there's any chance you might be pregnant. Some surgeries may injure a fetus (unborn baby), so they require a pregnancy test. Surgeries that are safe for a fetus don't always need a test, and you can choose whether to have one.   Preparing for surgery  Within 10 to 30 days of surgery: Have a pre-op exam (sometimes called an H&P, or History and Physical). This can be done at a clinic or pre-operative center.  If you're having a , you may not need this exam. Talk to your care team.  At your pre-op exam, talk to your care team about all medicines you take. (This includes CBD oil and any drugs, such as THC, marijuana, and other forms of cannabis.) If you need to stop any medicine before surgery, ask when to start taking it again.  This is for your  safety. Many medicines and drugs can make you bleed too much during surgery. Some change how well surgery (anesthesia) drugs work.  Call your insurance company to let them know you're having surgery. (If you don't have insurance, call [phone].)  Call your clinic if there's any change in your health. This includes a scrape or scratch near the surgery site, or any signs of a cold (sore throat, runny nose, cough, rash, fever).  Eating and drinking guidelines  For your safety: Unless your surgeon tells you otherwise, follow the guidelines below.  Eat and drink as normal until 8 hours before you arrive for surgery. After that, no food or milk. You can spit out gum when you arrive.  Drink clear liquids until 2 hours before you arrive. These are liquids you can see through, like water, Gatorade, and Propel Water. They also include plain black coffee and tea (no cream or milk).  No alcohol for 24 hours before you arrive. The night before surgery, stop any drinks that contain THC.  If your care team tells you to take medicine on the morning of surgery, it's okay to take it with a sip of water. No other medicines or drugs are allowed (including CBD oil)--follow your care team's instructions.  If you have questions the day of surgery, call your hospital or surgery center.   Preventing infection  Shower or bathe the night before and the morning of surgery. Follow the instructions your clinic gave you. (If no instructions, use regular soap.)  Don't shave or clip hair near your surgery site. We'll remove the hair if needed.  Don't smoke or vape the morning of surgery. No chewing tobacco for 6 hours before you arrive. A nicotine patch is okay. You may spit out nicotine gum when you arrive.  For some surgeries, the surgeon will tell you to fully quit smoking and nicotine.  We will make every effort to keep you safe from infection. We will:  Clean our hands often with soap and water (or an alcohol-based hand rub).  Clean the  skin at your surgery site with a special soap that kills germs.  Give you a special gown to keep you warm. (Cold raises the risk of infection.)  Wear hair covers, masks, gowns, and gloves during surgery.  Give antibiotic medicine, if prescribed. Not all surgeries need this medicine.  What to bring on the day of surgery  Photo ID and insurance card  Copy of your health care directive, if you have one  Glasses and hearing aids (bring cases)  You can't wear contacts during surgery  Inhaler and eye drops, if you use them (tell us about these when you arrive)  CPAP machine or breathing device, if you use them  A few personal items, if spending the night  If you have . . .  A pacemaker, ICD (cardiac defibrillator), or other implant: Bring the ID card.  An implanted stimulator: Bring the remote control.  A legal guardian: Bring a copy of the certified (court-stamped) guardianship papers.  Please remove any jewelry, including body piercings. Leave jewelry and other valuables at home.  If you're going home the day of surgery  You must have a responsible adult drive you home. They should stay with you overnight as well.  If you don't have someone to stay with you, and you aren't safe to go home alone, we may keep you overnight. Insurance often won't pay for this.  After surgery  If it's hard to control your pain or you need more pain medicine, please call your surgeon's office.  Questions?   If you have any questions for your care team, list them here:   ____________________________________________________________________________________________________________________________________________________________________________________________________________________________________________________________  For informational purposes only. Not to replace the advice of your health care provider. Copyright   2003, 2019 [facility]. All rights reserved. Clinically reviewed by Ruddy Spann MD. [employer] [id number] -  REV 08/24.

## 2025-02-17 NOTE — PROGRESS NOTES
Preoperative Evaluation  [facility]  [address]  [facility] [address]  Phone: [phone]  Fax: [phone]  Primary Provider: Coreen Kendall MD  Pre-op Performing Provider: MORAIMA Juarez CNP  Feb 17, 2025 2/17/2025   Surgical Information   What procedure is being done? Tonsillectomy   Facility or Hospital where procedure/surgery will be performed: [facility]   Who is doing the procedure / surgery? Dr. Omalley   Date of surgery / procedure: 2/27/2025   Time of surgery / procedure: TBD   Where do you plan to recover after surgery? at home with family     Fax number for surgical facility: Note does not need to be faxed, will be available electronically in Epic.    Assessment & Plan     The proposed surgical procedure is considered INTERMEDIATE risk.      Preop general physical exam  Tonsillar mass  64 year old female here for pre-operative exam for tonsillectomy related to tonsillar mass. She is in good health today. Reviewed her PMH, medications, and previous surgeries. She has tolerated anesthesia in the past. EKG is NSR today. Will obtain baseline CBC and updated kidney function today. OK to continue with planned procedure.   - Basic metabolic panel  (Ca, Cl, CO2, Creat, Gluc, K, Na, BUN); Future  - CBC with platelets; Future  - Basic metabolic panel  (Ca, Cl, CO2, Creat, Gluc, K, Na, BUN)  - CBC with platelets  - EKG 12-lead, tracing only      Type 2 diabetes mellitus with hyperglycemia, with long-term current use of insulin (H)  Last A1C 8.0. Recent annual exam on 1/7/25. Current regimen is: 70/30 insulin BID, metformin, and invokana. Reviewed medication hold instructions with patient and son for surgery.     Hypertension  Well controlled. Current regimen: losartan, hydrochlorothiazide, and amlodipine. Medication hold instructions as below.             - No identified additional risk factors other than previously addressed    Preoperative  Medication Instructions  Antiplatelet or Anticoagulation Medication Instructions   - We reviewed the medication list and the patient is not on an antiplatelet or anticoagulation medications.    Additional Medication Instructions  -Canagliflozin (Invokana) stop THREE days prior to surgery     -Losartan DO NOT TAKE on day of surgery   -  hydrochlorothiazide DO NOT TAKE on the day of surgery.   - Insulin: DO NOT TAKE on the morning of surgery.    - Metformin: DO NOT TAKE day of surgery.       - Escitalopram Continue without modification.     Recommendation  Approval given to proceed with proposed procedure, without further diagnostic evaluation.    Remedios Greene is a 64 year old, presenting for the following:  Pre-Op Exam          2/17/2025     8:40 AM   Additional Questions   Roomed by Claudia   Accompanied by Son     BLANCA related to upcoming procedure:     Doing well. Has chronic pain.    Recent annual exam with PCP in January.     Hx of diabetes and HTN.     No new chest pain, shortness of breath, fevers.         2/17/2025   Pre-Op Questionnaire   Have you ever had a heart attack or stroke? No   Have you ever had surgery on your heart or blood vessels, such as a stent placement, a coronary artery bypass, or surgery on an artery in your head, neck, heart, or legs? No   Do you have chest pain with activity? No   Do you have a history of heart failure? No   Do you currently have a cold, bronchitis or symptoms of other infection? No   Do you have a cough, shortness of breath, or wheezing? No   Do you or anyone in your family have previous history of blood clots? No   Do you or does anyone in your family have a serious bleeding problem such as prolonged bleeding following surgeries or cuts? No   Have you ever had problems with anemia or been told to take iron pills? No   Have you had any abnormal blood loss such as black, tarry or bloody stools, or abnormal vaginal bleeding? No   Have you ever had a blood transfusion?  No   Are you willing to have a blood transfusion if it is medically needed before, during, or after your surgery? Yes   Have you or any of your relatives ever had problems with anesthesia? No   Do you have sleep apnea, excessive snoring or daytime drowsiness? No   Do you have any artifical heart valves or other implanted medical devices like a pacemaker, defibrillator, or continuous glucose monitor? No   Do you have artificial joints? No   Are you allergic to latex? No     Health Care Directive  Patient does not have a Health Care Directive: Discussed advance care planning with patient; however, patient declined at this time.    Preoperative Review of    reviewed - no record of controlled substances prescribed.      Status of Chronic Conditions:  See problem list for active medical problems.  Problems all longstanding and stable, except as noted/documented.  See ROS for pertinent symptoms related to these conditions.    Patient Active Problem List    Diagnosis Date Noted    Tonsillar mass 10/24/2024     Priority: Medium    Severe nonproliferative diabetic retinopathy of both eyes with macular edema associated with type 2 diabetes mellitus (H) 12/17/2021     Priority: Medium    Lichen sclerosus et atrophicus 09/17/2021     Priority: Medium    Proteinuria due to type 2 diabetes mellitus (H) 01/15/2021     Priority: Medium    Hypertension      Priority: Medium     Unable to take ACE Inhibitor due to angioedema from lisinopril        Gastropathy      Priority: Medium     EGD 2/28/12:  Reactive gastropathy. H. Pylori neg.  Was seen for several visits at [facility] and when she didn't improve with   standard therapy (several PPI's, H2 blocker, carafate)was felt to have   hypersensitivity syndrome.  Treated with nortriptyline and Carafate.  EGD 1/15/15:  Normal        Type 2 diabetes mellitus with hyperglycemia, with long-term current use of insulin (H)      Priority: Medium    Hyperlipidemia      Priority: Medium     Headache      Priority: Medium    Right lumbar radiculitis 08/02/2019     Priority: Medium     MRI of the lumbar spine on 07/26/2013 revealed a right paracentral disk   protrusion and annular tear at L4-5 traversing the right L5 nerve root and   resulting in moderate spinal canal and mild bilateral neural foraminal  narrowing. There is also a left (asymptomatic side) midline foraminal disk   herniation L5-S1.  Had epidural steroid injection 9/13/13.        Urinary, incontinence, stress female 05/17/2019     Priority: Medium    Left knee pain 01/28/2018     Priority: Medium     X-ray on 1/10/2018:nthesophyte at the quadriceps tendon insertion onto the   patella. Otherwise negative left knee. No fracture or dislocation.        Moderate Recurrent Major Depression      Priority: Medium    Vitamin D deficiency      Priority: Medium     Created by Conversion  [employer] [facility] Annotation: Aug 13 2009 11:52AM - Coreen Kendall: Had been   replaced in 2009 with normal recheck.  Check 4/10 low again; replacement   re-initiated.  Replacement Utility updated for latest IMO load        Anxiety      Priority: Medium     Created by Conversion  Replacement Utility updated for latest IMO load        Pain in finger of right hand 03/03/2015     Priority: Medium    Dermatitis      Priority: Medium     Created by Conversion        Metabolic Tests Nonspecific Elevation Of Transaminase Levels      Priority: Medium     Created by Conversion        Back Strain      Priority: Medium     Created by Conversion        Carotid Artery Stenosis      Priority: Medium     Created by Conversion        Insomnia      Priority: Medium     Created by Conversion        Chronic Pain      Priority: Medium     Created by Conversion  [facility] Annotation: May 23 2007  5:01PM - Siomara Barrera: Patient has   long   history of burning body pain, more on right, with unkown etiology.          No past medical history on file.  Past Surgical History:   Procedure  Laterality Date    PHACOEMULSIFICATION WITH STANDARD INTRAOCULAR LENS IMPLANT Right 10/09/2023    IA ESOPHAGOGASTRODUODENOSCOPY TRANSORAL DIAGNOSTIC      Description: Esophagogastroduodenoscopy;  Proc Date: 02/28/2012;  Comments: Reactive Gastropathy. Neg H. Pylori.    ZZC REPAIR OF ANAL SPHINCTER,ADULT  02/21/2011    Sphincteroplasty and levetaroplasty at [facility] by Dr Leonardo Gibbs and Allen Jones; Repair of childbirth-related large cloacal defect.  Complicated by post-op wound infection requiring readmission.     Current Outpatient Medications   Medication Sig Dispense Refill    acetaminophen (TYLENOL) 500 MG tablet Take 1 tablet (500 mg) by mouth every 6 hours as needed for pain. 100 tablet 5    amLODIPine (NORVASC) 5 MG tablet Take 1 tablet (5 mg) by mouth daily. 30 tablet 11    blood glucose (CONTOUR NEXT TEST) test strip 1 strip by In Vitro route 4 times daily (before meals and nightly). 200 strip 11    blood glucose (NO BRAND SPECIFIED) lancets standard by In Vitro route 2 times daily. 100 Lancet 11    cyclobenzaprine (FLEXERIL) 5 MG tablet Take 1-2 tablets (5-10 mg) by mouth 3 times daily as needed for other (rectal pain). 60 tablet 5    escitalopram (LEXAPRO) 5 MG tablet Take 1 tablet (5 mg) by mouth daily. 30 tablet 11    hydrochlorothiazide (HYDRODIURIL) 25 MG tablet Take 1 tablet (25 mg) by mouth daily. In the morning 30 tablet 11    insulin aspart prot & aspart (NOVOLOG MIX 70/30 PEN) (70-30) 100 UNIT/ML pen 28 units in the morning and 12 units in the evening 45 mL 3    insulin pen needle (TRUEPLUS PEN NEEDLES) 32G X 4 MM miscellaneous Use 3 pen needles daily or as directed. 300 each 4    losartan (COZAAR) 100 MG tablet Take 1 tablet (100 mg) by mouth daily. 30 tablet 11    metFORMIN (GLUCOPHAGE XR) 500 MG 24 hr tablet Take 1 tablet (500 mg) by mouth 2 times daily (with meals). 60 tablet 11    omeprazole (PRILOSEC) 20 MG DR capsule Take 1 capsule (20 mg) by mouth daily. 30  "capsule 0    rosuvastatin (CRESTOR) 40 MG tablet Take 1 tablet (40 mg) by mouth at bedtime. 30 tablet 11    Vitamin D3 (CHOLECALCIFEROL) 25 mcg (1000 units) tablet Take 1 tablet (25 mcg) by mouth daily. 90 tablet 3    canagliflozin (INVOKANA) 300 MG tablet Take 1 tablet (300 mg) by mouth daily. 30 tablet 11    meloxicam (MOBIC) 7.5 MG tablet Take 1 tablet (7.5 mg) by mouth 2 times daily. 42 tablet 0       Allergies   Allergen Reactions    Aspirin GI Disturbance     Abdominal pain      Lisinopril Angioedema        Social History     Tobacco Use    Smoking status: Never     Passive exposure: Current    Smokeless tobacco: Former     Types: Chew     Quit date: 2023    Tobacco comments:     Son smokes outside, pt use to chew betel nut.    Substance Use Topics    Alcohol use: No     Family History   Problem Relation Age of Onset    Cancer Sister         Maybe lung cancer?  Lung problem.  in [place].    Asthma Sister     Breast Cancer No family hx of     Ovarian Cancer No family hx of      History   Drug Use No             Review of Systems  Constitutional, neuro, ENT, endocrine, pulmonary, cardiac, gastrointestinal, genitourinary, musculoskeletal, integument and psychiatric systems are negative, except as otherwise noted.    Objective    /66 (BP Location: Left arm, Patient Position: Sitting, Cuff Size: Adult Large)   Pulse 80   Temp 98.1  F (36.7  C) (Temporal)   Resp 16   Ht 1.486 m (4' 10.5\")   Wt 68.5 kg (151 lb 1.6 oz)   LMP  (LMP Unknown)   BMI 31.04 kg/m     Estimated body mass index is 31.04 kg/m  as calculated from the following:    Height as of this encounter: 1.486 m (4' 10.5\").    Weight as of this encounter: 68.5 kg (151 lb 1.6 oz).  Physical Exam  GENERAL: alert and no distress  EYES: Eyes grossly normal to inspection, PERRL and conjunctivae and sclerae normal  HENT: ear canals and TM's normal, nose and mouth without ulcers or lesions  NECK: no adenopathy, no asymmetry, masses, or " scars  RESP: lungs clear to auscultation - no rales, rhonchi or wheezes  CV: regular rate and rhythm, normal S1 S2, no S3 or S4, no murmur, click or rub, no peripheral edema  ABDOMEN: soft, nontender, no hepatosplenomegaly, no masses and bowel sounds normal  MS: no gross musculoskeletal defects noted, no edema  SKIN: no suspicious lesions or rashes  NEURO: Normal strength and tone, mentation intact and speech normal  PSYCH: mentation appears normal, affect normal/bright    Recent Labs   Lab Test 11/20/24  1046 06/28/24  0759   NA  --  143   POTASSIUM  --  4.1   CR  --  0.70   A1C 8.0* 7.4*        Diagnostics  Recent Results (from the past week)   CBC with platelets    Collection Time: 02/17/25  9:54 AM   Result Value Ref Range    WBC Count 5.9 4.0 - 11.0 10e3/uL    RBC Count 3.97 3.80 - 5.20 10e6/uL    Hemoglobin 12.0 11.7 - 15.7 g/dL    Hematocrit 36.5 35.0 - 47.0 %    MCV 92 78 - 100 fL    MCH 30.2 26.5 - 33.0 pg    MCHC 32.9 31.5 - 36.5 g/dL    RDW 12.5 10.0 - 15.0 %    Platelet Count 280 150 - 450 10e3/uL   EKG 12-lead, tracing only    Collection Time: 02/17/25 10:04 AM   Result Value Ref Range    Systolic Blood Pressure  mmHg    Diastolic Blood Pressure  mmHg    Ventricular Rate 91 BPM    Atrial Rate 91 BPM    NH Interval 150 ms    QRS Duration 78 ms     ms    QTc 408 ms    P Axis 60 degrees    R AXIS 36 degrees    T Axis 118 degrees    Interpretation ECG       Sinus rhythm  Nonspecific T wave abnormality  Abnormal ECG  When compared with ECG of 09-Apr-2010 00:10,  Aberrant conduction is no longer Present  Nonspecific T wave abnormality, worse in Inferior leads  Nonspecific T wave abnormality now evident in Lateral leads        EKG: appears normal, NSR, normal axis, normal intervals, no acute ST/T changes c/w ischemia, no LVH by voltage criteria, unchanged from previous tracings    Revised Cardiac Risk Index (RCRI)  The patient has the following serious cardiovascular risks for perioperative  complications:   - Diabetes Mellitus (on Insulin) = 1 point     RCRI Interpretation: 1 point: Class II (low risk - 0.9% complication rate)         Signed Electronically by: MORAIMA Juarez CNP  A copy of this evaluation report is provided to the requesting physician.        Answers submitted by the patient for this visit:  Patient Health Questionnaire (Submitted on 2/17/2025)  If you checked off any problems, how difficult have these problems made it for you to do your work, take care of things at home, or get along with other people?: Somewhat difficult  PHQ9 TOTAL SCORE: 4

## 2025-02-24 ENCOUNTER — OFFICE VISIT (OUTPATIENT)
Dept: PHARMACY | Facility: CLINIC | Age: 65
End: 2025-02-24
Payer: COMMERCIAL

## 2025-02-24 ENCOUNTER — LAB (OUTPATIENT)
Dept: LAB | Facility: CLINIC | Age: 65
End: 2025-02-24
Payer: COMMERCIAL

## 2025-02-24 VITALS
SYSTOLIC BLOOD PRESSURE: 128 MMHG | OXYGEN SATURATION: 98 % | WEIGHT: 156.25 LBS | DIASTOLIC BLOOD PRESSURE: 72 MMHG | BODY MASS INDEX: 32.1 KG/M2 | HEART RATE: 93 BPM

## 2025-02-24 DIAGNOSIS — M79.644 PAIN IN FINGER OF RIGHT HAND: ICD-10-CM

## 2025-02-24 DIAGNOSIS — E78.5 HYPERLIPIDEMIA, UNSPECIFIED HYPERLIPIDEMIA TYPE: ICD-10-CM

## 2025-02-24 DIAGNOSIS — I10 ESSENTIAL HYPERTENSION: ICD-10-CM

## 2025-02-24 DIAGNOSIS — E11.65 TYPE 2 DIABETES MELLITUS WITH HYPERGLYCEMIA, WITH LONG-TERM CURRENT USE OF INSULIN (H): ICD-10-CM

## 2025-02-24 DIAGNOSIS — J35.8 TONSILLAR MASS: ICD-10-CM

## 2025-02-24 DIAGNOSIS — Z79.4 TYPE 2 DIABETES MELLITUS WITH HYPERGLYCEMIA, WITH LONG-TERM CURRENT USE OF INSULIN (H): Primary | ICD-10-CM

## 2025-02-24 DIAGNOSIS — E55.9 VITAMIN D DEFICIENCY: ICD-10-CM

## 2025-02-24 DIAGNOSIS — F33.1 MAJOR DEPRESSIVE DISORDER, RECURRENT EPISODE, MODERATE (H): ICD-10-CM

## 2025-02-24 DIAGNOSIS — R12 HEARTBURN: ICD-10-CM

## 2025-02-24 DIAGNOSIS — Z79.4 TYPE 2 DIABETES MELLITUS WITH HYPERGLYCEMIA, WITH LONG-TERM CURRENT USE OF INSULIN (H): ICD-10-CM

## 2025-02-24 DIAGNOSIS — E11.65 TYPE 2 DIABETES MELLITUS WITH HYPERGLYCEMIA, WITH LONG-TERM CURRENT USE OF INSULIN (H): Primary | ICD-10-CM

## 2025-02-24 LAB
EST. AVERAGE GLUCOSE BLD GHB EST-MCNC: 214 MG/DL
HBA1C MFR BLD: 9.1 % (ref 0–5.6)

## 2025-02-24 PROCEDURE — 99607 MTMS BY PHARM ADDL 15 MIN: CPT | Performed by: PHARMACIST

## 2025-02-24 PROCEDURE — 36415 COLL VENOUS BLD VENIPUNCTURE: CPT

## 2025-02-24 PROCEDURE — 99605 MTMS BY PHARM NP 15 MIN: CPT | Performed by: PHARMACIST

## 2025-02-24 PROCEDURE — 83036 HEMOGLOBIN GLYCOSYLATED A1C: CPT

## 2025-02-24 RX ORDER — FAMOTIDINE 10 MG
10 TABLET ORAL 2 TIMES DAILY PRN
Qty: 60 TABLET | Refills: 3 | Status: SHIPPED | OUTPATIENT
Start: 2025-02-24

## 2025-02-24 NOTE — Clinical Note
For when Dr. Kendall returns.  FYI - only a couple changes made today (her tonsillectomy is on 2/27). I did increase her insulin and switched Tums to famotidine as needed. She is ready for procedure on Thursday (meds updated today). See other note, do you think its ok for her to proceed with procedure considering A1c 9.1 today? Thanks Ajay

## 2025-02-24 NOTE — PROGRESS NOTES
Medication Therapy Management (MTM) Encounter    ASSESSMENT:                            Medication Adherence/Access: No issues identified.    1. Type 2 diabetes mellitus with hyperglycemia, with long-term current use of insulin (H) (Primary)  A1c not meeting goal <7%.  Given increased A1c and current blood sugars, patient would benefit from increasing insulin prior to morning meal, though will make changes after patient's upcoming procedure to avoid confusion prior to then.  Patient able to repeat back new dose with teach back method.  Patient would benefit from taking SGLT2 inhibitor with morning medications.    2. Heartburn  Considering continued symptoms of heartburn and side effects from Tums, reasonable to switch patient to H2 blocker as needed, patient agreeable and prescription sent today.    3. Hypertension  BP at goal <130/80 mmHg, continue current medications without change today.    4. Hyperlipidemia, unspecified hyperlipidemia type  LDL at goal <100 mg/dL, no changes recommended.    5. Vitamin D deficiency  Last vitamin D level was elevated, was switched from ergocalciferol to maintenance vitamin D at that time, no changes recommended.    6. Major depressive disorder, recurrent episode, moderate (H)  Offered talk therapy, though patient declines recommendation today.  Reasonable to continue current medication without change at this time.  Could consider dose increase of escitalopram in the future if needed.    7. Pain in finger of right hand  Encouraged patient to follow-up with sports medicine clinic as directed.    8. Tonsillar mass  Reviewed with patient today and updated pillbox to reflect medication changes recommended prior to upcoming procedure (including holding Invokana until after procedure).    PLAN:                            Insulin change: Starting 2/28/25 Take 32 units before AM meal and 16 units before PM meal  Start famotidine 10 mg twice daily as needed for heartburn  Switch Invokana  administration to morning rather than evening starting on 2/28  Made medication changes to pillbox prior to procedure  Lab: A1c    Follow-up: Return in about 29 days (around 3/25/2025) for With PharmD.    SUBJECTIVE/OBJECTIVE:                          Ramona Wilder is a 64 year old female seen for a follow-up visit. Patient was accompanied by daughter .  (ID# [id number]) was used during today's visit.       Reason for visit: MTM - initial visit for 2025.    Allergies/ADRs: Reviewed in chart  Past Medical History: Reviewed in chart  Tobacco: She reports that she has never smoked. She has been exposed to tobacco smoke. She quit smokeless tobacco use about 17 months ago.  Her smokeless tobacco use included chew.  Alcohol: none    Medication Adherence/Access: Patient taking medications out of pillbox, that daughter-in-law is setting up her for. Reports she rarely misses doses, maybe once every 2 weeks.   Brings with medications and twice daily pillbox today (set up correctly).     Diabetes   Metformin  mg twice daily   Invokana 300 mg daily in the evening  Novolog 70/30 Mix insulin 28 units AM and 12 unit PM 5-10 minutes before meals - Patient using 15 units before evening meal  Patient is not experiencing side effects.   Current diabetes symptoms: hypoglycemia - Corrects with 1/2 cup juice when low  Diet/Exercise: Eating 2 meals per day, with snack of fruit in middle of the day. Eating brown rice instead of white rice.   MedHx: metformin 2000 mg daily (GI sx), Victoza (gassy/bloating), Ozempic (has declined retrial)     Blood sugar monitoring: Fingerstick - tried Libre2, but prefers traditional monitoring.  Date Fasting Before dinner   2/24/2025 106    2/23/2025 131 193   2/22/2025 119 261   2/21/2025 158 281   2/20/2025 193 233   2/19/2025 210 220   2/18/2025 178 389      Eye exam is up to date  Foot exam: due    Hypertension   Hydrochlorothiazide 25 mg daily in the morning  Losartan 100 mg daily in the  evening  Amlodipine 5 mg daily   Patient reports no current medication side effects  Patient does self-monitor blood pressure.     Hyperlipidemia   Rosuvastatin 40 mg daily  AM  Patient reports no significant myalgias or other side effects.  The 10-year ASCVD risk score (Rishi GOFF, et al., 2019) is: 10.2%    Values used to calculate the score:      Age: 64 years      Sex: Female      Is Non- : No      Diabetic: Yes      Tobacco smoker: No      Systolic Blood Pressure: 128 mmHg      Is BP treated: Yes      HDL Cholesterol: 64 mg/dL      Total Cholesterol: 188 mg/dL     GERD    Tums 500 mg daily as needed   Notes that when she was using the Tums, it causes constipation, therefore she has only been using this sparingly. Agrees to retrial of famotidine.       Supplements   Vitamin D 1000 units once daily - dose decreased 11/2024     Mental Health   Escitalopram 5 mg daily  Not feeling happy lately due to her medical conditions. Does not desire to see talk therapist.      Trigger finger pain  Meloxicam and ppi x 3 weeks - reports therapy is complete per sports medicine clinic  Reports the pain in her hand continues. Notes that she is afraid to have steroid injection.     Tonsillectomy on 2/27    Patient states that she is concerned about which medications to stop before her upcoming procedure. Brought with AVS from recent visit today.   Patient has been taking cyclobenzaprine 2 tablets by mouth every morning. Notes that her rectum is hurting every day. Finds this helpful.     Today's Vitals: /72   Pulse 93   Wt 156 lb 4 oz (70.9 kg)   LMP  (LMP Unknown)   SpO2 98%   BMI 32.10 kg/m    ----------------      I spent 60 minutes with this patient today. All changes were made via collaborative practice agreement with Coreen Kendall MD.     A summary of these recommendations was given to the patient.    Heather Morales, PharmD, BCACP  Medication Therapy Management Pharmacist     Medication  Therapy Recommendations  Heartburn   1 Current Medication: famotidine (PEPCID) 10 MG tablet   Current Medication Sig: Take 1 tablet (10 mg) by mouth 2 times daily as needed (heartburn).   Rationale: More effective medication available - Ineffective medication - Effectiveness   Recommendation: Change Medication - famotidine 10 MG tablet   Status: Accepted per CPA   Identified Date: 2025 Completed Date: 2025         Type 2 diabetes mellitus with hyperglycemia, with long-term current use of insulin (H)   1 Current Medication: canagliflozin (INVOKANA) 300 MG tablet   Current Medication Sig: Take 1 tablet (300 mg) by mouth daily.   Rationale: Incorrect administration - Adverse medication event - Safety   Recommendation: Change Administration Time   Status: Patient Agreed - Adherence/Education   Identified Date: 2025 Completed Date: 2025         2 Current Medication: insulin aspart prot & aspart (NOVOLOG MIX 70/30 PEN) (70-30) 100 UNIT/ML pen (Discontinued)   Current Medication Si units in the morning and 12 units in the evening   Rationale: Dose too low - Dosage too low - Effectiveness   Recommendation: Increase Dose   Status: Accepted per CPA   Identified Date: 2025 Completed Date: 2025

## 2025-02-24 NOTE — PATIENT INSTRUCTIONS
"Recommendations from today's MTM visit:                                                    MTM (medication therapy management) is a service provided by a clinical pharmacist designed to help you get the most of out of your medicines.        Insulin change: Starting 2/28/25 Take Novolog 70/30 mix insulin 32 units before AM meal and 16 units before PM meal  Start famotidine 10 mg twice daily as needed for heartburn    Follow-up: Return in about 29 days (around 3/25/2025) for With PharmD.    It was great speaking with you today.  I value your experience and would be very thankful for your time in providing feedback in our clinic survey. In the next few days, you may receive an email or text message from [employer] with a link to a survey related to your  clinical pharmacist.\"     To schedule another MTM appointment, please call the clinic directly or you may call the MTM scheduling line at [phone].    My Clinical Pharmacist's contact information:                                                      Please feel free to contact me with any questions or concerns you have.      Heather Morales, PharmD, BCACP  Medication Therapy Management Pharmacist    "

## 2025-02-26 DIAGNOSIS — L30.9 DERMATITIS: Primary | ICD-10-CM

## 2025-02-26 RX ORDER — TRIAMCINOLONE ACETONIDE 1 MG/G
OINTMENT TOPICAL
Qty: 80 G | Refills: 1 | Status: SHIPPED | OUTPATIENT
Start: 2025-02-26

## 2025-02-27 ENCOUNTER — HOSPITAL ENCOUNTER (OUTPATIENT)
Facility: CLINIC | Age: 65
Discharge: HOME | End: 2025-02-27
Attending: STUDENT IN AN ORGANIZED HEALTH CARE EDUCATION/TRAINING PROGRAM | Admitting: STUDENT IN AN ORGANIZED HEALTH CARE EDUCATION/TRAINING PROGRAM
Payer: COMMERCIAL

## 2025-02-27 VITALS
SYSTOLIC BLOOD PRESSURE: 167 MMHG | DIASTOLIC BLOOD PRESSURE: 90 MMHG | HEIGHT: 59 IN | BODY MASS INDEX: 31.47 KG/M2 | WEIGHT: 156.09 LBS | RESPIRATION RATE: 22 BRPM | TEMPERATURE: 97.6 F | OXYGEN SATURATION: 96 % | HEART RATE: 88 BPM

## 2025-02-27 DIAGNOSIS — J35.8 TONSILLAR MASS: Primary | ICD-10-CM

## 2025-02-27 LAB
ABO + RH BLD: NORMAL
BLD GP AB SCN SERPL QL: NEGATIVE
GLUCOSE BLDC GLUCOMTR-MCNC: 138 MG/DL (ref 70–99)
GLUCOSE BLDC GLUCOMTR-MCNC: 155 MG/DL (ref 70–99)
GLUCOSE BLDC GLUCOMTR-MCNC: 207 MG/DL (ref 70–99)
GLUCOSE BLDC GLUCOMTR-MCNC: 233 MG/DL (ref 70–99)
SPECIMEN EXP DATE BLD: NORMAL

## 2025-02-27 PROCEDURE — 272N000001 HC OR GENERAL SUPPLY STERILE: Performed by: STUDENT IN AN ORGANIZED HEALTH CARE EDUCATION/TRAINING PROGRAM

## 2025-02-27 PROCEDURE — 42826 REMOVAL OF TONSILS: CPT | Mod: 52 | Performed by: STUDENT IN AN ORGANIZED HEALTH CARE EDUCATION/TRAINING PROGRAM

## 2025-02-27 PROCEDURE — 250N000012 HC RX MED GY IP 250 OP 636 PS 637

## 2025-02-27 PROCEDURE — 88304 TISSUE EXAM BY PATHOLOGIST: CPT | Mod: TC | Performed by: STUDENT IN AN ORGANIZED HEALTH CARE EDUCATION/TRAINING PROGRAM

## 2025-02-27 PROCEDURE — 999N000141 HC STATISTIC PRE-PROCEDURE NURSING ASSESSMENT: Performed by: STUDENT IN AN ORGANIZED HEALTH CARE EDUCATION/TRAINING PROGRAM

## 2025-02-27 PROCEDURE — 250N000013 HC RX MED GY IP 250 OP 250 PS 637: Performed by: STUDENT IN AN ORGANIZED HEALTH CARE EDUCATION/TRAINING PROGRAM

## 2025-02-27 PROCEDURE — 86901 BLOOD TYPING SEROLOGIC RH(D): CPT | Performed by: STUDENT IN AN ORGANIZED HEALTH CARE EDUCATION/TRAINING PROGRAM

## 2025-02-27 PROCEDURE — 258N000003 HC RX IP 258 OP 636

## 2025-02-27 PROCEDURE — 710N000012 HC RECOVERY PHASE 2, PER MINUTE: Performed by: STUDENT IN AN ORGANIZED HEALTH CARE EDUCATION/TRAINING PROGRAM

## 2025-02-27 PROCEDURE — 250N000011 HC RX IP 250 OP 636: Performed by: STUDENT IN AN ORGANIZED HEALTH CARE EDUCATION/TRAINING PROGRAM

## 2025-02-27 PROCEDURE — 370N000017 HC ANESTHESIA TECHNICAL FEE, PER MIN: Performed by: STUDENT IN AN ORGANIZED HEALTH CARE EDUCATION/TRAINING PROGRAM

## 2025-02-27 PROCEDURE — 88304 TISSUE EXAM BY PATHOLOGIST: CPT | Mod: 26 | Performed by: PATHOLOGY

## 2025-02-27 PROCEDURE — 36415 COLL VENOUS BLD VENIPUNCTURE: CPT | Performed by: STUDENT IN AN ORGANIZED HEALTH CARE EDUCATION/TRAINING PROGRAM

## 2025-02-27 PROCEDURE — 710N000011 HC RECOVERY PHASE 1, LEVEL 3, PER MIN: Performed by: STUDENT IN AN ORGANIZED HEALTH CARE EDUCATION/TRAINING PROGRAM

## 2025-02-27 PROCEDURE — 360N000075 HC SURGERY LEVEL 2, PER MIN: Performed by: STUDENT IN AN ORGANIZED HEALTH CARE EDUCATION/TRAINING PROGRAM

## 2025-02-27 PROCEDURE — 82962 GLUCOSE BLOOD TEST: CPT

## 2025-02-27 RX ORDER — DEXAMETHASONE SODIUM PHOSPHATE 4 MG/ML
4 INJECTION, SOLUTION INTRA-ARTICULAR; INTRALESIONAL; INTRAMUSCULAR; INTRAVENOUS; SOFT TISSUE
Status: DISCONTINUED | OUTPATIENT
Start: 2025-02-27 | End: 2025-02-27 | Stop reason: HOSPADM

## 2025-02-27 RX ORDER — LABETALOL HYDROCHLORIDE 5 MG/ML
10 INJECTION, SOLUTION INTRAVENOUS
Status: DISCONTINUED | OUTPATIENT
Start: 2025-02-27 | End: 2025-02-27 | Stop reason: HOSPADM

## 2025-02-27 RX ORDER — SODIUM CHLORIDE, SODIUM LACTATE, POTASSIUM CHLORIDE, CALCIUM CHLORIDE 600; 310; 30; 20 MG/100ML; MG/100ML; MG/100ML; MG/100ML
INJECTION, SOLUTION INTRAVENOUS CONTINUOUS
Status: DISCONTINUED | OUTPATIENT
Start: 2025-02-27 | End: 2025-02-27 | Stop reason: HOSPADM

## 2025-02-27 RX ORDER — FENTANYL CITRATE 50 UG/ML
25 INJECTION, SOLUTION INTRAMUSCULAR; INTRAVENOUS EVERY 5 MIN PRN
Status: DISCONTINUED | OUTPATIENT
Start: 2025-02-27 | End: 2025-02-27 | Stop reason: HOSPADM

## 2025-02-27 RX ORDER — ONDANSETRON 2 MG/ML
4 INJECTION INTRAMUSCULAR; INTRAVENOUS EVERY 30 MIN PRN
Status: DISCONTINUED | OUTPATIENT
Start: 2025-02-27 | End: 2025-02-27 | Stop reason: HOSPADM

## 2025-02-27 RX ORDER — ACETAMINOPHEN 325 MG/1
650 TABLET ORAL
Status: DISCONTINUED | OUTPATIENT
Start: 2025-02-27 | End: 2025-02-27 | Stop reason: HOSPADM

## 2025-02-27 RX ORDER — DEXAMETHASONE SODIUM PHOSPHATE 10 MG/ML
10 INJECTION, SOLUTION INTRAMUSCULAR; INTRAVENOUS ONCE
Status: DISCONTINUED | OUTPATIENT
Start: 2025-02-27 | End: 2025-02-27 | Stop reason: HOSPADM

## 2025-02-27 RX ORDER — ACETAMINOPHEN 325 MG/1
975 TABLET ORAL ONCE
Status: COMPLETED | OUTPATIENT
Start: 2025-02-27 | End: 2025-02-27

## 2025-02-27 RX ORDER — OXYMETAZOLINE HYDROCHLORIDE 0.05 G/100ML
2 SPRAY NASAL 2 TIMES DAILY PRN
Qty: 6 ML | Refills: 0 | Status: SHIPPED | OUTPATIENT
Start: 2025-02-27 | End: 2025-03-14

## 2025-02-27 RX ORDER — HYDROMORPHONE HCL IN WATER/PF 6 MG/30 ML
0.2 PATIENT CONTROLLED ANALGESIA SYRINGE INTRAVENOUS EVERY 5 MIN PRN
Status: DISCONTINUED | OUTPATIENT
Start: 2025-02-27 | End: 2025-02-27 | Stop reason: HOSPADM

## 2025-02-27 RX ORDER — ONDANSETRON 4 MG/1
4 TABLET, ORALLY DISINTEGRATING ORAL EVERY 30 MIN PRN
Status: DISCONTINUED | OUTPATIENT
Start: 2025-02-27 | End: 2025-02-27 | Stop reason: HOSPADM

## 2025-02-27 RX ORDER — APREPITANT 40 MG/1
40 CAPSULE ORAL ONCE
Status: COMPLETED | OUTPATIENT
Start: 2025-02-27 | End: 2025-02-27

## 2025-02-27 RX ORDER — NALOXONE HYDROCHLORIDE 0.4 MG/ML
0.1 INJECTION, SOLUTION INTRAMUSCULAR; INTRAVENOUS; SUBCUTANEOUS
Status: DISCONTINUED | OUTPATIENT
Start: 2025-02-27 | End: 2025-02-27 | Stop reason: HOSPADM

## 2025-02-27 RX ORDER — OXYCODONE HYDROCHLORIDE 5 MG/1
5 TABLET ORAL
Status: DISCONTINUED | OUTPATIENT
Start: 2025-02-27 | End: 2025-02-27 | Stop reason: HOSPADM

## 2025-02-27 RX ORDER — FENTANYL CITRATE 50 UG/ML
50 INJECTION, SOLUTION INTRAMUSCULAR; INTRAVENOUS EVERY 5 MIN PRN
Status: DISCONTINUED | OUTPATIENT
Start: 2025-02-27 | End: 2025-02-27 | Stop reason: HOSPADM

## 2025-02-27 RX ORDER — HYDRALAZINE HYDROCHLORIDE 20 MG/ML
2.5-5 INJECTION INTRAMUSCULAR; INTRAVENOUS EVERY 10 MIN PRN
Status: DISCONTINUED | OUTPATIENT
Start: 2025-02-27 | End: 2025-02-27 | Stop reason: HOSPADM

## 2025-02-27 RX ORDER — HYDROMORPHONE HCL IN WATER/PF 6 MG/30 ML
0.4 PATIENT CONTROLLED ANALGESIA SYRINGE INTRAVENOUS EVERY 5 MIN PRN
Status: DISCONTINUED | OUTPATIENT
Start: 2025-02-27 | End: 2025-02-27 | Stop reason: HOSPADM

## 2025-02-27 RX ORDER — OXYCODONE HYDROCHLORIDE 5 MG/1
5-10 TABLET ORAL EVERY 4 HOURS PRN
Qty: 14 TABLET | Refills: 0 | Status: SHIPPED | OUTPATIENT
Start: 2025-02-27

## 2025-02-27 RX ORDER — ACETAMINOPHEN 325 MG/1
650 TABLET ORAL EVERY 4 HOURS PRN
Qty: 50 TABLET | Refills: 0 | Status: SHIPPED | OUTPATIENT
Start: 2025-02-27

## 2025-02-27 RX ORDER — AMOXICILLIN 250 MG
1-2 CAPSULE ORAL 2 TIMES DAILY
Qty: 30 TABLET | Refills: 0 | Status: SHIPPED | OUTPATIENT
Start: 2025-02-27

## 2025-02-27 RX ORDER — LIDOCAINE 40 MG/G
CREAM TOPICAL
Status: DISCONTINUED | OUTPATIENT
Start: 2025-02-27 | End: 2025-02-27 | Stop reason: HOSPADM

## 2025-02-27 RX ADMIN — SODIUM CHLORIDE 4 UNITS: 9 INJECTION, SOLUTION INTRAVENOUS at 12:16

## 2025-02-27 RX ADMIN — FENTANYL CITRATE 50 MCG: 50 INJECTION, SOLUTION INTRAMUSCULAR; INTRAVENOUS at 09:14

## 2025-02-27 RX ADMIN — APREPITANT 40 MG: 40 CAPSULE ORAL at 07:21

## 2025-02-27 RX ADMIN — FENTANYL CITRATE 25 MCG: 50 INJECTION, SOLUTION INTRAMUSCULAR; INTRAVENOUS at 09:04

## 2025-02-27 RX ADMIN — ACETAMINOPHEN 975 MG: 325 TABLET, FILM COATED ORAL at 07:21

## 2025-02-27 ASSESSMENT — ACTIVITIES OF DAILY LIVING (ADL)
ADLS_ACUITY_SCORE: 39
ADLS_ACUITY_SCORE: 34
ADLS_ACUITY_SCORE: 39
ADLS_ACUITY_SCORE: 39
ADLS_ACUITY_SCORE: 34
ADLS_ACUITY_SCORE: 39
ADLS_ACUITY_SCORE: 32
ADLS_ACUITY_SCORE: 34

## 2025-02-27 NOTE — OP NOTE
Pediatric Otolaryngology Operative Note      Pre-op Diagnosis:  Tonsillectomy hypertrophy  Post-op Diagnosis:  Same  Procedure:   Left Tonsillectomy    Surgeons:  Kunal Omalley MD  Assistants:  Nishant Jerry MD   Anesthesia:  General endotracheal  EBL: 5cc  Drains:  None      Complications: None   Specimens:   Tonsils    Findings:   Tonsils :1+  Palate: Intact, no submucosal cleft palate.  Uvula: Singular    Indications:  Ramona Wilder is a 64 year old female left-sided tonsillar mass with dysphagia. Decision was made to proceed with surgery to remove and send to pathology. Informed consent was obtained.     Procedure:  After consent, the patient was brought to the operating room and placed in the supine position.  Following induction, the patient was intubated orotracheally.  Monitoring lines were placed as appropriate. The bed was turned 90 degrees. The patient was prepped and draped in standard fashion. A time out was performed and the patient correctly identified.    The McGyvor mouth gag was inserted and mouth retracted open. The left tonsil was grasped with an Allis. It was dissected out in subcapsular fashion using cautery.     The McGyvor mouth gag were removed. The patient was turned over to the care of anesthesia, awakened, and taken to the PACU in stable condition.    Disposition: To PACU, anticipate [place] home     was present for the critical portions of the procedure     Nishant Jerry MD PGY1  Otolaryngology- Head & Neck Surgery

## 2025-02-27 NOTE — DISCHARGE INSTRUCTIONS
"To contact a physician:  Call Dr Omalley's office at [phone] Monday through Friday from 8:00 am to 4:30 pm at [phone].  During weekends and evenings, call the page  at [phone] and ask for ENT resident \"on call\".    "

## 2025-02-27 NOTE — OR NURSING
Blood sugar checked for discharge with result of 233; Dr Morataya paged.  Waiting for instructions.

## 2025-02-27 NOTE — BRIEF OP NOTE
[facility]    Brief Operative Note    Pre-operative diagnosis: Tonsillar hypertrophy [J35.1]  Post-operative diagnosis Same as pre-operative diagnosis    Procedure: TONSILLECTOMY, Left - Throat    Surgeon: Surgeons and Role:     * Kunal Omalley MD - Primary     * Nishant Jerry MD - Resident - Assisting  Anesthesia: General   Estimated Blood Loss: 2ml    Drains: None  Specimens:   ID Type Source Tests Collected by Time Destination   1 : left tonsil Tissue Tonsil, Left SURGICAL PATHOLOGY EXAM Kunal Omalley MD 2/27/2025  8:11 AM      Findings:   None.  Complications: None.  Implants: * No implants in log *        - Discharge post-op  - Follow up in clinic w/ Dr. Omalley w/ results of specimen  - If small bleeding occurs use gargle afrin sprays. If significant bleed occurs seek medical attention right away

## 2025-02-27 NOTE — OR NURSING
Patient arrived from PACU awake and alert.  Ambulated from stretcher to chair without difficulty.  Denies any nausea; pain level has decreased with medication

## 2025-02-27 NOTE — OP NOTE
Repeat blood sugar at 1245 207. Verified with Dr. Agosto that pt is okay to discharge. Instructed pt to check blood sugar again at home and correct based on PTA insulin orders.

## 2025-03-06 ENCOUNTER — PATIENT OUTREACH (OUTPATIENT)
Dept: OTOLARYNGOLOGY | Facility: CLINIC | Age: 65
End: 2025-03-06
Payer: COMMERCIAL

## 2025-03-06 LAB
PATH REPORT.COMMENTS IMP SPEC: NORMAL
PATH REPORT.COMMENTS IMP SPEC: NORMAL
PATH REPORT.FINAL DX SPEC: NORMAL
PATH REPORT.GROSS SPEC: NORMAL
PATH REPORT.MICROSCOPIC SPEC OTHER STN: NORMAL
PATH REPORT.RELEVANT HX SPEC: NORMAL
PHOTO IMAGE: NORMAL

## 2025-03-06 NOTE — TELEPHONE ENCOUNTER
Spoke with patient's son to review surgical pathology.    Final Diagnosis   LEFT TONSIL, TONSILLECTOMY:  -Benign tonsil with retention cyst and reactive lymphoid/follicular hyperplasia.       Plan for patient to follow up with ENT as needed. Son agreeable with plan of care. Son discussed that patient does not have any further questions or concerns at this time.    Lauren Barrios BSN, RN

## 2025-03-24 DIAGNOSIS — K62.89 ANAL OR RECTAL PAIN: ICD-10-CM

## 2025-03-24 RX ORDER — CYCLOBENZAPRINE HCL 5 MG
5-10 TABLET ORAL 3 TIMES DAILY PRN
Qty: 60 TABLET | Refills: 5 | Status: SHIPPED | OUTPATIENT
Start: 2025-03-24

## 2025-03-24 NOTE — PROGRESS NOTES
Medication Therapy Management (MTM) Encounter    ASSESSMENT:                            Medication Adherence/Access: No issues identified.    1. Type 2 diabetes mellitus with hyperglycemia, with long-term current use of insulin (H) (Primary)  A1c not meeting goal <7%.  Patient would benefit from an increase dose of metformin and morning insulin for glycemic control.     2. Hypertension  BP at goal <130/80 mmHg. No medication changes recommended today.     3. Hyperlipidemia, unspecified hyperlipidemia type  LDL at goal <100 mg/dL, no changes recommended.    4. Constipation   Patient would benefit from having a medication on hand to use as needed for constipation.     5.  Heartburn   Patient would benefit from filling famotidine and taking it as needed for heartburn. Dispense report does show it was filled 2/24, but patient does not remember taking.     6. Supplements   Recommend checking vitamin D to assess efficacy of current supplement.  Not checked today as no other labs due.     7. Mental Health   Patient interested in an increased dose of lexapro. Discussed increased dose with Dr. Kendall who agreed the patient would benefit from an increased dose of lexapro (routed to nurse Cheney to call and inform patient of change).     8. Pain   Stable.     9. Dermatitis  Has not improved. Recommended patient use triamcinolone cream twice daily as prescribed by Dr. Ladd.        PLAN:                            [facility] pharmacy please fill: famotidine   INCREASED novolog 70/30 mix to 36 units in the morning, keep evening dose at 16 units  INCREASE metformin to 2 tablets in the morning, keep evening dose at 1 tablet   START senna-docusate 1-2 tablets daily as needed for bowel movements   Use famotidine 1 tablet twice daily as needed for heartburn   Use triamcinolone cream twice daily   STOP lexapro 5mg daily   START lexapro 10mg daily     Follow-up: Return in 5 weeks (on 4/30/2025) for with PharmD.    Medication issues to be  addressed at a future visit:    Labs: A1c, Vit D    SUBJECTIVE/OBJECTIVE:                          Ramona Wilder is a 64 year old female seen for a follow-up visit. Patient was accompanied by daughter-in-law.  (ID# [id number]) was used during today's visit.       Reason for visit: MTM f/up.    Allergies/ADRs: Reviewed in chart  Past Medical History: Reviewed in chart  Tobacco: She reports that she has never smoked. She has been exposed to tobacco smoke. She quit smokeless tobacco use about 18 months ago.  Her smokeless tobacco use included chew.  Alcohol: none    Medication Adherence/Access: Patient taking medications out of pillbox, that daughter-in-law is setting up for her. Reports she rarely misses doses, maybe once every 2 weeks.   Brings with medications and twice daily pillbox today (set up correctly).     Diabetes   Metformin  mg twice daily   Invokana 300 mg daily  Novolog 70/30 Mix insulin 32 units AM and 16 unit PM 5-10 minutes before meals   Patient is not experiencing side effects.   Current diabetes symptoms: hypoglycemia - Corrects with 1/2 cup juice when low  Diet/Exercise: Eating 2 meals per day, with snack of fruit in middle of the day. Eating brown rice instead of white rice.    MedHx: metformin 2000 mg daily (GI sx), Victoza (gassy/bloating), Ozempic (has declined retrial)     Blood sugar monitoring: Fingerstick - tried Libre2, but prefers traditional monitoring.  14d av - 30 readings  Date FBG/ 2hours post Around 5:30 pm - before dinner   3/25/2025 150    3/24/2025 120 332   3/23/2025 130 183   3/22/2025 135 380   3/21/2025 184 161   3/20/2025 154 259   3/19/2025 107 272       Eye exam is up to date  Foot exam: due    Hypertension   Hydrochlorothiazide 25 mg daily in the morning  Losartan 100 mg daily in the evening  Amlodipine 5 mg daily   Patient reports no current medication side effects.   Patient does self-monitor blood pressure.     Hyperlipidemia   Rosuvastatin 40 mg daily   AM  Patient reports no significant myalgias or other side effects.  The 10-year ASCVD risk score (Rishi GOFF, et al., 2019) is: 9.3%    Values used to calculate the score:      Age: 64 years      Sex: Female      Is Non- : No      Diabetic: Yes      Tobacco smoker: No      Systolic Blood Pressure: 122 mmHg      Is BP treated: Yes      HDL Cholesterol: 64 mg/dL      Total Cholesterol: 188 mg/dL     Constipation   Senna-docusate 8.5-50mg 1-2 tabs 2 times daily  - patient reports she does not have at home  Patient is having bowel movements every 2-3 days.  Patient reports she feels like she needs to have a bowel movement and that 'it is close but does not come out'. Reports stool is not hard.   Patient feels that current therapy is not effective.       GERD    Tums 500 mg daily as needed - taking 2-3 times per week.   Famotidine 10mg 2 times daily prn - patient unsure if she is taking  Notes that when she was using the Tums, it causes constipation, therefore she has only been using this sparingly. Agrees to retrial of famotidine.       Supplements   Vitamin D 1000 units once daily - dose decreased 11/2024     Mental Health   Escitalopram 5 mg daily  Not feeling happy lately due to her medical conditions. Does not desire to see talk therapist.   Said that her mood is stable, but that she is not happy.   Did report that her sleep has improved.      Pain   Tylenol 325mg 2 tabs q4h prn - patient has 500mg tablet and likes that dose, removed from med list  Tylenol 500mg q6h prn - uses for body aches, uses 2 pills every 2-3 days   Cyclobenzaprine 5mg 102 tabs 3 times daily prn -using almost every day   Oxycodone 5mg tab 1-2 tabs q4h prn - used up supply, removed from med list  Finds medications effective for pain.     Dermatitis   Triamcinolone 0.1% oint - uses under right breast, using once daily   Still using.   Rash has not gone away and still itches.     Today's Vitals: /78   Pulse 88   Wt  152 lb 8 oz (69.2 kg)   LMP  (LMP Unknown)   SpO2 98%   BMI 31.33 kg/m    ----------------    I spent 45 minutes with this patient today. All changes were made via collaborative practice agreement with Coreen Kendall MD.     A summary of these recommendations was given to the patient.    Lily Navas, PharmD   Medication Therapy Management Pharmacy Resident    Preceptor cosignature: Patient was seen independently by Dr. Navas. I have reviewed the assessment and plan. Heather Morales, PharmD, [facility]     Medication Therapy Recommendations  Constipation   1 Rationale: Preventive therapy - Needs additional medication therapy - Indication   Recommendation: Start Medication - SENNA-docusate sodium 8.6-50 MG tablet   Status: Accepted per CPA   Identified Date: 3/25/2025 Completed Date: 3/25/2025         Heartburn   1 Current Medication: famotidine (PEPCID) 10 MG tablet   Current Medication Sig: Take 1 tablet (10 mg) by mouth 2 times daily as needed (heartburn).   Rationale: Does not understand instructions - Adherence - Adherence   Recommendation: Provide Education   Status: Patient Agreed - Adherence/Education   Identified Date: 3/25/2025 Completed Date: 3/25/2025         Type 2 diabetes mellitus with hyperglycemia, with long-term current use of insulin (H)   1 Current Medication: insulin aspart prot & aspart (NOVOLOG MIX 70/30 PEN) (70-30) 100 UNIT/ML pen   Current Medication Si units in the morning and 16 units in the evening   Rationale: Dose too low - Dosage too low - Effectiveness   Recommendation: Increase Dose   Status: Accepted per CPA   Identified Date: 3/25/2025 Completed Date: 3/25/2025         2 Current Medication: metFORMIN (GLUCOPHAGE XR) 500 MG 24 hr tablet   Current Medication Sig: Take 2 tablets (1000mg) in the morning and 1 tablet (500mg) in the evening   Rationale: Dose too low - Dosage too low - Effectiveness   Recommendation: Increase Dose   Status: Accepted per CPA   Identified Date:  3/25/2025 Completed Date: 3/25/2025

## 2025-03-25 ENCOUNTER — OFFICE VISIT (OUTPATIENT)
Dept: PHARMACY | Facility: CLINIC | Age: 65
End: 2025-03-25
Payer: COMMERCIAL

## 2025-03-25 VITALS
BODY MASS INDEX: 31.33 KG/M2 | DIASTOLIC BLOOD PRESSURE: 78 MMHG | HEART RATE: 88 BPM | OXYGEN SATURATION: 98 % | WEIGHT: 152.5 LBS | SYSTOLIC BLOOD PRESSURE: 122 MMHG

## 2025-03-25 DIAGNOSIS — G89.29 OTHER CHRONIC PAIN: ICD-10-CM

## 2025-03-25 DIAGNOSIS — R12 HEARTBURN: ICD-10-CM

## 2025-03-25 DIAGNOSIS — E11.65 TYPE 2 DIABETES MELLITUS WITH HYPERGLYCEMIA, WITH LONG-TERM CURRENT USE OF INSULIN (H): Primary | ICD-10-CM

## 2025-03-25 DIAGNOSIS — E55.9 VITAMIN D DEFICIENCY: ICD-10-CM

## 2025-03-25 DIAGNOSIS — F33.1 MAJOR DEPRESSIVE DISORDER, RECURRENT EPISODE, MODERATE (H): ICD-10-CM

## 2025-03-25 DIAGNOSIS — K59.00 CONSTIPATION, UNSPECIFIED CONSTIPATION TYPE: ICD-10-CM

## 2025-03-25 DIAGNOSIS — I10 ESSENTIAL HYPERTENSION: ICD-10-CM

## 2025-03-25 DIAGNOSIS — Z79.4 TYPE 2 DIABETES MELLITUS WITH HYPERGLYCEMIA, WITH LONG-TERM CURRENT USE OF INSULIN (H): Primary | ICD-10-CM

## 2025-03-25 DIAGNOSIS — K59.00 CONSTIPATION: ICD-10-CM

## 2025-03-25 DIAGNOSIS — E78.5 HYPERLIPIDEMIA, UNSPECIFIED HYPERLIPIDEMIA TYPE: ICD-10-CM

## 2025-03-25 DIAGNOSIS — L30.9 DERMATITIS: ICD-10-CM

## 2025-03-25 PROCEDURE — 99606 MTMS BY PHARM EST 15 MIN: CPT

## 2025-03-25 PROCEDURE — 3078F DIAST BP <80 MM HG: CPT | Performed by: PHARMACIST

## 2025-03-25 PROCEDURE — 3074F SYST BP LT 130 MM HG: CPT | Performed by: PHARMACIST

## 2025-03-25 PROCEDURE — 99607 MTMS BY PHARM ADDL 15 MIN: CPT

## 2025-03-25 RX ORDER — AMOXICILLIN 250 MG
1-2 CAPSULE ORAL DAILY PRN
Qty: 60 TABLET | Refills: 0 | Status: SHIPPED | OUTPATIENT
Start: 2025-03-25

## 2025-03-25 RX ORDER — METFORMIN HYDROCHLORIDE 500 MG/1
TABLET, EXTENDED RELEASE ORAL
Qty: 90 TABLET | Refills: 11 | Status: SHIPPED | OUTPATIENT
Start: 2025-03-25

## 2025-03-25 NOTE — Clinical Note
Ale Kendall,   I saw Ramona today in clinic. I increased her metformin and novolog mix. Also sent in more senna-docusate for her as she is having contipation and instructed her to  and start using famotidine. She was only using the triamcinolone once daily under her right breast and reported it has not improved. I educated her to use it twice, but that may need to be follow up on. Of note, she reported her mood is not good and she was interested in an increased dose of lexapro. Her last PHQ-9 was 4 which would not warrant an increase, so I just wanted to know your thoughts before I made any dose adjustments. Let me know what you think about the lexapro and if you have any questions!   Thanks,  Lily

## 2025-03-25 NOTE — Clinical Note
Hello,   Please contact Ramona Wilder and inform her that Dr. Kendall was okay with her increasing her escitalopram. A new prescription has been sent to her pharmacy. She should take escitalopram 10mg 1 tablet daily.   Thanks,  Lily

## 2025-03-26 ENCOUNTER — TELEPHONE (OUTPATIENT)
Dept: PHARMACY | Facility: CLINIC | Age: 65
End: 2025-03-26
Payer: COMMERCIAL

## 2025-03-26 RX ORDER — ESCITALOPRAM OXALATE 10 MG/1
10 TABLET ORAL DAILY
Qty: 30 TABLET | Refills: 11 | Status: SHIPPED | OUTPATIENT
Start: 2025-03-26

## 2025-03-26 NOTE — TELEPHONE ENCOUNTER
Lily Navas, [facility]  [facility] [place] [facility]    [facility],    Please contact Ramona Meño and inform her that Dr. Kendall was okay with her increasing her escitalopram. A new prescription has been sent to her pharmacy. She should take escitalopram 10mg 1 tablet daily.    Thanks,  Lily    RN spoke to Jeniffer Root, sasha (CTC on file) to relay provider treatment recommendation above.  RN advised son to assist patient to  the medication above from [facility] and to help remove the old bottle at home so patient don't take too much.  Son verbalized understood.  No further action needed.    Jae Dickson RN  [facility]

## 2025-03-27 NOTE — TELEPHONE ENCOUNTER
"Writer attempt #1 to call patient with the help of a \"Dee\"   (ID # [id number]) regarding clinician's message below. No answer, unable to leave VM.    If patient / family calls back, please relay clinician's message to them. Thanks.    Raymond Johnston, BSN, RN, PHN   [facility]    "

## 2025-04-01 ENCOUNTER — TRANSFERRED RECORDS (OUTPATIENT)
Dept: HEALTH INFORMATION MANAGEMENT | Facility: CLINIC | Age: 65
End: 2025-04-01
Payer: COMMERCIAL

## 2025-04-17 DIAGNOSIS — R12 HEARTBURN: ICD-10-CM

## 2025-04-17 NOTE — TELEPHONE ENCOUNTER
1st attempt----Called patient, unable to reach patient, left voicemail. Please relay REFILL NURSE message if patient calls back. Thank you.       Linsey Weiss, MSN, RN   [facility]

## 2025-04-21 NOTE — TELEPHONE ENCOUNTER
"2nd attempt----Called patient, unable to reach patient,  says \"not available.\" Unable to lvm. Please relay REFILL NURSE message if patient calls back. Thank you.         Linsey Weiss, MSN, RN   [facility]      "

## 2025-04-23 DIAGNOSIS — E11.65 TYPE 2 DIABETES MELLITUS WITH HYPERGLYCEMIA, WITH LONG-TERM CURRENT USE OF INSULIN (H): ICD-10-CM

## 2025-04-23 DIAGNOSIS — Z79.4 TYPE 2 DIABETES MELLITUS WITH HYPERGLYCEMIA, WITH LONG-TERM CURRENT USE OF INSULIN (H): ICD-10-CM

## 2025-04-23 RX ORDER — INSULIN ASPART 100 [IU]/ML
INJECTION, SUSPENSION SUBCUTANEOUS
Qty: 15 ML | Refills: 3 | OUTPATIENT
Start: 2025-04-23

## 2025-04-29 RX ORDER — CALCIUM CARBONATE 500 MG/1
TABLET, CHEWABLE ORAL DAILY PRN
Qty: 30 TABLET | Refills: 5 | OUTPATIENT
Start: 2025-04-29

## 2025-04-29 NOTE — TELEPHONE ENCOUNTER
Writer attempt # 3 to call patient regarding Refill RN's message below. No answer,unable to leave VM.    If patient returns call back, please review Refill RN's message with patient. Obtain patient responses, and route to the prescribing provider as needed. Thanks!    Third attempt, refusing medication since unable to obtain clarification from patient.    JOSE PalmerN, RN, PHN   [facility]

## 2025-04-30 ENCOUNTER — OFFICE VISIT (OUTPATIENT)
Dept: PHARMACY | Facility: CLINIC | Age: 65
End: 2025-04-30
Payer: COMMERCIAL

## 2025-04-30 VITALS
HEART RATE: 75 BPM | BODY MASS INDEX: 32.4 KG/M2 | SYSTOLIC BLOOD PRESSURE: 128 MMHG | WEIGHT: 157.75 LBS | OXYGEN SATURATION: 98 % | DIASTOLIC BLOOD PRESSURE: 68 MMHG

## 2025-04-30 DIAGNOSIS — R12 HEARTBURN: ICD-10-CM

## 2025-04-30 DIAGNOSIS — G89.29 OTHER CHRONIC PAIN: ICD-10-CM

## 2025-04-30 DIAGNOSIS — E78.5 HYPERLIPIDEMIA, UNSPECIFIED HYPERLIPIDEMIA TYPE: ICD-10-CM

## 2025-04-30 DIAGNOSIS — I10 ESSENTIAL HYPERTENSION: ICD-10-CM

## 2025-04-30 DIAGNOSIS — K59.00 CONSTIPATION, UNSPECIFIED CONSTIPATION TYPE: ICD-10-CM

## 2025-04-30 DIAGNOSIS — Z79.4 TYPE 2 DIABETES MELLITUS WITH HYPERGLYCEMIA, WITH LONG-TERM CURRENT USE OF INSULIN (H): Primary | ICD-10-CM

## 2025-04-30 DIAGNOSIS — F33.1 MAJOR DEPRESSIVE DISORDER, RECURRENT EPISODE, MODERATE (H): ICD-10-CM

## 2025-04-30 DIAGNOSIS — E11.65 TYPE 2 DIABETES MELLITUS WITH HYPERGLYCEMIA, WITH LONG-TERM CURRENT USE OF INSULIN (H): Primary | ICD-10-CM

## 2025-04-30 DIAGNOSIS — E55.9 VITAMIN D DEFICIENCY: ICD-10-CM

## 2025-04-30 PROCEDURE — 3078F DIAST BP <80 MM HG: CPT | Performed by: PHARMACIST

## 2025-04-30 PROCEDURE — 99606 MTMS BY PHARM EST 15 MIN: CPT | Performed by: PHARMACIST

## 2025-04-30 PROCEDURE — 99607 MTMS BY PHARM ADDL 15 MIN: CPT | Performed by: PHARMACIST

## 2025-04-30 PROCEDURE — 3074F SYST BP LT 130 MM HG: CPT | Performed by: PHARMACIST

## 2025-04-30 RX ORDER — FAMOTIDINE 10 MG
10 TABLET ORAL 2 TIMES DAILY PRN
Qty: 60 TABLET | Refills: 3 | Status: SHIPPED | OUTPATIENT
Start: 2025-04-30

## 2025-04-30 RX ORDER — CALCIUM CARBONATE 500 MG/1
1 TABLET, CHEWABLE ORAL DAILY PRN
Qty: 30 TABLET | Refills: 5 | Status: SHIPPED | OUTPATIENT
Start: 2025-04-30

## 2025-04-30 RX ORDER — PSEUDOEPHED/ACETAMINOPH/DIPHEN 30MG-500MG
500 TABLET ORAL EVERY 6 HOURS PRN
Qty: 100 TABLET | Refills: 5 | Status: SHIPPED | OUTPATIENT
Start: 2025-04-30

## 2025-04-30 NOTE — Clinical Note
You see her in June for lab recheck, could consider rechecking her vit D to see if back in normal range since decreasing supplement. I did not change DM meds today as they were recently changed and blood sugar overall stable.   Her biggest concern was heartburn, she has not been taking the famotidine, I restarted this today. If still not controlled, may need to switch to PPI in future (though not my preferred option).   Ajay

## 2025-04-30 NOTE — PROGRESS NOTES
Medication Therapy Management (MTM) Encounter    ASSESSMENT:                            Medication Adherence/Access: No issues identified.    1. Type 2 diabetes mellitus with hyperglycemia, with long-term current use of insulin (H) (Primary)  A1c not meeting goal <7%.  Dose of metformin and insulin recently increased, overall blood sugars have improved, no changes recommended today.  If patient experiencing more frequent hypoglycemia, recommended she reach out to MTM pharmacist.  Reiterated today the importance of diet and exercise adjustments for blood sugar control.    2. Hypertension  BP at goal <130/80 mmHg. No medication changes recommended today.     3. Hyperlipidemia, unspecified hyperlipidemia type  LDL at goal <100 mg/dL, no changes recommended.    4. Constipation, unspecified constipation type  Stable on current therapy, no changes recommended.    5. Heartburn  Patient would benefit from resuming H2 blocker on a consistent basis, has not been taking for quite some time.  Refill sent to pharmacy today.    6. Vitamin D deficiency  Recommend checking vitamin D to assess efficacy of current supplement.  Not checked today as no other labs due.     7. Major depressive disorder, recurrent episode, moderate (H)  Stable, no changes recommended.    8. Chronic Pain  Stable, refill sent for acetaminophen today.    PLAN:                            RESTART famotidine 20 mg twice daily - refill sent today    Medication issues to be addressed at a future visit:    Labs: A1c, Vit D    Follow-up: Return in about 3 months (around 7/28/2025) for With PharmD.    SUBJECTIVE/OBJECTIVE:                          Ramona Wilder is a 64 year old female seen for a follow-up visit. Patient was accompanied by daughter.  (ID# [id number]) was used during today's visit.       Reason for visit: MTM follow up.    Allergies/ADRs: Reviewed in chart  Past Medical History: Reviewed in chart  Tobacco: She reports that she has never smoked. She  has been exposed to tobacco smoke. She quit smokeless tobacco use about 19 months ago.  Her smokeless tobacco use included chew.  Alcohol: none    Medication Adherence/Access: Patient taking medications out of pillbox, that daughter-in-law is setting up for her. Reports she rarely misses doses, maybe once every 2 weeks.   Brings with medications and twice daily pillbox today (set up correctly).     Diabetes   Metformin  mg 2 tablets in the morning and 1 tablet in the evening     Invokana 300 mg daily  Novolog 70/30 Mix insulin 36 units AM and 16 unit PM 5-10 minutes before meals   Patient is not experiencing side effects.   States that her blood sugar is strongly correlated to the food she is eating. If eating high carb or high sugar, it will be high. If small meal, blood sugar might be low. No issues with appetite lately.   Current diabetes symptoms: None. Hypoglycemia if blood sugar in 70's - Corrects with 1/2 cup juice when low  Diet/Exercise: Eating 2 meals per day, with snack of fruit in middle of the day. Eating brown rice instead of white rice.    MedHx: metformin 2000 mg daily (GI sx), Victoza (gassy/bloating), Ozempic (has declined retrial)     Blood sugar monitoring: Fingerstick - tried Libre2, but prefers traditional monitoring.  Date FBG/ 2hours post Around 5:30 pm - before dinner   4/30 88    4/29/2025 97 76   4/28/2025 140 159   4/27/2025 150 201   4/26/2025 112 148   4/25/2025 112 299   4/24/2025 187 349   Eye exam is up to date  Foot exam: due    Hypertension   Hydrochlorothiazide 25 mg daily in the morning  Losartan 100 mg daily in the evening  Amlodipine 5 mg daily   Patient reports no current medication side effects.   Patient does self-monitor blood pressure.     Hyperlipidemia   Rosuvastatin 40 mg daily  AM  Patient reports no significant myalgias or other side effects.  The 10-year ASCVD risk score (Rishi GOFF, et al., 2019) is: 10.2%    Values used to calculate the score:      Age: 64  years      Sex: Female      Is Non- : No      Diabetic: Yes      Tobacco smoker: No      Systolic Blood Pressure: 128 mmHg      Is BP treated: Yes      HDL Cholesterol: 64 mg/dL      Total Cholesterol: 188 mg/dL     Constipation   Senna-docusate 8.5-50mg 1-2 tabs 2 times daily  - patient taking as needed  Patient is having bowel movements every 1-2 days.    Patient feels that current therapy is not effective.       GERD    Tums 500 mg daily as needed - taking 2-3 times per week, requesting refill  Famotidine 10mg 2 times daily prn - NOT taking  Has been having heartburn in her chest and throat. Feels this all the time, constant for 2 weeks.   Notes that when she was using the Tums, it causes constipation, therefore she has only been using this sparingly. Agrees to retrial of famotidine.       Supplements   Vitamin D 1000 units once daily - dose decreased 11/2024     Mental Health   Escitalopram 10 mg daily  States that she is happy with her mood lately and her current med.   Did report that her sleep has improved.      Pain  Requesting refill of acetaminophen.       Today's Vitals: /68   Pulse 75   Wt 157 lb 12 oz (71.6 kg)   LMP  (LMP Unknown)   SpO2 98%   BMI 32.40 kg/m    ----------------      I spent 30 minutes with this patient today. All changes were made via collaborative practice agreement with Coreen Kendall MD.     A summary of these recommendations was given to the patient.    Heather Morales, PharmD, BCACP  Medication Therapy Management Pharmacist     Medication Therapy Recommendations  Heartburn   1 Current Medication: famotidine (PEPCID) 10 MG tablet   Current Medication Sig: Take 1 tablet (10 mg) by mouth 2 times daily as needed (heartburn).   Rationale: Synergistic therapy - Needs additional medication therapy - Indication   Recommendation: Start Medication   Status: Accepted per CPA   Identified Date: 4/30/2025 Completed Date: 4/30/2025

## 2025-04-30 NOTE — PATIENT INSTRUCTIONS
"Recommendations from today's MTM visit:                                                    MTM (medication therapy management) is a service provided by a clinical pharmacist designed to help you get the most of out of your medicines.        RESTART famotidine 20 mg twice daily - refill sent today    Follow-up: Return in about 3 months (around 7/28/2025) for With PharmD.    It was great speaking with you today.  I value your experience and would be very thankful for your time in providing feedback in our clinic survey. In the next few days, you may receive an email or text message from [employer] with a link to a survey related to your  clinical pharmacist.\"     To schedule another MTM appointment, please call the clinic directly or you may call the MTM scheduling line at [phone].    My Clinical Pharmacist's contact information:                                                      Please feel free to contact me with any questions or concerns you have.      Heather Morales, PharmD, BCACP  Medication Therapy Management Pharmacist    "

## 2025-05-09 ENCOUNTER — TRANSFERRED RECORDS (OUTPATIENT)
Dept: HEALTH INFORMATION MANAGEMENT | Facility: CLINIC | Age: 65
End: 2025-05-09
Payer: COMMERCIAL

## 2025-06-03 ENCOUNTER — OFFICE VISIT (OUTPATIENT)
Dept: FAMILY MEDICINE | Facility: CLINIC | Age: 65
End: 2025-06-03
Attending: FAMILY MEDICINE
Payer: COMMERCIAL

## 2025-06-03 VITALS
HEIGHT: 59 IN | SYSTOLIC BLOOD PRESSURE: 138 MMHG | BODY MASS INDEX: 31.06 KG/M2 | HEART RATE: 84 BPM | TEMPERATURE: 97.6 F | OXYGEN SATURATION: 99 % | WEIGHT: 154.08 LBS | RESPIRATION RATE: 20 BRPM | DIASTOLIC BLOOD PRESSURE: 84 MMHG

## 2025-06-03 DIAGNOSIS — I10 ESSENTIAL HYPERTENSION: ICD-10-CM

## 2025-06-03 DIAGNOSIS — E55.9 VITAMIN D DEFICIENCY: ICD-10-CM

## 2025-06-03 DIAGNOSIS — E11.65 TYPE 2 DIABETES MELLITUS WITH HYPERGLYCEMIA, WITH LONG-TERM CURRENT USE OF INSULIN (H): Primary | ICD-10-CM

## 2025-06-03 DIAGNOSIS — Z79.4 TYPE 2 DIABETES MELLITUS WITH HYPERGLYCEMIA, WITH LONG-TERM CURRENT USE OF INSULIN (H): Primary | ICD-10-CM

## 2025-06-03 DIAGNOSIS — F33.1 MAJOR DEPRESSIVE DISORDER, RECURRENT EPISODE, MODERATE (H): ICD-10-CM

## 2025-06-03 LAB
EST. AVERAGE GLUCOSE BLD GHB EST-MCNC: 160 MG/DL
GLUCOSE BLD-MCNC: 372 MG/DL (ref 60–99)
HBA1C MFR BLD: 7.2 % (ref 0–5.6)

## 2025-06-03 PROCEDURE — 82947 ASSAY GLUCOSE BLOOD QUANT: CPT | Performed by: FAMILY MEDICINE

## 2025-06-03 PROCEDURE — 99214 OFFICE O/P EST MOD 30 MIN: CPT | Performed by: FAMILY MEDICINE

## 2025-06-03 PROCEDURE — 83036 HEMOGLOBIN GLYCOSYLATED A1C: CPT | Performed by: FAMILY MEDICINE

## 2025-06-03 PROCEDURE — 3079F DIAST BP 80-89 MM HG: CPT | Performed by: FAMILY MEDICINE

## 2025-06-03 PROCEDURE — 3051F HG A1C>EQUAL 7.0%<8.0%: CPT | Performed by: FAMILY MEDICINE

## 2025-06-03 PROCEDURE — 3075F SYST BP GE 130 - 139MM HG: CPT | Performed by: FAMILY MEDICINE

## 2025-06-03 PROCEDURE — 36415 COLL VENOUS BLD VENIPUNCTURE: CPT | Performed by: FAMILY MEDICINE

## 2025-06-03 PROCEDURE — 82306 VITAMIN D 25 HYDROXY: CPT | Performed by: FAMILY MEDICINE

## 2025-06-03 PROCEDURE — G2211 COMPLEX E/M VISIT ADD ON: HCPCS | Performed by: FAMILY MEDICINE

## 2025-06-03 ASSESSMENT — PATIENT HEALTH QUESTIONNAIRE - PHQ9
SUM OF ALL RESPONSES TO PHQ QUESTIONS 1-9: 4
SUM OF ALL RESPONSES TO PHQ QUESTIONS 1-9: 4
10. IF YOU CHECKED OFF ANY PROBLEMS, HOW DIFFICULT HAVE THESE PROBLEMS MADE IT FOR YOU TO DO YOUR WORK, TAKE CARE OF THINGS AT HOME, OR GET ALONG WITH OTHER PEOPLE: NOT DIFFICULT AT ALL

## 2025-06-03 NOTE — PATIENT INSTRUCTIONS
Results for orders placed or performed in visit on 06/03/25   Hemoglobin A1c   Result Value Ref Range    Estimated Average Glucose 160 (H) <117 mg/dL    Hemoglobin A1C 7.2 (H) 0.0 - 5.6 %   Glucose, whole blood   Result Value Ref Range    Glucose Whole Blood 372 (H) 60 - 99 mg/dL

## 2025-06-03 NOTE — PROGRESS NOTES
"  Assessment & Plan     Type 2 diabetes mellitus with hyperglycemia, with long-term current use of insulin (H)  - Overall overage of blood sugars good with A1C 7.2, but glucometer shows hypoglycemia a couple of time per month.  Pt reports these lows explained by days of more exertion.  - Blood sugar levels show variability, with some readings in the hundreds and occasional lows, such as 50 on May 13, 2025, and 58 at 2:47 AM a few days ago. A1c is 7.2, indicating overall control is not too high.   - Recommend that patient halve insulin dose on a day that she knows she will be very physically active to prevent hypoglycemia.  -  Carefully considered adjusting meds today, but Patient feels like she can correct low blood sugars by eating, and really doesn't feel well when blood sugars high.   Although A1C was pretty good at 7.2, random glucose today was very high at 372.   Given that she has good awareness of hypoglycemia, will continue current regimen with exception as above.    Vitamin D deficiency  - Hx low Vit D.  Currently taking Vit D 1,000 international unit(s) daily (has pill bottle)  -  Vitamin D levels are being tested to ensure the current supplement dosage is appropriate.  - Await vitamin D test results to confirm appropriate supplementation.    Essential hypertension  - Blood pressure is currently stable with the current medication regimen.  - Continue current blood pressure medications.    Major depressive disorder, recurrent episode, moderate (H)  - Mood is stable with current medication.  - Continue current medication for depression.    Consent was obtained from the patient to use an AI documentation tool in the creation of this note.          BMI  Estimated body mass index is 31.65 kg/m  as calculated from the following:    Height as of this encounter: 1.486 m (4' 10.5\").    Weight as of this encounter: 69.9 kg (154 lb 1.3 oz).       The longitudinal plan of care for the diagnosis(es)/condition(s) as " documented were addressed during this visit. Due to the added complexity in care, I will continue to support Ramona in the subsequent management and with ongoing continuity of care.    Follow-up    Follow-up Visit   Expected date:  Oct 03, 2025 (Approximate)      Follow Up Appointment Details:     Follow-up with whom?: Me    Follow-Up for what?: Chronic Disease f/u    Chronic Disease f/u: Diabetes    How?: In Person                 Subjective   Ramona is a 65 year old, presenting for the following health issues:  Diabetes (Itchy when urinate)        6/3/2025     4:05 PM   Additional Questions   Roomed by paw p   Accompanied by daughter in law     History of Present Illness       Diabetes:   She presents for follow up of diabetes.  She is checking home blood glucose two times daily.   She checks blood glucose before meals.  Blood glucose is sometimes over 200 and sometimes under 70. She is aware of hypoglycemia symptoms including shakiness, dizziness, weakness, blurred vision and confusion.   She is concerned about frequent infections.   She is having blurry vision and weight gain.            Ramona Wilder, 65 years, is here for a diabetes follow-up. She reports taking all her prescribed medications, including insulin. Her insulin regimen consists of 36 units in the morning and 16 units in the evening. She has experienced fluctuations in her blood sugar levels, with recent readings mostly in the hundreds, but some as low as 50 and as high as 200-300. On May 13th, she had a blood sugar reading of 50 in the evening, which she attributes to increased exercise. She also had a reading of 58 at 2:47 AM a few days ago, but she does not recall the specific circumstances. She reports that high blood sugar levels cause her urine to irritate her skin. She feels capable of managing low blood sugar episodes when they occur and is open to adjusting her insulin dosage based on her activity level.    Misc:  Ramona Wilder is taking a vitamin D  "supplement, and her mood has been stable while on medication for depression. She uses a constipation medication as needed, which she does not take daily.                  Objective    /84   Pulse 84   Temp 97.6  F (36.4  C) (Oral)   Resp 20   Ht 1.486 m (4' 10.5\")   Wt 69.9 kg (154 lb 1.3 oz)   LMP  (LMP Unknown)   SpO2 99%   BMI 31.65 kg/m    Body mass index is 31.65 kg/m .  Physical Exam   Gen:  A&A, NAD  CV:  HRRR, no M/R/G  Resp:  CTAB  Ext:  W&D, no edema      Results for orders placed or performed in visit on 06/03/25   Hemoglobin A1c   Result Value Ref Range    Estimated Average Glucose 160 (H) <117 mg/dL    Hemoglobin A1C 7.2 (H) 0.0 - 5.6 %   Glucose, whole blood   Result Value Ref Range    Glucose Whole Blood 372 (H) 60 - 99 mg/dL                Signed Electronically by: Coreen Kendall MD    "

## 2025-06-04 LAB — VIT D+METAB SERPL-MCNC: 36 NG/ML (ref 20–50)

## 2025-06-09 ENCOUNTER — RESULTS FOLLOW-UP (OUTPATIENT)
Dept: FAMILY MEDICINE | Facility: CLINIC | Age: 65
End: 2025-06-09

## 2025-07-28 ENCOUNTER — OFFICE VISIT (OUTPATIENT)
Dept: PHARMACY | Facility: CLINIC | Age: 65
End: 2025-07-28
Payer: COMMERCIAL

## 2025-07-28 VITALS
BODY MASS INDEX: 31.02 KG/M2 | OXYGEN SATURATION: 96 % | WEIGHT: 151 LBS | SYSTOLIC BLOOD PRESSURE: 126 MMHG | DIASTOLIC BLOOD PRESSURE: 68 MMHG | HEART RATE: 77 BPM

## 2025-07-28 DIAGNOSIS — E78.5 HYPERLIPIDEMIA, UNSPECIFIED HYPERLIPIDEMIA TYPE: ICD-10-CM

## 2025-07-28 DIAGNOSIS — Z79.4 TYPE 2 DIABETES MELLITUS WITH HYPERGLYCEMIA, WITH LONG-TERM CURRENT USE OF INSULIN (H): ICD-10-CM

## 2025-07-28 DIAGNOSIS — R12 HEARTBURN: ICD-10-CM

## 2025-07-28 DIAGNOSIS — E11.65 TYPE 2 DIABETES MELLITUS WITH HYPERGLYCEMIA, WITH LONG-TERM CURRENT USE OF INSULIN (H): ICD-10-CM

## 2025-07-28 DIAGNOSIS — E55.9 VITAMIN D DEFICIENCY: ICD-10-CM

## 2025-07-28 DIAGNOSIS — I10 ESSENTIAL HYPERTENSION: ICD-10-CM

## 2025-07-28 DIAGNOSIS — K59.00 CONSTIPATION, UNSPECIFIED CONSTIPATION TYPE: Primary | ICD-10-CM

## 2025-07-28 DIAGNOSIS — F33.1 MAJOR DEPRESSIVE DISORDER, RECURRENT EPISODE, MODERATE (H): ICD-10-CM

## 2025-07-28 PROCEDURE — 99606 MTMS BY PHARM EST 15 MIN: CPT | Performed by: PHARMACIST

## 2025-07-28 PROCEDURE — 3074F SYST BP LT 130 MM HG: CPT | Performed by: PHARMACIST

## 2025-07-28 PROCEDURE — 3078F DIAST BP <80 MM HG: CPT | Performed by: PHARMACIST

## 2025-07-28 RX ORDER — VITAMIN B COMPLEX
1 TABLET ORAL DAILY
Qty: 90 TABLET | Refills: 3 | Status: SHIPPED | OUTPATIENT
Start: 2025-07-28

## 2025-07-28 RX ORDER — CALCIUM CARBONATE 500 MG/1
1 TABLET, CHEWABLE ORAL DAILY PRN
Qty: 30 TABLET | Refills: 11 | Status: SHIPPED | OUTPATIENT
Start: 2025-07-28

## 2025-07-28 RX ORDER — FAMOTIDINE 10 MG
10 TABLET ORAL 2 TIMES DAILY PRN
Qty: 60 TABLET | Refills: 11 | Status: SHIPPED | OUTPATIENT
Start: 2025-07-28

## 2025-07-28 RX ORDER — AMOXICILLIN 250 MG
1-2 CAPSULE ORAL DAILY PRN
Qty: 60 TABLET | Refills: 11 | Status: SHIPPED | OUTPATIENT
Start: 2025-07-28

## 2025-07-28 NOTE — PATIENT INSTRUCTIONS
"Recommendations from today's MTM visit:                                                    MTM (medication therapy management) is a service provided by a clinical pharmacist designed to help you get the most of out of your medicines.      Refill at pharmacy: Triamcinolone     Follow-up: Return in about 18 weeks (around 12/1/2025) for With PharmD.    It was great speaking with you today.  I value your experience and would be very thankful for your time in providing feedback in our clinic survey. In the next few days, you may receive an email or text message from [employer] with a link to a survey related to your  clinical pharmacist.\"     To schedule another MTM appointment, please call the clinic directly or you may call the MTM scheduling line at [phone].    My Clinical Pharmacist's contact information:                                                      Please feel free to contact me with any questions or concerns you have.      Heather Morales, PharmD, [facility]  Medication Therapy Management Pharmacist    "

## 2025-07-28 NOTE — PROGRESS NOTES
Medication Therapy Management (MTM) Encounter    ASSESSMENT:                            Medication Adherence/Access: No issues identified.    1. Type 2 diabetes mellitus with hyperglycemia, with long-term current use of insulin (H)  A1c not at goal <7%, though very close and much improved from previously.  No medication changes recommended today, could consider dose decrease of insulin at next visit pending blood sugars.    2. Hypertension  BP at goal <130/80 mmHg. No medication changes recommended today.     3. Hyperlipidemia, unspecified hyperlipidemia type  LDL at goal <100 mg/dL, no changes recommended.    4. Constipation, unspecified constipation type (Primary)  Stable, refill sent today.    5. Heartburn  Stable, refill sent today.    6. Vitamin D deficiency  Stable, refill sent today.  Last vitamin D was within normal limits.    7. Major depressive disorder, recurrent episode, moderate (H)  Stable.    PLAN:                            - No medication changes made today  - Patient to request refill of triamcinolone from retail pharmacy    Follow-up: Return in about 18 weeks (around 12/1/2025) for With PharmD.    SUBJECTIVE/OBJECTIVE:                          Ramona Wilder is a 65 year old female seen for a follow-up visit. Patient was accompanied by daughter in law.  (ID# [id number]) was used during today's visit.       Reason for visit: MTM follow up .    Allergies/ADRs: Reviewed in chart  Past Medical History: Reviewed in chart  Tobacco: She reports that she has never smoked. She has been exposed to tobacco smoke. She quit smokeless tobacco use about 22 months ago.  Her smokeless tobacco use included chew.  Alcohol: none    Medication Adherence/Access: Patient taking medications out of pillbox, that daughter-in-law is setting up for her. Reports she rarely misses doses, maybe once every 2 weeks.   Brings with medications and twice daily pillbox today (set up correctly).     Diabetes   Metformin  mg 2  tablets in the morning and 1 tablet in the evening     Invokana 300 mg daily  Novolog 70/30 Mix insulin 36 units AM and 16 unit PM 5-10 minutes before meals   Patient is not experiencing side effects.   Current diabetes symptoms: None. Hypoglycemia if blood sugar in 70's and tachycardia - Corrects with 1/2 cup juice when low  Diet/Exercise: Eating 2 meals per day, with snack of fruit in middle of the day. Eating brown rice instead of white rice.    MedHx: metformin 2000 mg daily (GI sx), Victoza (gassy/bloating), Ozempic (has declined retrial)     Blood sugar monitoring: Fingerstick - tried Libre2, but prefers traditional monitoring.  Date FBG/ 2hours post Around 5:30 pm - before dinner   7/28 83    7/27 107 152   7/26 137 149   7/25 133 238   7/24 116 313   7/23 100 154   7/22 119    Eye exam is up to date  Foot exam: due    Hypertension   Hydrochlorothiazide 25 mg daily in the morning  Losartan 100 mg daily in the evening  Amlodipine 5 mg daily   Patient reports no current medication side effects.   Patient does self-monitor blood pressure.     Hyperlipidemia   Rosuvastatin 40 mg daily  AM  Patient reports no significant myalgias or other side effects.     Constipation   Senna-docusate 8.5-50mg 1-2 tabs 2 times daily as needed  Patient is having bowel movements every 1-2 days.    Patient feels that current therapy is not effective.       GERD    Tums 500 mg daily as needed   Famotidine 10mg 2 times daily prn   Both medications are helpful for her.       Supplements   Vitamin D 1000 units once daily - requesting refill     Mental Health   Escitalopram 10 mg daily  States that she is happy with her mood lately and her current med.      Today's Vitals: /68   Pulse 77   Wt 151 lb (68.5 kg)   LMP  (LMP Unknown)   SpO2 96%   BMI 31.02 kg/m    ----------------      I spent 30 minutes with this patient today. All changes were made via collaborative practice agreement with Coreen Kendall MD.     A summary of  these recommendations was given to the patient.    Heather Morales, PharmD, [facility]  Medication Therapy Management Pharmacist     Medication Therapy Recommendations  No medication therapy recommendations to display

## 2025-08-24 DIAGNOSIS — K62.89 ANAL OR RECTAL PAIN: ICD-10-CM

## 2025-08-25 RX ORDER — CYCLOBENZAPRINE HCL 5 MG
5-10 TABLET ORAL 3 TIMES DAILY PRN
Qty: 60 TABLET | Refills: 5 | Status: SHIPPED | OUTPATIENT
Start: 2025-08-25

## 2025-08-27 DIAGNOSIS — L30.9 DERMATITIS: ICD-10-CM

## 2025-08-27 RX ORDER — TRIAMCINOLONE ACETONIDE 1 MG/G
OINTMENT TOPICAL
Qty: 80 G | Refills: 0 | Status: SHIPPED | OUTPATIENT
Start: 2025-08-27

## (undated) DEVICE — ESU GROUND PAD ADULT W/CORD E7507

## (undated) DEVICE — LINEN TOWEL PACK X5 5464

## (undated) DEVICE — ESU SUCTION CAUTERY 10FR FOOT CONTROL E2505-10FR

## (undated) DEVICE — ESU CORD BIPOLAR GREEN 10-4000

## (undated) DEVICE — Device

## (undated) DEVICE — ESU PENCIL SMOKE EVAC W/ROCKER SWITCH 0703-047-000

## (undated) RX ORDER — PROPOFOL 10 MG/ML
INJECTION, EMULSION INTRAVENOUS
Status: DISPENSED
Start: 2025-02-27

## (undated) RX ORDER — OXYMETAZOLINE HYDROCHLORIDE 0.05 G/100ML
SPRAY NASAL
Status: DISPENSED
Start: 2025-02-27

## (undated) RX ORDER — APREPITANT 40 MG/1
CAPSULE ORAL
Status: DISPENSED
Start: 2025-02-27

## (undated) RX ORDER — LIDOCAINE HYDROCHLORIDE AND EPINEPHRINE 10; 10 MG/ML; UG/ML
INJECTION, SOLUTION INFILTRATION; PERINEURAL
Status: DISPENSED
Start: 2025-02-27

## (undated) RX ORDER — ACETAMINOPHEN 325 MG/1
TABLET ORAL
Status: DISPENSED
Start: 2025-02-27

## (undated) RX ORDER — CEFAZOLIN SODIUM/WATER 2 G/20 ML
SYRINGE (ML) INTRAVENOUS
Status: DISPENSED
Start: 2025-02-27

## (undated) RX ORDER — FENTANYL CITRATE 50 UG/ML
INJECTION, SOLUTION INTRAMUSCULAR; INTRAVENOUS
Status: DISPENSED
Start: 2025-02-27

## (undated) RX ORDER — HYDROMORPHONE HYDROCHLORIDE 1 MG/ML
INJECTION, SOLUTION INTRAMUSCULAR; INTRAVENOUS; SUBCUTANEOUS
Status: DISPENSED
Start: 2025-02-27

## (undated) RX ORDER — DEXAMETHASONE SODIUM PHOSPHATE 4 MG/ML
INJECTION, SOLUTION INTRA-ARTICULAR; INTRALESIONAL; INTRAMUSCULAR; INTRAVENOUS; SOFT TISSUE
Status: DISPENSED
Start: 2025-02-27

## (undated) RX ORDER — ONDANSETRON 2 MG/ML
INJECTION INTRAMUSCULAR; INTRAVENOUS
Status: DISPENSED
Start: 2025-02-27

## (undated) RX ORDER — CHLORHEXIDINE GLUCONATE ORAL RINSE 1.2 MG/ML
SOLUTION DENTAL
Status: DISPENSED
Start: 2025-02-27